# Patient Record
Sex: MALE | Race: WHITE | NOT HISPANIC OR LATINO | Employment: FULL TIME | URBAN - METROPOLITAN AREA
[De-identification: names, ages, dates, MRNs, and addresses within clinical notes are randomized per-mention and may not be internally consistent; named-entity substitution may affect disease eponyms.]

---

## 2017-05-16 ENCOUNTER — TELEPHONE (OUTPATIENT)
Dept: TRANSPLANT | Facility: CLINIC | Age: 32
End: 2017-05-16

## 2017-05-16 NOTE — TELEPHONE ENCOUNTER
Andres-  We have nothing since last August on him. We need at least updated labs (if recently done by nep, that's ok to use). 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   After making sure those are OK, it's fine to do letter.

## 2017-05-16 NOTE — TELEPHONE ENCOUNTER
"Patient contacted, concerns addressed.  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  Patient advised this writer will look into it.  Patient is due for annual visit in 7/2017.  Last visit on 8/8/16.  Patient reporting he needs the letter by tomorrow.  This writer explained there is no guarantee that it will be completed today as other patient issues may supercede this request.  He verbalized "okay".     "

## 2017-05-16 NOTE — TELEPHONE ENCOUNTER
----- Message from Yayo Mg sent at 5/16/2017 12:35 PM CDT -----  Contact: patient   Need to get a release form sent to him he's applying to a new job please call

## 2017-05-17 ENCOUNTER — TELEPHONE (OUTPATIENT)
Dept: TRANSPLANT | Facility: CLINIC | Age: 32
End: 2017-05-17

## 2017-05-17 NOTE — TELEPHONE ENCOUNTER
Patient completed labs at [facility]. No results available at this time, patient contacted and advised.  He verbalized understanding.  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

## 2017-05-17 NOTE — TELEPHONE ENCOUNTER
Received transferred call, patient requesting clearance letter for a new job.  Per Dr. Armstrong, patient requires labs.  Patient request labs be sent to PCP office.  Faxed per request.

## 2017-05-17 NOTE — TELEPHONE ENCOUNTER
----- Message from Ghanshyam Cabezas sent at 5/17/2017  9:29 AM CDT -----  Contact: pt  Need to get a release form sent to him he's applying to a new job please call   [phone] (pt)  [phone] (f)

## 2017-05-18 ENCOUNTER — DOCUMENTATION ONLY (OUTPATIENT)
Dept: TRANSPLANT | Facility: CLINIC | Age: 32
End: 2017-05-18

## 2017-05-18 ENCOUNTER — TELEPHONE (OUTPATIENT)
Dept: TRANSPLANT | Facility: CLINIC | Age: 32
End: 2017-05-18

## 2017-05-18 LAB
EXT ALBUMIN: 4.6
EXT ALKALINE PHOSPHATASE: 62
EXT ALT: 35
EXT AST: 78
EXT BILIRUBIN DIRECT: 0.19 MG/DL
EXT BILIRUBIN TOTAL: 0.7
EXT BUN: 10
EXT CALCIUM: 9.8
EXT CHLORIDE: 105
EXT CO2: 20
EXT CREATININE: 1.34 MG/DL
EXT EOSINOPHIL%: 2
EXT GFR MDRD AF AMER: 79
EXT GFR MDRD NON AF AMER: 69
EXT GLUCOSE: 80
EXT HEMATOCRIT: 42.9
EXT HEMOGLOBIN: 14.4
EXT LYMPH%: 18
EXT MAGNESIUM: 1.8
EXT MONOCYTES%: 6
EXT PHOSPHORUS: 3.1
EXT PLATELETS: 225
EXT POTASSIUM: 4.2
EXT PROT/CREAT RATIO UR: NORMAL
EXT PROTEIN TOTAL: 7.3
EXT SEGS%: 73
EXT SODIUM: 143 MMOL/L
EXT TACROLIMUS LVL: NORMAL
EXT WBC: 6.9

## 2017-05-18 NOTE — PROGRESS NOTES
Results reviewed, and action is needed: AST looks abnormal, but I do not have reference for his lab. Please verify if it is high; If so, he should f/u with PCP.

## 2017-05-18 NOTE — TELEPHONE ENCOUNTER
----- Message from Mercedes Hameed sent at 5/18/2017  8:20 AM CDT -----  Contact: pt  Pt called for lab results, please call back at [phone]

## 2017-05-20 LAB
ALBUMIN SERPL-MCNC: 4.6 G/DL (ref 3.5–5.5)
ALP SERPL-CCNC: 62 IU/L (ref 39–117)
ALT SERPL-CCNC: 35 IU/L (ref 0–44)
AST SERPL-CCNC: 78 IU/L (ref 0–40)
BASOPHILS # BLD AUTO: 0 X10E3/UL (ref 0–0.2)
BASOPHILS NFR BLD AUTO: 1 %
BILIRUB DIRECT SERPL-MCNC: 0.19 MG/DL (ref 0–0.4)
BILIRUB SERPL-MCNC: 0.7 MG/DL (ref 0–1.2)
BUN SERPL-MCNC: 10 MG/DL (ref 6–20)
BUN/CREAT SERPL: 7 (ref 9–20)
CALCIUM SERPL-MCNC: 9.8 MG/DL (ref 8.7–10.2)
CHLORIDE SERPL-SCNC: 105 MMOL/L (ref 96–106)
CO2 SERPL-SCNC: 20 MMOL/L (ref 18–29)
CREAT SERPL-MCNC: 1.34 MG/DL (ref 0.76–1.27)
CREAT UR-MCNC: 187.5 MG/DL
EOSINOPHIL # BLD AUTO: 0.2 X10E3/UL (ref 0–0.4)
EOSINOPHIL NFR BLD AUTO: 2 %
ERYTHROCYTE [DISTWIDTH] IN BLOOD BY AUTOMATED COUNT: 13.7 % (ref 12.3–15.4)
GLUCOSE SERPL-MCNC: 80 MG/DL (ref 65–99)
HCT VFR BLD AUTO: 42.9 % (ref 37.5–51)
HGB BLD-MCNC: 14.4 G/DL (ref 12.6–17.7)
IMM GRANULOCYTES # BLD: 0 X10E3/UL (ref 0–0.1)
IMM GRANULOCYTES NFR BLD: 0 %
LYMPHOCYTES # BLD AUTO: 1.2 X10E3/UL (ref 0.7–3.1)
LYMPHOCYTES NFR BLD AUTO: 18 %
MAGNESIUM SERPL-MCNC: 1.8 MG/DL (ref 1.6–2.3)
MCH RBC QN AUTO: 29.6 PG (ref 26.6–33)
MCHC RBC AUTO-ENTMCNC: 33.6 G/DL (ref 31.5–35.7)
MCV RBC AUTO: 88 FL (ref 79–97)
MONOCYTES # BLD AUTO: 0.4 X10E3/UL (ref 0.1–0.9)
MONOCYTES NFR BLD AUTO: 6 %
NEUTROPHILS # BLD AUTO: 5.1 X10E3/UL (ref 1.4–7)
NEUTROPHILS NFR BLD AUTO: 73 %
PHOSPHATE SERPL-MCNC: 3.1 MG/DL (ref 2.5–4.5)
PLATELET # BLD AUTO: 225 X10E3/UL (ref 150–379)
POTASSIUM SERPL-SCNC: 4.2 MMOL/L (ref 3.5–5.2)
PROT SERPL-MCNC: 7.3 G/DL (ref 6–8.5)
PROT UR-MCNC: 9.9 MG/DL
PROT/CREAT UR: 53 MG/G CREAT (ref 0–200)
RBC # BLD AUTO: 4.86 X10E6/UL (ref 4.14–5.8)
SODIUM SERPL-SCNC: 143 MMOL/L (ref 134–144)
TACROLIMUS BLD-MCNC: 8.7 NG/ML (ref 2–20)
WBC # BLD AUTO: 6.9 X10E3/UL (ref 3.4–10.8)

## 2017-08-30 DIAGNOSIS — Z29.89 NEED FOR PROPHYLACTIC IMMUNOTHERAPY: ICD-10-CM

## 2017-08-30 DIAGNOSIS — Z94.0 LIVING-DONOR KIDNEY TRANSPLANT RECIPIENT: ICD-10-CM

## 2017-08-30 RX ORDER — TACROLIMUS 1 MG/1
CAPSULE ORAL
Qty: 180 CAPSULE | Refills: 11 | Status: SHIPPED | OUTPATIENT
Start: 2017-08-30 | End: 2018-09-06 | Stop reason: SDUPTHER

## 2017-08-30 RX ORDER — MYCOPHENOLATE MOFETIL 250 MG/1
CAPSULE ORAL
Qty: 180 CAPSULE | Refills: 11 | Status: SHIPPED | OUTPATIENT
Start: 2017-08-30 | End: 2018-09-06 | Stop reason: SDUPTHER

## 2018-07-06 ENCOUNTER — DOCUMENTATION ONLY (OUTPATIENT)
Dept: TRANSPLANT | Facility: CLINIC | Age: 33
End: 2018-07-06

## 2018-07-06 LAB
EXT ALBUMIN: NORMAL
EXT ALKALINE PHOSPHATASE: NORMAL
EXT ALT: NORMAL
EXT AMYLASE: NORMAL
EXT ANC: NORMAL
EXT AST: NORMAL
EXT BACTERIA UA: NORMAL
EXT BANDS%: NORMAL
EXT BILIRUBIN DIRECT: NORMAL MG/DL
EXT BILIRUBIN TOTAL: NORMAL
EXT BK VIRUS DNA QN PCR: NORMAL
EXT BK VIRUS DNA QUANT, PCR, URINE: NORMAL
EXT BUN: 11
EXT C PEPTIDE: NORMAL
EXT CALCIUM: NORMAL
EXT CHLORIDE: 104
EXT CHOLESTEROL (LIPID PANEL): NORMAL
EXT CHOLESTEROL: NORMAL
EXT CMV DNA QUANT. BY PCR: NORMAL
EXT CO2: 22
EXT CREATININE: NORMAL MG/DL
EXT CYCLOSPORINE LVL: NORMAL
EXT EBV DNA BY PCR: NORMAL
EXT EBV DNA-COPIES/ML: NORMAL
EXT EOSINOPHIL%: 3
EXT FERRITIN: NORMAL
EXT GFR MDRD AF AMER: NORMAL
EXT GFR MDRD NON AF AMER: NORMAL
EXT GLUCOSE UA: NORMAL
EXT GLUCOSE: NORMAL
EXT HBV DNA QUANT PCR: NORMAL
EXT HCV QUANT: NORMAL
EXT HDL: NORMAL
EXT HEMATOCRIT: 42.8
EXT HEMOGLOBIN A1C: NORMAL
EXT HEMOGLOBIN: 14.2
EXT HEP B S AG: NORMAL
EXT HIV: NORMAL
EXT IMMUNKNOW (STIMULATED): NORMAL
EXT INR: NORMAL
EXT IRON SATURATION: NORMAL
EXT LDH, TOTAL: NORMAL
EXT LDL CHOLESTEROL: NORMAL
EXT LIPASE: NORMAL
EXT LYMPH%: 20
EXT MAGNESIUM: NORMAL
EXT MONOCYTES%: 7
EXT NITRITES UA: NORMAL
EXT PHOSPHORUS: NORMAL
EXT PLATELETS: 202
EXT POTASSIUM: 4.1
EXT PROT/CREAT RATIO UR: NORMAL
EXT PROTEIN TOTAL: 7.4
EXT PROTEIN UA: NORMAL
EXT PT: NORMAL
EXT PTH, INTACT: NORMAL
EXT RBC UA: NORMAL
EXT SEGS%: 70
EXT SERUM IRON: NORMAL
EXT SIROLIMUS LVL: NORMAL
EXT SODIUM: 142 MMOL/L
EXT STOOL CDIFF: NORMAL
EXT STOOL CMV: NORMAL
EXT STOOL CULTURE: NORMAL
EXT STOOL OCP: NORMAL
EXT TACROLIMUS LVL: NORMAL
EXT TIBC: NORMAL
EXT TRIGLYCERIDES: NORMAL
EXT UNSATURATED IRON BINDING CAP.: NORMAL
EXT URIC ACID: NORMAL
EXT URINE CULTURE: NORMAL
EXT VIT D 25 HYDROXY: NORMAL
EXT WBC UA: NORMAL
EXT WBC: 6.8

## 2018-07-09 LAB
ALBUMIN SERPL-MCNC: 4.3 G/DL (ref 3.5–5.5)
ALP SERPL-CCNC: 64 IU/L (ref 39–117)
ALT SERPL-CCNC: 29 IU/L (ref 0–44)
AST SERPL-CCNC: 22 IU/L (ref 0–40)
BASOPHILS # BLD AUTO: 0 X10E3/UL (ref 0–0.2)
BASOPHILS NFR BLD AUTO: 0 %
BILIRUB DIRECT SERPL-MCNC: 0.11 MG/DL (ref 0–0.4)
BILIRUB SERPL-MCNC: 0.4 MG/DL (ref 0–1.2)
BUN SERPL-MCNC: 11 MG/DL (ref 6–20)
BUN/CREAT SERPL: 7 (ref 9–20)
CALCIUM SERPL-MCNC: 9.6 MG/DL (ref 8.7–10.2)
CHLORIDE SERPL-SCNC: 104 MMOL/L (ref 96–106)
CO2 SERPL-SCNC: 22 MMOL/L (ref 20–29)
CREAT SERPL-MCNC: 1.56 MG/DL (ref 0.76–1.27)
CREAT UR-MCNC: 110.8 MG/DL
EGFR IF AFRICAN AMERICAN: 66 ML/MIN/1.73
EOSINOPHIL # BLD AUTO: 0.2 X10E3/UL (ref 0–0.4)
EOSINOPHIL NFR BLD AUTO: 3 %
ERYTHROCYTE [DISTWIDTH] IN BLOOD BY AUTOMATED COUNT: 13.6 % (ref 12.3–15.4)
EST. GFR  (NON AFRICAN AMERICAN): 57 ML/MIN/1.73
GLUCOSE SERPL-MCNC: 104 MG/DL (ref 65–99)
HCT VFR BLD AUTO: 42.8 % (ref 37.5–51)
HGB BLD-MCNC: 14.2 G/DL (ref 13–17.7)
IMM GRANULOCYTES # BLD: 0 X10E3/UL (ref 0–0.1)
IMM GRANULOCYTES NFR BLD: 0 %
LYMPHOCYTES # BLD AUTO: 1.3 X10E3/UL (ref 0.7–3.1)
LYMPHOCYTES NFR BLD AUTO: 20 %
MAGNESIUM SERPL-MCNC: 1.6 MG/DL (ref 1.6–2.3)
MCH RBC QN AUTO: 28.7 PG (ref 26.6–33)
MCHC RBC AUTO-ENTMCNC: 33.2 G/DL (ref 31.5–35.7)
MCV RBC AUTO: 87 FL (ref 79–97)
MONOCYTES # BLD AUTO: 0.5 X10E3/UL (ref 0.1–0.9)
MONOCYTES NFR BLD AUTO: 7 %
NEUTROPHILS # BLD AUTO: 4.7 X10E3/UL (ref 1.4–7)
NEUTROPHILS NFR BLD AUTO: 70 %
PHOSPHATE SERPL-MCNC: 2.8 MG/DL (ref 2.5–4.5)
PLATELET # BLD AUTO: 202 X10E3/UL (ref 150–379)
POTASSIUM SERPL-SCNC: 4.1 MMOL/L (ref 3.5–5.2)
PROT SERPL-MCNC: 7.4 G/DL (ref 6–8.5)
PROT UR-MCNC: 5.6 MG/DL
PROT/CREAT UR: 51 MG/G CREAT (ref 0–200)
RBC # BLD AUTO: 4.94 X10E6/UL (ref 4.14–5.8)
SODIUM SERPL-SCNC: 142 MMOL/L (ref 134–144)
TACROLIMUS BLD-MCNC: 6.4 NG/ML (ref 2–20)
WBC # BLD AUTO: 6.8 X10E3/UL (ref 3.4–10.8)

## 2018-07-11 ENCOUNTER — OFFICE VISIT (OUTPATIENT)
Dept: TRANSPLANT | Facility: CLINIC | Age: 33
End: 2018-07-11
Payer: OTHER GOVERNMENT

## 2018-07-11 VITALS
TEMPERATURE: 98 F | HEIGHT: 72 IN | RESPIRATION RATE: 16 BRPM | BODY MASS INDEX: 31.92 KG/M2 | OXYGEN SATURATION: 99 % | HEART RATE: 68 BPM | DIASTOLIC BLOOD PRESSURE: 90 MMHG | WEIGHT: 235.69 LBS | SYSTOLIC BLOOD PRESSURE: 130 MMHG

## 2018-07-11 DIAGNOSIS — Z29.89 NEED FOR PROPHYLACTIC IMMUNOTHERAPY: Chronic | ICD-10-CM

## 2018-07-11 DIAGNOSIS — D84.9 IMMUNOCOMPROMISED STATE: ICD-10-CM

## 2018-07-11 DIAGNOSIS — N18.30 CKD (CHRONIC KIDNEY DISEASE), STAGE III: Primary | Chronic | ICD-10-CM

## 2018-07-11 DIAGNOSIS — Z94.0 LIVING-DONOR KIDNEY TRANSPLANT RECIPIENT: Chronic | ICD-10-CM

## 2018-07-11 DIAGNOSIS — I10 ESSENTIAL HYPERTENSION: ICD-10-CM

## 2018-07-11 LAB
CREAT UR-MCNC: 54 MG/DL
PROT UR-MCNC: <7 MG/DL
PROT/CREAT RATIO, UR: NORMAL

## 2018-07-11 PROCEDURE — 99214 OFFICE O/P EST MOD 30 MIN: CPT | Mod: S$PBB,,, | Performed by: INTERNAL MEDICINE

## 2018-07-11 PROCEDURE — 99999 PR PBB SHADOW E&M-EST. PATIENT-LVL III: CPT | Mod: PBBFAC,,, | Performed by: INTERNAL MEDICINE

## 2018-07-11 PROCEDURE — 99213 OFFICE O/P EST LOW 20 MIN: CPT | Mod: PBBFAC | Performed by: INTERNAL MEDICINE

## 2018-07-11 PROCEDURE — 84156 ASSAY OF PROTEIN URINE: CPT

## 2018-07-11 NOTE — PROGRESS NOTES
Kidney Post-Transplant Assessment    Referring Physician:   Current Nephrologist: Patricio Abbott    ORGAN: LEFT KIDNEY  Donor Type: living  PHS Increased Risk: no  Cold Ischemia: 19.8 mins  Induction Medications: steroids (prednisone,methylprednisolone,solumedrol,medrol,decadron), campath - alemtuzumab (anti-cd52)    Subjective:     CC:  Reassessment of renal allograft function and management of chronic immunosuppression.    HPI:  Mr. Tripp is a 33 y.o. year old White male who received a living kidney transplant on 7/26/10.  He has CKD stage 3 - GFR 30-59 and his baseline creatinine is between 1.3 to 1.6. He takes mycophenolic acid, prednisone and tacrolimus for maintenance immunosuppression. He denies any recent hospitalizations or ER visits since his previous clinic visit.    No issues to report to me today    No chest pain or SOBV    No claudication    No issues to repor to me today     He feels great and very pleased with the kidney transplant outcome         Current Outpatient Prescriptions on File Prior to Visit   Medication Sig Dispense Refill    mycophenolate (CELLCEPT) 250 mg Cap TAKE 2 CAPSULES BY MOUTH 3 TIMES DAILY. 180 capsule 11    tacrolimus (PROGRAF) 1 MG Cap TAKE 3 CAPSULES BY MOUTH EVERY MORNING, THEN 3 CAPSULES BY MOUTH EVERY EVENING 180 capsule 11     No current facility-administered medications on file prior to visit.         Review of Systems    Skin: no skin rash  CNS; no headaches, blurred vision, seizure, or syncope  ENT: No JVD,  Adenopathies,  nasal congestion. No oral lesions  Cardiac: No chest pain, dyspnea, claudication, edema or palpitations  Respiratory: No SOB, cough, hemoptysis   Gastro-intestinal: No diarrhea, constipation, abdominal pain, nausea, vomit. No ascitis  Genitourinary: no hematuria, dysuria, frequency, frequency  Musculoskeletal: joint pain, arthritis or vasculitic changes  Psych: alert awake, oriented, No cranial nerves deficit.      Objective:       Physical  Exam     BP (!) 130/90 (BP Location: Right arm, Patient Position: Sitting, BP Method: Medium (Automatic))   Pulse 68   Temp 98.1 °F (36.7 °C) (Oral)   Resp 16   Ht 6' (1.829 m)   Wt 106.9 kg (235 lb 10.8 oz)   SpO2 99%   BMI 31.96 kg/m²       Head: normocephalic  Neck: No JVD, cervical axillary, or femoral adenopathies  Heart: no murmurs, Normal s1 and s2, No gallops, no rubs, No murmurs  Lungs; CTA, good respiratory effort, no crackles  Abdomen: soft, non tender, no splenomegaly or hepatomegaly, no massess, no bruits  Extremities: No edema, skin rash, joint pain  SNC: awake, alert oriented. Cranial nerves are intact, no focalized, sensitivity and strength preserved      Labs:  Lab Results   Component Value Date    WBC 6.8 07/05/2018    HGB 14.2 07/05/2018    HCT 42.8 07/05/2018     07/05/2018    K 4.1 07/05/2018     07/05/2018    CO2 22 07/05/2018    BUN 11 07/05/2018    CREATININE 1.56 (H) 07/05/2018    EGFRNONAA 57 (L) 07/05/2018    EGFRIFAFRICA 66 07/05/2018    GLUCOSE 109 07/26/2010    CALCIUM 9.6 07/05/2018    PHOS 2.8 07/05/2018    MG 1.6 07/05/2018    ALBUMIN 4.3 07/05/2018    AST 22 07/05/2018    ALT 29 07/05/2018       Lab Results   Component Value Date    EXTWBC 6.8 07/05/2018    EXTSEGS 70 07/05/2018    EXTPLATELETS 202 07/05/2018    EXTHEMOGLOBI 14.2 07/05/2018    EXTHEMATOCRI 42.8 07/05/2018    EXTCREATININ 1.34 05/17/2017    EXTSODIUM 142 07/05/2018    EXTPOTASSIUM 4.1 07/05/2018    EXTBUN 11 07/05/2018    EXTCO2 22 07/05/2018    EXTCALCIUM 9.8 05/17/2017    EXTPHOSPHORU 3.1 05/17/2017    EXTGLUCOSE 80 05/17/2017    EXTALBUMIN 4.6 05/17/2017    EXTAST 78 05/17/2017    EXTALT 35 05/17/2017    EXTBILITOTAL 0.7 05/17/2017       Lab Results   Component Value Date    EXTTACROLVL pending 05/17/2017    EXTPROTCRE pending 05/17/2017       Labs were reviewed with the patient.    Assessment:     1. CKD (chronic kidney disease), stage III    2. Living-donor kidney transplant recipient -  7/26/10    3. Essential hypertension    4. Chronic immunosuppression with Prograf and Cellcept    5. Immunocompromised state        Plan:     Will repeat pr/cr ratio    Same dose with prograf    MMF at target     Education provided    We discussed briefly interaction with pomegranate: some interaction found and reported to the patient       1. CKD stage: ckd stage 3 with stable creatinine. Will continue follow up with his nephrologist and our transplant clinic. Education provided in appropriate fluid intake, potassium intake. Continue with oral hydration    2. Immunosuppression: will continue with same dose of prograf and MMF for now. Monitor toxicities as per our protocol.  Lab Results   Component Value Date    TACROLIMUS 6.4 07/05/2018      Will closely monitor for toxicities, education provided about adherence to medicines and need to communicate any side effct to the transplant nurse or physician    3. Allograft Function:creatinine at baseline. Encourage fluid intake around 3 lt a day and avoid prolonged exposure to hot weather w/o apprpriate hydration  Lab Results   Component Value Date    CREATININE 1.56 (H) 07/05/2018       4. Hypertension management:  Well controlled at home Continue with home blood pressure monitoring, low salt and healthy life discussed with the patient    5. Metabolic Bone Disease/Secondary Hyperparathyroidism: hillary and phos within the normla parameters , continue with his nephrologist management of elevated PTH calcium and phosphorus as per our center protocol. Will monitor PTH, Vit D level, calcium      Lab Results   Component Value Date     (H) 08/23/2010    CALCIUM 9.6 07/05/2018    PHOS 2.8 07/05/2018       6. Electrolytes: reviewed with the patient, essentially within the normal range no need for acute changes today, will monitor as per our center guidelines     Lab Results   Component Value Date     07/05/2018    K 4.1 07/05/2018     07/05/2018    CO2 22  07/05/2018       7. Anemia: will continue monitoring as per our center guidelines. No indication for acute intervention today     Lab Results   Component Value Date    WBC 6.8 07/05/2018    HGB 14.2 07/05/2018    HCT 42.8 07/05/2018    MCV 87 07/05/2018     07/05/2018       8.Proteinuria: will repeat pr/cr ratio. Specimen last time was not processed correctly.  will continue with pr/cr ratio as per our center guidelines. uable to claculate ratio. No preteina measured. Will repeat in clinic today  Lab Results   Component Value Date    PROTEINURINE 15 08/08/2016    CREATRANDUR 110.8 07/05/2018    UTPCR 0.05 08/08/2016        9. BK virus infection screening: will continue with urine or blood PCR as per our guidelines to prevent BK virus viremia and allograft dysfunction. Last serum pcr in 2013 ws undetectable.   Lab Results   Component Value Date    BKVIRUSDNAUR NEGATIVE 07/17/2013    BKQUANTURINE <250 07/17/2013    BKVIRUSLOG <2.40 07/17/2013    BKVIRUSURINE Urine 07/17/2013         10. Weight education: provided during the clinic visit   There is no height or weight on file to calculate BMI.       11.Patient safety education regarding immunosuppression including prophylaxis posttransplant for CMV, PCP : Education provided about vaccination and prevention of infections    12.  Cytopenias: no significant cytopenias will monitor as per our guidelines. Medicine list reviewed including potential causes of drug-induced cytopenias     Lab Results   Component Value Date    WBC 6.8 07/05/2018    HGB 14.2 07/05/2018    HCT 42.8 07/05/2018    MCV 87 07/05/2018     07/05/2018       13. Post-transplant Prophylaxis; CMV Infection, PJP and Candida mucosistis and other indicated for this particular patient    I spoke with the patient for 30 minutes. More than half dedicated to counseling and education. All questions answered            Follow-up:   Annual follow-up with kidney transplant clinic with repeat labs,  including renal function panel with UA, urine protein/creatinine ratio, and drug trough level q3 months.  Patient also reminded to follow-up with general nephrologist.    Quoc Morris MD       Education:   Material provided to the patient.  Patient reminded to call with any health changes since these can be early signs of significant complications.  Also, I advised the patient to be sure any new medications or changes of old medications are discussed with either a pharmacist or physician knowledgeable with transplant to avoid rejection/drug toxicity related to significant drug interactions.    UNOS Patient Status  Functional Status: 100% - Normal, no complaints, no evidence of disease  Physical Capacity: No Limitations

## 2018-07-11 NOTE — LETTER
July 11, 2018        Patricio Abbott  [address] [address]  Phone: [phone]  Fax: [phone]             [facility]  [address]  [facility] [address]  Phone: [phone]   Patient: Ubaldo Tripp   MR Number: [id number]   YOB: 1985   Date of Visit: 7/11/2018       Dear Dr. Patricio Abbott    Thank you for referring Ubaldo Tripp to me for evaluation. Attached you will find relevant portions of my assessment and plan of care.    If you have questions, please do not hesitate to call me. I look forward to following Ubaldo Tripp along with you.    Sincerely,    Quoc Morris MD    Enclosure    If you would like to receive this communication electronically, please contact [email or web address] or [phone] to request [facility] access.    [facility] is a tool which provides read-only access to select patient information with whom you have a relationship. Its easy to use and provides real time access to review your patients record including encounter summaries, notes, results, and demographic information.    If you feel you have received this communication in error or would no longer like to receive these types of communications, please e-mail [email or web address]

## 2018-09-06 DIAGNOSIS — Z94.0 LIVING-DONOR KIDNEY TRANSPLANT RECIPIENT: ICD-10-CM

## 2018-09-06 DIAGNOSIS — Z29.89 NEED FOR PROPHYLACTIC IMMUNOTHERAPY: ICD-10-CM

## 2018-09-07 RX ORDER — MYCOPHENOLATE MOFETIL 250 MG/1
CAPSULE ORAL
Qty: 180 CAPSULE | Refills: 12 | Status: SHIPPED | OUTPATIENT
Start: 2018-09-07 | End: 2019-09-30 | Stop reason: SDUPTHER

## 2018-09-07 RX ORDER — TACROLIMUS 1 MG/1
CAPSULE ORAL
Qty: 180 CAPSULE | Refills: 12 | Status: SHIPPED | OUTPATIENT
Start: 2018-09-07 | End: 2019-09-30 | Stop reason: SDUPTHER

## 2019-07-05 ENCOUNTER — TELEPHONE (OUTPATIENT)
Dept: TRANSPLANT | Facility: CLINIC | Age: 34
End: 2019-07-05

## 2019-07-05 NOTE — TELEPHONE ENCOUNTER
Called the patient to Find out if he went to do his labs that was scheduled 7/1/2019 He said he  Didn't go because his Gout flared up he said he will  Go 7/9 and the orders was re faxed to his out side lab

## 2019-07-16 ENCOUNTER — TELEPHONE (OUTPATIENT)
Dept: TRANSPLANT | Facility: CLINIC | Age: 34
End: 2019-07-16

## 2019-07-16 NOTE — TELEPHONE ENCOUNTER
Unable to reach patient on his lines, left message for patient to contact us to reschedule his annual lab and urine testing and that [email or web address] states he did not go and to his testing on the rescheduled date he had given of 7/9/19.

## 2019-09-17 ENCOUNTER — TELEPHONE (OUTPATIENT)
Dept: TRANSPLANT | Facility: CLINIC | Age: 34
End: 2019-09-17

## 2019-09-17 NOTE — TELEPHONE ENCOUNTER
Unable to reach patient at listed numbers, message left that he is overdue for his annual apt and labs and to make contact with our office please. Letter also placed in the mail in order to try and reach patient.

## 2019-09-30 DIAGNOSIS — Z94.0 LIVING-DONOR KIDNEY TRANSPLANT RECIPIENT: ICD-10-CM

## 2019-09-30 DIAGNOSIS — Z29.89 NEED FOR PROPHYLACTIC IMMUNOTHERAPY: ICD-10-CM

## 2019-09-30 RX ORDER — MYCOPHENOLATE MOFETIL 250 MG/1
CAPSULE ORAL
Qty: 180 CAPSULE | Refills: 0 | Status: SHIPPED | OUTPATIENT
Start: 2019-09-30 | End: 2019-11-12 | Stop reason: SDUPTHER

## 2019-09-30 RX ORDER — TACROLIMUS 1 MG/1
CAPSULE ORAL
Qty: 180 CAPSULE | Refills: 0 | Status: SHIPPED | OUTPATIENT
Start: 2019-09-30 | End: 2019-11-12 | Stop reason: SDUPTHER

## 2019-09-30 RX ORDER — TACROLIMUS 1 MG/1
CAPSULE ORAL
Qty: 180 CAPSULE | Refills: 12 | OUTPATIENT
Start: 2019-09-30

## 2019-09-30 NOTE — TELEPHONE ENCOUNTER
Have been trying to reach patient, he placed a call for immuno refill. I called him back and left voicemail again for him to please contact our office to schedule labs (no labs since 07/2018 and have not seen patient since 07/2018).  Patient called us back and is set for labs tomorrow morning. Advised patient that we will refill his immuno's for the next 30 days while we wait on labs and to see if he can be deferred for his annual visit or not.

## 2019-09-30 NOTE — TELEPHONE ENCOUNTER
----- Message from Sofia Jones sent at 9/30/2019 10:14 AM CDT -----  Type:  RX Refill Request    Who Called: Xiomara     RX Name and Strength: mycophenolate (CELLCEPT) 250 mg Cap and tacrolimus (PROGRAF) 1 MG Cap    How is the patient currently taking it? (ex. 1XDay):     Is this a 30 day or 90 day RX: 90    Preferred Pharmacy with phone number: [facility] [phone]    Local or Mail Order: local    Ordering Provider:     Would the patient rather a call back or a response via [facility]? c/b    Best Call Back Number:     Additional Information:

## 2019-10-04 ENCOUNTER — TELEPHONE (OUTPATIENT)
Dept: TRANSPLANT | Facility: CLINIC | Age: 34
End: 2019-10-04

## 2019-10-04 LAB
ALBUMIN SERPL-MCNC: 4.4 G/DL (ref 3.5–5.5)
ALP SERPL-CCNC: 74 IU/L (ref 39–117)
ALT SERPL-CCNC: 17 IU/L (ref 0–44)
AST SERPL-CCNC: 17 IU/L (ref 0–40)
BASOPHILS # BLD AUTO: 0 X10E3/UL (ref 0–0.2)
BASOPHILS NFR BLD AUTO: 1 %
BILIRUB DIRECT SERPL-MCNC: 0.16 MG/DL (ref 0–0.4)
BILIRUB SERPL-MCNC: 0.8 MG/DL (ref 0–1.2)
BUN SERPL-MCNC: 9 MG/DL (ref 6–20)
BUN/CREAT SERPL: 6 (ref 9–20)
CALCIUM SERPL-MCNC: 9.5 MG/DL (ref 8.7–10.2)
CHLORIDE SERPL-SCNC: 105 MMOL/L (ref 96–106)
CO2 SERPL-SCNC: 20 MMOL/L (ref 20–29)
CREAT SERPL-MCNC: 1.39 MG/DL (ref 0.76–1.27)
CREAT UR-MCNC: 249.2 MG/DL
EOSINOPHIL # BLD AUTO: 0.2 X10E3/UL (ref 0–0.4)
EOSINOPHIL NFR BLD AUTO: 3 %
ERYTHROCYTE [DISTWIDTH] IN BLOOD BY AUTOMATED COUNT: 13.8 % (ref 12.3–15.4)
GLUCOSE SERPL-MCNC: 131 MG/DL (ref 65–99)
HCT VFR BLD AUTO: 44.8 % (ref 37.5–51)
HGB BLD-MCNC: 14.6 G/DL (ref 13–17.7)
IMM GRANULOCYTES # BLD AUTO: 0 X10E3/UL (ref 0–0.1)
IMM GRANULOCYTES NFR BLD AUTO: 0 %
LYMPHOCYTES # BLD AUTO: 1.1 X10E3/UL (ref 0.7–3.1)
LYMPHOCYTES NFR BLD AUTO: 18 %
MAGNESIUM SERPL-MCNC: 1.6 MG/DL (ref 1.6–2.3)
MCH RBC QN AUTO: 29.3 PG (ref 26.6–33)
MCHC RBC AUTO-ENTMCNC: 32.6 G/DL (ref 31.5–35.7)
MCV RBC AUTO: 90 FL (ref 79–97)
MONOCYTES # BLD AUTO: 0.3 X10E3/UL (ref 0.1–0.9)
MONOCYTES NFR BLD AUTO: 5 %
NEUTROPHILS # BLD AUTO: 4.7 X10E3/UL (ref 1.4–7)
NEUTROPHILS NFR BLD AUTO: 73 %
PHOSPHATE SERPL-MCNC: 2.1 MG/DL (ref 2.5–4.5)
PLATELET # BLD AUTO: 214 X10E3/UL (ref 150–450)
POTASSIUM SERPL-SCNC: 3.8 MMOL/L (ref 3.5–5.2)
PROT SERPL-MCNC: 7.1 G/DL (ref 6–8.5)
PROT UR-MCNC: 10.7 MG/DL
PROT/CREAT UR: 43 MG/G CREAT (ref 0–200)
RBC # BLD AUTO: 4.99 X10E6/UL (ref 4.14–5.8)
SODIUM SERPL-SCNC: 141 MMOL/L (ref 134–144)
TACROLIMUS BLD IA-MCNC: 8.7 NG/ML (ref 2–20)
WBC # BLD AUTO: 6.4 X10E3/UL (ref 3.4–10.8)

## 2019-10-04 NOTE — TELEPHONE ENCOUNTER
"Annual Transplant follow up assessment    Does not qualify for deferment.    Call placed to annual post transplant patient for health assessment and evaluation of need for annual transplant clinic visit.      -Are you following with a General Nephrologist? No, has never seen a local Nephrologist  If so, who, and when was your last visit? Has never seen a local Nephrologist post transplant states " I was never told"    -Have you had any hospitalizations since your last visit? Denies      -What is your current dose of Immunos?   tacro dosage is 3/3  cellcept 500mg twice daily    Who prescribed your last refill? Transplant Dr prescribe    -Do you have any new health problems? Denies    -Have you been told you have any form of Cancer? Denies    -Have you had any recurrent infections? Yearly episode of Bronchitis but only once a year    Broke ankle in June and was in a walking boot with Physical therapy. Placed on Xarelto for treatment of DVT and has follow up with that for eventual discontinuation of med after 6months  "

## 2019-10-07 ENCOUNTER — DOCUMENTATION ONLY (OUTPATIENT)
Dept: TRANSPLANT | Facility: CLINIC | Age: 34
End: 2019-10-07

## 2019-10-07 LAB
CREATININE RANDOM URINE: 249 MG/DL
EXT ALBUMIN: 4.4 (ref 3.5–5.5)
EXT ALKALINE PHOSPHATASE: 74 (ref 39–117)
EXT ALT: 17 (ref 0–44)
EXT AST: 17 (ref 0–40)
EXT BILIRUBIN DIRECT: 0.16 MG/DL (ref 0–0.4)
EXT BILIRUBIN TOTAL: 0.8 (ref 0–1.2)
EXT BUN: 9 (ref 6–20)
EXT CHLORIDE: 105 (ref 96–106)
EXT CO2: 20 (ref 20–29)
EXT CREATININE: 1.39 MG/DL (ref 0.76–1.27)
EXT EOSINOPHIL%: 3
EXT GFR MDRD NON AF AMER: 65
EXT GLUCOSE: 131 (ref 65–99)
EXT HEMATOCRIT: 44.8 (ref 37.5–51)
EXT HEMOGLOBIN: 14.6 (ref 13–17.7)
EXT LYMPH%: 18
EXT MAGNESIUM: 1.6 (ref 1.6–2.3)
EXT MONOCYTES%: 5
EXT PHOSPHORUS: 2.1 (ref 2.5–4.5)
EXT PLATELETS: 214 (ref 150–450)
EXT POTASSIUM: 3.8 (ref 3.5–5.2)
EXT PROTEIN TOTAL: 7.1 (ref 6–8.5)
EXT SEGS%: 73
EXT SODIUM: 141 MMOL/L (ref 134–144)
EXT TACROLIMUS LVL: 8.7 (ref 2–20)
EXT WBC: 6.4 (ref 3.4–10.8)
PROTEIN URINE RANDOM: 10.7 MG/DL
RATIO: 43 MG/G

## 2019-10-08 ENCOUNTER — TELEPHONE (OUTPATIENT)
Dept: TRANSPLANT | Facility: CLINIC | Age: 34
End: 2019-10-08

## 2019-10-08 NOTE — TELEPHONE ENCOUNTER
----- Message from Janeth Meadows sent at 10/8/2019  4:14 PM CDT -----  Contact: pt  Pt was returning phone call.    Pt# [phone]

## 2019-10-14 DIAGNOSIS — Z94.0 KIDNEY REPLACED BY TRANSPLANT: Primary | ICD-10-CM

## 2019-10-21 DIAGNOSIS — Z29.89 NEED FOR PROPHYLACTIC IMMUNOTHERAPY: ICD-10-CM

## 2019-10-21 DIAGNOSIS — Z94.0 LIVING-DONOR KIDNEY TRANSPLANT RECIPIENT: ICD-10-CM

## 2019-10-21 NOTE — TELEPHONE ENCOUNTER
Patient called in and stated he is out of his meds. Made patient aware that his local pharmacy has it ready and it has been ready since 9/30/19. Patient also made aware that he must be seen for any future refills and he verbalized understanding.

## 2019-10-23 ENCOUNTER — TELEPHONE (OUTPATIENT)
Dept: TRANSPLANT | Facility: CLINIC | Age: 34
End: 2019-10-23

## 2019-10-23 DIAGNOSIS — Z29.89 NEED FOR PROPHYLACTIC IMMUNOTHERAPY: ICD-10-CM

## 2019-10-23 DIAGNOSIS — Z94.0 LIVING-DONOR KIDNEY TRANSPLANT RECIPIENT: ICD-10-CM

## 2019-10-23 NOTE — TELEPHONE ENCOUNTER
----- Message from Torri Cleveland RN sent at 10/22/2019 11:26 AM CDT -----  Contact: [facility] [phone]       ----- Message -----  From: Patricia Márquez MA  Sent: 10/22/2019  11:18 AM CDT  To: [facility] Post-Kidney Transplant Clinical    Pt is requesting new 90 day Rx's for home delivery on mycophenolate (CELLCEPT) 250 mg Cap and tacrolimus (PROGRAF) 1 MG Capd.  New Rx would be needed to fill this request              [facility] rep with [facility] [phone]

## 2019-10-23 NOTE — TELEPHONE ENCOUNTER
Script recently sent to local pharmacy and patient has enough to hold him for 1mo, future scripts to [facility] but he needs to come in for visit and labs in November first.

## 2019-11-12 DIAGNOSIS — Z94.0 LIVING-DONOR KIDNEY TRANSPLANT RECIPIENT: ICD-10-CM

## 2019-11-12 DIAGNOSIS — Z29.89 NEED FOR PROPHYLACTIC IMMUNOTHERAPY: ICD-10-CM

## 2019-11-12 RX ORDER — MYCOPHENOLATE MOFETIL 250 MG/1
CAPSULE ORAL
Qty: 180 CAPSULE | Refills: 0 | Status: SHIPPED | OUTPATIENT
Start: 2019-11-12 | End: 2019-12-13 | Stop reason: SDUPTHER

## 2019-11-12 RX ORDER — TACROLIMUS 1 MG/1
CAPSULE ORAL
Qty: 180 CAPSULE | Refills: 0 | Status: SHIPPED | OUTPATIENT
Start: 2019-11-12 | End: 2019-12-13 | Stop reason: SDUPTHER

## 2019-11-15 ENCOUNTER — TELEPHONE (OUTPATIENT)
Dept: TRANSPLANT | Facility: CLINIC | Age: 34
End: 2019-11-15

## 2019-12-13 ENCOUNTER — TELEPHONE (OUTPATIENT)
Dept: TRANSPLANT | Facility: CLINIC | Age: 34
End: 2019-12-13

## 2019-12-13 DIAGNOSIS — Z94.0 LIVING-DONOR KIDNEY TRANSPLANT RECIPIENT: ICD-10-CM

## 2019-12-13 DIAGNOSIS — Z29.89 NEED FOR PROPHYLACTIC IMMUNOTHERAPY: ICD-10-CM

## 2019-12-13 RX ORDER — TACROLIMUS 1 MG/1
CAPSULE ORAL
Qty: 180 CAPSULE | Refills: 0 | Status: SHIPPED | OUTPATIENT
Start: 2019-12-13 | End: 2020-01-03 | Stop reason: SDUPTHER

## 2019-12-13 RX ORDER — MYCOPHENOLATE MOFETIL 250 MG/1
CAPSULE ORAL
Qty: 180 CAPSULE | Refills: 0 | Status: SHIPPED | OUTPATIENT
Start: 2019-12-13 | End: 2020-01-03 | Stop reason: SDUPTHER

## 2019-12-13 NOTE — TELEPHONE ENCOUNTER
Patient notified he will again be given a month supply of his immuno's to hold him to another rescheduled apt on 1/3/19

## 2019-12-13 NOTE — TELEPHONE ENCOUNTER
Reached out to patient, he did not show for his 11/11/19 apt with provider after having been notified in Oct when just one refill of meds was given, that no more refills would be given if he didn't make that apt.  Patient states today that it is difficult for him to get off work. I advised him that we are happy to provide him a letter to his work in order for him to be given a day to be allowed to make an apt. But he instead asked for any apt prior to 1/6 since he will be off for holidays. Gave patient an apt on 1/3/19 at 1130 and he said that was perfect.   I also asked him if he has established himself yet with a local kidney dr after a previous conversation that he had not been going to one, he said not yet. The reason being was that he was waiting on his PCP office to send a referral to one. I advised him to be proactive in following up with this.   I contacted [facility] office myself at [phone] and faxed over to them at [phone] what most recent clinicals we do have and left them my number to contact me if needed.

## 2019-12-17 ENCOUNTER — TELEPHONE (OUTPATIENT)
Dept: TRANSPLANT | Facility: CLINIC | Age: 34
End: 2019-12-17

## 2019-12-17 DIAGNOSIS — Z29.89 NEED FOR PROPHYLACTIC IMMUNOTHERAPY: ICD-10-CM

## 2019-12-17 DIAGNOSIS — Z94.0 LIVING-DONOR KIDNEY TRANSPLANT RECIPIENT: ICD-10-CM

## 2019-12-17 NOTE — TELEPHONE ENCOUNTER
Just determined that due to his not being a [place] resident, our pharmacy cannot mail meds to an out of state, therefore patient requests I send his prescription to [facility]. At this time, patient must be seen for any further refills of immuno's, will send refill to [facility] at that visit on 1/3/19.

## 2019-12-17 NOTE — TELEPHONE ENCOUNTER
----- Message from Tona Leon MD sent at 12/17/2019 12:06 PM CST -----  Refill his med to our pharmacy if patient is michelle to  his med here. Thank you!  ----- Message -----  From: Torri Cleveland RN  Sent: 12/17/2019   9:55 AM CST  To: Tona Leon MD, Anna Quiros, PharmD    Patient uses a small retail pharmacy, please advise. Should I route med to our pharmacy to start filling it for him or switch him to [facility]?    He currently has a 30 day supply.      ----- Message -----  From: Sofia Jones  Sent: 12/17/2019   9:52 AM CST  To: [facility] Post-Kidney Transplant Clinical    calling to inform coordinator that tacrolimus (PROGRAF) 1 MG Cap is on national back order/pt was given 180 pills by pharmacy    pls contact pt to advise    Pt contact [phone]

## 2020-01-03 ENCOUNTER — OFFICE VISIT (OUTPATIENT)
Dept: TRANSPLANT | Facility: CLINIC | Age: 35
End: 2020-01-03
Payer: OTHER GOVERNMENT

## 2020-01-03 ENCOUNTER — LAB VISIT (OUTPATIENT)
Dept: LAB | Facility: HOSPITAL | Age: 35
End: 2020-01-03
Payer: OTHER GOVERNMENT

## 2020-01-03 ENCOUNTER — TELEPHONE (OUTPATIENT)
Dept: TRANSPLANT | Facility: CLINIC | Age: 35
End: 2020-01-03

## 2020-01-03 VITALS
WEIGHT: 242.31 LBS | OXYGEN SATURATION: 99 % | RESPIRATION RATE: 16 BRPM | TEMPERATURE: 97 F | DIASTOLIC BLOOD PRESSURE: 80 MMHG | BODY MASS INDEX: 32.86 KG/M2 | HEART RATE: 65 BPM | SYSTOLIC BLOOD PRESSURE: 139 MMHG

## 2020-01-03 DIAGNOSIS — Z94.0 KIDNEY REPLACED BY TRANSPLANT: ICD-10-CM

## 2020-01-03 DIAGNOSIS — N18.30 CKD (CHRONIC KIDNEY DISEASE), STAGE III: Chronic | ICD-10-CM

## 2020-01-03 DIAGNOSIS — Z94.0 LIVING-DONOR KIDNEY TRANSPLANT RECIPIENT: Primary | Chronic | ICD-10-CM

## 2020-01-03 DIAGNOSIS — Z29.89 NEED FOR PROPHYLACTIC IMMUNOTHERAPY: Chronic | ICD-10-CM

## 2020-01-03 DIAGNOSIS — Z29.89 NEED FOR PROPHYLACTIC IMMUNOTHERAPY: ICD-10-CM

## 2020-01-03 DIAGNOSIS — Z94.0 LIVING-DONOR KIDNEY TRANSPLANT RECIPIENT: ICD-10-CM

## 2020-01-03 DIAGNOSIS — I15.0 RENOVASCULAR HYPERTENSION: ICD-10-CM

## 2020-01-03 DIAGNOSIS — D84.9 IMMUNOCOMPROMISED STATE: ICD-10-CM

## 2020-01-03 LAB
ALBUMIN SERPL BCP-MCNC: 4 G/DL (ref 3.5–5.2)
ALBUMIN SERPL BCP-MCNC: 4 G/DL (ref 3.5–5.2)
ALP SERPL-CCNC: 72 U/L (ref 55–135)
ALT SERPL W/O P-5'-P-CCNC: 26 U/L (ref 10–44)
ANION GAP SERPL CALC-SCNC: 7 MMOL/L (ref 8–16)
AST SERPL-CCNC: 20 U/L (ref 10–40)
BASOPHILS # BLD AUTO: 0.07 K/UL (ref 0–0.2)
BASOPHILS NFR BLD: 0.8 % (ref 0–1.9)
BILIRUB DIRECT SERPL-MCNC: 0.2 MG/DL (ref 0.1–0.3)
BILIRUB SERPL-MCNC: 0.6 MG/DL (ref 0.1–1)
BUN SERPL-MCNC: 6 MG/DL (ref 6–20)
CALCIUM SERPL-MCNC: 9.2 MG/DL (ref 8.7–10.5)
CHLORIDE SERPL-SCNC: 107 MMOL/L (ref 95–110)
CO2 SERPL-SCNC: 24 MMOL/L (ref 23–29)
CREAT SERPL-MCNC: 1.4 MG/DL (ref 0.5–1.4)
DIFFERENTIAL METHOD: NORMAL
EOSINOPHIL # BLD AUTO: 0.3 K/UL (ref 0–0.5)
EOSINOPHIL NFR BLD: 3.5 % (ref 0–8)
ERYTHROCYTE [DISTWIDTH] IN BLOOD BY AUTOMATED COUNT: 12.6 % (ref 11.5–14.5)
EST. GFR  (AFRICAN AMERICAN): >60 ML/MIN/1.73 M^2
EST. GFR  (NON AFRICAN AMERICAN): >60 ML/MIN/1.73 M^2
GLUCOSE SERPL-MCNC: 88 MG/DL (ref 70–110)
HCT VFR BLD AUTO: 49 % (ref 40–54)
HGB BLD-MCNC: 15.7 G/DL (ref 14–18)
IMM GRANULOCYTES # BLD AUTO: 0.04 K/UL (ref 0–0.04)
IMM GRANULOCYTES NFR BLD AUTO: 0.4 % (ref 0–0.5)
LYMPHOCYTES # BLD AUTO: 2.2 K/UL (ref 1–4.8)
LYMPHOCYTES NFR BLD: 24.3 % (ref 18–48)
MAGNESIUM SERPL-MCNC: 1.3 MG/DL (ref 1.6–2.6)
MCH RBC QN AUTO: 29.2 PG (ref 27–31)
MCHC RBC AUTO-ENTMCNC: 32 G/DL (ref 32–36)
MCV RBC AUTO: 91 FL (ref 82–98)
MONOCYTES # BLD AUTO: 0.9 K/UL (ref 0.3–1)
MONOCYTES NFR BLD: 9.8 % (ref 4–15)
NEUTROPHILS # BLD AUTO: 5.5 K/UL (ref 1.8–7.7)
NEUTROPHILS NFR BLD: 61.2 % (ref 38–73)
NRBC BLD-RTO: 0 /100 WBC
PHOSPHATE SERPL-MCNC: 3.3 MG/DL (ref 2.7–4.5)
PLATELET # BLD AUTO: 215 K/UL (ref 150–350)
PMV BLD AUTO: 9.3 FL (ref 9.2–12.9)
POTASSIUM SERPL-SCNC: 4.1 MMOL/L (ref 3.5–5.1)
PROT SERPL-MCNC: 7.8 G/DL (ref 6–8.4)
RBC # BLD AUTO: 5.38 M/UL (ref 4.6–6.2)
SODIUM SERPL-SCNC: 138 MMOL/L (ref 136–145)
TACROLIMUS BLD-MCNC: 8.4 NG/ML (ref 5–15)
WBC # BLD AUTO: 9.04 K/UL (ref 3.9–12.7)

## 2020-01-03 PROCEDURE — 80197 ASSAY OF TACROLIMUS: CPT

## 2020-01-03 PROCEDURE — 99215 PR OFFICE/OUTPT VISIT, EST, LEVL V, 40-54 MIN: ICD-10-PCS | Mod: S$PBB,,, | Performed by: NURSE PRACTITIONER

## 2020-01-03 PROCEDURE — 36415 COLL VENOUS BLD VENIPUNCTURE: CPT

## 2020-01-03 PROCEDURE — 99213 OFFICE O/P EST LOW 20 MIN: CPT | Mod: PBBFAC | Performed by: NURSE PRACTITIONER

## 2020-01-03 PROCEDURE — 84075 ASSAY ALKALINE PHOSPHATASE: CPT

## 2020-01-03 PROCEDURE — 99215 OFFICE O/P EST HI 40 MIN: CPT | Mod: S$PBB,,, | Performed by: NURSE PRACTITIONER

## 2020-01-03 PROCEDURE — 83735 ASSAY OF MAGNESIUM: CPT

## 2020-01-03 PROCEDURE — 99999 PR PBB SHADOW E&M-EST. PATIENT-LVL III: ICD-10-PCS | Mod: PBBFAC,,, | Performed by: NURSE PRACTITIONER

## 2020-01-03 PROCEDURE — 99999 PR PBB SHADOW E&M-EST. PATIENT-LVL III: CPT | Mod: PBBFAC,,, | Performed by: NURSE PRACTITIONER

## 2020-01-03 PROCEDURE — 85025 COMPLETE CBC W/AUTO DIFF WBC: CPT

## 2020-01-03 PROCEDURE — 80069 RENAL FUNCTION PANEL: CPT

## 2020-01-03 RX ORDER — MYCOPHENOLATE MOFETIL 250 MG/1
CAPSULE ORAL
Qty: 180 CAPSULE | Refills: 0 | Status: SHIPPED | OUTPATIENT
Start: 2020-01-03 | End: 2020-01-03 | Stop reason: SDUPTHER

## 2020-01-03 RX ORDER — TACROLIMUS 1 MG/1
CAPSULE ORAL
Qty: 150 CAPSULE | Refills: 11 | Status: SHIPPED | OUTPATIENT
Start: 2020-01-03 | End: 2021-01-11

## 2020-01-03 RX ORDER — MYCOPHENOLATE MOFETIL 250 MG/1
CAPSULE ORAL
Qty: 180 CAPSULE | Refills: 11 | Status: SHIPPED | OUTPATIENT
Start: 2020-01-03 | End: 2021-01-11

## 2020-01-03 NOTE — PROGRESS NOTES
Kidney Post-Transplant Assessment    Referring Physician:   Current Nephrologist: Patricio Abbott    ORGAN: LEFT KIDNEY  Donor Type: living  PHS Increased Risk: no  Cold Ischemia: 19.8 mins  Induction Medications: steroids (prednisone,methylprednisolone,solumedrol,medrol,decadron), campath - alemtuzumab (anti-cd52)    Subjective:     CC:  Reassessment of renal allograft function and management of chronic immunosuppression.    HPI:  Mr. Tripp is a 34 y.o. year old White male who received a living kidney transplant on 7/26/10.  He has CKD stage 2 - GFR 60-89 and his creatinine is ~ 1.3-1.4. He takes mycophenolate mofetil and tacrolimus for maintenance immunosuppression.  Recent hospitalizations or ER visits since his previous clinic visit. 6/2019 --surgery for broken right ankle. Ended up with a blood clot to the right calf. He was on blood thinners for 6 months. Ankle has healed and DVT has resolved. No longer on blood thinners.     Overall feels well. No health concerns today.   Denies chest pain, SOB, leg pain, abdominal pain or LUTs.   has been referred to general nephrology by his PCP.       Past Medical History:   Diagnosis Date    Chronic interstitial nephritis     CKD (chronic kidney disease), stage III 7/17/2013    Hypertension     Immunocompromised state 7/11/2018    Living-donor kidney transplant recipient     Need for prophylactic immunotherapy        Review of Systems   Constitutional: Negative for activity change, appetite change, chills, fatigue, fever and unexpected weight change.   HENT: Negative for congestion, facial swelling, postnasal drip, rhinorrhea, sinus pressure, sore throat and trouble swallowing.               Eyes: Negative for pain, redness and visual disturbance.   Respiratory: Negative for cough, chest tightness, shortness of breath and wheezing.    Cardiovascular: Negative.  Negative for chest pain, palpitations and leg swelling.   Gastrointestinal: Negative for abdominal  pain, diarrhea, nausea and vomiting.   Genitourinary: Negative for dysuria, flank pain and urgency.   Musculoskeletal: Negative for gait problem, neck pain and neck stiffness.   Skin: Negative for rash.   Allergic/Immunologic: Positive for immunocompromised state. Negative for environmental allergies and food allergies.   Neurological: Negative for dizziness, weakness, light-headedness and headaches.   Psychiatric/Behavioral: Negative for agitation and confusion. The patient is not nervous/anxious.        Objective:   Blood pressure 139/80, pulse 65, temperature 96.9 °F (36.1 °C), resp. rate 16, weight 109.9 kg (242 lb 4.6 oz), SpO2 99 %.body mass index is 32.86 kg/m².    Physical Exam   Constitutional: He is oriented to person, place, and time. He appears well-developed and well-nourished.   HENT:   Head: Normocephalic.       Mouth/Throat: Oropharynx is clear and moist. No oropharyngeal exudate.   Eyes: Pupils are equal, round, and reactive to light. Conjunctivae and EOM are normal. No scleral icterus.   Neck: Normal range of motion. Neck supple.   Cardiovascular: Normal rate, regular rhythm and normal heart sounds.   Pulmonary/Chest: Effort normal and breath sounds normal.   Abdominal: Soft. Normal appearance and bowel sounds are normal. He exhibits no distension and no mass. There is no splenomegaly or hepatomegaly. There is no tenderness. There is no rebound, no guarding, no CVA tenderness, no tenderness at McBurney's point and negative Manzanares's sign.       Musculoskeletal: Normal range of motion. He exhibits no edema.   Lymphadenopathy:     He has no cervical adenopathy.   Neurological: He is alert and oriented to person, place, and time. He exhibits normal muscle tone. Coordination normal.   Skin: Skin is warm and dry.   Psychiatric: He has a normal mood and affect. His behavior is normal.   Vitals reviewed.      Labs:  Lab Results   Component Value Date    WBC 8.86 08/08/2016    HGB 15.8 08/08/2016    HCT  48.2 08/08/2016     08/08/2016    K 4.5 08/08/2016     08/08/2016    CO2 26 08/08/2016    BUN 9 08/08/2016    CREATININE 1.3 08/08/2016    EGFRNONAA >60.0 08/08/2016    GLUCOSE 109 07/26/2010    CALCIUM 9.7 08/08/2016    PHOS 2.7 08/08/2016    MG 1.7 07/27/2010    ALBUMIN 4.0 08/08/2016    ALBUMIN 4.0 08/08/2016    AST 25 08/08/2016    ALT 33 08/08/2016       Lab Results   Component Value Date    EXTWBC 6.4 10/03/2019    EXTSEGS 73 10/03/2019    EXTPLATELETS 214 10/03/2019    EXTHEMOGLOBI 14.6 10/03/2019    EXTHEMATOCRI 44.8 10/03/2019    EXTCREATININ 1.39 (A) 10/03/2019    EXTSODIUM 141 10/03/2019    EXTPOTASSIUM 3.8 10/03/2019    EXTBUN 9 10/03/2019    EXTCO2 20 10/03/2019    EXTCALCIUM 9.8 05/17/2017    EXTPHOSPHORU 2.1 (A) 10/03/2019    EXTGLUCOSE 131 (A) 10/03/2019    EXTALBUMIN 4.4 10/03/2019    EXTAST 17 10/03/2019    EXTALT 17 10/03/2019    EXTBILITOTAL 0.8 10/03/2019       Lab Results   Component Value Date    EXTTACROLVL 8.7 10/03/2019    EXTPROTCRE pending 05/17/2017       Labs were reviewed with the patient.    Assessment:     1. Living-donor kidney transplant recipient - 7/26/10    2. Chronic immunosuppression with Prograf and Cellcept    3. Immunocompromised state    4. Renovascular hypertension    5. CKD (chronic kidney disease), stage III        Plan:     Lower prograf 3/2 repeat trough ~ 2 weeks  Encouraged to f/u with General nephrology  As scheduled  Follow-up:   1. CKD stage: 2 stable    2. Immunosuppression: No change, Prograf Trough 8.7 . Lower Prograf 3/2, MMF 500mg BID    Will continue to monitor for drug toxicities.     3. Allograft Function: Stable.  Continue good po hydration . P/CrR-results pending      Lab Results   Component Value Date    CREATININE 1.4 01/03/2020          1/3/2020  9yr 5mo   eGFR if non African American >60 mL/min/1.73 m^2 >60.0  Calculation used to obtain the estimated glomerular filtration  rate (eGFR) is the CKD-EPI equation.          4. Hypertension  management: Stable, advise low salt diet and home BP monitoring    No BP meds    5. Metabolic Bone Disease/Secondary Hyperparathyroidism:stable.    6. Electrolytes: Normal calcium. Bicarbonate is normal    high mg diet encouraged -->MGMT deferred to general nephrology    Lab Results   Component Value Date     (H) 08/23/2010    CALCIUM 9.2 01/03/2020    PHOS 3.3 01/03/2020      1/3/2020  9yr 5mo   Magnesium 1.6 - 2.6 mg/dL 1.3 (A)       7. Anemia: stable. No need for intervention    Lab Results   Component Value Date    WBC 9.04 01/03/2020    HGB 15.7 01/03/2020    HCT 49.0 01/03/2020    MCV 91 01/03/2020     01/03/2020       8.  Cytopenias: no significant cytopenias will monitor as per our guidelines. Medicine list reviewed including potential causes of drug-induced cytopenias    9.Proteinuria: continue p/c ratio as per guidelines P/ CrR- results pending           Follow-up:   Annual follow-up with kidney transplant clinic with repeat labs, including renal function panel with UA, urine protein/creatinine ratio, and drug trough level q3 months.  Patient also reminded to follow-up with general nephrologist.    Isha Landa NP       Education:   Material provided to the patient.  Patient reminded to call with any health changes since these can be early signs of significant complications.  Also, I advised the patient to be sure any new medications or changes of old medications are discussed with either a pharmacist or physician knowledgeable with transplant to avoid rejection/drug toxicity related to significant drug interactions.    UNOS Patient Status  Functional Status: 90% - Able to carry on normal activity: minor symptoms of disease  Physical Capacity: No Limitations

## 2020-01-03 NOTE — LETTER
January 3, 2020                     [address]  [address]  [facility] [address]  Phone: [phone]   Patient: Ubaldo Tripp   MR Number: [id number]   YOB: 1985   Date of Visit: 1/3/2020       Dear      Thank you for referring Ubaldo Tripp to me for evaluation. Attached you will find relevant portions of my assessment and plan of care.    If you have questions, please do not hesitate to call me. I look forward to following Ubaldo Tripp along with you.    Sincerely,    Isha Landa, NP    Enclosure    If you would like to receive this communication electronically, please contact [email or web address] or [phone] to request [employer] access.    [employer] is a tool which provides read-only access to select patient information with whom you have a relationship. Its easy to use and provides real time access to review your patients record including encounter summaries, notes, results, and demographic information.    If you feel you have received this communication in error or would no longer like to receive these types of communications, please e-mail [email or web address]

## 2020-01-03 NOTE — TELEPHONE ENCOUNTER
tacro already altered down from 3/3 to 3/2 per Isha in clinic, patient scheduled for repeat labs on 1/17.

## 2020-01-03 NOTE — TELEPHONE ENCOUNTER
----- Message from Tona Leon MD sent at 1/3/2020 12:25 PM CST -----  Please tac to 2 mg BID if it is a true trough. Check the level in 2-3 weeks. Thank you!

## 2020-02-02 LAB
BUN SERPL-MCNC: 12 MG/DL (ref 6–20)
BUN/CREAT SERPL: 9 (ref 9–20)
CALCIUM SERPL-MCNC: 9.8 MG/DL (ref 8.7–10.2)
CHLORIDE SERPL-SCNC: 106 MMOL/L (ref 96–106)
CO2 SERPL-SCNC: 21 MMOL/L (ref 20–29)
CREAT SERPL-MCNC: 1.33 MG/DL (ref 0.76–1.27)
GLUCOSE SERPL-MCNC: 58 MG/DL (ref 65–99)
POTASSIUM SERPL-SCNC: 4.5 MMOL/L (ref 3.5–5.2)
SODIUM SERPL-SCNC: 142 MMOL/L (ref 134–144)
SPECIMEN STATUS REPORT: NORMAL
TACROLIMUS BLD LC/MS/MS-MCNC: 6.9 NG/ML (ref 2–20)

## 2020-02-03 ENCOUNTER — DOCUMENTATION ONLY (OUTPATIENT)
Dept: TRANSPLANT | Facility: CLINIC | Age: 35
End: 2020-02-03

## 2020-02-03 LAB
EXT BUN: 12 (ref 6–20)
EXT CALCIUM: 9.8 (ref 8.7–10.2)
EXT CHLORIDE: 106 (ref 96–106)
EXT CO2: 21 (ref 20–29)
EXT CREATININE: 1.33 MG/DL (ref 0.76–1.27)
EXT GFR MDRD NON AF AMER: 68
EXT GLUCOSE: 58 (ref 65–99)
EXT POTASSIUM: 4.5 (ref 3.5–5.2)
EXT SODIUM: 142 MMOL/L (ref 134–144)
EXT TACROLIMUS LVL: 6.9 (ref 2–20)

## 2020-12-29 DIAGNOSIS — Z94.0 STATUS POST DECEASED-DONOR KIDNEY TRANSPLANTATION: Primary | ICD-10-CM

## 2020-12-31 ENCOUNTER — TELEPHONE (OUTPATIENT)
Dept: TRANSPLANT | Facility: CLINIC | Age: 35
End: 2020-12-31

## 2020-12-31 NOTE — TELEPHONE ENCOUNTER
----- Message from Allie Auguste sent at 12/31/2020 12:30 PM CST -----  Regarding: Vaccine  Contact: Ubaldo Fowler is calling to speak with nurse coordinator to see if he can take covid vaccine. Leave voicemail if he does not answer.      [phone]

## 2020-12-31 NOTE — TELEPHONE ENCOUNTER
Explained to patient that the Transplant Team had a discussion with Dr. Montgomery in ID and he is encouraging patients to get the COVID vaccine when it becomes available to them. Pt states he has it available and wanted to check with transplant first. I explained that the long term effects are unknown, as they are for anyone. Pt voiced understanding.

## 2021-01-19 ENCOUNTER — TELEPHONE (OUTPATIENT)
Dept: TRANSPLANT | Facility: CLINIC | Age: 36
End: 2021-01-19

## 2021-01-26 ENCOUNTER — EPISODE CHANGES (OUTPATIENT)
Dept: TRANSPLANT | Facility: CLINIC | Age: 36
End: 2021-01-26

## 2021-02-18 LAB
ALBUMIN SERPL-MCNC: 4.7 G/DL (ref 4–5)
ALBUMIN/GLOB SERPL: 1.8 {RATIO} (ref 1.2–2.2)
ALP SERPL-CCNC: 76 IU/L (ref 39–117)
ALT SERPL-CCNC: 17 IU/L (ref 0–44)
AST SERPL-CCNC: 15 IU/L (ref 0–40)
BACTERIA UR CULT: NO GROWTH
BACTERIA UR CULT: NORMAL
BASOPHILS # BLD AUTO: 0.1 X10E3/UL (ref 0–0.2)
BASOPHILS NFR BLD AUTO: 1 %
BILIRUB SERPL-MCNC: 0.5 MG/DL (ref 0–1.2)
BUN SERPL-MCNC: 11 MG/DL (ref 6–20)
BUN/CREAT SERPL: 9 (ref 9–20)
CALCIUM SERPL-MCNC: 9.5 MG/DL (ref 8.7–10.2)
CHLORIDE SERPL-SCNC: 105 MMOL/L (ref 96–106)
CO2 SERPL-SCNC: 18 MMOL/L (ref 20–29)
CREAT SERPL-MCNC: 1.29 MG/DL (ref 0.76–1.27)
CREAT UR-MCNC: 147.4 MG/DL
EOSINOPHIL # BLD AUTO: 0.2 X10E3/UL (ref 0–0.4)
EOSINOPHIL NFR BLD AUTO: 3 %
ERYTHROCYTE [DISTWIDTH] IN BLOOD BY AUTOMATED COUNT: 12.6 % (ref 11.6–15.4)
GLOBULIN SER CALC-MCNC: 2.6 G/DL (ref 1.5–4.5)
GLUCOSE SERPL-MCNC: 94 MG/DL (ref 65–99)
HCT VFR BLD AUTO: 47.3 % (ref 37.5–51)
HGB BLD-MCNC: 15.6 G/DL (ref 13–17.7)
IMM GRANULOCYTES # BLD AUTO: 0 X10E3/UL (ref 0–0.1)
IMM GRANULOCYTES NFR BLD AUTO: 0 %
LYMPHOCYTES # BLD AUTO: 1.2 X10E3/UL (ref 0.7–3.1)
LYMPHOCYTES NFR BLD AUTO: 19 %
MCH RBC QN AUTO: 29.2 PG (ref 26.6–33)
MCHC RBC AUTO-ENTMCNC: 33 G/DL (ref 31.5–35.7)
MCV RBC AUTO: 89 FL (ref 79–97)
MONOCYTES # BLD AUTO: 0.5 X10E3/UL (ref 0.1–0.9)
MONOCYTES NFR BLD AUTO: 8 %
NEUTROPHILS # BLD AUTO: 4.4 X10E3/UL (ref 1.4–7)
NEUTROPHILS NFR BLD AUTO: 69 %
PHOSPHATE SERPL-MCNC: 2.8 MG/DL (ref 2.8–4.1)
PLATELET # BLD AUTO: 176 X10E3/UL (ref 150–450)
POTASSIUM SERPL-SCNC: 4.7 MMOL/L (ref 3.5–5.2)
PROT SERPL-MCNC: 7.3 G/DL (ref 6–8.5)
PROT UR-MCNC: 17.6 MG/DL
PROT/CREAT UR: 119 MG/G CREAT (ref 0–200)
RBC # BLD AUTO: 5.34 X10E6/UL (ref 4.14–5.8)
SODIUM SERPL-SCNC: 139 MMOL/L (ref 134–144)
SPECIMEN STATUS REPORT: NORMAL
TACROLIMUS BLD LC/MS/MS-MCNC: 4.1 NG/ML (ref 2–20)
WBC # BLD AUTO: 6.4 X10E3/UL (ref 3.4–10.8)

## 2021-02-22 PROBLEM — I82.409 DEEP VENOUS THROMBOSIS OF LOWER EXTREMITY: Status: ACTIVE | Noted: 2019-09-03

## 2021-02-22 RX ORDER — SODIUM BICARBONATE 650 MG/1
650 TABLET ORAL 2 TIMES DAILY
Qty: 60 TABLET | Refills: 11 | Status: ON HOLD | OUTPATIENT
Start: 2021-02-22 | End: 2023-12-22

## 2021-02-25 ENCOUNTER — TELEPHONE (OUTPATIENT)
Dept: TRANSPLANT | Facility: CLINIC | Age: 36
End: 2021-02-25

## 2021-12-14 ENCOUNTER — TELEPHONE (OUTPATIENT)
Dept: TRANSPLANT | Facility: CLINIC | Age: 36
End: 2021-12-14
Payer: OTHER GOVERNMENT

## 2023-12-15 ENCOUNTER — TELEPHONE (OUTPATIENT)
Dept: TRANSPLANT | Facility: CLINIC | Age: 38
End: 2023-12-15
Payer: OTHER GOVERNMENT

## 2023-12-15 NOTE — TELEPHONE ENCOUNTER
Return call to Dr Robertson asking if he needs to speak to a MD. States no need to speak to a MD. States patient presently in the hospital in [facility] after C/O Headache X 4 days, Admit Hypertensive urgency and GENNY, Creatinine 3.7. Patient out of all meds X 1 month. \[facility]\"" plan D/C on Monday 12/18. Asking the transplant team to schedule patient in clinic for possible outpatient Biopsy.  Dr Robertson faxing labs and progress notes to .  ----- Message from Isha Landa NP sent at 12/15/2023  3:28 PM CST -----  Regarding: FW: Speak to provider  Contact: Snow    ----- Message -----  From: Samantha Llanes  Sent: 12/15/2023   2:30 PM CST  To: Isha Landa NP  Subject: Speak to provider                                Regarding: Speak to provider        Name Of Caller: Snow        Contact Preference: [phone] ( Dr Robertson)           Nature of call: Dr [facility]  is calling to speak to provider, in regards to pt status. They believe he is in rejection. I noyified them that he has not had a visit with you since 01/2020, states that any assistance would help.

## 2023-12-18 ENCOUNTER — HOSPITAL ENCOUNTER (INPATIENT)
Facility: HOSPITAL | Age: 38
LOS: 5 days | Discharge: HOME | DRG: 699 | End: 2023-12-23
Attending: HOSPITALIST | Admitting: HOSPITALIST
Payer: OTHER GOVERNMENT

## 2023-12-18 DIAGNOSIS — Z91.89 AT RISK FOR OPPORTUNISTIC INFECTIONS: ICD-10-CM

## 2023-12-18 DIAGNOSIS — N17.9 ACUTE RENAL FAILURE: ICD-10-CM

## 2023-12-18 DIAGNOSIS — N18.4 CKD (CHRONIC KIDNEY DISEASE) STAGE 4, GFR 15-29 ML/MIN: ICD-10-CM

## 2023-12-18 DIAGNOSIS — R07.9 CHEST PAIN: ICD-10-CM

## 2023-12-18 DIAGNOSIS — N17.9 ACUTE RENAL FAILURE, UNSPECIFIED ACUTE RENAL FAILURE TYPE: ICD-10-CM

## 2023-12-18 DIAGNOSIS — T86.11 ACUTE REJECTION OF KIDNEY TRANSPLANT: Primary | ICD-10-CM

## 2023-12-18 DIAGNOSIS — Z79.60 LONG-TERM USE OF IMMUNOSUPPRESSANT MEDICATION: ICD-10-CM

## 2023-12-18 DIAGNOSIS — I15.0 RENOVASCULAR HYPERTENSION: ICD-10-CM

## 2023-12-18 DIAGNOSIS — Z94.0 LIVING-DONOR KIDNEY TRANSPLANT RECIPIENT: Chronic | ICD-10-CM

## 2023-12-18 DIAGNOSIS — N04.9 NEPHROTIC SYNDROME: ICD-10-CM

## 2023-12-18 DIAGNOSIS — Z29.89 NEED FOR PROPHYLACTIC IMMUNOTHERAPY: ICD-10-CM

## 2023-12-18 DIAGNOSIS — Z29.89 PROPHYLACTIC IMMUNOTHERAPY: ICD-10-CM

## 2023-12-18 PROBLEM — I82.409 DEEP VENOUS THROMBOSIS OF LOWER EXTREMITY: Status: RESOLVED | Noted: 2019-09-03 | Resolved: 2023-12-18

## 2023-12-18 LAB
ALBUMIN SERPL BCP-MCNC: 2.2 G/DL (ref 3.5–5.2)
ALP SERPL-CCNC: 98 U/L (ref 55–135)
ALT SERPL W/O P-5'-P-CCNC: 23 U/L (ref 10–44)
ANION GAP SERPL CALC-SCNC: 8 MMOL/L (ref 8–16)
AST SERPL-CCNC: 24 U/L (ref 10–40)
BASOPHILS # BLD AUTO: 0.05 K/UL (ref 0–0.2)
BASOPHILS NFR BLD: 0.3 % (ref 0–1.9)
BILIRUB SERPL-MCNC: 0.6 MG/DL (ref 0.1–1)
BUN SERPL-MCNC: 91 MG/DL (ref 6–20)
CALCIUM SERPL-MCNC: 8.1 MG/DL (ref 8.7–10.5)
CHLORIDE SERPL-SCNC: 107 MMOL/L (ref 95–110)
CO2 SERPL-SCNC: 20 MMOL/L (ref 23–29)
CREAT SERPL-MCNC: 3.9 MG/DL (ref 0.5–1.4)
CREAT UR-MCNC: 184 MG/DL (ref 23–375)
DIFFERENTIAL METHOD: ABNORMAL
EOSINOPHIL # BLD AUTO: 0.1 K/UL (ref 0–0.5)
EOSINOPHIL NFR BLD: 0.6 % (ref 0–8)
ERYTHROCYTE [DISTWIDTH] IN BLOOD BY AUTOMATED COUNT: 13.1 % (ref 11.5–14.5)
EST. GFR  (NO RACE VARIABLE): 19.1 ML/MIN/1.73 M^2
GLUCOSE SERPL-MCNC: 98 MG/DL (ref 70–110)
HCT VFR BLD AUTO: 46.2 % (ref 40–54)
HGB BLD-MCNC: 15.4 G/DL (ref 14–18)
IMM GRANULOCYTES # BLD AUTO: 0.18 K/UL (ref 0–0.04)
IMM GRANULOCYTES NFR BLD AUTO: 1.2 % (ref 0–0.5)
LYMPHOCYTES # BLD AUTO: 1.8 K/UL (ref 1–4.8)
LYMPHOCYTES NFR BLD: 12.5 % (ref 18–48)
MAGNESIUM SERPL-MCNC: 2 MG/DL (ref 1.6–2.6)
MCH RBC QN AUTO: 29.1 PG (ref 27–31)
MCHC RBC AUTO-ENTMCNC: 33.3 G/DL (ref 32–36)
MCV RBC AUTO: 87 FL (ref 82–98)
MONOCYTES # BLD AUTO: 1.5 K/UL (ref 0.3–1)
MONOCYTES NFR BLD: 10.6 % (ref 4–15)
NEUTROPHILS # BLD AUTO: 10.8 K/UL (ref 1.8–7.7)
NEUTROPHILS NFR BLD: 74.8 % (ref 38–73)
NRBC BLD-RTO: 0 /100 WBC
PHOSPHATE SERPL-MCNC: 6.1 MG/DL (ref 2.7–4.5)
PLATELET # BLD AUTO: 171 K/UL (ref 150–450)
PLATELET BLD QL SMEAR: ABNORMAL
PMV BLD AUTO: 9.8 FL (ref 9.2–12.9)
POTASSIUM SERPL-SCNC: 4.3 MMOL/L (ref 3.5–5.1)
PROT SERPL-MCNC: 5.7 G/DL (ref 6–8.4)
PROT UR-MCNC: 1601 MG/DL (ref 0–15)
PROT/CREAT UR: 8.7 MG/G{CREAT} (ref 0–0.2)
RBC # BLD AUTO: 5.29 M/UL (ref 4.6–6.2)
SODIUM SERPL-SCNC: 135 MMOL/L (ref 136–145)
WBC # BLD AUTO: 14.45 K/UL (ref 3.9–12.7)

## 2023-12-18 PROCEDURE — 84100 ASSAY OF PHOSPHORUS: CPT | Performed by: INTERNAL MEDICINE

## 2023-12-18 PROCEDURE — 25000003 PHARM REV CODE 250: Performed by: INTERNAL MEDICINE

## 2023-12-18 PROCEDURE — 80197 ASSAY OF TACROLIMUS: CPT | Performed by: INTERNAL MEDICINE

## 2023-12-18 PROCEDURE — 99233 PR SUBSEQUENT HOSPITAL CARE,LEVL III: ICD-10-PCS | Mod: ,,, | Performed by: PHYSICIAN ASSISTANT

## 2023-12-18 PROCEDURE — 85025 COMPLETE CBC W/AUTO DIFF WBC: CPT | Performed by: INTERNAL MEDICINE

## 2023-12-18 PROCEDURE — 83735 ASSAY OF MAGNESIUM: CPT | Performed by: INTERNAL MEDICINE

## 2023-12-18 PROCEDURE — 99223 1ST HOSP IP/OBS HIGH 75: CPT | Mod: ,,, | Performed by: PHYSICIAN ASSISTANT

## 2023-12-18 PROCEDURE — 63600175 PHARM REV CODE 636 W HCPCS: Performed by: INTERNAL MEDICINE

## 2023-12-18 PROCEDURE — 80053 COMPREHEN METABOLIC PANEL: CPT | Performed by: INTERNAL MEDICINE

## 2023-12-18 PROCEDURE — 20600001 HC STEP DOWN PRIVATE ROOM

## 2023-12-18 PROCEDURE — 99233 SBSQ HOSP IP/OBS HIGH 50: CPT | Mod: ,,, | Performed by: PHYSICIAN ASSISTANT

## 2023-12-18 PROCEDURE — 36415 COLL VENOUS BLD VENIPUNCTURE: CPT | Performed by: INTERNAL MEDICINE

## 2023-12-18 PROCEDURE — 82570 ASSAY OF URINE CREATININE: CPT | Performed by: PHYSICIAN ASSISTANT

## 2023-12-18 PROCEDURE — 99223 PR INITIAL HOSPITAL CARE,LEVL III: ICD-10-PCS | Mod: ,,, | Performed by: PHYSICIAN ASSISTANT

## 2023-12-18 RX ORDER — CARVEDILOL 12.5 MG/1
12.5 TABLET ORAL 2 TIMES DAILY
Status: DISCONTINUED | OUTPATIENT
Start: 2023-12-18 | End: 2023-12-23 | Stop reason: HOSPADM

## 2023-12-18 RX ORDER — CARVEDILOL 12.5 MG/1
12.5 TABLET ORAL 2 TIMES DAILY
Status: DISCONTINUED | OUTPATIENT
Start: 2023-12-18 | End: 2023-12-18

## 2023-12-18 RX ORDER — HEPARIN SODIUM 5000 [USP'U]/ML
5000 INJECTION, SOLUTION INTRAVENOUS; SUBCUTANEOUS EVERY 8 HOURS
Status: DISCONTINUED | OUTPATIENT
Start: 2023-12-18 | End: 2023-12-22

## 2023-12-18 RX ORDER — TACROLIMUS 1 MG/1
2 CAPSULE ORAL EVERY EVENING
Status: DISCONTINUED | OUTPATIENT
Start: 2023-12-18 | End: 2023-12-21

## 2023-12-18 RX ORDER — NIFEDIPINE 30 MG/1
90 TABLET, EXTENDED RELEASE ORAL DAILY
Status: DISCONTINUED | OUTPATIENT
Start: 2023-12-18 | End: 2023-12-18

## 2023-12-18 RX ORDER — CLONIDINE HYDROCHLORIDE 0.1 MG/1
0.1 TABLET ORAL EVERY 6 HOURS PRN
Status: DISCONTINUED | OUTPATIENT
Start: 2023-12-18 | End: 2023-12-23 | Stop reason: HOSPADM

## 2023-12-18 RX ORDER — MYCOPHENOLATE MOFETIL 250 MG/1
1000 CAPSULE ORAL 3 TIMES DAILY
Status: DISCONTINUED | OUTPATIENT
Start: 2023-12-18 | End: 2023-12-18

## 2023-12-18 RX ORDER — MYCOPHENOLATE MOFETIL 250 MG/1
1000 CAPSULE ORAL 2 TIMES DAILY
Status: DISCONTINUED | OUTPATIENT
Start: 2023-12-18 | End: 2023-12-23 | Stop reason: HOSPADM

## 2023-12-18 RX ORDER — GLUCAGON 1 MG
1 KIT INJECTION
Status: DISCONTINUED | OUTPATIENT
Start: 2023-12-18 | End: 2023-12-23 | Stop reason: HOSPADM

## 2023-12-18 RX ORDER — NIFEDIPINE 30 MG/1
30 TABLET, EXTENDED RELEASE ORAL 2 TIMES DAILY
Status: DISCONTINUED | OUTPATIENT
Start: 2023-12-18 | End: 2023-12-19

## 2023-12-18 RX ORDER — SODIUM CHLORIDE 0.9 % (FLUSH) 0.9 %
10 SYRINGE (ML) INJECTION EVERY 12 HOURS PRN
Status: DISCONTINUED | OUTPATIENT
Start: 2023-12-18 | End: 2023-12-23 | Stop reason: HOSPADM

## 2023-12-18 RX ORDER — IBUPROFEN 200 MG
24 TABLET ORAL
Status: DISCONTINUED | OUTPATIENT
Start: 2023-12-18 | End: 2023-12-23 | Stop reason: HOSPADM

## 2023-12-18 RX ORDER — ACETAMINOPHEN 325 MG/1
650 TABLET ORAL EVERY 4 HOURS PRN
Status: DISCONTINUED | OUTPATIENT
Start: 2023-12-18 | End: 2023-12-23 | Stop reason: HOSPADM

## 2023-12-18 RX ORDER — IPRATROPIUM BROMIDE AND ALBUTEROL SULFATE 2.5; .5 MG/3ML; MG/3ML
3 SOLUTION RESPIRATORY (INHALATION) EVERY 6 HOURS PRN
Status: DISCONTINUED | OUTPATIENT
Start: 2023-12-18 | End: 2023-12-23 | Stop reason: HOSPADM

## 2023-12-18 RX ORDER — SIMETHICONE 80 MG
1 TABLET,CHEWABLE ORAL 4 TIMES DAILY PRN
Status: DISCONTINUED | OUTPATIENT
Start: 2023-12-18 | End: 2023-12-23 | Stop reason: HOSPADM

## 2023-12-18 RX ORDER — NIFEDIPINE 30 MG/1
30 TABLET, EXTENDED RELEASE ORAL 2 TIMES DAILY
Status: DISCONTINUED | OUTPATIENT
Start: 2023-12-19 | End: 2023-12-18

## 2023-12-18 RX ORDER — ONDANSETRON 2 MG/ML
4 INJECTION INTRAMUSCULAR; INTRAVENOUS EVERY 8 HOURS PRN
Status: DISCONTINUED | OUTPATIENT
Start: 2023-12-18 | End: 2023-12-23 | Stop reason: HOSPADM

## 2023-12-18 RX ORDER — HYDRALAZINE HYDROCHLORIDE 25 MG/1
25 TABLET, FILM COATED ORAL EVERY 6 HOURS PRN
Status: DISCONTINUED | OUTPATIENT
Start: 2023-12-18 | End: 2023-12-23 | Stop reason: HOSPADM

## 2023-12-18 RX ORDER — IBUPROFEN 200 MG
16 TABLET ORAL
Status: DISCONTINUED | OUTPATIENT
Start: 2023-12-18 | End: 2023-12-23 | Stop reason: HOSPADM

## 2023-12-18 RX ORDER — NALOXONE HCL 0.4 MG/ML
0.02 VIAL (ML) INJECTION
Status: DISCONTINUED | OUTPATIENT
Start: 2023-12-18 | End: 2023-12-23 | Stop reason: HOSPADM

## 2023-12-18 RX ORDER — PREDNISONE 5 MG/1
5 TABLET ORAL DAILY
Status: DISCONTINUED | OUTPATIENT
Start: 2023-12-19 | End: 2023-12-22

## 2023-12-18 RX ORDER — TACROLIMUS 1 MG/1
3 CAPSULE ORAL EVERY MORNING
Status: DISCONTINUED | OUTPATIENT
Start: 2023-12-19 | End: 2023-12-20

## 2023-12-18 RX ORDER — TALC
6 POWDER (GRAM) TOPICAL NIGHTLY PRN
Status: DISCONTINUED | OUTPATIENT
Start: 2023-12-18 | End: 2023-12-23 | Stop reason: HOSPADM

## 2023-12-18 RX ORDER — AMOXICILLIN 250 MG
1 CAPSULE ORAL 2 TIMES DAILY
Status: DISCONTINUED | OUTPATIENT
Start: 2023-12-18 | End: 2023-12-23 | Stop reason: HOSPADM

## 2023-12-18 RX ORDER — MAG HYDROX/ALUMINUM HYD/SIMETH 200-200-20
30 SUSPENSION, ORAL (FINAL DOSE FORM) ORAL 4 TIMES DAILY PRN
Status: DISCONTINUED | OUTPATIENT
Start: 2023-12-18 | End: 2023-12-23 | Stop reason: HOSPADM

## 2023-12-18 RX ORDER — MYCOPHENOLATE MOFETIL 250 MG/1
500 CAPSULE ORAL 3 TIMES DAILY
Status: DISCONTINUED | OUTPATIENT
Start: 2023-12-18 | End: 2023-12-18

## 2023-12-18 RX ADMIN — TACROLIMUS 2 MG: 1 CAPSULE ORAL at 05:12

## 2023-12-18 RX ADMIN — CARVEDILOL 12.5 MG: 12.5 TABLET, FILM COATED ORAL at 05:12

## 2023-12-18 RX ADMIN — NIFEDIPINE 30 MG: 30 TABLET, FILM COATED, EXTENDED RELEASE ORAL at 05:12

## 2023-12-18 RX ADMIN — HEPARIN SODIUM 5000 UNITS: 5000 INJECTION INTRAVENOUS; SUBCUTANEOUS at 09:12

## 2023-12-18 RX ADMIN — SENNOSIDES AND DOCUSATE SODIUM 1 TABLET: 8.6; 5 TABLET ORAL at 09:12

## 2023-12-18 RX ADMIN — MYCOPHENOLATE MOFETIL 1000 MG: 250 CAPSULE ORAL at 09:12

## 2023-12-18 NOTE — HPI
Mr. Qasim Tripp is a 38 y.o. year old white male who received a living kidney transplant on 7/26/10 (etiology of ESRD unknown per patient). Last follow up with transplant team was 1/3/20 at which time patient had CKD stage 2 - GFR 60-89 with baseline creatinine ~ 1.3-1.4. Patient lives in [place] and reports work () got busy so he stopped going to labs/appointments. He has been on mycophenolate mofetil and tacrolimus for maintenance immunosuppression, but he admits to running out of medications ~5 weeks ago. He states he did not have time to schedule a visit with a nephrologist, so he was unable to obtain his meds due to need for a new script/refill from a nephrologist. Patient presented to an ED in [place] with HA and HTN (SBP <200) on Monday, 12/11. Patient reports a history of HTN pre transplant, but reports his BP has been well controlled without need for anti-hypertensive's post op until now. Patient was prescribed Lisinopril on 12/11 and discharged home. He presented again to the ED 12/13 with HA and HTN, GENNY, and he was admitted. OSH record transfer is pending, but patient reports he was treated empirically for rejection with IV steroids. On admit to [facility], he reports feeling better overall and denies fever, chills, SOB, CP, edema, decreased UOP. He does admit to dark urine x a few weeks and decreased appetite starting yesterday. Labs notable for Cr 3.9, Phos 6.1. CBC is pending. Admitted to  with KTM consult. Recommend obtaining DSA, PTH, UPC, kidney transplant US, and consulting IR for kidney transplant biopsy tomorrow, 12/19. Continue Prograf 3 mg AM 2 mg PM, Cellcept 1000 mg BID, and Prednisone 5 mg daily for now. D/w Dr. Morris.

## 2023-12-18 NOTE — CONSULTS
Biopsy Consult Note  Interventional Radiology    Consult Requested By: Preston Montgomery MD  Reason for Consult: kidney biospy    SUBJECTIVE:     Chief Complaint:  s/p Ktx with GENNY, concern for rejection    History of Present Illness:  Ubaldo Tripp is a 38 y.o. male with a PMHx of CKD s/p LRKTx in 2010, HTN who was admitted to OSH for HTN urgency and GENNY. Pt was then transferred to [facility] on 12/18/23 for higher level of care and KTM consult. Interventional Radiology has been consulted for US guided random transplant kidney biopsy. Per chart, pt has not taken his anti rejection meds in over 1 month. He underwent treatment with steroids and resumed his anti rejection meds at the OSH without improvement in his Cr. He had kidney US performed at OSH which reportedly revealed transplant hydronephrosis; repeat kidney US currently pending. Cr on presentation to OSH was 3.7; repeat labs from today currently pending. Unclear what baseline Cr is. He does not take any blood thinners.    Scheduled Meds:   heparin (porcine)  5,000 Units Subcutaneous Q8H    mycophenolate  500 mg Oral TID    senna-docusate 8.6-50 mg  1 tablet Oral BID    tacrolimus  2 mg Oral Daily PM    [START ON 12/19/2023] tacrolimus  3 mg Oral Daily AM     Continuous Infusions:  PRN Meds:acetaminophen, albuterol-ipratropium, aluminum-magnesium hydroxide-simethicone, cloNIDine, dextrose 10%, dextrose 10%, glucagon (human recombinant), glucose, glucose, hydrALAZINE, melatonin, naloxone, ondansetron, simethicone, sodium chloride 0.9%    Review of patient's allergies indicates:   Allergen Reactions    Penicillins      Other reaction(s): Hives  Other reaction(s): Edema       Past Medical History:   Diagnosis Date    Chronic interstitial nephritis     CKD (chronic kidney disease), stage III 7/17/2013    Hypertension     Immunocompromised state 7/11/2018    Living-donor kidney transplant recipient     Need for prophylactic immunotherapy      Past Surgical History:  "  Procedure Laterality Date    KIDNEY TRANSPLANT      PERITONEAL CATHETER REMOVAL       Family History   Problem Relation Age of Onset    Stroke Mother     Heart disease Father     Kidney disease Neg Hx     Hypertension Neg Hx     Diabetes Neg Hx      Social History     Tobacco Use    Smoking status: Former     Current packs/day: 0.00     Average packs/day: 0.5 packs/day for 15.0 years (7.5 ttl pk-yrs)     Types: Cigarettes     Start date: 1995     Quit date: 2010     Years since quittin.4    Smokeless tobacco: Never   Substance Use Topics    Alcohol use: No    Drug use: No       OBJECTIVE:     Vital Signs (Most Recent)  Temp: 98.4 °F (36.9 °C) (23 1421)  Pulse: 73 (23 1421)  Resp: 18 (23 142)  BP: (!) 170/103 (23 142)  SpO2: 98 % (23 142)    Physical Exam:  Physical Exam  Vitals and nursing note reviewed.   Constitutional:       General: He is not in acute distress.     Appearance: He is not ill-appearing.   HENT:      Head: Normocephalic and atraumatic.      Right Ear: External ear normal.      Left Ear: External ear normal.   Eyes:      Extraocular Movements: Extraocular movements intact.      Conjunctiva/sclera: Conjunctivae normal.      Pupils: Pupils are equal, round, and reactive to light.   Pulmonary:      Effort: Pulmonary effort is normal. No respiratory distress.   Abdominal:      General: Abdomen is flat. There is no distension.   Skin:     General: Skin is warm and dry.      Coloration: Skin is not jaundiced.   Neurological:      General: No focal deficit present.      Mental Status: He is alert and oriented to person, place, and time.   Psychiatric:         Mood and Affect: Mood normal.         Behavior: Behavior normal.         Thought Content: Thought content normal.         Judgment: Judgment normal.         Laboratory  I have reviewed all pertinent lab results within the past 24 hours.  CBC: No results for input(s): "WBC", "RBC", "HGB", "HCT", "PLT", " ""MCV", "MCH", "MCHC" in the last 168 hours.  BMP: No results for input(s): "GLU", "NA", "K", "CL", "CO2", "BUN", "CREATININE", "CALCIUM", "MG" in the last 168 hours.  CMP: No results for input(s): "GLU", "CALCIUM", "ALBUMIN", "PROT", "NA", "K", "CO2", "CL", "BUN", "CREATININE", "ALKPHOS", "ALT", "AST", "BILITOT" in the last 168 hours.  LFTs: No results for input(s): "ALT", "AST", "ALKPHOS", "BILITOT", "PROT", "ALBUMIN" in the last 168 hours.  Coagulation: No results for input(s): "LABPROT", "INR", "APTT" in the last 168 hours.  Microbiology Results (last 7 days)       ** No results found for the last 168 hours. **            ASA/Mallampati  ASA: 3  Mallampati: 2    Imaging:  No recent imaging studies available.    ASSESSMENT/PLAN:     Assessment:  38 y.o. male with a PMHx of s/p LRKTx in 2010, HTN who has been referred to IR for US guided random transplant kidney biopsy. The procedure was discussed in great detail with the patient including thorough explanations of the potential risks and benefits of US guided random transplant kidney biopsy. Risks include sepsis, severe infection, hemorrhage, and damage to surrounding structures. Labs pending, transplant kidney US pending, and KTM consult pending. The patient may be a candidate for US guided random transplant kidney biopsy under moderate sedation pending labs, US results, and KTM input. Plan discussed with ordering physician.The pt verbalized understanding of the plan and would like to proceed.    Plan:  May proceed with US guided random transplant kidney biopsy under moderate sedation on 12/19/23 pending labs, US results, and KTM input.   Please keep pt NPO starting at midnight on 12/19/23.   Anticoagulation history reviewed.   Coagulation labs reviewed.  Thank you for the consult. Please contact with questions via [employer] secure chat or [employer]    Ale Arnett PA-C  Interventional Radiology  Spectra: [id number]    "

## 2023-12-18 NOTE — SUBJECTIVE & OBJECTIVE
Subjective:     History of Present Illness:   Mr. Qasim Tripp is a 38 y.o. year old white male who received a living kidney transplant on 7/26/10 (etiology of ESRD unknown per patient). Last follow up with transplant team was 1/3/20 at which time patient had CKD stage 2 - GFR 60-89 with baseline creatinine ~ 1.3-1.4. Patient lives in [place] and reports work () got busy so he stopped going to labs/appointments. He has been on mycophenolate mofetil and tacrolimus for maintenance immunosuppression, but he admits to running out of medications ~5 weeks ago. He states he did not have time to schedule a visit with a nephrologist, so he was unable to obtain his meds due to need for a new script/refill from a nephrologist. Patient presented to an ED in [place] with HA and HTN (SBP <200) on Monday, 12/11. Patient reports a history of HTN pre transplant, but reports his BP has been well controlled without need for anti-hypertensive's post op until now. Patient was prescribed Lisinopril on 12/11 and discharged home. He presented again to the ED 12/13 with HA and HTN, GENNY, and he was admitted. OSH record transfer is pending, but patient reports he was treated empirically for rejection with IV steroids. [place] [facility] he reports feeling better overall and denies fever, chills, SOB, CP, edema, decreased UOP. He does admit to dark urine x a few weeks and decreased appetite starting yesterday. Labs notable for Cr 3.9, Phos 6.1. CBC is pending. Admitted to  with KTM consult. Recommend obtaining DSA, PTH, UPC, kidney transplant US, and consulting IR for kidney transplant biopsy tomorrow, 12/19. Continue Prograf 3 mg AM 2 mg PM, Cellcept 1000 mg BID, and Prednisone 5 mg daily for now. D/w Dr. Morris.    Mr. Tripp is a 38 y.o. year old male who is status post Kidney Transplant - 7/26/2010  (#1).    He received induction therapy with Campath and his maintenance immunosuppression consists of:  "  Immunosuppressants (From admission, onward)      Start     Stop Route Frequency Ordered    12/19/23 0800  tacrolimus capsule 3 mg         -- Oral Every morning 12/18/23 1449    12/18/23 2100  mycophenolate capsule 1,000 mg         -- Oral 2 times daily 12/18/23 1612    12/18/23 1800  tacrolimus capsule 2 mg         -- Oral Every evening 12/18/23 1449                Past Medical and Surgical History: Mr. Tripp has a past medical history of Chronic interstitial nephritis, CKD (chronic kidney disease), stage III (7/17/2013), Hypertension, Immunocompromised state (7/11/2018), Living-donor kidney transplant recipient, and Need for prophylactic immunotherapy.  He has a past surgical history that includes Kidney transplant and Peritoneal catheter removal.    Past Social and Family History: Mr. Tripp reports that he quit smoking about 13 years ago. He started smoking about 28 years ago. He has a 7.5 pack-year smoking history. He has never used smokeless tobacco. He reports that he does not drink alcohol and does not use drugs.His family history includes Heart disease in his father; Stroke in his mother.    Intake/Output - Last 3 Shifts         12/16 0700  12/17 0659 12/17 0700  12/18 0659 12/18 0700  12/19 0659    Urine (mL/kg/hr)   600    Total Output   600    Net   -600                    Review of Systems   Constitutional:  Positive for appetite change. Negative for chills and fever.   HENT:  Negative for trouble swallowing.    Eyes:  Negative for photophobia and redness.   Respiratory:  Negative for cough, shortness of breath, wheezing and stridor.    Cardiovascular:  Negative for chest pain, palpitations and leg swelling.   Gastrointestinal:  Negative for abdominal distention, abdominal pain, diarrhea, nausea and vomiting.   Genitourinary:  Negative for decreased urine volume and difficulty urinating.        +dark "tea colored" urine   Musculoskeletal:  Negative for neck pain and neck stiffness.   Skin:  " "Negative for wound.   Allergic/Immunologic: Positive for immunocompromised state.   Neurological:  Positive for headaches. Negative for dizziness and syncope.   Psychiatric/Behavioral:  Negative for agitation, behavioral problems, confusion and decreased concentration.      Objective:     Vital Signs (Most Recent):  Temp: 97.8 °F (36.6 °C) (12/18/23 1624)  Pulse: 85 (12/18/23 1624)  Resp: 18 (12/18/23 1624)  BP: (!) 178/106 (12/18/23 1624)  SpO2: 97 % (12/18/23 1624) Vital Signs (24h Range):  Temp:  [97.8 °F (36.6 °C)-98.4 °F (36.9 °C)] 97.8 °F (36.6 °C)  Pulse:  [73-85] 85  Resp:  [18] 18  SpO2:  [97 %-98 %] 97 %  BP: (166-178)/(103-125) 178/106     Weight: 123.2 kg (271 lb 9.7 oz)  Height: 6' (182.9 cm)  Body mass index is 36.84 kg/m².     Physical Exam  Vitals and nursing note reviewed.   Constitutional:       General: He is not in acute distress.  HENT:      Head: Normocephalic and atraumatic.   Eyes:      General: No scleral icterus.        Right eye: No discharge.         Left eye: No discharge.   Cardiovascular:      Rate and Rhythm: Normal rate and regular rhythm.      Heart sounds: No murmur heard.  Abdominal:      General: Bowel sounds are normal. There is no distension.      Tenderness: There is no abdominal tenderness. There is no guarding.      Comments: Ktx scar RLQ   Musculoskeletal:      Comments: Trace generalized edema  Surgical scar lateral R ankle   Skin:     General: Skin is warm and dry.      Capillary Refill: Capillary refill takes less than 2 seconds.      Coloration: Skin is not jaundiced.   Neurological:      General: No focal deficit present.      Mental Status: He is alert and oriented to person, place, and time.   Psychiatric:         Mood and Affect: Mood normal.         Behavior: Behavior normal.         Thought Content: Thought content normal.          Significant Labs:  CBC: No results for input(s): "WBC", "RBC", "HGB", "HCT", "PLT", "MCV", "MCH", "MCHC" in the last 168 hours.  CMP: "   Recent Labs   Lab 12/18/23  1514   GLU 98   CALCIUM 8.1*   ALBUMIN 2.2*   PROT 5.7*   *   K 4.3   CO2 20*      BUN 91*   CREATININE 3.9*   ALKPHOS 98   ALT 23   AST 24       Diagnostics:  Kidney US ordered

## 2023-12-18 NOTE — ASSESSMENT & PLAN NOTE
- Continue Prograf (3 mg 0800, 2 mg 1800). Monitor trough daily and adjust dose as needed to achieve therapeutic level.  - Continue Cellcept 1000 mg BID.  - Treated with IV steroids at OSH prior to transfer. For now, start Prednisone 5 mg daily.

## 2023-12-18 NOTE — NURSING
Nurses Note -- 4 Eyes      12/18/2023   2:25 PM      Skin assessed during: Admit      [x] No Altered Skin Integrity Present    []Prevention Measures Documented      [] Yes- Altered Skin Integrity Present or Discovered   [] LDA Added if Not in Epic (Describe Wound)   [] New Altered Skin Integrity was Present on Admit and Documented in LDA   [] Wound Image Taken    Wound Care Consulted? No    Attending Nurse:  MONIQUE Rodrigues     Second RN/Staff Member:   MARCEL Bender

## 2023-12-18 NOTE — ASSESSMENT & PLAN NOTE
"- Last follow up with transplant team was 1/3/20 at which time patient had CKD stage 2 - GFR 60-89 with baseline creatinine ~ 1.3-1.4.   - Cr on admit 3.9; see "acute renal failure."  "

## 2023-12-18 NOTE — CONSULTS
[facility] - Telemetry Stepdown  Kidney Transplant  Consult Note    Inpatient consult to Kidney/Pancreas Transplant Medicine  Consult performed by: Slime Goyal PA-C  Consult ordered by: Preston Montgomery MD            Subjective:     History of Present Illness:   Mr. Qasim Tripp is a 38 y.o. year old white male who received a living kidney transplant on 7/26/10 (etiology of ESRD unknown per patient). Last follow up with transplant team was 1/3/20 at which time patient had CKD stage 2 - GFR 60-89 with baseline creatinine ~ 1.3-1.4. Patient lives in [place] and reports work () got busy so he stopped going to labs/appointments. He has been on mycophenolate mofetil and tacrolimus for maintenance immunosuppression, but he admits to running out of medications ~5 weeks ago. He states he did not have time to schedule a visit with a nephrologist, so he was unable to obtain his meds due to need for a new script/refill from a nephrologist. Patient presented to an ED in [place] with HA and HTN (SBP <200) on Monday, 12/11. Patient reports a history of HTN pre transplant, but reports his BP has been well controlled without need for anti-hypertensive's post op until now. Patient was prescribed Lisinopril on 12/11 and discharged home. He presented again to the ED 12/13 with HA and HTN, GENNY, and he was admitted. OSH record transfer is pending, but patient reports he was treated empirically for rejection with IV steroids. On admit to [facility] [place], he reports feeling better overall and denies fever, chills, SOB, CP, edema, decreased UOP. He does admit to dark urine x a few weeks and decreased appetite starting yesterday. Labs notable for Cr 3.9, Phos 6.1. CBC is pending. Admitted to  with KTM consult. Recommend obtaining DSA, PTH, UPC, kidney transplant US, and consulting IR for kidney transplant biopsy tomorrow, 12/19. Continue Prograf 3 mg AM 2 mg PM, Cellcept 1000 mg BID, and Prednisone 5 mg daily for  now. D/w Dr. Morris.    Mr. Tripp is a 38 y.o. year old male who is status post Kidney Transplant - 7/26/2010  (#1).    He received induction therapy with Campath and his maintenance immunosuppression consists of:   Immunosuppressants (From admission, onward)      Start     Stop Route Frequency Ordered    12/19/23 0800  tacrolimus capsule 3 mg         -- Oral Every morning 12/18/23 1449    12/18/23 2100  mycophenolate capsule 1,000 mg         -- Oral 2 times daily 12/18/23 1612    12/18/23 1800  tacrolimus capsule 2 mg         -- Oral Every evening 12/18/23 1449                Past Medical and Surgical History: Mr. Tripp has a past medical history of Chronic interstitial nephritis, CKD (chronic kidney disease), stage III (7/17/2013), Hypertension, Immunocompromised state (7/11/2018), Living-donor kidney transplant recipient, and Need for prophylactic immunotherapy.  He has a past surgical history that includes Kidney transplant and Peritoneal catheter removal.    Past Social and Family History: Mr. Tripp reports that he quit smoking about 13 years ago. He started smoking about 28 years ago. He has a 7.5 pack-year smoking history. He has never used smokeless tobacco. He reports that he does not drink alcohol and does not use drugs.His family history includes Heart disease in his father; Stroke in his mother.    Intake/Output - Last 3 Shifts         12/16 0700  12/17 0659 12/17 0700  12/18 0659 12/18 0700  12/19 0659    Urine (mL/kg/hr)   600    Total Output   600    Net   -600                    Review of Systems   Constitutional:  Positive for appetite change. Negative for chills and fever.   HENT:  Negative for trouble swallowing.    Eyes:  Negative for photophobia and redness.   Respiratory:  Negative for cough, shortness of breath, wheezing and stridor.    Cardiovascular:  Negative for chest pain, palpitations and leg swelling.   Gastrointestinal:  Negative for abdominal distention, abdominal pain,  "diarrhea, nausea and vomiting.   Genitourinary:  Negative for decreased urine volume and difficulty urinating.        +dark "tea colored" urine   Musculoskeletal:  Negative for neck pain and neck stiffness.   Skin:  Negative for wound.   Allergic/Immunologic: Positive for immunocompromised state.   Neurological:  Positive for headaches. Negative for dizziness and syncope.   Psychiatric/Behavioral:  Negative for agitation, behavioral problems, confusion and decreased concentration.      Objective:     Vital Signs (Most Recent):  Temp: 97.8 °F (36.6 °C) (12/18/23 1624)  Pulse: 85 (12/18/23 1624)  Resp: 18 (12/18/23 1624)  BP: (!) 178/106 (12/18/23 1624)  SpO2: 97 % (12/18/23 1624) Vital Signs (24h Range):  Temp:  [97.8 °F (36.6 °C)-98.4 °F (36.9 °C)] 97.8 °F (36.6 °C)  Pulse:  [73-85] 85  Resp:  [18] 18  SpO2:  [97 %-98 %] 97 %  BP: (166-178)/(103-125) 178/106     Weight: 123.2 kg (271 lb 9.7 oz)  Height: 6' (182.9 cm)  Body mass index is 36.84 kg/m².     Physical Exam  Vitals and nursing note reviewed.   Constitutional:       General: He is not in acute distress.  HENT:      Head: Normocephalic and atraumatic.   Eyes:      General: No scleral icterus.        Right eye: No discharge.         Left eye: No discharge.   Cardiovascular:      Rate and Rhythm: Normal rate and regular rhythm.      Heart sounds: No murmur heard.  Abdominal:      General: Bowel sounds are normal. There is no distension.      Tenderness: There is no abdominal tenderness. There is no guarding.      Comments: Ktx scar RLQ   Musculoskeletal:      Comments: Trace generalized edema  Surgical scar lateral R ankle   Skin:     General: Skin is warm and dry.      Capillary Refill: Capillary refill takes less than 2 seconds.      Coloration: Skin is not jaundiced.   Neurological:      General: No focal deficit present.      Mental Status: He is alert and oriented to person, place, and time.   Psychiatric:         Mood and Affect: Mood normal.         " "Behavior: Behavior normal.         Thought Content: Thought content normal.          Significant Labs:  CBC: No results for input(s): "WBC", "RBC", "HGB", "HCT", "PLT", "MCV", "MCH", "MCHC" in the last 168 hours.  CMP:   Recent Labs   Lab 12/18/23  1514   GLU 98   CALCIUM 8.1*   ALBUMIN 2.2*   PROT 5.7*   *   K 4.3   CO2 20*      BUN 91*   CREATININE 3.9*   ALKPHOS 98   ALT 23   AST 24       Diagnostics:  Kidney US ordered  Assessment/Plan:     Cardiac/Vascular  Renovascular hypertension  - Admitted to OSH 12/13 with HTN/HA, reports BP has been well controlled post transplant (until now) but that he did take anti-hypertensives pre transplant  - SBP 170s on admit.  - Management per primary team; Of note, IR requires SBP to be <160 for kidney biopsy.    Renal/  * Acute renal failure  - Baseline creat in 2020 was 1.3-1.4  - Cr 3.9 on admit 12/18  - Avoid nephrotoxic agents (NSAIDs, IV contrast dye, ACEI/ARB anti-HTN medications, Aminoglycoside-containing antibiotics)  - Recommend obtaining DSA, PTH, UPC (ordered)  - Recommend kidney transplant US (ordered)  - IR consult for kidney bx tomorrow, 12/19  - Obtain OSH records  - See "prophylactic immunotherapy."   - Renally dose all appropriate medications, including antibiotics   - Strict I/O      CKD (chronic kidney disease) stage 4, GFR 15-29 ml/min  - Last follow up with transplant team was 1/3/20 at which time patient had CKD stage 2 - GFR 60-89 with baseline creatinine ~ 1.3-1.4.   - Cr on admit 3.9; see "acute renal failure."    Living-donor kidney transplant recipient - 7/26/10  - s/p living related Ktx 7/26/10 for unknown etiology   - See "acute renal failure."      Immunology/Multi System  Prophylactic immunotherapy  - Continue Prograf (3 mg 0800, 2 mg 1800). Monitor trough daily and adjust dose as needed to achieve therapeutic level.  - Continue Cellcept 1000 mg BID.  - Treated with IV steroids at OSH prior to transfer. For now, start Prednisone 5 " "mg daily.        Palliative Care  Long-term use of immunosuppressant medication  - See "prophylactic immunotherapy."          Slime Goyal PA-C  Kidney Transplant  [facility] - Telemetry Stepdown  "

## 2023-12-18 NOTE — ASSESSMENT & PLAN NOTE
- Admitted to OSH 12/13 with HTN/HA, reports BP has been well controlled post transplant (until now) but that he did take anti-hypertensives pre transplant  - SBP 170s on admit.  - Management per primary team; Of note, IR requires SBP to be <160 for kidney biopsy.

## 2023-12-18 NOTE — PROVIDER TRANSFER
"   Outside Transfer Acceptance Note / Regional Referral Center    Referring facility: [facility]   Referring provider: JOANNE SOSA  Accepting facility: [facility]  Accepting provider: SEAN CH  Admitting provider: Hospital medicine P  Reason for transfer: Higher Level of Care  Transfer diagnosis: Acute Renal Failure  Transfer specialty requested: Transplant Kidney  Transfer specialty notified: yes  Transfer level: NUMBER 1-5: 2  Bed type requested: med tele   Isolation status: No active isolations   Admission class or status: IP- Inpatient      Narrative     Pt is a 38-year-old male with past medical history of hypertension, CKD s/p renal transplant in 2010 who presented to the ED with complaints of a headache.  On arrival to the ED, patient was found to be very hypertensive with BP of 222/115 initially.  Required an ICU admission with Cardene drip.  His blood pressure eventually improved.  Patient was also found to have renal function dysfunction with creatinine elevated to 3.68, baseline WNL.  Other labs showed Na 133, K nl, Cr 3.68, BUN 83, UA +1 leukocytes and 3+ blood.  CT abdomen/pelvis was performed which showed persistent mild dilation of the allograft and cable system.  Hydronephrosis and distended bladder.  Mayen catheter was placed.  Patient was also started on ceftriaxone.  Referring provider seeking transfer for facility with kidney transplant medicine given his GENNY.  Blood pressure has now improved with last vital signs of /114, HR 73, temp 36.9, RR 10 on RA.  Dr. Morris from kidney transplant medicine is agreeable to consult with Hospital Medicine admitting.    Objective     Vitals:  /114, HR 73, temp 36.9, RR 10 on RA    Recent Labs: All pertinent labs within the past 24 hours have been reviewed.  Labs: Na 133, K nl, Cr 3.68, BUN 83, UA +1 leukocytes and 3+ blood     CBC: No results for input(s): "WBC", "HGB", "HCT", "PLT" in the last 48 " "hours.  CMP: No results for input(s): "NA", "K", "CL", "CO2", "GLU", "BUN", "CREATININE", "CALCIUM", "PROT", "ALBUMIN", "BILITOT", "ALKPHOS", "AST", "ALT", "ANIONGAP", "EGFRNONAA" in the last 48 hours.    Invalid input(s): "ESTGFAFRICA"  Cardiac Markers: No results for input(s): "CKMB", "MYOGLOBIN", "BNP", "TROPISTAT" in the last 48 hours.    Recent imaging:   CT abdomen - persistent mild dilation of the allograph and caval system   Hydronephrosis and distended bladde    IV access:  PIV    Allergies:   Review of patient's allergies indicates:   Allergen Reactions    Penicillins      Other reaction(s): Hives  Other reaction(s): Edema        NPO: No    Anticoagulation:   Anticoagulants       None             Instructions      [facility]-  Admit to Hospital Medicine  Upon patient arrival to floor, please send SecureChat to [facility] or call extension [phone] (if no answer, do NOT leave a callback number after the beep, rather please send a SecureChat to [facility]), for Hospital Medicine admit team assignment and for additional admit orders for the patient.  Do not page the attending physician associated with the patient on arrival (this physician may not be on duty at the time of arrival).  Rather, always send a SecureChat to [facility] or call [phone] to reach the triage physician for orders and team assignment.    "

## 2023-12-18 NOTE — NURSING TRANSFER
Nursing Transfer Note      12/18/2023   2:23 PM      Reason patient is being transferred: Higher Level of Care    Transfer From: [place]     Transfer via stretcher    Transfer with cell phone and clothes    Transported by [facility]    Transfer Vital Signs:  Blood Pressure:170/103  Heart Rate:73  O2:98% on room air  Temperature:98.4  Respirations:18    Additional Lines: Mayen Catheter    4eyes on Skin: yes    Patient belongings transferred with patient: Yes    Notified: MD assigned to that pt.     Upon arrival to floor: patient oriented to room, call bell in reach, and bed in lowest position

## 2023-12-18 NOTE — PLAN OF CARE
Problem: Adult Inpatient Plan of Care  Goal: Absence of Hospital-Acquired Illness or Injury  Outcome: Ongoing, Progressing  Intervention: Identify and Manage Fall Risk  Flowsheets (Taken 12/18/2023 5343)  Safety Promotion/Fall Prevention:   assistive device/personal item within reach   side rails raised x 2   lighting adjusted   medications reviewed  Plan of care was reviewed with the pt. Juancho HARRISON. Pt presented with HTN and medications were administered per MD. MD is aware. Will continue to monitor. Mayen care was done. Intake and output was documented. No major changes throughout the day. Safety precautions were in placed. Pt is aware about being NPO at [facility] for kidney biopsy tomorrow.

## 2023-12-18 NOTE — ASSESSMENT & PLAN NOTE
"- Baseline creat in 2020 was 1.3-1.4  - Cr 3.9 on admit 12/18  - Avoid nephrotoxic agents (NSAIDs, IV contrast dye, ACEI/ARB anti-HTN medications, Aminoglycoside-containing antibiotics)  - Recommend obtaining DSA, PTH, UPC (ordered)  - Recommend kidney transplant US (ordered)  - IR consult for kidney bx tomorrow, 12/19  - Obtain OSH records  - See "prophylactic immunotherapy."   - Renally dose all appropriate medications, including antibiotics   - Strict I/O    "

## 2023-12-19 PROBLEM — N04.9 NEPHROTIC SYNDROME: Status: ACTIVE | Noted: 2023-12-19

## 2023-12-19 LAB
ALBUMIN SERPL BCP-MCNC: 1.8 G/DL (ref 3.5–5.2)
ALP SERPL-CCNC: 80 U/L (ref 55–135)
ALT SERPL W/O P-5'-P-CCNC: 20 U/L (ref 10–44)
ANION GAP SERPL CALC-SCNC: 8 MMOL/L (ref 8–16)
AST SERPL-CCNC: 18 U/L (ref 10–40)
BACTERIA #/AREA URNS AUTO: ABNORMAL /HPF
BASOPHILS # BLD AUTO: 0.02 K/UL (ref 0–0.2)
BASOPHILS NFR BLD: 0.2 % (ref 0–1.9)
BILIRUB SERPL-MCNC: 0.5 MG/DL (ref 0.1–1)
BILIRUB UR QL STRIP: NEGATIVE
BUN SERPL-MCNC: 95 MG/DL (ref 6–20)
CALCIUM SERPL-MCNC: 7.8 MG/DL (ref 8.7–10.5)
CHLORIDE SERPL-SCNC: 107 MMOL/L (ref 95–110)
CLARITY UR REFRACT.AUTO: ABNORMAL
CO2 SERPL-SCNC: 22 MMOL/L (ref 23–29)
COLOR UR AUTO: YELLOW
CREAT SERPL-MCNC: 3.8 MG/DL (ref 0.5–1.4)
CREAT UR-MCNC: 127 MG/DL (ref 23–375)
DIFFERENTIAL METHOD: ABNORMAL
EOSINOPHIL # BLD AUTO: 0.2 K/UL (ref 0–0.5)
EOSINOPHIL NFR BLD: 1.8 % (ref 0–8)
ERYTHROCYTE [DISTWIDTH] IN BLOOD BY AUTOMATED COUNT: 12.7 % (ref 11.5–14.5)
EST. GFR  (NO RACE VARIABLE): 19.7 ML/MIN/1.73 M^2
GLUCOSE SERPL-MCNC: 89 MG/DL (ref 70–110)
GLUCOSE UR QL STRIP: NEGATIVE
HCT VFR BLD AUTO: 35.9 % (ref 40–54)
HGB BLD-MCNC: 12.2 G/DL (ref 14–18)
HGB UR QL STRIP: ABNORMAL
HYALINE CASTS UR QL AUTO: 5 /LPF
IMM GRANULOCYTES # BLD AUTO: 0.11 K/UL (ref 0–0.04)
IMM GRANULOCYTES NFR BLD AUTO: 1.2 % (ref 0–0.5)
INR PPP: 1 (ref 0.8–1.2)
KETONES UR QL STRIP: NEGATIVE
LEUKOCYTE ESTERASE UR QL STRIP: ABNORMAL
LYMPHOCYTES # BLD AUTO: 1.7 K/UL (ref 1–4.8)
LYMPHOCYTES NFR BLD: 18.5 % (ref 18–48)
MAGNESIUM SERPL-MCNC: 2.1 MG/DL (ref 1.6–2.6)
MCH RBC QN AUTO: 29.5 PG (ref 27–31)
MCHC RBC AUTO-ENTMCNC: 34 G/DL (ref 32–36)
MCV RBC AUTO: 87 FL (ref 82–98)
MICROSCOPIC COMMENT: ABNORMAL
MONOCYTES # BLD AUTO: 1.2 K/UL (ref 0.3–1)
MONOCYTES NFR BLD: 12.7 % (ref 4–15)
NEUTROPHILS # BLD AUTO: 5.9 K/UL (ref 1.8–7.7)
NEUTROPHILS NFR BLD: 65.6 % (ref 38–73)
NITRITE UR QL STRIP: NEGATIVE
NRBC BLD-RTO: 0 /100 WBC
PH UR STRIP: 7 [PH] (ref 5–8)
PHOSPHATE SERPL-MCNC: 5.7 MG/DL (ref 2.7–4.5)
PLATELET # BLD AUTO: 179 K/UL (ref 150–450)
PMV BLD AUTO: 9.3 FL (ref 9.2–12.9)
POTASSIUM SERPL-SCNC: 4 MMOL/L (ref 3.5–5.1)
PROT SERPL-MCNC: 4.7 G/DL (ref 6–8.4)
PROT UR QL STRIP: ABNORMAL
PROT UR-MCNC: 1022 MG/DL (ref 0–15)
PROT/CREAT UR: 8.05 MG/G{CREAT} (ref 0–0.2)
PROTHROMBIN TIME: 11 SEC (ref 9–12.5)
PTH-INTACT SERPL-MCNC: 457.3 PG/ML (ref 9–77)
RBC # BLD AUTO: 4.14 M/UL (ref 4.6–6.2)
RBC #/AREA URNS AUTO: >100 /HPF (ref 0–4)
SODIUM SERPL-SCNC: 137 MMOL/L (ref 136–145)
SP GR UR STRIP: 1.02 (ref 1–1.03)
TACROLIMUS BLD-MCNC: 5.1 NG/ML (ref 5–15)
TACROLIMUS BLD-MCNC: 5.6 NG/ML (ref 5–15)
URN SPEC COLLECT METH UR: ABNORMAL
WBC # BLD AUTO: 9.03 K/UL (ref 3.9–12.7)
WBC #/AREA URNS AUTO: >100 /HPF (ref 0–5)

## 2023-12-19 PROCEDURE — 84100 ASSAY OF PHOSPHORUS: CPT | Performed by: INTERNAL MEDICINE

## 2023-12-19 PROCEDURE — 25000003 PHARM REV CODE 250: Performed by: INTERNAL MEDICINE

## 2023-12-19 PROCEDURE — 87086 URINE CULTURE/COLONY COUNT: CPT | Performed by: INTERNAL MEDICINE

## 2023-12-19 PROCEDURE — 20600001 HC STEP DOWN PRIVATE ROOM

## 2023-12-19 PROCEDURE — 86977 RBC SERUM PRETX INCUBJ/INHIB: CPT | Performed by: PHYSICIAN ASSISTANT

## 2023-12-19 PROCEDURE — 86833 HLA CLASS II HIGH DEFIN QUAL: CPT | Performed by: PHYSICIAN ASSISTANT

## 2023-12-19 PROCEDURE — 80053 COMPREHEN METABOLIC PANEL: CPT | Performed by: INTERNAL MEDICINE

## 2023-12-19 PROCEDURE — 86832 HLA CLASS I HIGH DEFIN QUAL: CPT | Performed by: PHYSICIAN ASSISTANT

## 2023-12-19 PROCEDURE — 99233 PR SUBSEQUENT HOSPITAL CARE,LEVL III: ICD-10-PCS | Mod: ,,, | Performed by: PHYSICIAN ASSISTANT

## 2023-12-19 PROCEDURE — 99233 SBSQ HOSP IP/OBS HIGH 50: CPT | Mod: ,,, | Performed by: PHYSICIAN ASSISTANT

## 2023-12-19 PROCEDURE — 83735 ASSAY OF MAGNESIUM: CPT | Performed by: INTERNAL MEDICINE

## 2023-12-19 PROCEDURE — 36415 COLL VENOUS BLD VENIPUNCTURE: CPT | Performed by: PHYSICIAN ASSISTANT

## 2023-12-19 PROCEDURE — 63600175 PHARM REV CODE 636 W HCPCS: Performed by: RADIOLOGY

## 2023-12-19 PROCEDURE — 85610 PROTHROMBIN TIME: CPT | Performed by: PHYSICIAN ASSISTANT

## 2023-12-19 PROCEDURE — 63600175 PHARM REV CODE 636 W HCPCS: Performed by: INTERNAL MEDICINE

## 2023-12-19 PROCEDURE — 80197 ASSAY OF TACROLIMUS: CPT | Performed by: INTERNAL MEDICINE

## 2023-12-19 PROCEDURE — 84156 ASSAY OF PROTEIN URINE: CPT | Performed by: INTERNAL MEDICINE

## 2023-12-19 PROCEDURE — 85025 COMPLETE CBC W/AUTO DIFF WBC: CPT | Performed by: INTERNAL MEDICINE

## 2023-12-19 PROCEDURE — 83970 ASSAY OF PARATHORMONE: CPT | Performed by: PHYSICIAN ASSISTANT

## 2023-12-19 PROCEDURE — 81001 URINALYSIS AUTO W/SCOPE: CPT | Performed by: INTERNAL MEDICINE

## 2023-12-19 RX ORDER — HYDRALAZINE HYDROCHLORIDE 20 MG/ML
INJECTION INTRAMUSCULAR; INTRAVENOUS
Status: COMPLETED | OUTPATIENT
Start: 2023-12-19 | End: 2023-12-19

## 2023-12-19 RX ORDER — NIFEDIPINE 30 MG/1
60 TABLET, EXTENDED RELEASE ORAL 2 TIMES DAILY
Status: DISCONTINUED | OUTPATIENT
Start: 2023-12-19 | End: 2023-12-23 | Stop reason: HOSPADM

## 2023-12-19 RX ORDER — SEVELAMER CARBONATE 800 MG/1
800 TABLET, FILM COATED ORAL
Status: DISCONTINUED | OUTPATIENT
Start: 2023-12-19 | End: 2023-12-22

## 2023-12-19 RX ORDER — MIDAZOLAM HYDROCHLORIDE 1 MG/ML
INJECTION INTRAMUSCULAR; INTRAVENOUS
Status: COMPLETED | OUTPATIENT
Start: 2023-12-19 | End: 2023-12-19

## 2023-12-19 RX ADMIN — MYCOPHENOLATE MOFETIL 1000 MG: 250 CAPSULE ORAL at 10:12

## 2023-12-19 RX ADMIN — HYDRALAZINE HYDROCHLORIDE 10 MG: 20 INJECTION INTRAMUSCULAR; INTRAVENOUS at 01:12

## 2023-12-19 RX ADMIN — SENNOSIDES AND DOCUSATE SODIUM 1 TABLET: 8.6; 5 TABLET ORAL at 10:12

## 2023-12-19 RX ADMIN — TACROLIMUS 2 MG: 1 CAPSULE ORAL at 05:12

## 2023-12-19 RX ADMIN — HEPARIN SODIUM 5000 UNITS: 5000 INJECTION INTRAVENOUS; SUBCUTANEOUS at 03:12

## 2023-12-19 RX ADMIN — MIDAZOLAM HYDROCHLORIDE 2 MG: 2 INJECTION, SOLUTION INTRAMUSCULAR; INTRAVENOUS at 01:12

## 2023-12-19 RX ADMIN — CARVEDILOL 12.5 MG: 12.5 TABLET, FILM COATED ORAL at 10:12

## 2023-12-19 RX ADMIN — SENNOSIDES AND DOCUSATE SODIUM 1 TABLET: 8.6; 5 TABLET ORAL at 03:12

## 2023-12-19 RX ADMIN — MYCOPHENOLATE MOFETIL 1000 MG: 250 CAPSULE ORAL at 03:12

## 2023-12-19 RX ADMIN — NIFEDIPINE 60 MG: 30 TABLET, FILM COATED, EXTENDED RELEASE ORAL at 10:12

## 2023-12-19 RX ADMIN — SEVELAMER CARBONATE 800 MG: 800 TABLET, FILM COATED ORAL at 05:12

## 2023-12-19 RX ADMIN — PREDNISONE 5 MG: 5 TABLET ORAL at 03:12

## 2023-12-19 RX ADMIN — NIFEDIPINE 30 MG: 30 TABLET, FILM COATED, EXTENDED RELEASE ORAL at 03:12

## 2023-12-19 RX ADMIN — HEPARIN SODIUM 5000 UNITS: 5000 INJECTION INTRAVENOUS; SUBCUTANEOUS at 10:12

## 2023-12-19 RX ADMIN — CARVEDILOL 12.5 MG: 12.5 TABLET, FILM COATED ORAL at 03:12

## 2023-12-19 NOTE — ASSESSMENT & PLAN NOTE
- Admitted to ICU in [place] for HTN emergency requiring cardene gtt. Hasn't required anti-hypertensives until this admission.   - Start coreg and nifedipine   - PRN clonidine and hydralazine

## 2023-12-19 NOTE — ASSESSMENT & PLAN NOTE
- Continue Prograf 3mg AM and 2mg PM and monitor levels daily  - Continue Cellcept 1000 mg BID.  - Treated with IV steroids prior to transfer. Cont prednisone 5 mg daily.

## 2023-12-19 NOTE — PLAN OF CARE
Problem: Adult Inpatient Plan of Care  Goal: Plan of Care Review  Outcome: Ongoing, Progressing  Goal: Patient-Specific Goal (Individualized)  Outcome: Ongoing, Progressing  Goal: Absence of Hospital-Acquired Illness or Injury  Outcome: Ongoing, Progressing  Goal: Optimal Comfort and Wellbeing  Outcome: Ongoing, Progressing  Goal: Readiness for Transition of Care  Outcome: Ongoing, Progressing     No distress noted at this time. Call light within reach. Will continue to monitor.

## 2023-12-19 NOTE — SUBJECTIVE & OBJECTIVE
Interval History: NAEON. Cr 3.8 today. Kidney US concerning for graft rejection. Unfortunately kidney biopsy cancelled as patient refused HTN meds this AM and BP uncontrolled.     Review of Systems   Constitutional:  Negative for activity change, appetite change, chills, diaphoresis, fatigue, fever and unexpected weight change.   Respiratory:  Negative for cough, chest tightness, shortness of breath, wheezing and stridor.    Cardiovascular:  Negative for chest pain, palpitations and leg swelling.   Gastrointestinal:  Negative for abdominal pain, blood in stool, constipation, diarrhea, nausea and vomiting.   Endocrine: Negative for cold intolerance and heat intolerance.   Genitourinary:  Negative for difficulty urinating, dysuria, flank pain and frequency.   Musculoskeletal:  Negative for arthralgias, joint swelling and myalgias.   Skin: Negative.    Neurological:  Negative for dizziness, weakness, light-headedness and headaches.     Objective:     Vital Signs (Most Recent):  Temp: 97.2 °F (36.2 °C) (12/19/23 1546)  Pulse: 87 (12/19/23 1546)  Resp: 18 (12/19/23 1546)  BP: (!) 175/106 (12/19/23 1553)  SpO2: 98 % (12/19/23 1546) Vital Signs (24h Range):  Temp:  [97.2 °F (36.2 °C)-98.2 °F (36.8 °C)] 97.2 °F (36.2 °C)  Pulse:  [63-94] 87  Resp:  [12-18] 18  SpO2:  [93 %-100 %] 98 %  BP: (140-219)/() 175/106     Weight: 123.2 kg (271 lb 9.7 oz)  Body mass index is 36.84 kg/m².    Intake/Output Summary (Last 24 hours) at 12/19/2023 1623  Last data filed at 12/19/2023 1217  Gross per 24 hour   Intake 240 ml   Output 1060 ml   Net -820 ml         Physical Exam  Constitutional:       General: He is not in acute distress.     Appearance: He is not ill-appearing or toxic-appearing.   HENT:      Head: Normocephalic and atraumatic.   Eyes:      Conjunctiva/sclera: Conjunctivae normal.      Pupils: Pupils are equal, round, and reactive to light.   Cardiovascular:      Rate and Rhythm: Normal rate and regular rhythm.       Heart sounds: Normal heart sounds.   Pulmonary:      Effort: Pulmonary effort is normal. No respiratory distress.      Breath sounds: Normal breath sounds. No wheezing or rales.   Abdominal:      General: Bowel sounds are normal. There is no distension.      Palpations: Abdomen is soft.      Tenderness: There is no abdominal tenderness. There is no guarding.   Musculoskeletal:         General: Normal range of motion.      Cervical back: Normal range of motion and neck supple.      Right lower leg: No edema.      Left lower leg: No edema.   Skin:     General: Skin is warm and dry.   Neurological:      General: No focal deficit present.      Mental Status: He is alert and oriented to person, place, and time. Mental status is at baseline.   Psychiatric:         Mood and Affect: Mood normal.         Behavior: Behavior normal.             Significant Labs: All pertinent labs within the past 24 hours have been reviewed.    Significant Imaging: I have reviewed all pertinent imaging results/findings within the past 24 hours.

## 2023-12-19 NOTE — ASSESSMENT & PLAN NOTE
"- Baseline creat in 2020 was 1.3-1.4  - Cr 3.9 on admit 12/18; s/p empiric treatment with IV steroids at OSH (working on obtaining OSH records for exact dose)  - Avoid nephrotoxic agents (NSAIDs, IV contrast dye, ACEI/ARB anti-HTN medications, Aminoglycoside-containing antibiotics)  - UPC elevated, trend daily  - DSA 12/19 pending  - PTH 12/19 pending  -  Kidney transplant US 12/18 shows mild hydronephrosis (PVR ordered), mild elevated of the peak systolic velocity of main renal artery (however, improved from 2010), and question of minimal tardus parvus waveform at the inferior lobar artery.   - Plan for transplant kidney bx today 12/19 with IR. Treatment is pending bx results.   - See "prophylactic immunotherapy."   - Renally dose all appropriate medications, including antibiotics   - Strict I/O, daily weights  "

## 2023-12-19 NOTE — ASSESSMENT & PLAN NOTE
- Continue Prograf 3mg AM and 2mg PM and monitor levels daily  - Continue Cellcept 1000 mg BID.  - Treated with IV steroids prior to transfer. Start prednisone 5 mg daily.

## 2023-12-19 NOTE — ASSESSMENT & PLAN NOTE
CKD s/p living-donor kidney transplant 7/2010  - Non-compliance with immunotherapy over the past 4-5 weeks  - Baseline creat in 2020 was 1.3-1.4  - Cr 3.9 on admit 12/18  - s/p empiric treatment with IV steroids in [place]  - Cont prograf/cellcept  - Start prednisone 5mg daily  - Kidney transplant US pending   - KTM consulted  - IR consulted for kidney biopsy  - BP control  - Avoid nephrotoxic agents   - Strict I/O

## 2023-12-19 NOTE — NURSING
Pt arrived to 173 for transplant kidney biopsy. Pt oriented to unit and staff. Plan of care reviewed with patient, patient verbalizes understanding. Comfort measures utilized. Pt will remain on stretcher for procedure. Fall risk reviewed with patient, fall risk interventions maintained with direct observation/attendance and side rails.  Blankets applied. Pt prepped and draped utilizing standard sterile technique. Patient placed on continuous monitoring, as required by sedation policy. Timeouts completed utilizing standard universal time-out, per department and facility policy. RN to remain at bedside, continuous monitoring maintained. Pt resting comfortably. Denies pain/discomfort. Will continue to monitor. See flow sheets for monitoring, medication administration, and updates.

## 2023-12-19 NOTE — SUBJECTIVE & OBJECTIVE
Subjective:   History of Present Illness:  Mr. Qasim Tripp is a 38 y.o. year old white male who received a living kidney transplant on 7/26/10 (etiology of ESRD unknown per patient). Last follow up with transplant team was 1/3/20 at which time patient had CKD stage 2 - GFR 60-89 with baseline creatinine ~ 1.3-1.4. Patient lives in [place] and reports work () got busy so he stopped going to labs/appointments. He has been on mycophenolate mofetil and tacrolimus for maintenance immunosuppression, but he admits to running out of medications ~5 weeks ago. He states he did not have time to schedule a visit with a nephrologist, so he was unable to obtain his meds due to need for a new script/refill from a nephrologist. Patient presented to an ED in [place] with HA and HTN (SBP <200) on Monday, 12/11. Patient reports a history of HTN pre transplant, but reports his BP has been well controlled without need for anti-hypertensive's post op until now. Patient was prescribed Lisinopril on 12/11 and discharged home. He presented again to the ED 12/13 with HA and HTN, GENNY, and he was admitted. OSH record transfer is pending, but patient reports he was treated empirically for rejection with IV steroids. On admit to [facility], he reports feeling better overall and denies fever, chills, SOB, CP, edema, decreased UOP. He does admit to dark urine x a few weeks and decreased appetite starting yesterday. Labs notable for Cr 3.9, Phos 6.1. CBC is pending. Admitted to  with KTM consult. Recommend obtaining DSA, PTH, UPC, kidney transplant US, and consulting IR for kidney transplant biopsy tomorrow, 12/19. Continue Prograf 3 mg AM 2 mg PM, Cellcept 1000 mg BID, and Prednisone 5 mg daily for now. D/w Dr. Morris.    Mr. Tripp is a 38 y.o. year old male who is status post Kidney Transplant - 7/26/2010  (#1).    His maintenance immunosuppression consists of:   Immunosuppressants (From admission, onward)       Start     Stop Route Frequency Ordered    12/19/23 0800  tacrolimus capsule 3 mg         -- Oral Every morning 12/18/23 1449    12/18/23 2100  mycophenolate capsule 1,000 mg         -- Oral 2 times daily 12/18/23 1612    12/18/23 1800  tacrolimus capsule 2 mg         -- Oral Every evening 12/18/23 1449            Hospital Course:  Interval History: NAEON. BP improved this AM on Nifedipine and Coreg. Cr 3.8 from 3.9 and patient has proteinuria. Kidney transplant US shows mild hydronephrosis (PVR ordered), mild elevated of the peak systolic velocity of main renal artery (however, improved from 2010), and question of minimal tardus parvus waveform at the inferior lobar artery. Plan for transplant kidney bx today 12/19 with IR. Treatment is pending bx results. DSA 12/19 pending. For now, continue Prograf, Cellcept, and Prednisone. Prograf level 5.1. Continue Prograf 3 mg AM 2 mg PM. Monitor strict I/O, daily weights.      Past Medical, Surgical, Family, and Social History:   Unchanged from H&P.    Scheduled Meds:   carvediloL  12.5 mg Oral BID    heparin (porcine)  5,000 Units Subcutaneous Q8H    mycophenolate  1,000 mg Oral BID    NIFEdipine  30 mg Oral BID    predniSONE  5 mg Oral Daily    senna-docusate 8.6-50 mg  1 tablet Oral BID    sevelamer carbonate  800 mg Oral TID WM    tacrolimus  2 mg Oral Daily PM    tacrolimus  3 mg Oral Daily AM     Continuous Infusions:  PRN Meds:acetaminophen, albuterol-ipratropium, aluminum-magnesium hydroxide-simethicone, cloNIDine, dextrose 10%, dextrose 10%, glucagon (human recombinant), glucose, glucose, hydrALAZINE, melatonin, naloxone, ondansetron, simethicone, sodium chloride 0.9%    Intake/Output - Last 3 Shifts         12/17 0700 12/18 0659 12/18 0700 12/19 0659 12/19 0700 12/20 0659    P.O.  240     Total Intake(mL/kg)  240 (1.9)     Urine (mL/kg/hr)  660 400 (0.8)    Stool  0     Total Output  660 400    Net  -420 -400           Stool Occurrence  0 x              Review  "of Systems   Constitutional:  Negative for appetite change, chills and fever.   HENT:  Negative for trouble swallowing.    Eyes:  Negative for photophobia and redness.   Respiratory:  Negative for cough, shortness of breath, wheezing and stridor.    Cardiovascular:  Negative for chest pain, palpitations and leg swelling.   Gastrointestinal:  Negative for abdominal distention, abdominal pain, diarrhea, nausea and vomiting.   Genitourinary:  Negative for decreased urine volume and difficulty urinating.        +dark "tea colored" urine   Musculoskeletal:  Negative for neck pain and neck stiffness.   Skin:  Negative for wound.   Allergic/Immunologic: Positive for immunocompromised state.   Neurological:  Negative for dizziness, syncope and headaches.   Psychiatric/Behavioral:  Negative for agitation, behavioral problems, confusion and decreased concentration.       Objective:     Vital Signs (Most Recent):  Temp: 98.1 °F (36.7 °C) (12/19/23 0730)  Pulse: 67 (12/19/23 0730)  Resp: 18 (12/19/23 0730)  BP: (!) 140/87 (12/19/23 0730)  SpO2: 95 % (12/19/23 0730) Vital Signs (24h Range):  Temp:  [97.8 °F (36.6 °C)-98.4 °F (36.9 °C)] 98.1 °F (36.7 °C)  Pulse:  [63-85] 67  Resp:  [17-18] 18  SpO2:  [94 %-98 %] 95 %  BP: (140-178)/() 140/87     Weight: 123.2 kg (271 lb 9.7 oz)  Height: 6' (182.9 cm)  Body mass index is 36.84 kg/m².     Physical Exam  Vitals and nursing note reviewed.   Constitutional:       General: He is not in acute distress.  HENT:      Head: Normocephalic and atraumatic.   Eyes:      General: No scleral icterus.        Right eye: No discharge.         Left eye: No discharge.   Cardiovascular:      Rate and Rhythm: Normal rate and regular rhythm.      Heart sounds: No murmur heard.  Abdominal:      General: Bowel sounds are normal. There is no distension.      Tenderness: There is no abdominal tenderness. There is no guarding.      Comments: Ktx scar RLQ   Musculoskeletal:      Comments: Trace " generalized edema  Surgical scar lateral R ankle   Skin:     General: Skin is warm and dry.      Capillary Refill: Capillary refill takes less than 2 seconds.      Coloration: Skin is not jaundiced.   Neurological:      General: No focal deficit present.      Mental Status: He is alert and oriented to person, place, and time.   Psychiatric:         Mood and Affect: Mood normal.         Behavior: Behavior normal.         Thought Content: Thought content normal.          Laboratory:  CBC:   Recent Labs   Lab 12/18/23  1514 12/19/23  0346   WBC 14.45* 9.03   RBC 5.29 4.14*   HGB 15.4 12.2*   HCT 46.2 35.9*    179   MCV 87 87   MCH 29.1 29.5   MCHC 33.3 34.0     CMP:   Recent Labs   Lab 12/18/23  1514 12/19/23  0346   GLU 98 89   CALCIUM 8.1* 7.8*   ALBUMIN 2.2* 1.8*   PROT 5.7* 4.7*   * 137   K 4.3 4.0   CO2 20* 22*    107   BUN 91* 95*   CREATININE 3.9* 3.8*   ALKPHOS 98 80   ALT 23 20   AST 24 18       Diagnostic Results:  US - Kidney: Results for orders placed during the hospital encounter of 12/18/23    US Transplant Kidney With Doppler    Narrative  EXAMINATION:  US TRANSPLANT KIDNEY WITH DOPPLER    CLINICAL HISTORY:  concern for rejection;    TECHNIQUE:  Grayscale, duplex, and color Doppler evaluation of transplant kidney performed.    COMPARISON:  Ultrasound kidney 10/04/2010    FINDINGS:  History of kidney transplant in 2010.      There is a transplant kidney identified in the right lower quadrant.  Renal allograft measures 15.5 cm in craniocaudal length, previously 11.9 cm.  No perinephric fluid or collection identified.  No mass.  Mild hydronephrosis.    Doppler evaluation demonstrates a peak systolic velocity in the main renal artery of 277 cm/sec (previously 425 cm/sec).  The main renal artery has a normal waveform without evidence of parvus tardus.  The main renal artery to iliac artery velocity ratio is not elevated measuring 1.4.  The main renal vein is patent.    The intrarenal  resistive indices are as follows:    Segmental upper pole: 0.75, previously 0.56 with mild slow upstroke of the waveform    Segmental midpole: 0.70, previously 0.64 with slow upstroke of the waveform    Segmental lower pole: 0.71, previously 0.63 with questioned early/minimal parvus tardus waveform.    Interlobar: 0.69, previously 0.59 with normal waveforms    Small free fluid in the pelvis.    Impression  Doppler evaluation of transplant kidney demonstrate mild hydronephrosis.    Mild elevation of the peak systolic velocity in the main renal artery but improved from study in 2010 otherwise normal renal artery to iliac artery ratio.  There is questioned early/minimal tardus parvus waveform at the inferior lobar artery with mild slow upstroke at the upper and midpole arteries, as well as interval increased but technically within normal limits resistive indices from 2010 study.  These findings are concerning for developing allograft rejection versus sequela of ATN or medication such as calcineurin inhibitor toxicity, less likely renal artery stenosis.  Further evaluation/follow-up as warranted.    This report was flagged in Epic as abnormal.    Electronically signed by resident: Cesar Sullivan  Date:    12/18/2023  Time:    19:33    Electronically signed by: Tres Tsang MD  Date:    12/18/2023  Time:    21:10

## 2023-12-19 NOTE — ASSESSMENT & PLAN NOTE
- Continue Prograf (3 mg 0800, 2 mg 1800). Monitor trough daily and adjust dose as needed to achieve therapeutic level.  - 12/19: no dose changes  - Continue Cellcept 1000 mg BID.  - Treated with IV steroids at OSH prior to transfer. For now, continue Prednisone 5 mg daily.

## 2023-12-19 NOTE — HOSPITAL COURSE
Interval History: NAEON. 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 EM pending. Discussed with team. Plan to treat with steroids. Give additional Solumedrol 5mg/kg q daily x 2 doses to complete treatment. Restart opportunistic infectious prophylaxis. Due to hypoalbuminemia, start eliquis for anticoagulation. DSA 12/19 with +DSAs. Reviewed by team. Consider IVIG outpatient. Transfer to [facility] for further management. Discuss with primary team. Monitor.

## 2023-12-19 NOTE — PROGRESS NOTES
[facility]  Progress Note    Patient Name: Ubaldo Tripp  MRN: [id number]  Patient Class: IP- Inpatient   Admission Date: 12/18/2023  Length of Stay: 1 days  Attending Physician: Preston Montgomery MD  Primary Care Provider: Jaime Jauregui (Inactive)        Subjective:     Principal Problem:Acute renal failure        HPI:  38M with PMH of CKD s/p living kidney transplant 7/26/10 on cellcept/tacrolimus (etiology of ESRD unknown per patient) who has been transferred from hospital in [place], [place] for evaluation of possible graft rejection. Patient currently follows with nephrology in [place] and last follow up with transplant team was 1/3/20 at which time patient had CKD stage 2 with baseline creatinine ~ 1.3-1.4. Patient states he stopped taking cellcept and tacrolimus 4-5 weeks ago as he ran out of medications and was too busy with work to schedule follow up appt. He presented to ED in [place] with c/o headache was diagnosed with hypertensive emergency and placed in ICU on cardene drip. Patient also noted to have GENNY with Cr 3.68. Other labs showed Na 133, K nl, Cr 3.68, BUN 83, UA +1 leukocytes and 3+ blood.  CT abdomen/pelvis was performed which showed persistent mild dilation of the allograft and cable system.  Hydronephrosis and distended bladder.  Mayen catheter was placed.  Patient was also started on ceftriaxone for unknown reason. Patient then transferred here for evaluation by kidney transplant medicine for possible graft rejection. Patient reports he was treated empirically for rejection with IV steroids. He is asymptomatic at this time and denies fever, chills, SOB, CP, edema, decreased UOP. He will be admitted to hospital medicine service for further management.    Overview/Hospital Course:  No notes on file    Interval History: NAEON. Cr 3.8 today. Kidney US concerning for graft rejection. Unfortunately kidney biopsy cancelled as patient refused HTN meds this AM  and BP uncontrolled.     Review of Systems   Constitutional:  Negative for activity change, appetite change, chills, diaphoresis, fatigue, fever and unexpected weight change.   Respiratory:  Negative for cough, chest tightness, shortness of breath, wheezing and stridor.    Cardiovascular:  Negative for chest pain, palpitations and leg swelling.   Gastrointestinal:  Negative for abdominal pain, blood in stool, constipation, diarrhea, nausea and vomiting.   Endocrine: Negative for cold intolerance and heat intolerance.   Genitourinary:  Negative for difficulty urinating, dysuria, flank pain and frequency.   Musculoskeletal:  Negative for arthralgias, joint swelling and myalgias.   Skin: Negative.    Neurological:  Negative for dizziness, weakness, light-headedness and headaches.     Objective:     Vital Signs (Most Recent):  Temp: 97.2 °F (36.2 °C) (12/19/23 1546)  Pulse: 87 (12/19/23 1546)  Resp: 18 (12/19/23 1546)  BP: (!) 175/106 (12/19/23 1553)  SpO2: 98 % (12/19/23 1546) Vital Signs (24h Range):  Temp:  [97.2 °F (36.2 °C)-98.2 °F (36.8 °C)] 97.2 °F (36.2 °C)  Pulse:  [63-94] 87  Resp:  [12-18] 18  SpO2:  [93 %-100 %] 98 %  BP: (140-219)/() 175/106     Weight: 123.2 kg (271 lb 9.7 oz)  Body mass index is 36.84 kg/m².    Intake/Output Summary (Last 24 hours) at 12/19/2023 1623  Last data filed at 12/19/2023 1217  Gross per 24 hour   Intake 240 ml   Output 1060 ml   Net -820 ml         Physical Exam  Constitutional:       General: He is not in acute distress.     Appearance: He is not ill-appearing or toxic-appearing.   HENT:      Head: Normocephalic and atraumatic.   Eyes:      Conjunctiva/sclera: Conjunctivae normal.      Pupils: Pupils are equal, round, and reactive to light.   Cardiovascular:      Rate and Rhythm: Normal rate and regular rhythm.      Heart sounds: Normal heart sounds.   Pulmonary:      Effort: Pulmonary effort is normal. No respiratory distress.      Breath sounds: Normal breath sounds. No  wheezing or rales.   Abdominal:      General: Bowel sounds are normal. There is no distension.      Palpations: Abdomen is soft.      Tenderness: There is no abdominal tenderness. There is no guarding.   Musculoskeletal:         General: Normal range of motion.      Cervical back: Normal range of motion and neck supple.      Right lower leg: No edema.      Left lower leg: No edema.   Skin:     General: Skin is warm and dry.   Neurological:      General: No focal deficit present.      Mental Status: He is alert and oriented to person, place, and time. Mental status is at baseline.   Psychiatric:         Mood and Affect: Mood normal.         Behavior: Behavior normal.             Significant Labs: All pertinent labs within the past 24 hours have been reviewed.    Significant Imaging: I have reviewed all pertinent imaging results/findings within the past 24 hours.    Assessment/Plan:      * Acute renal failure  CKD s/p living-donor kidney transplant 7/2010  - Non-compliance with immunotherapy over the past 4-5 weeks  - Baseline creat in 2020 was 1.3-1.4  - Cr 3.9 on admit 12/18  - s/p empiric treatment with IV steroids in [place]  - Cont prograf/cellcept  - Cont prednisone 5mg daily  - Kidney transplant US: Mild elevation of the peak systolic velocity in the main renal artery but improved from study in 2010 otherwise normal renal artery to iliac artery ratio.  There is questioned early/minimal tardus parvus waveform at the inferior lobar artery with mild slow upstroke at the upper and midpole arteries, as well as interval increased but technically within normal limits resistive indices from 2010 study.  These findings are concerning for developing allograft rejection  - KTM consulted  - IR consulted for kidney biopsy  - BP control  - Avoid nephrotoxic agents   - Strict I/O  - Kidney biopsy once HTN better controlled, hopefully tomorrow.    Nephrotic syndrome        Long-term use of immunosuppressant  "medication        CKD (chronic kidney disease) stage 4, GFR 15-29 ml/min  - CR 3.9 on admit. Baseline Cr in 2020 1.3-1.4      Renovascular hypertension  - Admitted to ICU in [place] for HTN emergency requiring cardene gtt. Hasn't required anti-hypertensives until this admission.   - Cont coreg 12.5mg BID and increase nifedipine to 60mg BID  - PRN clonidine and hydralazine      Prophylactic immunotherapy  - Continue Prograf 3mg AM and 2mg PM and monitor levels daily  - Continue Cellcept 1000 mg BID.  - Treated with IV steroids prior to transfer. Cont prednisone 5 mg daily.      Living-donor kidney transplant recipient - 7/26/10  - s/p living related Ktx 7/26/10 for unknown etiology   - See "acute renal failure."        VTE Risk Mitigation (From admission, onward)           Ordered     heparin (porcine) injection 5,000 Units  Every 8 hours         12/18/23 1449     IP VTE HIGH RISK PATIENT  Once         12/18/23 1449     Place sequential compression device  Until discontinued         12/18/23 1449                    Discharge Planning   ZAY:      Code Status: Full Code   Is the patient medically ready for discharge?:     Reason for patient still in hospital (select all that apply): Treatment and Consult recommendations                     Preston Montgomery MD  Department of Hospital Medicine   [facility] - Tele[facility] Stepdown    "

## 2023-12-19 NOTE — ASSESSMENT & PLAN NOTE
CKD s/p living-donor kidney transplant 7/2010  - Non-compliance with immunotherapy over the past 4-5 weeks  - Baseline creat in 2020 was 1.3-1.4  - Cr 3.9 on admit 12/18  - s/p empiric treatment with IV steroids in [place]  - Cont prograf/cellcept  - Cont prednisone 5mg daily  - Kidney transplant US: Mild elevation of the peak systolic velocity in the main renal artery but improved from study in 2010 otherwise normal renal artery to iliac artery ratio.  There is questioned early/minimal tardus parvus waveform at the inferior lobar artery with mild slow upstroke at the upper and midpole arteries, as well as interval increased but technically within normal limits resistive indices from 2010 study.  These findings are concerning for developing allograft rejection  - KTM consulted  - IR consulted for kidney biopsy  - BP control  - Avoid nephrotoxic agents   - Strict I/O  - Kidney biopsy once HTN better controlled, hopefully tomorrow.

## 2023-12-19 NOTE — PROGRESS NOTES
[facility] - Telemetry Stepdown  Kidney Transplant  Progress Note      Reason for Follow-up: Reassessment of Kidney Transplant - 7/26/2010  (#1) recipient and management of immunosuppression.     ORGAN: LEFT KIDNEY    Donor Type: Living        Subjective:   History of Present Illness:  Mr. Qasim Tripp is a 38 y.o. year old white male who received a living kidney transplant on 7/26/10 (etiology of ESRD unknown per patient). Last follow up with transplant team was 1/3/20 at which time patient had CKD stage 2 - GFR 60-89 with baseline creatinine ~ 1.3-1.4. Patient lives in [place] and reports work () got busy so he stopped going to labs/appointments. He has been on mycophenolate mofetil and tacrolimus for maintenance immunosuppression, but he admits to running out of medications ~5 weeks ago. He states he did not have time to schedule a visit with a nephrologist, so he was unable to obtain his meds due to need for a new script/refill from a nephrologist. Patient presented to an ED in [place] with HA and HTN (SBP <200) on Monday, 12/11. Patient reports a history of HTN pre transplant, but reports his BP has been well controlled without need for anti-hypertensive's post op until now. Patient was prescribed Lisinopril on 12/11 and discharged home. He presented again to the ED 12/13 with HA and HTN, GENNY, and he was admitted. OSH record transfer is pending, but patient reports he was treated empirically for rejection with IV steroids. On admit to [facility], he reports feeling better overall and denies fever, chills, SOB, CP, edema, decreased UOP. He does admit to dark urine x a few weeks and decreased appetite starting yesterday. Labs notable for Cr 3.9, Phos 6.1. CBC is pending. Admitted to  with KTM consult. Recommend obtaining DSA, PTH, UPC, kidney transplant US, and consulting IR for kidney transplant biopsy tomorrow, 12/19. Continue Prograf 3 mg AM 2 mg PM, Cellcept 1000 mg BID, and Prednisone 5  mg daily for now. D/w Dr. Morris.    Mr. Tripp is a 38 y.o. year old male who is status post Kidney Transplant - 7/26/2010  (#1).    His maintenance immunosuppression consists of:   Immunosuppressants (From admission, onward)      Start     Stop Route Frequency Ordered    12/19/23 0800  tacrolimus capsule 3 mg         -- Oral Every morning 12/18/23 1449    12/18/23 2100  mycophenolate capsule 1,000 mg         -- Oral 2 times daily 12/18/23 1612    12/18/23 1800  tacrolimus capsule 2 mg         -- Oral Every evening 12/18/23 1449            Hospital Course:  Interval History: NAEON. BP improved this AM on Nifedipine and Coreg. Cr 3.8 from 3.9 and patient has proteinuria. Kidney transplant US shows mild hydronephrosis (PVR ordered), mild elevated of the peak systolic velocity of main renal artery (however, improved from 2010), and question of minimal tardus parvus waveform at the inferior lobar artery. Plan for transplant kidney bx today 12/19 with IR. Treatment is pending bx results. DSA 12/19 pending. For now, continue Prograf, Cellcept, and Prednisone. Prograf level 5.1. Continue Prograf 3 mg AM 2 mg PM. Monitor strict I/O, daily weights.      Past Medical, Surgical, Family, and Social History:   Unchanged from H&P.    Scheduled Meds:   carvediloL  12.5 mg Oral BID    heparin (porcine)  5,000 Units Subcutaneous Q8H    mycophenolate  1,000 mg Oral BID    NIFEdipine  30 mg Oral BID    predniSONE  5 mg Oral Daily    senna-docusate 8.6-50 mg  1 tablet Oral BID    sevelamer carbonate  800 mg Oral TID WM    tacrolimus  2 mg Oral Daily PM    tacrolimus  3 mg Oral Daily AM     Continuous Infusions:  PRN Meds:acetaminophen, albuterol-ipratropium, aluminum-magnesium hydroxide-simethicone, cloNIDine, dextrose 10%, dextrose 10%, glucagon (human recombinant), glucose, glucose, hydrALAZINE, melatonin, naloxone, ondansetron, simethicone, sodium chloride 0.9%    Intake/Output - Last 3 Shifts         12/17 0700  12/18 0659  "12/18 0700 12/19 0659 12/19 0700 12/20 0659    P.O.  240     Total Intake(mL/kg)  240 (1.9)     Urine (mL/kg/hr)  660 400 (0.8)    Stool  0     Total Output  660 400    Net  -420 -400           Stool Occurrence  0 x              Review of Systems   Constitutional:  Negative for appetite change, chills and fever.   HENT:  Negative for trouble swallowing.    Eyes:  Negative for photophobia and redness.   Respiratory:  Negative for cough, shortness of breath, wheezing and stridor.    Cardiovascular:  Negative for chest pain, palpitations and leg swelling.   Gastrointestinal:  Negative for abdominal distention, abdominal pain, diarrhea, nausea and vomiting.   Genitourinary:  Negative for decreased urine volume and difficulty urinating.        +dark "tea colored" urine   Musculoskeletal:  Negative for neck pain and neck stiffness.   Skin:  Negative for wound.   Allergic/Immunologic: Positive for immunocompromised state.   Neurological:  Negative for dizziness, syncope and headaches.   Psychiatric/Behavioral:  Negative for agitation, behavioral problems, confusion and decreased concentration.       Objective:     Vital Signs (Most Recent):  Temp: 98.1 °F (36.7 °C) (12/19/23 0730)  Pulse: 67 (12/19/23 0730)  Resp: 18 (12/19/23 0730)  BP: (!) 140/87 (12/19/23 0730)  SpO2: 95 % (12/19/23 0730) Vital Signs (24h Range):  Temp:  [97.8 °F (36.6 °C)-98.4 °F (36.9 °C)] 98.1 °F (36.7 °C)  Pulse:  [63-85] 67  Resp:  [17-18] 18  SpO2:  [94 %-98 %] 95 %  BP: (140-178)/() 140/87     Weight: 123.2 kg (271 lb 9.7 oz)  Height: 6' (182.9 cm)  Body mass index is 36.84 kg/m².     Physical Exam  Vitals and nursing note reviewed.   Constitutional:       General: He is not in acute distress.  HENT:      Head: Normocephalic and atraumatic.   Eyes:      General: No scleral icterus.        Right eye: No discharge.         Left eye: No discharge.   Cardiovascular:      Rate and Rhythm: Normal rate and regular rhythm.      Heart sounds: No " murmur heard.  Abdominal:      General: Bowel sounds are normal. There is no distension.      Tenderness: There is no abdominal tenderness. There is no guarding.      Comments: Ktx scar RLQ   Musculoskeletal:      Comments: Trace generalized edema  Surgical scar lateral R ankle   Skin:     General: Skin is warm and dry.      Capillary Refill: Capillary refill takes less than 2 seconds.      Coloration: Skin is not jaundiced.   Neurological:      General: No focal deficit present.      Mental Status: He is alert and oriented to person, place, and time.   Psychiatric:         Mood and Affect: Mood normal.         Behavior: Behavior normal.         Thought Content: Thought content normal.          Laboratory:  CBC:   Recent Labs   Lab 12/18/23  1514 12/19/23  0346   WBC 14.45* 9.03   RBC 5.29 4.14*   HGB 15.4 12.2*   HCT 46.2 35.9*    179   MCV 87 87   MCH 29.1 29.5   MCHC 33.3 34.0     CMP:   Recent Labs   Lab 12/18/23  1514 12/19/23  0346   GLU 98 89   CALCIUM 8.1* 7.8*   ALBUMIN 2.2* 1.8*   PROT 5.7* 4.7*   * 137   K 4.3 4.0   CO2 20* 22*    107   BUN 91* 95*   CREATININE 3.9* 3.8*   ALKPHOS 98 80   ALT 23 20   AST 24 18       Diagnostic Results:  US - Kidney: Results for orders placed during the hospital encounter of 12/18/23    US Transplant Kidney With Doppler    Narrative  EXAMINATION:  US TRANSPLANT KIDNEY WITH DOPPLER    CLINICAL HISTORY:  concern for rejection;    TECHNIQUE:  Grayscale, duplex, and color Doppler evaluation of transplant kidney performed.    COMPARISON:  Ultrasound kidney 10/04/2010    FINDINGS:  History of kidney transplant in 2010.      There is a transplant kidney identified in the right lower quadrant.  Renal allograft measures 15.5 cm in craniocaudal length, previously 11.9 cm.  No perinephric fluid or collection identified.  No mass.  Mild hydronephrosis.    Doppler evaluation demonstrates a peak systolic velocity in the main renal artery of 277 cm/sec (previously 425  cm/sec).  The main renal artery has a normal waveform without evidence of parvus tardus.  The main renal artery to iliac artery velocity ratio is not elevated measuring 1.4.  The main renal vein is patent.    The intrarenal resistive indices are as follows:    Segmental upper pole: 0.75, previously 0.56 with mild slow upstroke of the waveform    Segmental midpole: 0.70, previously 0.64 with slow upstroke of the waveform    Segmental lower pole: 0.71, previously 0.63 with questioned early/minimal parvus tardus waveform.    Interlobar: 0.69, previously 0.59 with normal waveforms    Small free fluid in the pelvis.    Impression  Doppler evaluation of transplant kidney demonstrate mild hydronephrosis.    Mild elevation of the peak systolic velocity in the main renal artery but improved from study in 2010 otherwise normal renal artery to iliac artery ratio.  There is questioned early/minimal tardus parvus waveform at the inferior lobar artery with mild slow upstroke at the upper and midpole arteries, as well as interval increased but technically within normal limits resistive indices from 2010 study.  These findings are concerning for developing allograft rejection versus sequela of ATN or medication such as calcineurin inhibitor toxicity, less likely renal artery stenosis.  Further evaluation/follow-up as warranted.    This report was flagged in Epic as abnormal.    Electronically signed by resident: Cesar Sullivan  Date:    12/18/2023  Time:    19:33    Electronically signed by: Tres Tsang MD  Date:    12/18/2023  Time:    21:10  Assessment/Plan:     * Acute renal failure  - Baseline creat in 2020 was 1.3-1.4  - Cr 3.9 on admit 12/18; s/p empiric treatment with IV steroids at OSH (working on obtaining OSH records for exact dose)  - Avoid nephrotoxic agents (NSAIDs, IV contrast dye, ACEI/ARB anti-HTN medications, Aminoglycoside-containing antibiotics)  - UPC elevated, trend daily  - DSA 12/19 pending  - PTH 12/19  "pending  -  Kidney transplant US 12/18 shows mild hydronephrosis (PVR ordered), mild elevated of the peak systolic velocity of main renal artery (however, improved from 2010), and question of minimal tardus parvus waveform at the inferior lobar artery.   - Plan for transplant kidney bx today 12/19 with IR. Treatment is pending bx results.   - See "prophylactic immunotherapy."   - Renally dose all appropriate medications, including antibiotics   - Strict I/O, daily weights    Long-term use of immunosuppressant medication  - See "prophylactic immunotherapy."      CKD (chronic kidney disease) stage 4, GFR 15-29 ml/min  - Last follow up with transplant team was 1/3/20 at which time patient had CKD stage 2 - GFR 60-89 with baseline creatinine ~ 1.3-1.4.   - Cr on admit 3.9; see "acute renal failure."    Renovascular hypertension  - Admitted to OSH 12/13 with HTN/HA, reports BP has been well controlled post transplant (until now) but that he did take anti-hypertensives pre transplant  - SBP 170s on admit.  - Coreg and Nifedipine started on admit 12/19 with improvement in BP noted, continue at this time with hold parameters  - Management per primary team; Of note, IR requires SBP to be <160 for kidney biopsy.    Prophylactic immunotherapy  - Continue Prograf (3 mg 0800, 2 mg 1800). Monitor trough daily and adjust dose as needed to achieve therapeutic level.  - 12/19: no dose changes  - Continue Cellcept 1000 mg BID.  - Treated with IV steroids at OSH prior to transfer. For now, continue Prednisone 5 mg daily.        Living-donor kidney transplant recipient - 7/26/10  - s/p living related Ktx 7/26/10 for unknown etiology   - See "acute renal failure."          Slime Goyal PA-C  Kidney Transplant  [facility] - Telemetry Stepdown    "

## 2023-12-19 NOTE — NURSING
Patient AAOx4, able to make needs known to staff. NPO after midnight. Heparin SQ held due to biopsy procedure. Void in urinal without difficulty. No apparent distress noted. Will continue to follow plan of care.

## 2023-12-19 NOTE — ASSESSMENT & PLAN NOTE
- Admitted to ICU in [place] for HTN emergency requiring cardene gtt. Hasn't required anti-hypertensives until this admission.   - Cont coreg 12.5mg BID and increase nifedipine to 60mg BID  - PRN clonidine and hydralazine

## 2023-12-19 NOTE — SEDATION DOCUMENTATION
"B/P high, states "nervous". Was not given B/P meds this am because"I can't take pills without food."  Dr Guo notified and orders received.  "

## 2023-12-19 NOTE — HPI
38M with PMH of CKD s/p living kidney transplant 7/26/10 on cellcept/tacrolimus (etiology of ESRD unknown per patient) who has been transferred from hospital in [place], [place] for evaluation of possible graft rejection. Patient currently follows with nephrology in [place] and last follow up with transplant team was 1/3/20 at which time patient had CKD stage 2 with baseline creatinine ~ 1.3-1.4. Patient states he stopped taking cellcept and tacrolimus 4-5 weeks ago as he ran out of medications and was too busy with work to schedule follow up appt. He presented to ED in [place] with c/o headache was diagnosed with hypertensive emergency and placed in ICU on cardene drip. Patient also noted to have GENNY with Cr 3.68. Other labs showed Na 133, K nl, Cr 3.68, BUN 83, UA +1 leukocytes and 3+ blood.  CT abdomen/pelvis was performed which showed persistent mild dilation of the allograft and cable system.  Hydronephrosis and distended bladder.  Mayen catheter was placed.  Patient was also started on ceftriaxone for unknown reason. Patient then transferred here for evaluation by kidney transplant medicine for possible graft rejection. Patient reports he was treated empirically for rejection with IV steroids. He is asymptomatic at this time and denies fever, chills, SOB, CP, edema, decreased UOP. He will be admitted to hospital medicine service for further management.

## 2023-12-19 NOTE — H&P
[facility] - Telemetry [facility] Medicine  History & Physical    Patient Name: Ubaldo Tripp  MRN: [id number]  Patient Class: IP- Inpatient  Admission Date: 12/18/2023  Attending Physician: Preston Montgomery MD   Primary Care Provider: Jaime Jauregui (Inactive)         Patient information was obtained from patient and ER records.     Subjective:     Principal Problem:Acute renal failure    Chief Complaint: No chief complaint on file.       HPI: 38M with PMH of CKD s/p living kidney transplant 7/26/10 on cellcept/tacrolimus (etiology of ESRD unknown per patient) who has been transferred from hospital in [place], [place] for evaluation of possible graft rejection. Patient currently follows with nephrology in [place] and last follow up with transplant team was 1/3/20 at which time patient had CKD stage 2 with baseline creatinine ~ 1.3-1.4. Patient states he stopped taking cellcept and tacrolimus 4-5 weeks ago as he ran out of medications and was too busy with work to schedule follow up appt. He presented to ED in [place] with c/o headache was diagnosed with hypertensive emergency and placed in ICU on cardene drip. Patient also noted to have GENNY with Cr 3.68. Other labs showed Na 133, K nl, Cr 3.68, BUN 83, UA +1 leukocytes and 3+ blood.  CT abdomen/pelvis was performed which showed persistent mild dilation of the allograft and cable system.  Hydronephrosis and distended bladder.  Mayen catheter was placed.  Patient was also started on ceftriaxone for unknown reason. Patient then transferred here for evaluation by kidney transplant medicine for possible graft rejection. Patient reports he was treated empirically for rejection with IV steroids. He is asymptomatic at this time and denies fever, chills, SOB, CP, edema, decreased UOP. He will be admitted to hospital medicine service for further management.    Past Medical History:   Diagnosis Date    Chronic interstitial nephritis     CKD (chronic kidney disease),  stage III 2013    Hypertension     Immunocompromised state 2018    Living-donor kidney transplant recipient     Need for prophylactic immunotherapy        Past Surgical History:   Procedure Laterality Date    KIDNEY TRANSPLANT      PERITONEAL CATHETER REMOVAL         Review of patient's allergies indicates:   Allergen Reactions    Penicillins      Other reaction(s): Hives  Other reaction(s): Edema       No current facility-administered medications on file prior to encounter.     Current Outpatient Medications on File Prior to Encounter   Medication Sig    mycophenolate (CELLCEPT) 250 mg Cap TAKE 2 CAPSULES THREE TIMES A DAY.    tacrolimus (PROGRAF) 1 MG Cap TAKE 3 CAPSULES EVERY MORNING THEN 2 CAPSULES EVERY EVENING    sodium bicarbonate 650 MG tablet Take 1 tablet (650 mg total) by mouth 2 (two) times daily.     Family History       Problem Relation (Age of Onset)    Heart disease Father    Stroke Mother          Tobacco Use    Smoking status: Former     Current packs/day: 0.00     Average packs/day: 0.5 packs/day for 15.0 years (7.5 ttl pk-yrs)     Types: Cigarettes     Start date: 1995     Quit date: 2010     Years since quittin.4    Smokeless tobacco: Never   Substance and Sexual Activity    Alcohol use: No    Drug use: No    Sexual activity: Not on file     Comment:      Review of Systems   Constitutional:  Negative for activity change, appetite change, chills, diaphoresis, fatigue, fever and unexpected weight change.   Respiratory:  Negative for cough, chest tightness, shortness of breath, wheezing and stridor.    Cardiovascular:  Negative for chest pain, palpitations and leg swelling.   Gastrointestinal:  Negative for abdominal pain, blood in stool, constipation, diarrhea, nausea and vomiting.   Endocrine: Negative for cold intolerance and heat intolerance.   Genitourinary:  Negative for difficulty urinating, dysuria, flank pain and frequency.   Musculoskeletal:  Negative for  arthralgias, joint swelling and myalgias.   Skin: Negative.    Neurological:  Negative for dizziness, weakness, light-headedness and headaches.     Objective:     Vital Signs (Most Recent):  Temp: 98 °F (36.7 °C) (12/19/23 1144)  Pulse: 94 (12/19/23 1410)  Resp: 14 (12/19/23 1410)  BP: (!) 158/101 (12/19/23 1410)  SpO2: 98 % (12/19/23 1410) Vital Signs (24h Range):  Temp:  [97.8 °F (36.6 °C)-98.4 °F (36.9 °C)] 98 °F (36.7 °C)  Pulse:  [63-94] 94  Resp:  [12-18] 14  SpO2:  [93 %-100 %] 98 %  BP: (140-219)/() 158/101     Weight: 123.2 kg (271 lb 9.7 oz)  Body mass index is 36.84 kg/m².     Physical Exam  Constitutional:       General: He is not in acute distress.     Appearance: He is not ill-appearing or toxic-appearing.   HENT:      Head: Normocephalic and atraumatic.   Eyes:      Conjunctiva/sclera: Conjunctivae normal.      Pupils: Pupils are equal, round, and reactive to light.   Cardiovascular:      Rate and Rhythm: Normal rate and regular rhythm.      Heart sounds: Normal heart sounds.   Pulmonary:      Effort: Pulmonary effort is normal. No respiratory distress.      Breath sounds: Normal breath sounds. No wheezing or rales.   Abdominal:      General: Bowel sounds are normal. There is no distension.      Palpations: Abdomen is soft.      Tenderness: There is no abdominal tenderness. There is no guarding.   Musculoskeletal:         General: Normal range of motion.      Cervical back: Normal range of motion and neck supple.      Right lower leg: No edema.      Left lower leg: No edema.   Skin:     General: Skin is warm and dry.   Neurological:      General: No focal deficit present.      Mental Status: He is alert and oriented to person, place, and time. Mental status is at baseline.   Psychiatric:         Mood and Affect: Mood normal.         Behavior: Behavior normal.              CRANIAL NERVES     CN III, IV, VI   Pupils are equal, round, and reactive to light.       Significant Labs: All pertinent  "labs within the past 24 hours have been reviewed.    Significant Imaging: I have reviewed all pertinent imaging results/findings within the past 24 hours.  Assessment/Plan:     * Acute renal failure  CKD s/p living-donor kidney transplant 7/2010  - Non-compliance with immunotherapy over the past 4-5 weeks  - Baseline creat in 2020 was 1.3-1.4  - Cr 3.9 on admit 12/18  - s/p empiric treatment with IV steroids in [place]  - Cont prograf/cellcept  - Start prednisone 5mg daily  - Kidney transplant US pending   - KTM consulted  - IR consulted for kidney biopsy  - BP control  - Avoid nephrotoxic agents   - Strict I/O    Nephrotic syndrome        Long-term use of immunosuppressant medication        CKD (chronic kidney disease) stage 4, GFR 15-29 ml/min  - CR 3.9 on admit. Baseline Cr in 2020 1.3-1.4      Renovascular hypertension  - Admitted to ICU in [place] for HTN emergency requiring cardene gtt. Hasn't required anti-hypertensives until this admission.   - Start coreg and nifedipine   - PRN clonidine and hydralazine      Prophylactic immunotherapy  - Continue Prograf 3mg AM and 2mg PM and monitor levels daily  - Continue Cellcept 1000 mg BID.  - Treated with IV steroids prior to transfer. Start prednisone 5 mg daily.      Living-donor kidney transplant recipient - 7/26/10  - s/p living related Ktx 7/26/10 for unknown etiology   - See "acute renal failure."        VTE Risk Mitigation (From admission, onward)           Ordered     heparin (porcine) injection 5,000 Units  Every 8 hours         12/18/23 1449     IP VTE HIGH RISK PATIENT  Once         12/18/23 1449     Place sequential compression device  Until discontinued         12/18/23 1449                               Pt is in MPU 6 for one hour recovery.       Preston Montgomery MD  Department of Hospital Medicine  [facility] - Telemetry Stepdown          "

## 2023-12-19 NOTE — ASSESSMENT & PLAN NOTE
- Admitted to OSH 12/13 with HTN/HA, reports BP has been well controlled post transplant (until now) but that he did take anti-hypertensives pre transplant  - SBP 170s on admit.  - Coreg and Nifedipine started on admit 12/19 with improvement in BP noted, continue at this time with hold parameters  - Management per primary team; Of note, IR requires SBP to be <160 for kidney biopsy.

## 2023-12-19 NOTE — SUBJECTIVE & OBJECTIVE
Past Medical History:   Diagnosis Date    Chronic interstitial nephritis     CKD (chronic kidney disease), stage III 2013    Hypertension     Immunocompromised state 2018    Living-donor kidney transplant recipient     Need for prophylactic immunotherapy        Past Surgical History:   Procedure Laterality Date    KIDNEY TRANSPLANT      PERITONEAL CATHETER REMOVAL         Review of patient's allergies indicates:   Allergen Reactions    Penicillins      Other reaction(s): Hives  Other reaction(s): Edema       No current facility-administered medications on file prior to encounter.     Current Outpatient Medications on File Prior to Encounter   Medication Sig    mycophenolate (CELLCEPT) 250 mg Cap TAKE 2 CAPSULES THREE TIMES A DAY.    tacrolimus (PROGRAF) 1 MG Cap TAKE 3 CAPSULES EVERY MORNING THEN 2 CAPSULES EVERY EVENING    sodium bicarbonate 650 MG tablet Take 1 tablet (650 mg total) by mouth 2 (two) times daily.     Family History       Problem Relation (Age of Onset)    Heart disease Father    Stroke Mother          Tobacco Use    Smoking status: Former     Current packs/day: 0.00     Average packs/day: 0.5 packs/day for 15.0 years (7.5 ttl pk-yrs)     Types: Cigarettes     Start date: 1995     Quit date: 2010     Years since quittin.4    Smokeless tobacco: Never   Substance and Sexual Activity    Alcohol use: No    Drug use: No    Sexual activity: Not on file     Comment:      Review of Systems   Constitutional:  Negative for activity change, appetite change, chills, diaphoresis, fatigue, fever and unexpected weight change.   Respiratory:  Negative for cough, chest tightness, shortness of breath, wheezing and stridor.    Cardiovascular:  Negative for chest pain, palpitations and leg swelling.   Gastrointestinal:  Negative for abdominal pain, blood in stool, constipation, diarrhea, nausea and vomiting.   Endocrine: Negative for cold intolerance and heat intolerance.   Genitourinary:   Negative for difficulty urinating, dysuria, flank pain and frequency.   Musculoskeletal:  Negative for arthralgias, joint swelling and myalgias.   Skin: Negative.    Neurological:  Negative for dizziness, weakness, light-headedness and headaches.     Objective:     Vital Signs (Most Recent):  Temp: 98 °F (36.7 °C) (12/19/23 1144)  Pulse: 94 (12/19/23 1410)  Resp: 14 (12/19/23 1410)  BP: (!) 158/101 (12/19/23 1410)  SpO2: 98 % (12/19/23 1410) Vital Signs (24h Range):  Temp:  [97.8 °F (36.6 °C)-98.4 °F (36.9 °C)] 98 °F (36.7 °C)  Pulse:  [63-94] 94  Resp:  [12-18] 14  SpO2:  [93 %-100 %] 98 %  BP: (140-219)/() 158/101     Weight: 123.2 kg (271 lb 9.7 oz)  Body mass index is 36.84 kg/m².     Physical Exam  Constitutional:       General: He is not in acute distress.     Appearance: He is not ill-appearing or toxic-appearing.   HENT:      Head: Normocephalic and atraumatic.   Eyes:      Conjunctiva/sclera: Conjunctivae normal.      Pupils: Pupils are equal, round, and reactive to light.   Cardiovascular:      Rate and Rhythm: Normal rate and regular rhythm.      Heart sounds: Normal heart sounds.   Pulmonary:      Effort: Pulmonary effort is normal. No respiratory distress.      Breath sounds: Normal breath sounds. No wheezing or rales.   Abdominal:      General: Bowel sounds are normal. There is no distension.      Palpations: Abdomen is soft.      Tenderness: There is no abdominal tenderness. There is no guarding.   Musculoskeletal:         General: Normal range of motion.      Cervical back: Normal range of motion and neck supple.      Right lower leg: No edema.      Left lower leg: No edema.   Skin:     General: Skin is warm and dry.   Neurological:      General: No focal deficit present.      Mental Status: He is alert and oriented to person, place, and time. Mental status is at baseline.   Psychiatric:         Mood and Affect: Mood normal.         Behavior: Behavior normal.              CRANIAL NERVES     CN  III, IV, VI   Pupils are equal, round, and reactive to light.       Significant Labs: All pertinent labs within the past 24 hours have been reviewed.    Significant Imaging: I have reviewed all pertinent imaging results/findings within the past 24 hours.

## 2023-12-20 LAB
ALBUMIN SERPL BCP-MCNC: 2 G/DL (ref 3.5–5.2)
ALP SERPL-CCNC: 84 U/L (ref 55–135)
ALT SERPL W/O P-5'-P-CCNC: 22 U/L (ref 10–44)
ANION GAP SERPL CALC-SCNC: 7 MMOL/L (ref 8–16)
AST SERPL-CCNC: 17 U/L (ref 10–40)
BASOPHILS # BLD AUTO: 0.02 K/UL (ref 0–0.2)
BASOPHILS NFR BLD: 0.2 % (ref 0–1.9)
BILIRUB SERPL-MCNC: 0.5 MG/DL (ref 0.1–1)
BUN SERPL-MCNC: 92 MG/DL (ref 6–20)
CALCIUM SERPL-MCNC: 7.9 MG/DL (ref 8.7–10.5)
CHLORIDE SERPL-SCNC: 108 MMOL/L (ref 95–110)
CO2 SERPL-SCNC: 22 MMOL/L (ref 23–29)
CREAT SERPL-MCNC: 3.2 MG/DL (ref 0.5–1.4)
DIFFERENTIAL METHOD: ABNORMAL
EOSINOPHIL # BLD AUTO: 0.2 K/UL (ref 0–0.5)
EOSINOPHIL NFR BLD: 1.9 % (ref 0–8)
ERYTHROCYTE [DISTWIDTH] IN BLOOD BY AUTOMATED COUNT: 12.4 % (ref 11.5–14.5)
EST. GFR  (NO RACE VARIABLE): 24.2 ML/MIN/1.73 M^2
GLUCOSE SERPL-MCNC: 108 MG/DL (ref 70–110)
HCT VFR BLD AUTO: 38.1 % (ref 40–54)
HGB BLD-MCNC: 12.9 G/DL (ref 14–18)
IMM GRANULOCYTES # BLD AUTO: 0.12 K/UL (ref 0–0.04)
IMM GRANULOCYTES NFR BLD AUTO: 1.4 % (ref 0–0.5)
LYMPHOCYTES # BLD AUTO: 1.4 K/UL (ref 1–4.8)
LYMPHOCYTES NFR BLD: 16.4 % (ref 18–48)
MAGNESIUM SERPL-MCNC: 2.2 MG/DL (ref 1.6–2.6)
MCH RBC QN AUTO: 29.5 PG (ref 27–31)
MCHC RBC AUTO-ENTMCNC: 33.9 G/DL (ref 32–36)
MCV RBC AUTO: 87 FL (ref 82–98)
MONOCYTES # BLD AUTO: 0.9 K/UL (ref 0.3–1)
MONOCYTES NFR BLD: 10.6 % (ref 4–15)
NEUTROPHILS # BLD AUTO: 5.9 K/UL (ref 1.8–7.7)
NEUTROPHILS NFR BLD: 69.5 % (ref 38–73)
NRBC BLD-RTO: 0 /100 WBC
PHOSPHATE SERPL-MCNC: 4.8 MG/DL (ref 2.7–4.5)
PLATELET # BLD AUTO: 166 K/UL (ref 150–450)
PMV BLD AUTO: 9 FL (ref 9.2–12.9)
POTASSIUM SERPL-SCNC: 4 MMOL/L (ref 3.5–5.1)
PROT SERPL-MCNC: 4.9 G/DL (ref 6–8.4)
RBC # BLD AUTO: 4.37 M/UL (ref 4.6–6.2)
SODIUM SERPL-SCNC: 137 MMOL/L (ref 136–145)
TACROLIMUS BLD-MCNC: 4.5 NG/ML (ref 5–15)
WBC # BLD AUTO: 8.43 K/UL (ref 3.9–12.7)

## 2023-12-20 PROCEDURE — 63600175 PHARM REV CODE 636 W HCPCS: Performed by: RADIOLOGY

## 2023-12-20 PROCEDURE — 85025 COMPLETE CBC W/AUTO DIFF WBC: CPT | Performed by: INTERNAL MEDICINE

## 2023-12-20 PROCEDURE — 25000003 PHARM REV CODE 250: Performed by: INTERNAL MEDICINE

## 2023-12-20 PROCEDURE — 63600175 PHARM REV CODE 636 W HCPCS: Performed by: INTERNAL MEDICINE

## 2023-12-20 PROCEDURE — 25000003 PHARM REV CODE 250: Performed by: RADIOLOGY

## 2023-12-20 PROCEDURE — 80053 COMPREHEN METABOLIC PANEL: CPT | Performed by: INTERNAL MEDICINE

## 2023-12-20 PROCEDURE — 63600175 PHARM REV CODE 636 W HCPCS: Mod: JG | Performed by: SURGERY

## 2023-12-20 PROCEDURE — 20600001 HC STEP DOWN PRIVATE ROOM

## 2023-12-20 PROCEDURE — 99233 SBSQ HOSP IP/OBS HIGH 50: CPT | Mod: ,,, | Performed by: PHYSICIAN ASSISTANT

## 2023-12-20 PROCEDURE — 36415 COLL VENOUS BLD VENIPUNCTURE: CPT | Performed by: INTERNAL MEDICINE

## 2023-12-20 PROCEDURE — 80197 ASSAY OF TACROLIMUS: CPT | Performed by: INTERNAL MEDICINE

## 2023-12-20 PROCEDURE — 83735 ASSAY OF MAGNESIUM: CPT | Performed by: INTERNAL MEDICINE

## 2023-12-20 PROCEDURE — 99233 PR SUBSEQUENT HOSPITAL CARE,LEVL III: ICD-10-PCS | Mod: ,,, | Performed by: PHYSICIAN ASSISTANT

## 2023-12-20 PROCEDURE — 25000003 PHARM REV CODE 250: Performed by: SURGERY

## 2023-12-20 PROCEDURE — 84100 ASSAY OF PHOSPHORUS: CPT | Performed by: INTERNAL MEDICINE

## 2023-12-20 RX ORDER — LIDOCAINE HYDROCHLORIDE 10 MG/ML
INJECTION INFILTRATION; PERINEURAL
Status: COMPLETED | OUTPATIENT
Start: 2023-12-20 | End: 2023-12-20

## 2023-12-20 RX ORDER — MIDAZOLAM HYDROCHLORIDE 1 MG/ML
INJECTION INTRAMUSCULAR; INTRAVENOUS
Status: COMPLETED | OUTPATIENT
Start: 2023-12-20 | End: 2023-12-20

## 2023-12-20 RX ORDER — FENTANYL CITRATE 50 UG/ML
INJECTION, SOLUTION INTRAMUSCULAR; INTRAVENOUS
Status: COMPLETED | OUTPATIENT
Start: 2023-12-20 | End: 2023-12-20

## 2023-12-20 RX ORDER — TACROLIMUS 1 MG/1
4 CAPSULE ORAL EVERY MORNING
Status: DISCONTINUED | OUTPATIENT
Start: 2023-12-21 | End: 2023-12-21

## 2023-12-20 RX ORDER — ONDANSETRON 2 MG/ML
INJECTION INTRAMUSCULAR; INTRAVENOUS
Status: COMPLETED | OUTPATIENT
Start: 2023-12-20 | End: 2023-12-20

## 2023-12-20 RX ADMIN — HEPARIN SODIUM 5000 UNITS: 5000 INJECTION INTRAVENOUS; SUBCUTANEOUS at 01:12

## 2023-12-20 RX ADMIN — CARVEDILOL 12.5 MG: 12.5 TABLET, FILM COATED ORAL at 08:12

## 2023-12-20 RX ADMIN — FENTANYL CITRATE 50 MCG: 50 INJECTION, SOLUTION INTRAMUSCULAR; INTRAVENOUS at 09:12

## 2023-12-20 RX ADMIN — NIFEDIPINE 60 MG: 30 TABLET, FILM COATED, EXTENDED RELEASE ORAL at 08:12

## 2023-12-20 RX ADMIN — ONDANSETRON 4 MG: 2 INJECTION INTRAMUSCULAR; INTRAVENOUS at 10:12

## 2023-12-20 RX ADMIN — MIDAZOLAM HYDROCHLORIDE 1.5 MG: 2 INJECTION, SOLUTION INTRAMUSCULAR; INTRAVENOUS at 09:12

## 2023-12-20 RX ADMIN — LIDOCAINE HYDROCHLORIDE 5 ML: 10 INJECTION, SOLUTION INFILTRATION; PERINEURAL at 10:12

## 2023-12-20 RX ADMIN — SEVELAMER CARBONATE 800 MG: 800 TABLET, FILM COATED ORAL at 05:12

## 2023-12-20 RX ADMIN — TACROLIMUS 2 MG: 1 CAPSULE ORAL at 05:12

## 2023-12-20 RX ADMIN — SENNOSIDES AND DOCUSATE SODIUM 1 TABLET: 8.6; 5 TABLET ORAL at 08:12

## 2023-12-20 RX ADMIN — MIDAZOLAM HYDROCHLORIDE 1 MG: 2 INJECTION, SOLUTION INTRAMUSCULAR; INTRAVENOUS at 10:12

## 2023-12-20 RX ADMIN — CLONIDINE HYDROCHLORIDE 0.1 MG: 0.1 TABLET ORAL at 03:12

## 2023-12-20 RX ADMIN — PREDNISONE 5 MG: 5 TABLET ORAL at 08:12

## 2023-12-20 RX ADMIN — Medication 1 G: at 10:12

## 2023-12-20 RX ADMIN — DESMOPRESSIN ACETATE 18.48 MCG: 4 SOLUTION INTRAVENOUS at 09:12

## 2023-12-20 RX ADMIN — SEVELAMER CARBONATE 800 MG: 800 TABLET, FILM COATED ORAL at 12:12

## 2023-12-20 RX ADMIN — MYCOPHENOLATE MOFETIL 1000 MG: 250 CAPSULE ORAL at 08:12

## 2023-12-20 RX ADMIN — HEPARIN SODIUM 5000 UNITS: 5000 INJECTION INTRAVENOUS; SUBCUTANEOUS at 09:12

## 2023-12-20 RX ADMIN — TACROLIMUS 3 MG: 1 CAPSULE ORAL at 08:12

## 2023-12-20 RX ADMIN — FENTANYL CITRATE 50 MCG: 50 INJECTION, SOLUTION INTRAMUSCULAR; INTRAVENOUS at 10:12

## 2023-12-20 NOTE — PLAN OF CARE
Problem: Fluid and Electrolyte Imbalance (Acute Kidney Injury/Impairment)  Goal: Fluid and Electrolyte Balance  Outcome: Ongoing, Progressing     Problem: Oral Intake Inadequate (Acute Kidney Injury/Impairment)  Goal: Optimal Nutrition Intake  Outcome: Ongoing, Progressing     Problem: Renal Function Impairment (Acute Kidney Injury/Impairment)  Goal: Effective Renal Function  Outcome: Ongoing, Progressing     Pt AAOx4, able to make needs known to staff. NPO after midnight to prepare to kidney biopsy this am, 0600 Heparin SQ will be held. Indep. ADLs. Ambulates without difficulty. No apparent distress noted. Will continue to follow plan of care.

## 2023-12-20 NOTE — PROGRESS NOTES
[facility] - Tele[facility] Stepdown  Kidney Transplant  Progress Note      Reason for Follow-up: Reassessment of Kidney Transplant - 7/26/2010  (#1) recipient and management of immunosuppression.     ORGAN: LEFT KIDNEY    Donor Type: Living      Subjective:   History of Present Illness:  Mr. Qasim Tripp is a 38 y.o. year old white male who received a living kidney transplant on 7/26/10 (etiology of ESRD unknown per patient). Last follow up with transplant team was 1/3/20 at which time patient had CKD stage 2 - GFR 60-89 with baseline creatinine ~ 1.3-1.4. Patient lives in [place] and reports work () got busy so he stopped going to labs/appointments. He has been on mycophenolate mofetil and tacrolimus for maintenance immunosuppression, but he admits to running out of medications ~5 weeks ago. He states he did not have time to schedule a visit with a nephrologist, so he was unable to obtain his meds due to need for a new script/refill from a nephrologist. Patient presented to an ED in [place] with HA and HTN (SBP <200) on Monday, 12/11. Patient reports a history of HTN pre transplant, but reports his BP has been well controlled without need for anti-hypertensive's post op until now. Patient was prescribed Lisinopril on 12/11 and discharged home. He presented again to the ED 12/13 with HA and HTN, GENNY, and he was admitted. OSH record transfer is pending, but patient reports he was treated empirically for rejection with IV steroids. On [facility], he reports feeling better overall and denies fever, chills, SOB, CP, edema, decreased UOP. He does admit to dark urine x a few weeks and decreased appetite starting yesterday. Labs notable for Cr 3.9, Phos 6.1. CBC is pending. Admitted to  with KTM consult. Recommend obtaining DSA, PTH, UPC, kidney transplant US, and consulting IR for kidney transplant biopsy tomorrow, 12/19. Continue Prograf 3 mg AM 2 mg PM, Cellcept 1000 mg BID, and Prednisone 5 mg  daily for now. D/w Dr. Morris.    Mr. Tripp is a 38 y.o. year old male who is status post Kidney Transplant - 7/26/2010  (#1).    His maintenance immunosuppression consists of:   Immunosuppressants (From admission, onward)      Start     Stop Route Frequency Ordered    12/21/23 0800  tacrolimus capsule 4 mg         -- Oral Every morning 12/20/23 1012    12/18/23 2100  mycophenolate capsule 1,000 mg         -- Oral 2 times daily 12/18/23 1612    12/18/23 1800  tacrolimus capsule 2 mg         -- Oral Every evening 12/18/23 1449          Interval History: NAEON. Pt unable to get kidney bx yesterday d/t hypertension. Nifedipine increased by primary team and biopsy rescheduled for today with IR. Treatment pending bx results. DSA obtained 12/19 pending. Cr 3.2 from 3.8. Prograf level 4.5 this am, increase dose to 4 mg am/2 mg pm. Cont cellcept and pred. Monitor strict I/O's and daily weights.       Past Medical, Surgical, Family, and Social History:   Unchanged from H&P.    Scheduled Meds:   carvediloL  12.5 mg Oral BID    heparin (porcine)  5,000 Units Subcutaneous Q8H    mycophenolate  1,000 mg Oral BID    NIFEdipine  60 mg Oral BID    predniSONE  5 mg Oral Daily    senna-docusate 8.6-50 mg  1 tablet Oral BID    sevelamer carbonate  800 mg Oral TID WM    tacrolimus  2 mg Oral Daily PM    [START ON 12/21/2023] tacrolimus  4 mg Oral Daily AM     Continuous Infusions:  PRN Meds:acetaminophen, albuterol-ipratropium, aluminum-magnesium hydroxide-simethicone, cloNIDine, dextrose 10%, dextrose 10%, glucagon (human recombinant), glucose, glucose, hydrALAZINE, melatonin, naloxone, ondansetron, simethicone, sodium chloride 0.9%    Intake/Output - Last 3 Shifts         12/18 0700 12/19 0659 12/19 0700 12/20 0659 12/20 0700 12/21 0659    P.O. 240 150     Total Intake(mL/kg) 240 (1.9) 150 (1.2)     Urine (mL/kg/hr) 660 2200 (0.7)     Stool 0      Total Output 660 2200     Net -420 -2050            Stool Occurrence 0 x         "       Review of Systems   Constitutional:  Negative for appetite change, chills and fever.   HENT:  Negative for trouble swallowing.    Eyes:  Negative for photophobia and redness.   Respiratory:  Negative for cough, shortness of breath, wheezing and stridor.    Cardiovascular:  Negative for chest pain, palpitations and leg swelling.   Gastrointestinal:  Negative for abdominal distention, abdominal pain, diarrhea, nausea and vomiting.   Genitourinary:  Negative for decreased urine volume and difficulty urinating.        +dark "tea colored" urine   Musculoskeletal:  Negative for neck pain and neck stiffness.   Skin:  Negative for wound.   Allergic/Immunologic: Positive for immunocompromised state.   Neurological:  Negative for dizziness, syncope and headaches.   Psychiatric/Behavioral:  Negative for agitation, behavioral problems, confusion and decreased concentration.       Objective:     Vital Signs (Most Recent):  Temp: 97.8 °F (36.6 °C) (12/20/23 1136)  Pulse: 70 (12/20/23 1136)  Resp: 18 (12/20/23 1136)  BP: (!) 154/99 (12/20/23 1136)  SpO2: 97 % (12/20/23 1136) Vital Signs (24h Range):  Temp:  [96.1 °F (35.6 °C)-98.5 °F (36.9 °C)] 97.8 °F (36.6 °C)  Pulse:  [70-94] 70  Resp:  [9-20] 18  SpO2:  [91 %-100 %] 97 %  BP: (121-219)/() 154/99     Weight: 123.2 kg (271 lb 9.7 oz)  Height: 6' (182.9 cm)  Body mass index is 36.84 kg/m².     Physical Exam  Vitals and nursing note reviewed.   Constitutional:       General: He is not in acute distress.  HENT:      Head: Normocephalic and atraumatic.   Eyes:      General: No scleral icterus.        Right eye: No discharge.         Left eye: No discharge.   Cardiovascular:      Rate and Rhythm: Normal rate and regular rhythm.      Heart sounds: No murmur heard.  Abdominal:      General: Bowel sounds are normal. There is no distension.      Tenderness: There is no abdominal tenderness. There is no guarding.      Comments: Ktx scar RLQ   Musculoskeletal:      Comments: " Trace generalized edema  Surgical scar lateral R ankle   Skin:     General: Skin is warm and dry.      Capillary Refill: Capillary refill takes less than 2 seconds.      Coloration: Skin is not jaundiced.   Neurological:      General: No focal deficit present.      Mental Status: He is alert and oriented to person, place, and time.   Psychiatric:         Mood and Affect: Mood normal.         Behavior: Behavior normal.         Thought Content: Thought content normal.          Laboratory:  CBC:   Recent Labs   Lab 12/18/23  1514 12/19/23  0346 12/20/23  0449   WBC 14.45* 9.03 8.43   RBC 5.29 4.14* 4.37*   HGB 15.4 12.2* 12.9*   HCT 46.2 35.9* 38.1*    179 166   MCV 87 87 87   MCH 29.1 29.5 29.5   MCHC 33.3 34.0 33.9     CMP:   Recent Labs   Lab 12/18/23  1514 12/19/23  0346 12/20/23  0449   GLU 98 89 108   CALCIUM 8.1* 7.8* 7.9*   ALBUMIN 2.2* 1.8* 2.0*   PROT 5.7* 4.7* 4.9*   * 137 137   K 4.3 4.0 4.0   CO2 20* 22* 22*    107 108   BUN 91* 95* 92*   CREATININE 3.9* 3.8* 3.2*   ALKPHOS 98 80 84   ALT 23 20 22   AST 24 18 17     Labs within the past 24 hours have been reviewed.    Diagnostic Results:  US - Kidney: Results for orders placed during the hospital encounter of 12/18/23    US Transplant Kidney With Doppler    Narrative  EXAMINATION:  US TRANSPLANT KIDNEY WITH DOPPLER    CLINICAL HISTORY:  concern for rejection;    TECHNIQUE:  Grayscale, duplex, and color Doppler evaluation of transplant kidney performed.    COMPARISON:  Ultrasound kidney 10/04/2010    FINDINGS:  History of kidney transplant in 2010.      There is a transplant kidney identified in the right lower quadrant.  Renal allograft measures 15.5 cm in craniocaudal length, previously 11.9 cm.  No perinephric fluid or collection identified.  No mass.  Mild hydronephrosis.    Doppler evaluation demonstrates a peak systolic velocity in the main renal artery of 277 cm/sec (previously 425 cm/sec).  The main renal artery has a normal  waveform without evidence of parvus tardus.  The main renal artery to iliac artery velocity ratio is not elevated measuring 1.4.  The main renal vein is patent.    The intrarenal resistive indices are as follows:    Segmental upper pole: 0.75, previously 0.56 with mild slow upstroke of the waveform    Segmental midpole: 0.70, previously 0.64 with slow upstroke of the waveform    Segmental lower pole: 0.71, previously 0.63 with questioned early/minimal parvus tardus waveform.    Interlobar: 0.69, previously 0.59 with normal waveforms    Small free fluid in the pelvis.    Impression  Doppler evaluation of transplant kidney demonstrate mild hydronephrosis.    Mild elevation of the peak systolic velocity in the main renal artery but improved from study in 2010 otherwise normal renal artery to iliac artery ratio.  There is questioned early/minimal tardus parvus waveform at the inferior lobar artery with mild slow upstroke at the upper and midpole arteries, as well as interval increased but technically within normal limits resistive indices from 2010 study.  These findings are concerning for developing allograft rejection versus sequela of ATN or medication such as calcineurin inhibitor toxicity, less likely renal artery stenosis.  Further evaluation/follow-up as warranted.    This report was flagged in Epic as abnormal.    Electronically signed by resident: Cesar Sullivan  Date:    12/18/2023  Time:    19:33    Electronically signed by: Tres Tsang MD  Date:    12/18/2023  Time:    21:10  Assessment/Plan:     * Acute renal failure  - Baseline creat in 2020 was 1.3-1.4  - Cr 3.9 on admit 12/18; s/p empiric treatment with IV steroids at OSH (working on obtaining OSH records for exact dose)  - Avoid nephrotoxic agents (NSAIDs, IV contrast dye, ACEI/ARB anti-HTN medications, Aminoglycoside-containing antibiotics)  - UPC elevated, trend daily  - DSA 12/19 pending  - PTH 12/19 pending  - Kidney transplant US 12/18 shows mild  "hydronephrosis (PVR ordered), mild elevated of the peak systolic velocity of main renal artery (however, improved from 2010), and question of minimal tardus parvus waveform at the inferior lobar artery.   - Transplant kidney bx 12/20 with IR. Treatment is pending bx results.   - Renally dose all appropriate medications, including antibiotics   - Strict I/O, daily weights      Long-term use of immunosuppressant medication  - Continue Prograf (4 mg 0800, 2 mg 1800), cellcept, and prednisone.  - Monitor trough daily and adjust dose as needed to achieve therapeutic level.      CKD (chronic kidney disease) stage 4, GFR 15-29 ml/min  - Last follow up with transplant team was 1/3/20 at which time patient had CKD stage 2 - GFR 60-89 with baseline creatinine ~ 1.3-1.4.   - Cr on admit 3.9 now 3.2; see "acute renal failure."      Renovascular hypertension  - Management per primary team       Prophylactic immunotherapy  - see long term use of immunosuppression       Living-donor kidney transplant recipient - 7/26/10  - s/p living related Ktx 7/26/10 for unknown etiology   - See "acute renal failure."      Rita Narayanan PA-C  Kidney Transplant  [facility] - Telemetry Stepdown    " room air

## 2023-12-20 NOTE — ASSESSMENT & PLAN NOTE
"- Last follow up with transplant team was 1/3/20 at which time patient had CKD stage 2 - GFR 60-89 with baseline creatinine ~ 1.3-1.4.   - Cr on admit 3.9 now 3.2; see "acute renal failure."  "

## 2023-12-20 NOTE — PLAN OF CARE
CM unable to obtain Discharge Planning Assessment due to patient out of room in kidney biopsy procedure. CM will return once patient is back in his room.      Maria Teresa Medina RN     [phone]

## 2023-12-20 NOTE — ASSESSMENT & PLAN NOTE
- Admitted to ICU in [place] for HTN emergency requiring cardene gtt. Hasn't required anti-hypertensives until this admission.   - Cont coreg 12.5mg BID and nifedipine 60mg BID  - PRN clonidine and hydralazine

## 2023-12-20 NOTE — SUBJECTIVE & OBJECTIVE
Subjective:   History of Present Illness:  Mr. Qasim Tripp is a 38 y.o. year old white male who received a living kidney transplant on 7/26/10 (etiology of ESRD unknown per patient). Last follow up with transplant team was 1/3/20 at which time patient had CKD stage 2 - GFR 60-89 with baseline creatinine ~ 1.3-1.4. Patient lives in [place] and reports work () got busy so he stopped going to labs/appointments. He has been on mycophenolate mofetil and tacrolimus for maintenance immunosuppression, but he admits to running out of medications ~5 weeks ago. He states he did not have time to schedule a visit with a nephrologist, so he was unable to obtain his meds due to need for a new script/refill from a nephrologist. Patient presented to an ED in [place] with HA and HTN (SBP <200) on Monday, 12/11. Patient reports a history of HTN pre transplant, but reports his BP has been well controlled without need for anti-hypertensive's post op until now. Patient was prescribed Lisinopril on 12/11 and discharged home. He presented again to the ED 12/13 with HA and HTN, GENNY, and he was admitted. OSH record transfer is pending, but patient reports he was treated empirically for rejection with IV steroids. On admit to [facility], he reports feeling better overall and denies fever, chills, SOB, CP, edema, decreased UOP. He does admit to dark urine x a few weeks and decreased appetite starting yesterday. Labs notable for Cr 3.9, Phos 6.1. CBC is pending. Admitted to  with KTM consult. Recommend obtaining DSA, PTH, UPC, kidney transplant US, and consulting IR for kidney transplant biopsy tomorrow, 12/19. Continue Prograf 3 mg AM 2 mg PM, Cellcept 1000 mg BID, and Prednisone 5 mg daily for now. D/w Dr. Morris.    Mr. Tripp is a 38 y.o. year old male who is status post Kidney Transplant - 7/26/2010  (#1).    His maintenance immunosuppression consists of:   Immunosuppressants (From admission, onward)       Start     Stop Route Frequency Ordered    12/21/23 0800  tacrolimus capsule 4 mg         -- Oral Every morning 12/20/23 1012    12/18/23 2100  mycophenolate capsule 1,000 mg         -- Oral 2 times daily 12/18/23 1612    12/18/23 1800  tacrolimus capsule 2 mg         -- Oral Every evening 12/18/23 1449          Interval History: NAEON. Pt unable to get kidney bx yesterday d/t hypertension. Nifedipine increased by primary team and biopsy rescheduled for today with IR. Treatment pending bx results. DSA obtained 12/19 pending. Cr 3.2 from 3.8. Prograf level 4.5 this am, increase dose to 4 mg am/2 mg pm. Cont cellcept and pred. Monitor strict I/O's and daily weights.       Past Medical, Surgical, Family, and Social History:   Unchanged from H&P.    Scheduled Meds:   carvediloL  12.5 mg Oral BID    heparin (porcine)  5,000 Units Subcutaneous Q8H    mycophenolate  1,000 mg Oral BID    NIFEdipine  60 mg Oral BID    predniSONE  5 mg Oral Daily    senna-docusate 8.6-50 mg  1 tablet Oral BID    sevelamer carbonate  800 mg Oral TID WM    tacrolimus  2 mg Oral Daily PM    [START ON 12/21/2023] tacrolimus  4 mg Oral Daily AM     Continuous Infusions:  PRN Meds:acetaminophen, albuterol-ipratropium, aluminum-magnesium hydroxide-simethicone, cloNIDine, dextrose 10%, dextrose 10%, glucagon (human recombinant), glucose, glucose, hydrALAZINE, melatonin, naloxone, ondansetron, simethicone, sodium chloride 0.9%    Intake/Output - Last 3 Shifts         12/18 0700 12/19 0659 12/19 0700 12/20 0659 12/20 0700  12/21 0659    P.O. 240 150     Total Intake(mL/kg) 240 (1.9) 150 (1.2)     Urine (mL/kg/hr) 660 2200 (0.7)     Stool 0      Total Output 660 2200     Net -420 -2050            Stool Occurrence 0 x               Review of Systems   Constitutional:  Negative for appetite change, chills and fever.   HENT:  Negative for trouble swallowing.    Eyes:  Negative for photophobia and redness.   Respiratory:  Negative for cough, shortness of  "breath, wheezing and stridor.    Cardiovascular:  Negative for chest pain, palpitations and leg swelling.   Gastrointestinal:  Negative for abdominal distention, abdominal pain, diarrhea, nausea and vomiting.   Genitourinary:  Negative for decreased urine volume and difficulty urinating.        +dark "tea colored" urine   Musculoskeletal:  Negative for neck pain and neck stiffness.   Skin:  Negative for wound.   Allergic/Immunologic: Positive for immunocompromised state.   Neurological:  Negative for dizziness, syncope and headaches.   Psychiatric/Behavioral:  Negative for agitation, behavioral problems, confusion and decreased concentration.       Objective:     Vital Signs (Most Recent):  Temp: 97.8 °F (36.6 °C) (12/20/23 1136)  Pulse: 70 (12/20/23 1136)  Resp: 18 (12/20/23 1136)  BP: (!) 154/99 (12/20/23 1136)  SpO2: 97 % (12/20/23 1136) Vital Signs (24h Range):  Temp:  [96.1 °F (35.6 °C)-98.5 °F (36.9 °C)] 97.8 °F (36.6 °C)  Pulse:  [70-94] 70  Resp:  [9-20] 18  SpO2:  [91 %-100 %] 97 %  BP: (121-219)/() 154/99     Weight: 123.2 kg (271 lb 9.7 oz)  Height: 6' (182.9 cm)  Body mass index is 36.84 kg/m².     Physical Exam  Vitals and nursing note reviewed.   Constitutional:       General: He is not in acute distress.  HENT:      Head: Normocephalic and atraumatic.   Eyes:      General: No scleral icterus.        Right eye: No discharge.         Left eye: No discharge.   Cardiovascular:      Rate and Rhythm: Normal rate and regular rhythm.      Heart sounds: No murmur heard.  Abdominal:      General: Bowel sounds are normal. There is no distension.      Tenderness: There is no abdominal tenderness. There is no guarding.      Comments: Ktx scar RLQ   Musculoskeletal:      Comments: Trace generalized edema  Surgical scar lateral R ankle   Skin:     General: Skin is warm and dry.      Capillary Refill: Capillary refill takes less than 2 seconds.      Coloration: Skin is not jaundiced.   Neurological:      " General: No focal deficit present.      Mental Status: He is alert and oriented to person, place, and time.   Psychiatric:         Mood and Affect: Mood normal.         Behavior: Behavior normal.         Thought Content: Thought content normal.          Laboratory:  CBC:   Recent Labs   Lab 12/18/23  1514 12/19/23  0346 12/20/23  0449   WBC 14.45* 9.03 8.43   RBC 5.29 4.14* 4.37*   HGB 15.4 12.2* 12.9*   HCT 46.2 35.9* 38.1*    179 166   MCV 87 87 87   MCH 29.1 29.5 29.5   MCHC 33.3 34.0 33.9     CMP:   Recent Labs   Lab 12/18/23  1514 12/19/23  0346 12/20/23  0449   GLU 98 89 108   CALCIUM 8.1* 7.8* 7.9*   ALBUMIN 2.2* 1.8* 2.0*   PROT 5.7* 4.7* 4.9*   * 137 137   K 4.3 4.0 4.0   CO2 20* 22* 22*    107 108   BUN 91* 95* 92*   CREATININE 3.9* 3.8* 3.2*   ALKPHOS 98 80 84   ALT 23 20 22   AST 24 18 17     Labs within the past 24 hours have been reviewed.    Diagnostic Results:  US - Kidney: Results for orders placed during the hospital encounter of 12/18/23    US Transplant Kidney With Doppler    Narrative  EXAMINATION:  US TRANSPLANT KIDNEY WITH DOPPLER    CLINICAL HISTORY:  concern for rejection;    TECHNIQUE:  Grayscale, duplex, and color Doppler evaluation of transplant kidney performed.    COMPARISON:  Ultrasound kidney 10/04/2010    FINDINGS:  History of kidney transplant in 2010.      There is a transplant kidney identified in the right lower quadrant.  Renal allograft measures 15.5 cm in craniocaudal length, previously 11.9 cm.  No perinephric fluid or collection identified.  No mass.  Mild hydronephrosis.    Doppler evaluation demonstrates a peak systolic velocity in the main renal artery of 277 cm/sec (previously 425 cm/sec).  The main renal artery has a normal waveform without evidence of parvus tardus.  The main renal artery to iliac artery velocity ratio is not elevated measuring 1.4.  The main renal vein is patent.    The intrarenal resistive indices are as follows:    Segmental upper  pole: 0.75, previously 0.56 with mild slow upstroke of the waveform    Segmental midpole: 0.70, previously 0.64 with slow upstroke of the waveform    Segmental lower pole: 0.71, previously 0.63 with questioned early/minimal parvus tardus waveform.    Interlobar: 0.69, previously 0.59 with normal waveforms    Small free fluid in the pelvis.    Impression  Doppler evaluation of transplant kidney demonstrate mild hydronephrosis.    Mild elevation of the peak systolic velocity in the main renal artery but improved from study in 2010 otherwise normal renal artery to iliac artery ratio.  There is questioned early/minimal tardus parvus waveform at the inferior lobar artery with mild slow upstroke at the upper and midpole arteries, as well as interval increased but technically within normal limits resistive indices from 2010 study.  These findings are concerning for developing allograft rejection versus sequela of ATN or medication such as calcineurin inhibitor toxicity, less likely renal artery stenosis.  Further evaluation/follow-up as warranted.    This report was flagged in Epic as abnormal.    Electronically signed by resident: Cesar Sullivan  Date:    12/18/2023  Time:    19:33    Electronically signed by: Tres Tsang MD  Date:    12/18/2023  Time:    21:10

## 2023-12-20 NOTE — ASSESSMENT & PLAN NOTE
- Increase Prograf to 4mg AM and 2mg PM and monitor levels daily  - Continue Cellcept 1000 mg BID.  - Treated with IV steroids prior to transfer. Cont prednisone 5 mg daily.

## 2023-12-20 NOTE — PLAN OF CARE
[facility] - Telemetry Stepdown  Initial Discharge Assessment       Primary Care Provider: Jaime Jauregui (Inactive)    Admission Diagnosis: Acute renal failure [N17.9]    Admission Date: 12/18/2023  Expected Discharge Date: 12/24/2023    Transition of Care Barriers: None    Payor:  / Plan:  [employer] / Product Type: Government /     Extended Emergency Contact Information  Primary Emergency Contact: Nusrat Tripp  Address: [address] [place] [address]           [place] [address] [facility]  Home Phone: [phone]  Mobile Phone: [phone]  Relation: Spouse    Discharge Plan A: Home with family  Discharge Plan B: Home with family      [id number] [place] [place] - [address]  [address]  [place] [address]  Phone: [phone] Fax: [phone]    [id number] - [place] - [address]  [address]  [place] [address]  Phone: [phone] Fax: [phone]    [facility] - [place], [place] - [address]  [address] [facility]  [place] [address]  Phone: [phone] Fax: [phone]    [facility] [place], [place] - [address] [place] [address]  Phone: [phone] Fax: [phone]    [facility] - [place] [address]  Phone: [phone] Fax: [phone]      Transferred from:     Past Medical History:   Diagnosis Date    Chronic interstitial nephritis     CKD (chronic kidney disease), stage III 7/17/2013    Hypertension     Immunocompromised state 7/11/2018    Living-donor kidney transplant recipient     Need for prophylactic immunotherapy          CM met with patient in room for Discharge Planning Assessment.  Patient was able to answer questions.  Per patient, he/spouse live  in a mobile home with 3 step(s) to enter.   Per patient, he was independent with ADLS and  used no DME for ambulation. Per patient, he is not on dialysis and does not take Coumadin.  Patient will have help from Nusrat Tripp (spouse) [phone] and Jaime Cardenas (father-in-law) upon discharge. Patient is from [facility] and had his kidney transplant here at [facility] in 2013.   Discharge Planning discussed with patient in room.  All questions addressed.  CM will follow for needs.      Discharge Plan A and Plan B have been determined by review of patient's clinical status, future medical and therapeutic needs, and coverage/benefits for post-acute care in coordination with multidisciplinary team members.        Initial Assessment (most recent)       Adult Discharge Assessment - 12/20/23 1547          Discharge Assessment    Assessment Type Discharge Planning Assessment     Confirmed/corrected address, phone number and insurance Yes     Confirmed Demographics Correct on Facesheet     Source of Information patient     When was your last doctors appointment? 01/03/20     Communicated ZAY with patient/caregiver Date not available/Unable to determine     Reason For Admission Acute renal failure     People in Home child(jesus), dependent;spouse     Facility Arrived From: [facility]     Do you expect to return to your current living situation? Yes     Do you have help at home or someone to help you manage your care at home? Yes     Who are your caregiver(s) and their phone number(s)? Nusrat Tripp (spouse) [phone]     Prior to hospitilization cognitive status: Alert/Oriented     Current cognitive status: Alert/Oriented     Walking or Climbing Stairs Difficulty no     Dressing/Bathing Difficulty no     Equipment Currently Used at Home none     Readmission within 30 days? No     Patient currently being followed by outpatient case management? No     Do you currently have service(s) that help you manage your care at home? No     Do you take prescription medications? Yes      Do you have prescription coverage? Yes     Coverage [facility]     Do you have any problems affording any of your prescribed medications? No     Is the patient taking medications as prescribed? yes     Who is going to help you get home at discharge? Nusrat Tripp (spouse) [phone], Jaime Cardenas (father in law)     How do you get to doctors appointments? car, drives self     Are you on dialysis? No     Do you take coumadin? No     Discharge Plan A Home with family     Discharge Plan B Home with family     DME Needed Upon Discharge  other (see comments)   TBD    Discharge Plan discussed with: Patient     Transition of Care Barriers None        Physical Activity    On average, how many days per week do you engage in moderate to strenuous exercise (like a brisk walk)? 0 days     On average, how many minutes do you engage in exercise at this level? 0 min        Financial Resource Strain    How hard is it for you to pay for the very basics like food, housing, medical care, and heating? Not very hard        Housing Stability    In the last 12 months, was there a time when you were not able to pay the mortgage or rent on time? No     In the last 12 months, how many places have you lived? 1     In the last 12 months, was there a time when you did not have a steady place to sleep or slept in a shelter (including now)? No        Transportation Needs    In the past 12 months, has lack of transportation kept you from medical appointments or from getting medications? No     In the past 12 months, has lack of transportation kept you from meetings, work, or from getting things needed for daily living? No        Food Insecurity    Within the past 12 months, you worried that your food would run out before you got the money to buy more. Never true     Within the past 12 months, the food you bought just didn't last and you didn't have money to get more. Never true        Stress    Do you feel stress -  tense, restless, nervous, or anxious, or unable to sleep at night because your mind is troubled all the time - these days? To some extent        Social Connections    In a typical week, how many times do you talk on the phone with family, friends, or neighbors? More than three times a week     How often do you get together with friends or relatives? More than three times a week     How often do you attend Religion or Mosque services? Never     Do you belong to any clubs or organizations such as Religion groups, unions, fraternal or athletic groups, or school groups? No     How often do you attend meetings of the clubs or organizations you belong to? Never     Are you , , , , never , or living with a partner?         Alcohol Use    Q1: How often do you have a drink containing alcohol? Monthly or less     Q2: How many drinks containing alcohol do you have on a typical day when you are drinking? 1 or 2     Q3: How often do you have six or more drinks on one occasion? Never        OTHER    Name(s) of People in Home Nusrat Qasim (spouse) [phone], dependent children                            PCP:  Jaime Jauregui (Inactive)  [phone]        Pharmacy:    [id number] [place], [place] - [address]  [address] [facility]  [place] [address]  Phone: [phone] Fax: [phone]    [id number] - [place], [place] - [address]  [address] [place] [address]  [place] [address]  Phone: [phone] Fax: [phone]    [facility] - [place], [place] - [address]  [address] [facility]  [place] [address]  Phone: [phone] Fax: [phone]    [facility] - [place], [place] - [address]  [address] [facility] [facility] [address] [facility] [address] [facility] [address]  Phone: [phone] Fax: [phone]    [facility] - [place], [place] - [address]  [address]  Phone: [phone] Fax: [phone]        Emergency Contacts:  Extended Emergency Contact Information  Primary Emergency Contact: Nusrat Tripp  Address: [facility] [place] [address] [facility]  Home Phone: [phone]  [place] Phone: [phone]  Relation: Spouse      Insurance:    Payor:  / Plan:  [facility] / Product Type: Government /     Maria Teresa Medina RN     [phone]      12/20/2023  3:57 PM

## 2023-12-20 NOTE — NURSING
Patient received s/p transplant kidney biopsy in MPU bay 5. Procedure site dressing to RLQ is clean and dry. No evidence of bleeding or hematoma formation. Patient to recover for 1 hour and return to inpatient room. Fall precautions reviewed. Bed in lowest, locked position. Call light within reach.

## 2023-12-20 NOTE — ASSESSMENT & PLAN NOTE
- Continue Prograf (4 mg 0800, 2 mg 1800), cellcept, and prednisone.  - Monitor trough daily and adjust dose as needed to achieve therapeutic level.

## 2023-12-20 NOTE — PLAN OF CARE
Transplant kidney biopsy complete. Pt tolerated well. VSS. No signs or symptoms of distress noted.  Site CDI.  Pt will be transferred to recovery bed and report to be given at bedside.

## 2023-12-20 NOTE — NURSING
Procedure recovery complete. VSS. Procedure site dressing to RLQ is clean, dry, and intact. Patient tolerated PO fluids and snack with no N/V. Report called to Cydney MALLORY.

## 2023-12-20 NOTE — H&P
See IR consult dated 12/18/23. Transplant kidney biopsy postponed on 12/19/23 due to persistently high BP. Plan to proceed with US guided transplant kidney biopsy under moderate sedation today, 12/20/23. SBP will need to remain < 160 mmHg in order to proceed.     ASA: 3  Mallampati: 2    Plan:  Will proceed with US guided transplant kidney biopsy under moderate sedation today, 12/20/23  SBP will need to remain < 160 mmHg in order to proceed  Please keep pt NPO    Ale Arnett PA-C  Interventional Radiology   Spectra: [phone]

## 2023-12-20 NOTE — ASSESSMENT & PLAN NOTE
CKD s/p living-donor kidney transplant 7/2010  - Non-compliance with immunotherapy over the past 4-5 weeks  - Baseline creat in 2020 was 1.3-1.4  - Cr 3.9 on admit 12/18  - s/p empiric treatment with IV steroids in [place]  - Cont prograf/cellcept  - Cont prednisone 5mg daily  - Kidney transplant US: Mild elevation of the peak systolic velocity in the main renal artery but improved from study in 2010 otherwise normal renal artery to iliac artery ratio.  There is questioned early/minimal tardus parvus waveform at the inferior lobar artery with mild slow upstroke at the upper and midpole arteries, as well as interval increased but technically within normal limits resistive indices from 2010 study.  These findings are concerning for developing allograft rejection  - KTM consulted  - IR consulted for kidney biopsy  - BP control  - Avoid nephrotoxic agents   - Strict I/O  - s/p kidney biopsy today, follow up results

## 2023-12-20 NOTE — ASSESSMENT & PLAN NOTE
- Baseline creat in 2020 was 1.3-1.4  - Cr 3.9 on admit 12/18; s/p empiric treatment with IV steroids at OSH (working on obtaining OSH records for exact dose)  - Avoid nephrotoxic agents (NSAIDs, IV contrast dye, ACEI/ARB anti-HTN medications, Aminoglycoside-containing antibiotics)  - UPC elevated, trend daily  - DSA 12/19 pending  - PTH 12/19 pending  - Kidney transplant US 12/18 shows mild hydronephrosis (PVR ordered), mild elevated of the peak systolic velocity of main renal artery (however, improved from 2010), and question of minimal tardus parvus waveform at the inferior lobar artery.   - Transplant kidney bx 12/20 with IR. Treatment is pending bx results.   - Renally dose all appropriate medications, including antibiotics   - Strict I/O, daily weights

## 2023-12-20 NOTE — SUBJECTIVE & OBJECTIVE
Interval History: NAEON. S/p kidney biopsy today. Cr trending down.     Review of Systems   Constitutional:  Negative for activity change, appetite change, chills, diaphoresis, fatigue, fever and unexpected weight change.   Respiratory:  Negative for cough, chest tightness, shortness of breath, wheezing and stridor.    Cardiovascular:  Negative for chest pain, palpitations and leg swelling.   Gastrointestinal:  Negative for abdominal pain, blood in stool, constipation, diarrhea, nausea and vomiting.   Endocrine: Negative for cold intolerance and heat intolerance.   Genitourinary:  Negative for difficulty urinating, dysuria, flank pain and frequency.   Musculoskeletal:  Negative for arthralgias, joint swelling and myalgias.   Skin: Negative.    Neurological:  Negative for dizziness, weakness, light-headedness and headaches.     Objective:     Vital Signs (Most Recent):  Temp: 97.8 °F (36.6 °C) (12/20/23 1136)  Pulse: 70 (12/20/23 1136)  Resp: 18 (12/20/23 1136)  BP: (!) 154/99 (12/20/23 1136)  SpO2: 97 % (12/20/23 1136) Vital Signs (24h Range):  Temp:  [96.1 °F (35.6 °C)-98.5 °F (36.9 °C)] 97.8 °F (36.6 °C)  Pulse:  [70-94] 70  Resp:  [9-20] 18  SpO2:  [91 %-100 %] 97 %  BP: (121-175)/() 154/99     Weight: 123.2 kg (271 lb 9.7 oz)  Body mass index is 36.84 kg/m².    Intake/Output Summary (Last 24 hours) at 12/20/2023 1354  Last data filed at 12/19/2023 1718  Gross per 24 hour   Intake 150 ml   Output 1200 ml   Net -1050 ml           Physical Exam  Constitutional:       General: He is not in acute distress.     Appearance: He is not ill-appearing or toxic-appearing.   HENT:      Head: Normocephalic and atraumatic.   Eyes:      Conjunctiva/sclera: Conjunctivae normal.      Pupils: Pupils are equal, round, and reactive to light.   Cardiovascular:      Rate and Rhythm: Normal rate and regular rhythm.      Heart sounds: Normal heart sounds.   Pulmonary:      Effort: Pulmonary effort is normal. No respiratory  distress.      Breath sounds: Normal breath sounds. No wheezing or rales.   Abdominal:      General: Bowel sounds are normal. There is no distension.      Palpations: Abdomen is soft.      Tenderness: There is no abdominal tenderness. There is no guarding.   Musculoskeletal:         General: Normal range of motion.      Cervical back: Normal range of motion and neck supple.      Right lower leg: No edema.      Left lower leg: No edema.   Skin:     General: Skin is warm and dry.   Neurological:      General: No focal deficit present.      Mental Status: He is alert and oriented to person, place, and time. Mental status is at baseline.   Psychiatric:         Mood and Affect: Mood normal.         Behavior: Behavior normal.             Significant Labs: All pertinent labs within the past 24 hours have been reviewed.    Significant Imaging: I have reviewed all pertinent imaging results/findings within the past 24 hours.

## 2023-12-20 NOTE — PROGRESS NOTES
[facility] - Telemetry [facility] Medicine  Progress Note    Patient Name: Ubaldo Tripp  MRN: [id number]  Patient Class: IP- Inpatient   Admission Date: 12/18/2023  Length of Stay: 2 days  Attending Physician: Preston Montgomery MD  Primary Care Provider: Jaime Jauregui (Inactive)        Subjective:     Principal Problem:Acute renal failure        HPI:  38M with PMH of CKD s/p living kidney transplant 7/26/10 on cellcept/tacrolimus (etiology of ESRD unknown per patient) who has been transferred from hospital in [place], [place] for evaluation of possible graft rejection. Patient currently follows with nephrology in [place] and last follow up with transplant team was 1/3/20 at which time patient had CKD stage 2 with baseline creatinine ~ 1.3-1.4. Patient states he stopped taking cellcept and tacrolimus 4-5 weeks ago as he ran out of medications and was too busy with work to schedule follow up appt. He presented to ED in [place] with c/o headache was diagnosed with hypertensive emergency and placed in ICU on cardene drip. Patient also noted to have GENNY with Cr 3.68. Other labs showed Na 133, K nl, Cr 3.68, BUN 83, UA +1 leukocytes and 3+ blood.  CT abdomen/pelvis was performed which showed persistent mild dilation of the allograft and cable system.  Hydronephrosis and distended bladder.  Mayen catheter was placed.  Patient was also started on ceftriaxone for unknown reason. Patient then transferred here for evaluation by kidney transplant medicine for possible graft rejection. Patient reports he was treated empirically for rejection with IV steroids. He is asymptomatic at this time and denies fever, chills, SOB, CP, edema, decreased UOP. He will be admitted to hospital medicine service for further management.    Overview/Hospital Course:  No notes on file    Interval History: NAEON. S/p kidney biopsy today. Cr trending down.     Review of Systems   Constitutional:  Negative for activity change, appetite change,  chills, diaphoresis, fatigue, fever and unexpected weight change.   Respiratory:  Negative for cough, chest tightness, shortness of breath, wheezing and stridor.    Cardiovascular:  Negative for chest pain, palpitations and leg swelling.   Gastrointestinal:  Negative for abdominal pain, blood in stool, constipation, diarrhea, nausea and vomiting.   Endocrine: Negative for cold intolerance and heat intolerance.   Genitourinary:  Negative for difficulty urinating, dysuria, flank pain and frequency.   Musculoskeletal:  Negative for arthralgias, joint swelling and myalgias.   Skin: Negative.    Neurological:  Negative for dizziness, weakness, light-headedness and headaches.     Objective:     Vital Signs (Most Recent):  Temp: 97.8 °F (36.6 °C) (12/20/23 1136)  Pulse: 70 (12/20/23 1136)  Resp: 18 (12/20/23 1136)  BP: (!) 154/99 (12/20/23 1136)  SpO2: 97 % (12/20/23 1136) Vital Signs (24h Range):  Temp:  [96.1 °F (35.6 °C)-98.5 °F (36.9 °C)] 97.8 °F (36.6 °C)  Pulse:  [70-94] 70  Resp:  [9-20] 18  SpO2:  [91 %-100 %] 97 %  BP: (121-175)/() 154/99     Weight: 123.2 kg (271 lb 9.7 oz)  Body mass index is 36.84 kg/m².    Intake/Output Summary (Last 24 hours) at 12/20/2023 1354  Last data filed at 12/19/2023 1718  Gross per 24 hour   Intake 150 ml   Output 1200 ml   Net -1050 ml           Physical Exam  Constitutional:       General: He is not in acute distress.     Appearance: He is not ill-appearing or toxic-appearing.   HENT:      Head: Normocephalic and atraumatic.   Eyes:      Conjunctiva/sclera: Conjunctivae normal.      Pupils: Pupils are equal, round, and reactive to light.   Cardiovascular:      Rate and Rhythm: Normal rate and regular rhythm.      Heart sounds: Normal heart sounds.   Pulmonary:      Effort: Pulmonary effort is normal. No respiratory distress.      Breath sounds: Normal breath sounds. No wheezing or rales.   Abdominal:      General: Bowel sounds are normal. There is no distension.       Palpations: Abdomen is soft.      Tenderness: There is no abdominal tenderness. There is no guarding.   Musculoskeletal:         General: Normal range of motion.      Cervical back: Normal range of motion and neck supple.      Right lower leg: No edema.      Left lower leg: No edema.   Skin:     General: Skin is warm and dry.   Neurological:      General: No focal deficit present.      Mental Status: He is alert and oriented to person, place, and time. Mental status is at baseline.   Psychiatric:         Mood and Affect: Mood normal.         Behavior: Behavior normal.             Significant Labs: All pertinent labs within the past 24 hours have been reviewed.    Significant Imaging: I have reviewed all pertinent imaging results/findings within the past 24 hours.    Assessment/Plan:      * Acute renal failure  CKD s/p living-donor kidney transplant 7/2010  - Non-compliance with immunotherapy over the past 4-5 weeks  - Baseline creat in 2020 was 1.3-1.4  - Cr 3.9 on admit 12/18  - s/p empiric treatment with IV steroids in [place]  - Cont prograf/cellcept  - Cont prednisone 5mg daily  - Kidney transplant US: Mild elevation of the peak systolic velocity in the main renal artery but improved from study in 2010 otherwise normal renal artery to iliac artery ratio.  There is questioned early/minimal tardus parvus waveform at the inferior lobar artery with mild slow upstroke at the upper and midpole arteries, as well as interval increased but technically within normal limits resistive indices from 2010 study.  These findings are concerning for developing allograft rejection  - KTM consulted  - IR consulted for kidney biopsy  - BP control  - Avoid nephrotoxic agents   - Strict I/O  - s/p kidney biopsy today, follow up results    Nephrotic syndrome        Long-term use of immunosuppressant medication        CKD (chronic kidney disease) stage 4, GFR 15-29 ml/min  - CR 3.9 on admit. Baseline Cr in 2020 1.3-1.4      Renovascular  "hypertension  - Admitted to ICU in [place] for HTN emergency requiring cardene gtt. Hasn't required anti-hypertensives until this admission.   - Cont coreg 12.5mg BID and nifedipine 60mg BID  - PRN clonidine and hydralazine      Prophylactic immunotherapy  - Increase Prograf to 4mg AM and 2mg PM and monitor levels daily  - Continue Cellcept 1000 mg BID.  - Treated with IV steroids prior to transfer. Cont prednisone 5 mg daily.      Living-donor kidney transplant recipient - 7/26/10  - s/p living related Ktx 7/26/10 for unknown etiology   - See "acute renal failure."        VTE Risk Mitigation (From admission, onward)           Ordered     heparin (porcine) injection 5,000 Units  Every 8 hours         12/18/23 1449     IP VTE HIGH RISK PATIENT  Once         12/18/23 1449     Place sequential compression device  Until discontinued         12/18/23 1449                    Discharge Planning   ZAY:      Code Status: Full Code   Is the patient medically ready for discharge?: No    Reason for patient still in hospital (select all that apply): Treatment and Consult recommendations                     Preston Montgomery MD  Department of Hospital Medicine   [facility] - Telemetry Stepdown    "

## 2023-12-20 NOTE — PLAN OF CARE
Pt arrived to  for transplant kidney biopsy. Pt oriented to unit and staff. Plan of care reviewed with patient, patient verbalizes understanding. Comfort measures utilized. Blankets applied. Pt prepped and draped utilizing standard sterile technique. Patient placed on continuous monitoring, as required by sedation policy. Timeouts completed utilizing standard universal time-out, per department and facility policy. RN to remain at bedside, continuous monitoring maintained. Pt resting comfortably. Denies pain/discomfort. Will continue to monitor. See flow sheets for monitoring, medication administration, and updates.

## 2023-12-20 NOTE — PROCEDURES
Radiology Post-Procedure Note    Pre Op Diagnosis: Renal transplant  Post Op Diagnosis: Same    Procedure: US-guided renal transplant biopsy    Procedure performed by: Jaime Hull MD    Written Informed Consent Obtained: Yes  Specimen Removed: YES 4, 18g x 20 mm core biopsies placed on telfa  Estimated Blood Loss: Minimal    Findings:   Mild transplant hydronephrosis and loss of corticomedullary differentiation.     Patient tolerated procedure well.    Jaime Hull MD  Interventional Radiology  Department of Radiology

## 2023-12-21 LAB
ANION GAP SERPL CALC-SCNC: 6 MMOL/L (ref 8–16)
BACTERIA UR CULT: NO GROWTH
BASOPHILS # BLD AUTO: 0.02 K/UL (ref 0–0.2)
BASOPHILS NFR BLD: 0.2 % (ref 0–1.9)
BUN SERPL-MCNC: 93 MG/DL (ref 6–20)
CALCIUM SERPL-MCNC: 7.6 MG/DL (ref 8.7–10.5)
CHLORIDE SERPL-SCNC: 108 MMOL/L (ref 95–110)
CO2 SERPL-SCNC: 22 MMOL/L (ref 23–29)
CREAT SERPL-MCNC: 2.8 MG/DL (ref 0.5–1.4)
DIFFERENTIAL METHOD: ABNORMAL
EOSINOPHIL # BLD AUTO: 0.2 K/UL (ref 0–0.5)
EOSINOPHIL NFR BLD: 2.3 % (ref 0–8)
ERYTHROCYTE [DISTWIDTH] IN BLOOD BY AUTOMATED COUNT: 12.6 % (ref 11.5–14.5)
EST. GFR  (NO RACE VARIABLE): 28.4 ML/MIN/1.73 M^2
GLUCOSE SERPL-MCNC: 103 MG/DL (ref 70–110)
HCT VFR BLD AUTO: 34.6 % (ref 40–54)
HGB BLD-MCNC: 11.6 G/DL (ref 14–18)
IMM GRANULOCYTES # BLD AUTO: 0.1 K/UL (ref 0–0.04)
IMM GRANULOCYTES NFR BLD AUTO: 1.2 % (ref 0–0.5)
LYMPHOCYTES # BLD AUTO: 1.3 K/UL (ref 1–4.8)
LYMPHOCYTES NFR BLD: 15 % (ref 18–48)
MAGNESIUM SERPL-MCNC: 2.2 MG/DL (ref 1.6–2.6)
MCH RBC QN AUTO: 29.1 PG (ref 27–31)
MCHC RBC AUTO-ENTMCNC: 33.5 G/DL (ref 32–36)
MCV RBC AUTO: 87 FL (ref 82–98)
MONOCYTES # BLD AUTO: 0.9 K/UL (ref 0.3–1)
MONOCYTES NFR BLD: 10.9 % (ref 4–15)
NEUTROPHILS # BLD AUTO: 5.9 K/UL (ref 1.8–7.7)
NEUTROPHILS NFR BLD: 70.4 % (ref 38–73)
NRBC BLD-RTO: 0 /100 WBC
PHOSPHATE SERPL-MCNC: 4.2 MG/DL (ref 2.7–4.5)
PLATELET # BLD AUTO: 168 K/UL (ref 150–450)
PMV BLD AUTO: 8.7 FL (ref 9.2–12.9)
POTASSIUM SERPL-SCNC: 4 MMOL/L (ref 3.5–5.1)
RBC # BLD AUTO: 3.99 M/UL (ref 4.6–6.2)
SODIUM SERPL-SCNC: 136 MMOL/L (ref 136–145)
TACROLIMUS BLD-MCNC: 6.1 NG/ML (ref 5–15)
WBC # BLD AUTO: 8.42 K/UL (ref 3.9–12.7)

## 2023-12-21 PROCEDURE — 63600175 PHARM REV CODE 636 W HCPCS: Performed by: PHYSICIAN ASSISTANT

## 2023-12-21 PROCEDURE — 99233 SBSQ HOSP IP/OBS HIGH 50: CPT | Mod: ,,, | Performed by: PHYSICIAN ASSISTANT

## 2023-12-21 PROCEDURE — 84100 ASSAY OF PHOSPHORUS: CPT | Performed by: INTERNAL MEDICINE

## 2023-12-21 PROCEDURE — 99233 PR SUBSEQUENT HOSPITAL CARE,LEVL III: ICD-10-PCS | Mod: ,,, | Performed by: PHYSICIAN ASSISTANT

## 2023-12-21 PROCEDURE — 63600175 PHARM REV CODE 636 W HCPCS: Performed by: INTERNAL MEDICINE

## 2023-12-21 PROCEDURE — 80197 ASSAY OF TACROLIMUS: CPT | Performed by: INTERNAL MEDICINE

## 2023-12-21 PROCEDURE — 83735 ASSAY OF MAGNESIUM: CPT | Performed by: INTERNAL MEDICINE

## 2023-12-21 PROCEDURE — 80048 BASIC METABOLIC PNL TOTAL CA: CPT | Performed by: INTERNAL MEDICINE

## 2023-12-21 PROCEDURE — 85025 COMPLETE CBC W/AUTO DIFF WBC: CPT | Performed by: INTERNAL MEDICINE

## 2023-12-21 PROCEDURE — 36415 COLL VENOUS BLD VENIPUNCTURE: CPT | Performed by: INTERNAL MEDICINE

## 2023-12-21 PROCEDURE — 25000003 PHARM REV CODE 250: Performed by: INTERNAL MEDICINE

## 2023-12-21 PROCEDURE — 20600001 HC STEP DOWN PRIVATE ROOM

## 2023-12-21 RX ORDER — PROCHLORPERAZINE EDISYLATE 5 MG/ML
5 INJECTION INTRAMUSCULAR; INTRAVENOUS EVERY 6 HOURS PRN
Status: DISCONTINUED | OUTPATIENT
Start: 2023-12-21 | End: 2023-12-23 | Stop reason: HOSPADM

## 2023-12-21 RX ORDER — TACROLIMUS 1 MG/1
4 CAPSULE ORAL 2 TIMES DAILY
Status: DISCONTINUED | OUTPATIENT
Start: 2023-12-21 | End: 2023-12-23 | Stop reason: HOSPADM

## 2023-12-21 RX ORDER — POLYETHYLENE GLYCOL 3350 17 G/17G
17 POWDER, FOR SOLUTION ORAL DAILY
Status: DISCONTINUED | OUTPATIENT
Start: 2023-12-21 | End: 2023-12-23 | Stop reason: HOSPADM

## 2023-12-21 RX ORDER — TACROLIMUS 1 MG/1
4 CAPSULE ORAL EVERY EVENING
Status: DISCONTINUED | OUTPATIENT
Start: 2023-12-21 | End: 2023-12-21

## 2023-12-21 RX ADMIN — CARVEDILOL 12.5 MG: 12.5 TABLET, FILM COATED ORAL at 09:12

## 2023-12-21 RX ADMIN — TACROLIMUS 4 MG: 1 CAPSULE ORAL at 09:12

## 2023-12-21 RX ADMIN — NIFEDIPINE 60 MG: 30 TABLET, FILM COATED, EXTENDED RELEASE ORAL at 09:12

## 2023-12-21 RX ADMIN — HEPARIN SODIUM 5000 UNITS: 5000 INJECTION INTRAVENOUS; SUBCUTANEOUS at 02:12

## 2023-12-21 RX ADMIN — SEVELAMER CARBONATE 800 MG: 800 TABLET, FILM COATED ORAL at 09:12

## 2023-12-21 RX ADMIN — PREDNISONE 5 MG: 5 TABLET ORAL at 09:12

## 2023-12-21 RX ADMIN — SENNOSIDES AND DOCUSATE SODIUM 1 TABLET: 8.6; 5 TABLET ORAL at 09:12

## 2023-12-21 RX ADMIN — HEPARIN SODIUM 5000 UNITS: 5000 INJECTION INTRAVENOUS; SUBCUTANEOUS at 05:12

## 2023-12-21 RX ADMIN — ALUMINUM HYDROXIDE, MAGNESIUM HYDROXIDE, AND SIMETHICONE 30 ML: 200; 200; 20 SUSPENSION ORAL at 07:12

## 2023-12-21 RX ADMIN — HEPARIN SODIUM 5000 UNITS: 5000 INJECTION INTRAVENOUS; SUBCUTANEOUS at 09:12

## 2023-12-21 RX ADMIN — CLONIDINE HYDROCHLORIDE 0.1 MG: 0.1 TABLET ORAL at 02:12

## 2023-12-21 RX ADMIN — ONDANSETRON 4 MG: 2 INJECTION INTRAMUSCULAR; INTRAVENOUS at 11:12

## 2023-12-21 RX ADMIN — MYCOPHENOLATE MOFETIL 1000 MG: 250 CAPSULE ORAL at 09:12

## 2023-12-21 RX ADMIN — SEVELAMER CARBONATE 800 MG: 800 TABLET, FILM COATED ORAL at 05:12

## 2023-12-21 RX ADMIN — SEVELAMER CARBONATE 800 MG: 800 TABLET, FILM COATED ORAL at 12:12

## 2023-12-21 RX ADMIN — TACROLIMUS 4 MG: 1 CAPSULE ORAL at 05:12

## 2023-12-21 NOTE — SUBJECTIVE & OBJECTIVE
Subjective:   History of Present Illness:  Mr. Qasim Tripp is a 38 y.o. year old white male who received a living kidney transplant on 7/26/10 (etiology of ESRD unknown per patient). Last follow up with transplant team was 1/3/20 at which time patient had CKD stage 2 - GFR 60-89 with baseline creatinine ~ 1.3-1.4. Patient lives in [place], [place] and reports work () got busy so he stopped going to labs/appointments. He has been on mycophenolate mofetil and tacrolimus for maintenance immunosuppression, but he admits to running out of medications ~5 weeks ago. He states he did not have time to schedule a visit with a nephrologist, so he was unable to obtain his meds due to need for a new script/refill from a nephrologist. Patient presented to an ED in [place] with HA and HTN (SBP <200) on Monday, 12/11. Patient reports a history of HTN pre transplant, but reports his BP has been well controlled without need for anti-hypertensive's post op until now. Patient was prescribed Lisinopril on 12/11 and discharged home. He presented again to the ED 12/13 with HA and HTN, GENNY, and he was admitted. OSH record transfer is pending, but patient reports he was treated empirically for rejection with IV steroids. On admit to [facility], he reports feeling better overall and denies fever, chills, SOB, CP, edema, decreased UOP. He does admit to dark urine x a few weeks and decreased appetite starting yesterday. Labs notable for Cr 3.9, Phos 6.1. CBC is pending. Admitted to  with KTM consult. Recommend obtaining DSA, PTH, UPC, kidney transplant US, and consulting IR for kidney transplant biopsy tomorrow, 12/19. Continue Prograf 3 mg AM 2 mg PM, Cellcept 1000 mg BID, and Prednisone 5 mg daily for now. D/w Dr. Morris.    Mr. Tripp is a 38 y.o. year old male who is status post Kidney Transplant - 7/26/2010  (#1).    His maintenance immunosuppression consists of:   Immunosuppressants (From admission, onward)       Start     Stop Route Frequency Ordered    12/21/23 1800  tacrolimus capsule 4 mg         -- Oral 2 times daily 12/21/23 1038    12/18/23 2100  mycophenolate capsule 1,000 mg         -- Oral 2 times daily 12/18/23 1612            Hospital Course:  Interval History: NAEON. Kidney bx 12/20 results pending. Cr trending down; 2.8 today. DSA 12/19 pending. Tacrolimus level needs to be drawn at 0600 (as timed) for true 12 hour trough (it was drawn at 0230AM today). D/w bedside nurse. Dose changed to 4 mg BID today. F/u tomorrow's level. Monitor.        Past Medical, Surgical, Family, and Social History:   Unchanged from H&P.    Scheduled Meds:   carvediloL  12.5 mg Oral BID    heparin (porcine)  5,000 Units Subcutaneous Q8H    mycophenolate  1,000 mg Oral BID    NIFEdipine  60 mg Oral BID    predniSONE  5 mg Oral Daily    senna-docusate 8.6-50 mg  1 tablet Oral BID    sevelamer carbonate  800 mg Oral TID WM    tacrolimus  4 mg Oral BID     Continuous Infusions:  PRN Meds:acetaminophen, albuterol-ipratropium, aluminum-magnesium hydroxide-simethicone, cloNIDine, dextrose 10%, dextrose 10%, glucagon (human recombinant), glucose, glucose, hydrALAZINE, melatonin, naloxone, ondansetron, simethicone, sodium chloride 0.9%    Intake/Output - Last 3 Shifts         12/19 0700  12/20 0659 12/20 0700  12/21 0659 12/21 0700  12/22 0659    P.O. 150 250     Total Intake(mL/kg) 150 (1.2) 250 (2)     Urine (mL/kg/hr) 2200 (0.7)      Stool       Total Output 2200      Net -2050 +250            Stool Occurrence  0 x              Review of Systems   Constitutional:  Negative for appetite change, chills and fever.   HENT:  Negative for trouble swallowing.    Eyes:  Negative for photophobia and redness.   Respiratory:  Negative for cough, shortness of breath, wheezing and stridor.    Cardiovascular:  Negative for chest pain, palpitations and leg swelling.   Gastrointestinal:  Negative for abdominal distention, abdominal pain, diarrhea, nausea  "and vomiting.   Genitourinary:  Negative for decreased urine volume and difficulty urinating.        +dark "tea colored" urine   Musculoskeletal:  Negative for neck pain and neck stiffness.   Skin:  Negative for wound.   Allergic/Immunologic: Positive for immunocompromised state.   Neurological:  Negative for dizziness, syncope and headaches.   Psychiatric/Behavioral:  Negative for agitation, behavioral problems, confusion and decreased concentration.       Objective:     Vital Signs (Most Recent):  Temp: 98.6 °F (37 °C) (12/21/23 0800)  Pulse: 80 (12/21/23 0910)  Resp: 18 (12/21/23 0800)  BP: (!) 142/92 (12/21/23 0910)  SpO2: 98 % (12/21/23 0800) Vital Signs (24h Range):  Temp:  [97.8 °F (36.6 °C)-98.6 °F (37 °C)] 98.6 °F (37 °C)  Pulse:  [70-88] 80  Resp:  [15-18] 18  SpO2:  [96 %-98 %] 98 %  BP: (132-166)/() 142/92     Weight: 123.2 kg (271 lb 9.7 oz)  Height: 6' (182.9 cm)  Body mass index is 36.84 kg/m².     Physical Exam  Vitals and nursing note reviewed.   Constitutional:       General: He is not in acute distress.  HENT:      Head: Normocephalic and atraumatic.   Eyes:      General: No scleral icterus.        Right eye: No discharge.         Left eye: No discharge.   Cardiovascular:      Rate and Rhythm: Normal rate and regular rhythm.      Heart sounds: No murmur heard.  Abdominal:      General: Bowel sounds are normal. There is no distension.      Tenderness: There is no abdominal tenderness. There is no guarding.      Comments: Ktx scar RLQ   Musculoskeletal:      Comments: Surgical scar lateral R ankle   Skin:     General: Skin is warm and dry.      Capillary Refill: Capillary refill takes less than 2 seconds.      Coloration: Skin is not jaundiced.   Neurological:      General: No focal deficit present.      Mental Status: He is alert and oriented to person, place, and time.   Psychiatric:         Mood and Affect: Mood normal.         Behavior: Behavior normal.         Thought Content: Thought " content normal.          Laboratory:  BMP:   Recent Labs   Lab 12/19/23  0346 12/20/23  0449 12/21/23  0235   GLU 89 108 103    137 136   K 4.0 4.0 4.0    108 108   CO2 22* 22* 22*   BUN 95* 92* 93*   CREATININE 3.8* 3.2* 2.8*   CALCIUM 7.8* 7.9* 7.6*     CMP:   Recent Labs   Lab 12/18/23  1514 12/19/23  0346 12/20/23  0449 12/21/23  0235   GLU 98 89 108 103   CALCIUM 8.1* 7.8* 7.9* 7.6*   ALBUMIN 2.2* 1.8* 2.0*  --    PROT 5.7* 4.7* 4.9*  --    * 137 137 136   K 4.3 4.0 4.0 4.0   CO2 20* 22* 22* 22*    107 108 108   BUN 91* 95* 92* 93*   CREATININE 3.9* 3.8* 3.2* 2.8*   ALKPHOS 98 80 84  --    ALT 23 20 22  --    AST 24 18 17  --

## 2023-12-21 NOTE — PLAN OF CARE
Problem: Adult Inpatient Plan of Care  Goal: Absence of Hospital-Acquired Illness or Injury  Outcome: Ongoing, Progressing  Goal: Optimal Comfort and Wellbeing  Outcome: Ongoing, Progressing  Goal: Readiness for Transition of Care  Outcome: Ongoing, Progressing     Problem: Renal Function Impairment (Acute Kidney Injury/Impairment)  Goal: Effective Renal Function  Outcome: Ongoing, Progressing     Resting in bed quietly, Watching videos on his cell phone, breathing even and un-labored, bed in low position, call light in reach, wheels locked, rails up x 3, no c/o pain and no acute distress noted.

## 2023-12-21 NOTE — ASSESSMENT & PLAN NOTE
- Continue Prograf (4 mg 0800, 4 mg 1800), Cellcept, and Prednisone.  - Monitor trough daily and adjust dose as needed to achieve therapeutic level.

## 2023-12-21 NOTE — PROGRESS NOTES
[facility] - Telemetry Stepdown  Kidney Transplant  Progress Note      Reason for Follow-up: Reassessment of Kidney Transplant - 7/26/2010  (#1) recipient and management of immunosuppression.     ORGAN: LEFT KIDNEY    Donor Type: Living      Subjective:   History of Present Illness:  Mr. Qasim Tripp is a 38 y.o. year old white male who received a living kidney transplant on 7/26/10 (etiology of ESRD unknown per patient). Last follow up with transplant team was 1/3/20 at which time patient had CKD stage 2 - GFR 60-89 with baseline creatinine ~ 1.3-1.4. Patient lives in [place] and reports work () got busy so he stopped going to labs/appointments. He has been on mycophenolate mofetil and tacrolimus for maintenance immunosuppression, but he admits to running out of medications ~5 weeks ago. He states he did not have time to schedule a visit with a nephrologist, so he was unable to obtain his meds due to need for a new script/refill from a nephrologist. Patient presented to an ED in [place] with HA and HTN (SBP <200) on Monday, 12/11. Patient reports a history of HTN pre transplant, but reports his BP has been well controlled without need for anti-hypertensive's post op until now. Patient was prescribed Lisinopril on 12/11 and discharged home. He presented again to the ED 12/13 with HA and HTN, GENNY, and he was admitted. OSH record transfer is pending, but patient reports he was treated empirically for rejection with IV steroids. On admit to [facility], he reports feeling better overall and denies fever, chills, SOB, CP, edema, decreased UOP. He does admit to dark urine x a few weeks and decreased appetite starting yesterday. Labs notable for Cr 3.9, Phos 6.1. CBC is pending. Admitted to  with KTM consult. Recommend obtaining DSA, PTH, UPC, kidney transplant US, and consulting IR for kidney transplant biopsy tomorrow, 12/19. Continue Prograf 3 mg AM 2 mg PM, Cellcept 1000 mg BID, and Prednisone 5 mg  daily for now. D/w Dr. Morris.    Mr. Tripp is a 38 y.o. year old male who is status post Kidney Transplant - 7/26/2010  (#1).    His maintenance immunosuppression consists of:   Immunosuppressants (From admission, onward)      Start     Stop Route Frequency Ordered    12/21/23 1800  tacrolimus capsule 4 mg         -- Oral 2 times daily 12/21/23 1038    12/18/23 2100  mycophenolate capsule 1,000 mg         -- Oral 2 times daily 12/18/23 1612            Hospital Course:  Interval History: NAEON. Kidney bx 12/20 results pending. Cr trending down; 2.8 today. DSA 12/19 pending. Tacrolimus level needs to be drawn at 0600 (as timed) for true 12 hour trough (it was drawn at 0230AM today). D/w bedside nurse. Dose changed to 4 mg BID today. F/u tomorrow's level. Monitor.        Past Medical, Surgical, Family, and Social History:   Unchanged from H&P.    Scheduled Meds:   carvediloL  12.5 mg Oral BID    heparin (porcine)  5,000 Units Subcutaneous Q8H    mycophenolate  1,000 mg Oral BID    NIFEdipine  60 mg Oral BID    predniSONE  5 mg Oral Daily    senna-docusate 8.6-50 mg  1 tablet Oral BID    sevelamer carbonate  800 mg Oral TID WM    tacrolimus  4 mg Oral BID     Continuous Infusions:  PRN Meds:acetaminophen, albuterol-ipratropium, aluminum-magnesium hydroxide-simethicone, cloNIDine, dextrose 10%, dextrose 10%, glucagon (human recombinant), glucose, glucose, hydrALAZINE, melatonin, naloxone, ondansetron, simethicone, sodium chloride 0.9%    Intake/Output - Last 3 Shifts         12/19 0700  12/20 0659 12/20 0700  12/21 0659 12/21 0700  12/22 0659    P.O. 150 250     Total Intake(mL/kg) 150 (1.2) 250 (2)     Urine (mL/kg/hr) 2200 (0.7)      Stool       Total Output 2200      Net -2050 +250            Stool Occurrence  0 x              Review of Systems   Constitutional:  Negative for appetite change, chills and fever.   HENT:  Negative for trouble swallowing.    Eyes:  Negative for photophobia and redness.  "  Respiratory:  Negative for cough, shortness of breath, wheezing and stridor.    Cardiovascular:  Negative for chest pain, palpitations and leg swelling.   Gastrointestinal:  Negative for abdominal distention, abdominal pain, diarrhea, nausea and vomiting.   Genitourinary:  Negative for decreased urine volume and difficulty urinating.        +dark "tea colored" urine   Musculoskeletal:  Negative for neck pain and neck stiffness.   Skin:  Negative for wound.   Allergic/Immunologic: Positive for immunocompromised state.   Neurological:  Negative for dizziness, syncope and headaches.   Psychiatric/Behavioral:  Negative for agitation, behavioral problems, confusion and decreased concentration.       Objective:     Vital Signs (Most Recent):  Temp: 98.6 °F (37 °C) (12/21/23 0800)  Pulse: 80 (12/21/23 0910)  Resp: 18 (12/21/23 0800)  BP: (!) 142/92 (12/21/23 0910)  SpO2: 98 % (12/21/23 0800) Vital Signs (24h Range):  Temp:  [97.8 °F (36.6 °C)-98.6 °F (37 °C)] 98.6 °F (37 °C)  Pulse:  [70-88] 80  Resp:  [15-18] 18  SpO2:  [96 %-98 %] 98 %  BP: (132-166)/() 142/92     Weight: 123.2 kg (271 lb 9.7 oz)  Height: 6' (182.9 cm)  Body mass index is 36.84 kg/m².     Physical Exam  Vitals and nursing note reviewed.   Constitutional:       General: He is not in acute distress.  HENT:      Head: Normocephalic and atraumatic.   Eyes:      General: No scleral icterus.        Right eye: No discharge.         Left eye: No discharge.   Cardiovascular:      Rate and Rhythm: Normal rate and regular rhythm.      Heart sounds: No murmur heard.  Abdominal:      General: Bowel sounds are normal. There is no distension.      Tenderness: There is no abdominal tenderness. There is no guarding.      Comments: Ktx scar RLQ   Musculoskeletal:      Comments: Surgical scar lateral R ankle   Skin:     General: Skin is warm and dry.      Capillary Refill: Capillary refill takes less than 2 seconds.      Coloration: Skin is not jaundiced. "   Neurological:      General: No focal deficit present.      Mental Status: He is alert and oriented to person, place, and time.   Psychiatric:         Mood and Affect: Mood normal.         Behavior: Behavior normal.         Thought Content: Thought content normal.          Laboratory:  BMP:   Recent Labs   Lab 12/19/23  0346 12/20/23  0449 12/21/23  0235   GLU 89 108 103    137 136   K 4.0 4.0 4.0    108 108   CO2 22* 22* 22*   BUN 95* 92* 93*   CREATININE 3.8* 3.2* 2.8*   CALCIUM 7.8* 7.9* 7.6*     CMP:   Recent Labs   Lab 12/18/23  1514 12/19/23  0346 12/20/23  0449 12/21/23  0235   GLU 98 89 108 103   CALCIUM 8.1* 7.8* 7.9* 7.6*   ALBUMIN 2.2* 1.8* 2.0*  --    PROT 5.7* 4.7* 4.9*  --    * 137 137 136   K 4.3 4.0 4.0 4.0   CO2 20* 22* 22* 22*    107 108 108   BUN 91* 95* 92* 93*   CREATININE 3.9* 3.8* 3.2* 2.8*   ALKPHOS 98 80 84  --    ALT 23 20 22  --    AST 24 18 17  --        Assessment/Plan:     * Acute renal failure  - Baseline creat in 2020 was 1.3-1.4  - Cr 3.9 on admit 12/18; s/p empiric treatment with IV steroids at OSH (working on obtaining OSH records for exact dose)  - Avoid nephrotoxic agents (NSAIDs, IV contrast dye, ACEI/ARB anti-HTN medications, Aminoglycoside-containing antibiotics)  - UPC elevated, trend daily  - DSA 12/19 pending  - Kidney transplant US 12/18 shows mild hydronephrosis, mild elevated of the peak systolic velocity of main renal artery (however, improved from 2010), and question of minimal tardus parvus waveform at the inferior lobar artery.   - Transplant kidney bx 12/20 with IR. Treatment is pending bx results.   - Renally dose all appropriate medications, including antibiotics   - Strict I/O, daily weights    Nephrotic syndrome  - Kidney bx 12/20 pending; trend UPC      Long-term use of immunosuppressant medication  - Continue Prograf (4 mg 0800, 4 mg 1800), Cellcept, and Prednisone.  - Monitor trough daily and adjust dose as needed to achieve  "therapeutic level.        CKD (chronic kidney disease) stage 4, GFR 15-29 ml/min  - Last follow up with transplant team was 1/3/20 at which time patient had CKD stage 2 - GFR 60-89 with baseline creatinine ~ 1.3-1.4.   - Cr on admit 3.9, trending down; see "acute renal failure."    Renovascular hypertension  - Management per primary team     Prophylactic immunotherapy  - See long term use of immunosuppression         Living-donor kidney transplant recipient - 7/26/10  - s/p living related Ktx 7/26/10 for unknown etiology   - See "acute renal failure."          Slime Goyal PA-C  Kidney Transplant  [facility] - Telemetry Stepdown    "

## 2023-12-21 NOTE — ASSESSMENT & PLAN NOTE
- Baseline creat in 2020 was 1.3-1.4  - Cr 3.9 on admit 12/18; s/p empiric treatment with IV steroids at OSH (working on obtaining OSH records for exact dose)  - Avoid nephrotoxic agents (NSAIDs, IV contrast dye, ACEI/ARB anti-HTN medications, Aminoglycoside-containing antibiotics)  - UPC elevated, trend daily  - DSA 12/19 pending  - Kidney transplant US 12/18 shows mild hydronephrosis, mild elevated of the peak systolic velocity of main renal artery (however, improved from 2010), and question of minimal tardus parvus waveform at the inferior lobar artery.   - Transplant kidney bx 12/20 with IR. Treatment is pending bx results.   - Renally dose all appropriate medications, including antibiotics   - Strict I/O, daily weights

## 2023-12-22 PROBLEM — D84.821 IMMUNOCOMPROMISED STATE DUE TO DRUG THERAPY: Status: ACTIVE | Noted: 2023-12-18

## 2023-12-22 PROBLEM — T86.11 ACUTE REJECTION OF KIDNEY TRANSPLANT: Status: ACTIVE | Noted: 2023-12-22

## 2023-12-22 PROBLEM — Z91.89 AT RISK FOR OPPORTUNISTIC INFECTIONS: Status: ACTIVE | Noted: 2023-12-22

## 2023-12-22 PROBLEM — Z79.899 IMMUNOCOMPROMISED STATE DUE TO DRUG THERAPY: Status: ACTIVE | Noted: 2023-12-18

## 2023-12-22 PROBLEM — N17.9 ACUTE RENAL FAILURE: Status: RESOLVED | Noted: 2023-12-18 | Resolved: 2023-12-22

## 2023-12-22 LAB
ALBUMIN SERPL BCP-MCNC: 1.8 G/DL (ref 3.5–5.2)
ANA SER QL IF: NORMAL
ANION GAP SERPL CALC-SCNC: 8 MMOL/L (ref 8–16)
BACTERIA #/AREA URNS AUTO: ABNORMAL /HPF
BASOPHILS # BLD AUTO: 0.03 K/UL (ref 0–0.2)
BASOPHILS NFR BLD: 0.4 % (ref 0–1.9)
BILIRUB UR QL STRIP: NEGATIVE
BUN SERPL-MCNC: 83 MG/DL (ref 6–20)
C3 SERPL-MCNC: 14 MG/DL (ref 50–180)
C4 SERPL-MCNC: 25 MG/DL (ref 11–44)
CALCIUM SERPL-MCNC: 7.6 MG/DL (ref 8.7–10.5)
CHLORIDE SERPL-SCNC: 107 MMOL/L (ref 95–110)
CLARITY UR REFRACT.AUTO: ABNORMAL
CO2 SERPL-SCNC: 21 MMOL/L (ref 23–29)
COLOR UR AUTO: YELLOW
CREAT SERPL-MCNC: 2.8 MG/DL (ref 0.5–1.4)
CREAT UR-MCNC: 121 MG/DL (ref 23–375)
DIFFERENTIAL METHOD: ABNORMAL
DSDNA AB SER-ACNC: NORMAL [IU]/ML
EOSINOPHIL # BLD AUTO: 0.2 K/UL (ref 0–0.5)
EOSINOPHIL NFR BLD: 2.1 % (ref 0–8)
EOSINOPHIL URNS QL WRIGHT STN: ABNORMAL
ERYTHROCYTE [DISTWIDTH] IN BLOOD BY AUTOMATED COUNT: 12.6 % (ref 11.5–14.5)
EST. GFR  (NO RACE VARIABLE): 28.4 ML/MIN/1.73 M^2
GLUCOSE SERPL-MCNC: 90 MG/DL (ref 70–110)
GLUCOSE UR QL STRIP: NEGATIVE
HAV IGM SERPL QL IA: NORMAL
HBV CORE IGM SERPL QL IA: NORMAL
HBV SURFACE AG SERPL QL IA: NORMAL
HCT VFR BLD AUTO: 35.4 % (ref 40–54)
HCV AB SERPL QL IA: NORMAL
HGB BLD-MCNC: 11.7 G/DL (ref 14–18)
HGB UR QL STRIP: ABNORMAL
HYALINE CASTS UR QL AUTO: 2 /LPF
IMM GRANULOCYTES # BLD AUTO: 0.1 K/UL (ref 0–0.04)
IMM GRANULOCYTES NFR BLD AUTO: 1.2 % (ref 0–0.5)
KETONES UR QL STRIP: NEGATIVE
LEUKOCYTE ESTERASE UR QL STRIP: ABNORMAL
LYMPHOCYTES # BLD AUTO: 1.3 K/UL (ref 1–4.8)
LYMPHOCYTES NFR BLD: 16 % (ref 18–48)
MAGNESIUM SERPL-MCNC: 2.2 MG/DL (ref 1.6–2.6)
MCH RBC QN AUTO: 29 PG (ref 27–31)
MCHC RBC AUTO-ENTMCNC: 33.1 G/DL (ref 32–36)
MCV RBC AUTO: 88 FL (ref 82–98)
MICROSCOPIC COMMENT: ABNORMAL
MONOCYTES # BLD AUTO: 1 K/UL (ref 0.3–1)
MONOCYTES NFR BLD: 12.3 % (ref 4–15)
NEUTROPHILS # BLD AUTO: 5.7 K/UL (ref 1.8–7.7)
NEUTROPHILS NFR BLD: 68 % (ref 38–73)
NITRITE UR QL STRIP: NEGATIVE
NRBC BLD-RTO: 0 /100 WBC
PH UR STRIP: 5 [PH] (ref 5–8)
PHOSPHATE SERPL-MCNC: 3.9 MG/DL (ref 2.7–4.5)
PLATELET # BLD AUTO: 191 K/UL (ref 150–450)
PMV BLD AUTO: 9.1 FL (ref 9.2–12.9)
POCT GLUCOSE: 222 MG/DL (ref 70–110)
POTASSIUM SERPL-SCNC: 4.3 MMOL/L (ref 3.5–5.1)
PROT UR QL STRIP: ABNORMAL
PROT UR-MCNC: 1213 MG/DL (ref 0–15)
PROT/CREAT UR: 10.02 MG/G{CREAT} (ref 0–0.2)
RBC # BLD AUTO: 4.04 M/UL (ref 4.6–6.2)
RBC #/AREA URNS AUTO: >100 /HPF (ref 0–4)
SODIUM SERPL-SCNC: 136 MMOL/L (ref 136–145)
SP GR UR STRIP: 1.02 (ref 1–1.03)
TACROLIMUS BLD-MCNC: 7.9 NG/ML (ref 5–15)
URN SPEC COLLECT METH UR: ABNORMAL
WBC # BLD AUTO: 8.4 K/UL (ref 3.9–12.7)
WBC #/AREA URNS AUTO: >100 /HPF (ref 0–5)

## 2023-12-22 PROCEDURE — 86036 ANCA SCREEN EACH ANTIBODY: CPT | Mod: 59 | Performed by: INTERNAL MEDICINE

## 2023-12-22 PROCEDURE — 86160 COMPLEMENT ANTIGEN: CPT | Performed by: INTERNAL MEDICINE

## 2023-12-22 PROCEDURE — 36415 COLL VENOUS BLD VENIPUNCTURE: CPT | Performed by: INTERNAL MEDICINE

## 2023-12-22 PROCEDURE — 99233 PR SUBSEQUENT HOSPITAL CARE,LEVL III: ICD-10-PCS | Mod: ,,, | Performed by: PHYSICIAN ASSISTANT

## 2023-12-22 PROCEDURE — 84165 PROTEIN E-PHORESIS SERUM: CPT | Performed by: INTERNAL MEDICINE

## 2023-12-22 PROCEDURE — 20600001 HC STEP DOWN PRIVATE ROOM

## 2023-12-22 PROCEDURE — 86334 PATHOLOGIST INTERPRETATION IFE: ICD-10-PCS | Mod: 26,,, | Performed by: PATHOLOGY

## 2023-12-22 PROCEDURE — 80074 ACUTE HEPATITIS PANEL: CPT | Performed by: INTERNAL MEDICINE

## 2023-12-22 PROCEDURE — 86162 COMPLEMENT TOTAL (CH50): CPT | Performed by: INTERNAL MEDICINE

## 2023-12-22 PROCEDURE — 63600175 PHARM REV CODE 636 W HCPCS: Performed by: PHYSICIAN ASSISTANT

## 2023-12-22 PROCEDURE — 25000003 PHARM REV CODE 250: Performed by: INTERNAL MEDICINE

## 2023-12-22 PROCEDURE — 85025 COMPLETE CBC W/AUTO DIFF WBC: CPT | Performed by: INTERNAL MEDICINE

## 2023-12-22 PROCEDURE — 86160 COMPLEMENT ANTIGEN: CPT | Mod: 59 | Performed by: INTERNAL MEDICINE

## 2023-12-22 PROCEDURE — 63600175 PHARM REV CODE 636 W HCPCS: Performed by: INTERNAL MEDICINE

## 2023-12-22 PROCEDURE — 84165 PATHOLOGIST INTERPRETATION SPE: ICD-10-PCS | Mod: 26,,, | Performed by: PATHOLOGY

## 2023-12-22 PROCEDURE — 83516 IMMUNOASSAY NONANTIBODY: CPT | Performed by: INTERNAL MEDICINE

## 2023-12-22 PROCEDURE — 81001 URINALYSIS AUTO W/SCOPE: CPT | Performed by: INTERNAL MEDICINE

## 2023-12-22 PROCEDURE — 99233 SBSQ HOSP IP/OBS HIGH 50: CPT | Mod: ,,, | Performed by: PHYSICIAN ASSISTANT

## 2023-12-22 PROCEDURE — 80069 RENAL FUNCTION PANEL: CPT | Performed by: STUDENT IN AN ORGANIZED HEALTH CARE EDUCATION/TRAINING PROGRAM

## 2023-12-22 PROCEDURE — 87086 URINE CULTURE/COLONY COUNT: CPT | Performed by: INTERNAL MEDICINE

## 2023-12-22 PROCEDURE — 86334 IMMUNOFIX E-PHORESIS SERUM: CPT | Mod: 26,,, | Performed by: PATHOLOGY

## 2023-12-22 PROCEDURE — 83735 ASSAY OF MAGNESIUM: CPT | Performed by: INTERNAL MEDICINE

## 2023-12-22 PROCEDURE — 87205 SMEAR GRAM STAIN: CPT | Performed by: INTERNAL MEDICINE

## 2023-12-22 PROCEDURE — 80197 ASSAY OF TACROLIMUS: CPT | Performed by: INTERNAL MEDICINE

## 2023-12-22 PROCEDURE — 86038 ANTINUCLEAR ANTIBODIES: CPT | Performed by: INTERNAL MEDICINE

## 2023-12-22 PROCEDURE — 86147 CARDIOLIPIN ANTIBODY EA IG: CPT | Performed by: INTERNAL MEDICINE

## 2023-12-22 PROCEDURE — 25000003 PHARM REV CODE 250: Performed by: STUDENT IN AN ORGANIZED HEALTH CARE EDUCATION/TRAINING PROGRAM

## 2023-12-22 PROCEDURE — 86225 DNA ANTIBODY NATIVE: CPT | Performed by: INTERNAL MEDICINE

## 2023-12-22 PROCEDURE — 84165 PROTEIN E-PHORESIS SERUM: CPT | Mod: 26,,, | Performed by: PATHOLOGY

## 2023-12-22 PROCEDURE — 86334 IMMUNOFIX E-PHORESIS SERUM: CPT | Performed by: INTERNAL MEDICINE

## 2023-12-22 PROCEDURE — 25000003 PHARM REV CODE 250: Performed by: PHYSICIAN ASSISTANT

## 2023-12-22 PROCEDURE — 84156 ASSAY OF PROTEIN URINE: CPT | Performed by: PHYSICIAN ASSISTANT

## 2023-12-22 RX ORDER — HEPARIN 100 UNIT/ML
500 SYRINGE INTRAVENOUS
Status: CANCELLED | OUTPATIENT
Start: 2023-12-26

## 2023-12-22 RX ORDER — EPINEPHRINE 0.3 MG/.3ML
0.3 INJECTION SUBCUTANEOUS ONCE AS NEEDED
Status: CANCELLED | OUTPATIENT
Start: 2023-12-26

## 2023-12-22 RX ORDER — DIPHENHYDRAMINE HYDROCHLORIDE 50 MG/ML
25 INJECTION INTRAMUSCULAR; INTRAVENOUS
Status: CANCELLED | OUTPATIENT
Start: 2023-12-26

## 2023-12-22 RX ORDER — VALGANCICLOVIR 450 MG/1
TABLET, FILM COATED ORAL
Qty: 12 TABLET | Refills: 5 | Status: SHIPPED | OUTPATIENT
Start: 2023-12-25 | End: 2023-12-22

## 2023-12-22 RX ORDER — NYSTATIN 100000 [USP'U]/ML
500000 SUSPENSION ORAL
Status: DISCONTINUED | OUTPATIENT
Start: 2023-12-22 | End: 2023-12-22

## 2023-12-22 RX ORDER — MYCOPHENOLATE MOFETIL 250 MG/1
1000 CAPSULE ORAL 2 TIMES DAILY
Qty: 240 CAPSULE | Refills: 11 | Status: ON HOLD | OUTPATIENT
Start: 2023-12-22 | End: 2024-01-23

## 2023-12-22 RX ORDER — NIFEDIPINE 60 MG/1
60 TABLET, EXTENDED RELEASE ORAL 2 TIMES DAILY
Qty: 60 TABLET | Refills: 4 | Status: ON HOLD | OUTPATIENT
Start: 2023-12-22 | End: 2024-01-23

## 2023-12-22 RX ORDER — SULFAMETHOXAZOLE AND TRIMETHOPRIM 400; 80 MG/1; MG/1
1 TABLET ORAL DAILY
Status: DISCONTINUED | OUTPATIENT
Start: 2023-12-22 | End: 2023-12-23 | Stop reason: HOSPADM

## 2023-12-22 RX ORDER — PREDNISONE 20 MG/1
TABLET ORAL
Qty: 120 TABLET | Refills: 3 | Status: ON HOLD | OUTPATIENT
Start: 2023-12-24 | End: 2024-01-23

## 2023-12-22 RX ORDER — SODIUM BICARBONATE 650 MG/1
650 TABLET ORAL 2 TIMES DAILY
Qty: 60 TABLET | Refills: 5 | Status: SHIPPED | OUTPATIENT
Start: 2023-12-22 | End: 2023-12-23

## 2023-12-22 RX ORDER — VALGANCICLOVIR 450 MG/1
450 TABLET, FILM COATED ORAL
Status: DISCONTINUED | OUTPATIENT
Start: 2023-12-22 | End: 2023-12-23

## 2023-12-22 RX ORDER — SODIUM CHLORIDE 0.9 % (FLUSH) 0.9 %
10 SYRINGE (ML) INJECTION
Status: CANCELLED | OUTPATIENT
Start: 2023-12-26

## 2023-12-22 RX ORDER — IBUPROFEN 200 MG
24 TABLET ORAL
Status: DISCONTINUED | OUTPATIENT
Start: 2023-12-22 | End: 2023-12-23 | Stop reason: HOSPADM

## 2023-12-22 RX ORDER — CARVEDILOL 12.5 MG/1
12.5 TABLET ORAL 2 TIMES DAILY
Qty: 60 TABLET | Refills: 5 | Status: ON HOLD | OUTPATIENT
Start: 2023-12-22 | End: 2024-01-23

## 2023-12-22 RX ORDER — DIPHENHYDRAMINE HCL 25 MG
25 CAPSULE ORAL
Status: CANCELLED | OUTPATIENT
Start: 2023-12-26

## 2023-12-22 RX ORDER — ACETAMINOPHEN 500 MG
500 TABLET ORAL
Status: CANCELLED | OUTPATIENT
Start: 2023-12-26

## 2023-12-22 RX ORDER — INSULIN ASPART 100 [IU]/ML
0-5 INJECTION, SOLUTION INTRAVENOUS; SUBCUTANEOUS
Status: DISCONTINUED | OUTPATIENT
Start: 2023-12-22 | End: 2023-12-23 | Stop reason: HOSPADM

## 2023-12-22 RX ORDER — SULFAMETHOXAZOLE AND TRIMETHOPRIM 400; 80 MG/1; MG/1
1 TABLET ORAL DAILY
Qty: 30 TABLET | Refills: 2 | Status: SHIPPED | OUTPATIENT
Start: 2023-12-23 | End: 2024-01-08 | Stop reason: SINTOL

## 2023-12-22 RX ORDER — TACROLIMUS 1 MG/1
4 CAPSULE ORAL EVERY 12 HOURS
Qty: 240 CAPSULE | Refills: 11 | Status: ON HOLD | OUTPATIENT
Start: 2023-12-22 | End: 2024-01-23

## 2023-12-22 RX ORDER — DIPHENHYDRAMINE HYDROCHLORIDE 50 MG/ML
50 INJECTION INTRAMUSCULAR; INTRAVENOUS ONCE AS NEEDED
Status: CANCELLED | OUTPATIENT
Start: 2023-12-26

## 2023-12-22 RX ORDER — GLUCAGON 1 MG
1 KIT INJECTION
Status: DISCONTINUED | OUTPATIENT
Start: 2023-12-22 | End: 2023-12-23 | Stop reason: HOSPADM

## 2023-12-22 RX ORDER — LOSARTAN POTASSIUM 50 MG/1
100 TABLET ORAL DAILY
Status: DISCONTINUED | OUTPATIENT
Start: 2023-12-22 | End: 2023-12-23 | Stop reason: HOSPADM

## 2023-12-22 RX ORDER — IBUPROFEN 200 MG
16 TABLET ORAL
Status: DISCONTINUED | OUTPATIENT
Start: 2023-12-22 | End: 2023-12-23 | Stop reason: HOSPADM

## 2023-12-22 RX ORDER — VALGANCICLOVIR 450 MG/1
450 TABLET, FILM COATED ORAL DAILY
Qty: 30 TABLET | Refills: 5 | Status: ON HOLD | OUTPATIENT
Start: 2023-12-22 | End: 2024-01-23

## 2023-12-22 RX ORDER — OLMESARTAN MEDOXOMIL 40 MG/1
40 TABLET ORAL DAILY
Qty: 90 TABLET | Refills: 3 | Status: ON HOLD | OUTPATIENT
Start: 2023-12-22 | End: 2024-01-23

## 2023-12-22 RX ADMIN — MYCOPHENOLATE MOFETIL 1000 MG: 250 CAPSULE ORAL at 08:12

## 2023-12-22 RX ADMIN — MYCOPHENOLATE MOFETIL 1000 MG: 250 CAPSULE ORAL at 10:12

## 2023-12-22 RX ADMIN — NIFEDIPINE 60 MG: 30 TABLET, FILM COATED, EXTENDED RELEASE ORAL at 10:12

## 2023-12-22 RX ADMIN — METHYLPREDNISOLONE SODIUM SUCCINATE 500 MG: 500 INJECTION INTRAMUSCULAR; INTRAVENOUS at 11:12

## 2023-12-22 RX ADMIN — SEVELAMER CARBONATE 800 MG: 800 TABLET, FILM COATED ORAL at 08:12

## 2023-12-22 RX ADMIN — INSULIN ASPART 1 UNITS: 100 INJECTION, SOLUTION INTRAVENOUS; SUBCUTANEOUS at 09:12

## 2023-12-22 RX ADMIN — CARVEDILOL 12.5 MG: 12.5 TABLET, FILM COATED ORAL at 08:12

## 2023-12-22 RX ADMIN — SULFAMETHOXAZOLE AND TRIMETHOPRIM 1 TABLET: 400; 80 TABLET ORAL at 10:12

## 2023-12-22 RX ADMIN — VALGANCICLOVIR 450 MG: 450 TABLET, FILM COATED ORAL at 10:12

## 2023-12-22 RX ADMIN — POLYETHYLENE GLYCOL 3350 17 G: 17 POWDER, FOR SOLUTION ORAL at 10:12

## 2023-12-22 RX ADMIN — NYSTATIN 500000 UNITS: 100000 SUSPENSION ORAL at 11:12

## 2023-12-22 RX ADMIN — TACROLIMUS 4 MG: 1 CAPSULE ORAL at 05:12

## 2023-12-22 RX ADMIN — APIXABAN 5 MG: 5 TABLET, FILM COATED ORAL at 11:12

## 2023-12-22 RX ADMIN — LOSARTAN POTASSIUM 100 MG: 50 TABLET, FILM COATED ORAL at 11:12

## 2023-12-22 RX ADMIN — NIFEDIPINE 60 MG: 30 TABLET, FILM COATED, EXTENDED RELEASE ORAL at 08:12

## 2023-12-22 RX ADMIN — SENNOSIDES AND DOCUSATE SODIUM 1 TABLET: 8.6; 5 TABLET ORAL at 10:12

## 2023-12-22 RX ADMIN — SEVELAMER CARBONATE 800 MG: 800 TABLET, FILM COATED ORAL at 11:12

## 2023-12-22 RX ADMIN — TACROLIMUS 4 MG: 1 CAPSULE ORAL at 10:12

## 2023-12-22 RX ADMIN — CARVEDILOL 12.5 MG: 12.5 TABLET, FILM COATED ORAL at 10:12

## 2023-12-22 RX ADMIN — APIXABAN 5 MG: 5 TABLET, FILM COATED ORAL at 08:12

## 2023-12-22 RX ADMIN — SENNOSIDES AND DOCUSATE SODIUM 1 TABLET: 8.6; 5 TABLET ORAL at 08:12

## 2023-12-22 RX ADMIN — HEPARIN SODIUM 5000 UNITS: 5000 INJECTION INTRAVENOUS; SUBCUTANEOUS at 05:12

## 2023-12-22 NOTE — ASSESSMENT & PLAN NOTE
CKD s/p living-donor kidney transplant in 2010  Presented w/ severe GENNY in setting of non-compliance with immunotherapy over the past 4-5 weeks and poor PO intake. Baseline Cr 1.3-1.4 per 2020 labs, although pt reports recent baseline mid-2s. On 12/18 admission Cr 3.9. Received empiric treatment with IV steroids in [place], [place] prior to transfer. Renal US w/ mild elevation of the peak systolic velocity in the main renal artery but improved from study in 2010 otherwise normal renal artery to iliac artery ratio; questioned early/minimal tardus parvus waveform at the inferior lobar artery with mild slow upstroke at the upper and midpole arteries, as well as interval increased but technically within normal limits resistive indices from 2010 study. Findings are concerning for developing allograft rejection  - KTM consulted; appreciate recs  - S/p kidney biopsy 12/20; pathology pending  - BP control as below  - Monitor Cr & UOP  - Strict I/Os   - Minimize nephrotoxic agents as able & renally-dose meds

## 2023-12-22 NOTE — DISCHARGE SUMMARY
[facility] - Telemetry Stepdown  Kidney Transplant  Discharge Summary    Patient Name: Ubaldo Tripp  MRN: [id number]  Admission Date: 12/18/2023  Hospital Length of Stay: 4 days  Discharge Date and Time:  12/23/2023 12:04 PM  Attending Physician: Madeleine Brennan MD   Discharging Provider: Nadege Julian PA-C  Primary Care Provider: Jaime Jauregui (Inactive)    HPI:   Mr. Qasim Tripp is a 38 y.o. year old white male who received a living kidney transplant on 7/26/10 (etiology of ESRD unknown per patient). Last follow up with transplant team was 1/3/20 at which time patient had CKD stage 2 - GFR 60-89 with baseline creatinine ~ 1.3-1.4. Patient lives in [place], [place] and reports work () got busy so he stopped going to labs/appointments. He has been on mycophenolate mofetil and tacrolimus for maintenance immunosuppression, but he admits to running out of medications ~5 weeks ago. He states he did not have time to schedule a visit with a nephrologist, so he was unable to obtain his meds due to need for a new script/refill from a nephrologist. Patient presented to an ED in [place] with HA and HTN (SBP <200) on Monday, 12/11. Patient reports a history of HTN pre transplant, but reports his BP has been well controlled without need for anti-hypertensive's post op until now. Patient was prescribed Lisinopril on 12/11 and discharged home. He presented again to the ED 12/13 with HA and HTN, GENNY, and he was admitted. OSH record transfer is pending, but patient reports he was treated empirically for rejection with IV steroids. On admit to [facility], he reports feeling better overall and denies fever, chills, SOB, CP, edema, decreased UOP. He does admit to dark urine x a few weeks and decreased appetite starting yesterday. Labs notable for Cr 3.9, Phos 6.1. CBC is pending. Admitted to  with KTM consult. Recommend obtaining DSA, PTH, UPC, kidney transplant US, and consulting IR for kidney  transplant biopsy tomorrow, 12/19. Continue Prograf 3 mg AM 2 mg PM, Cellcept 1000 mg BID, and Prednisone 5 mg daily for now. D/w Dr. Morris.    Hospital Course:    Pt presented to OSH with HA/HTN. Labs remarkable for GENNY and patient reported he had not been taking anti-rejection meds for 5 weeks. Transferred [place] hospital medicine [place] [facility] [place]. KTM consulted. He underwent biopsy on 12/20. 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 EM pending. Discussed with team. Transferred to KTS service for management. Treated with steroids (given insufficient steroids at OSH). Given an additional Solumedrol 500mg q daily x 2 doses to complete treatment (on 12/22-23). Restarted opportunistic infectious prophylaxis with nystatin, valcyte, and bacrtim. Will discharge on prednisone taper for GN.  DSA 12/19 with +DSAs. Reviewed by team. Consider IVIG outpatient pending response to steroids. Suspect GENNY due to GN.  Due to nephrotic syndrome and hypoalbuminemia, started eliquis for anticoagulation. Pt otherwise feeling significantly better. He is stable for discharge. Cr has come down to 3 on day of discharge. He will dc on pred taper, cellcept, and prograf. He will follow up with labs next Tuesday, December 26th and in clinic with Dr Morris on 1/12/23. Pt expressed understanding of discharge instructions and importance of follow up.     Goals of Care Treatment Preferences:  Code Status: Full Code      Final Active Diagnoses:    Diagnosis Date Noted POA    PRINCIPAL PROBLEM:  Acute renal failure [N17.9] 12/18/2023 Yes    Nephrotic syndrome [N04.9] 12/19/2023 Yes    Long-term use of immunosuppressant medication [Z79.60] 12/18/2023 Not Applicable    CKD (chronic kidney disease) stage 4, GFR 15-29 ml/min [N18.4] 07/17/2013 Yes    Prophylactic immunotherapy [Z29.89]  Not Applicable    Renovascular hypertension [I15.0]  Yes    Living-donor kidney transplant recipient - 7/26/10 [Z94.0] 07/26/2010 Not  Applicable     Chronic      Problems Resolved During this Admission:       Treatments: see hospital course    Consults (From admission, onward)          Status Ordering Provider     Inpatient consult to Interventional Radiology  Once        Provider:  (Not yet assigned)    Completed KATRINA PATRICK     Inpatient consult to Kidney/Pancreas Transplant Medicine  Once        Provider:  (Not yet assigned)    Completed KATRINA PATRICK            Pending Diagnostic Studies:       Procedure Component Value Units Date/Time    MARIAN [[id number]] Collected: 12/22/23 0542    Order Status: Sent Lab Status: In process Updated: 12/22/23 0607    Specimen: Blood     Anti-DNA antibody, double-stranded [[id number]] Collected: 12/22/23 0542    Order Status: Sent Lab Status: In process Updated: 12/22/23 0607    Specimen: Blood     Anti-neutrophilic cytoplasmic antibody [[id number]] Collected: 12/22/23 0542    Order Status: Sent Lab Status: In process Updated: 12/22/23 0607    Specimen: Blood     Cardiolipin antibody [[id number]] Collected: 12/22/23 0542    Order Status: Sent Lab Status: In process Updated: 12/22/23 0607    Specimen: Blood     Complement, total [[id number]] Collected: 12/22/23 0542    Order Status: Sent Lab Status: In process Updated: 12/22/23 0607    Specimen: Blood     Glomerular basement membrane antibodies [[id number]] Collected: 12/22/23 0542    Order Status: Sent Lab Status: In process Updated: 12/22/23 0607    Specimen: Blood     Immunofixation electrophoresis [[id number]] Collected: 12/22/23 0542    Order Status: Sent Lab Status: In process Updated: 12/22/23 0607    Specimen: Blood     Specimen to Pathology, Radiology Kidney, [place] biopsy [[id number]] Collected: 12/20/23 1004    Order Status: Sent Lab Status: In process Updated: 12/20/23 1026    Regan's Stain, Urine Random [[id number]] Collected: 12/22/23 0823    Order Status: Sent Lab Status: In process Updated: 12/22/23 0903    Specimen: Urine, Clean  "Catch           Significant Diagnostic Studies: Labs: CMP   Recent Labs   Lab 12/22/23  0407 12/23/23  0550    132*   K 4.3 4.9    105   CO2 21* 18*   GLU 90 138*   BUN 83* 89*   CREATININE 2.8* 3.0*   CALCIUM 7.6* 8.1*   ALBUMIN 1.8* 2.0*   ANIONGAP 8 9     CBC   Recent Labs   Lab 12/22/23  0407 12/23/23  0550   WBC 8.40 10.63   HGB 11.7* 13.9*   HCT 35.4* 40.7    190     INR   Lab Results   Component Value Date    INR 1.0 12/19/2023    INR 1.0 07/21/2010    INR 1.0 09/30/2008     and All labs within the past 24 hours have been reviewed  Microbiology: Blood Culture No results found for: "LABBLOO" and Urine Culture    Lab Results   Component Value Date    LABURIN No growth 12/19/2023       Discharged Condition: fair    Disposition: home    Follow Up: see hospital course    Patient Instructions:   No discharge procedures on file.    Medications:  Reconciled Home Medications:      Medication List        START taking these medications      apixaban 5 mg Tab  Commonly known as: ELIQUIS  Take 1 tablet (5 mg total) by mouth 2 (two) times daily.     carvediloL 12.5 MG tablet  Commonly known as: COREG  Take 1 tablet (12.5 mg total) by mouth 2 (two) times daily.     NIFEdipine 60 MG (OSM) 24 hr tablet  Commonly known as: PROCARDIA-XL  Take 1 tablet (60 mg total) by mouth 2 (two) times a day.     olmesartan 40 MG tablet  Commonly known as: BENICAR  Take 1 tablet (40 mg total) by mouth once daily.     predniSONE 20 MG tablet  Commonly known as: DELTASONE  60 mg PO daily x 3 weeks, then 50 mg PO daily x 3 weeks then follow up in clinic for subsequent taper  Start taking on: December 24, 2023     sulfamethoxazole-trimethoprim 400-80mg 400-80 mg per tablet  Commonly known as: BACTRIM,SEPTRA  Take 1 tablet by mouth once daily. Stop: 3/22/24     valGANciclovir 450 mg Tab  Commonly known as: VALCYTE  Take 1 tablet (450 mg total) by mouth once daily. Stop: 6/22/24            CHANGE how you take these medications  "     mycophenolate 250 mg Cap  Commonly known as: CELLCEPT  Take 4 capsules (1,000 mg total) by mouth 2 (two) times daily.  What changed:   how much to take  when to take this     sodium bicarbonate 650 MG tablet  Take 2 tablets (1,300 mg total) by mouth 2 (two) times daily.  What changed: how much to take     tacrolimus 1 MG Cap  Commonly known as: PROGRAF  Take 4 capsules (4 mg total) by mouth every 12 (twelve) hours.  What changed: See the new instructions.            Time spent caring for patient (Greater than 1/2 spent in direct face-to-face contact): > 30 minutes    Nati Nix  Kidney Transplant  [facility] - Telemetry Stepdown

## 2023-12-22 NOTE — PLAN OF CARE
Problem: Adult Inpatient Plan of Care  Goal: Absence of Hospital-Acquired Illness or Injury  Outcome: Ongoing, Progressing  Goal: Optimal Comfort and Wellbeing  Outcome: Ongoing, Progressing  Goal: Readiness for Transition of Care  Outcome: Ongoing, Progressing     Problem: Fluid and Electrolyte Imbalance (Acute Kidney Injury/Impairment)  Goal: Fluid and Electrolyte Balance  Outcome: Ongoing, Progressing     Resting in bed quietly with eyes closed, breathing even and un-labored, bed in low position, call light in reach, wheels locked, rails up x 3, no c/o pain and no acute distress noted.

## 2023-12-22 NOTE — ASSESSMENT & PLAN NOTE
-Cause of GENNY; see GENNY for details  -Biopsy shows TCMR and some suspicion for ABMR. Will treat TCMR with steroids and work on outpt IVIG

## 2023-12-22 NOTE — ASSESSMENT & PLAN NOTE
- Kidney bx 12/20 pending; trend UP  - start eliquis in the setting of hypoalbuminemia d/t risk of thrombosis from hypercoagulable state

## 2023-12-22 NOTE — ASSESSMENT & PLAN NOTE
Treated with IV steroids prior to transfer.   - Continue Cellcept 1000 mg BID  - Continue prednisone 5 mg daily  - Tacrolimus 4mg BID   - Monitor tacro lvl daily  - KTM consulted as above

## 2023-12-22 NOTE — ASSESSMENT & PLAN NOTE
- Continue Prograf, Cellcept, and Prednisone.  -Given newly diagnosed GN and nephrotic syndrome, will need high dose steroids (60 mg/d tapering q 3 weeks by 10 mg) with f/u in clinic  - Monitor tacrolimus trough daily and adjust dose as needed to achieve therapeutic level.

## 2023-12-22 NOTE — SUBJECTIVE & OBJECTIVE
Subjective:   History of Present Illness:  Mr. Qasim Tripp is a 38 y.o. year old white male who received a living kidney transplant on 7/26/10 (etiology of ESRD unknown per patient). Last follow up with transplant team was 1/3/20 at which time patient had CKD stage 2 - GFR 60-89 with baseline creatinine ~ 1.3-1.4. Patient lives in [place] and reports work () got busy so he stopped going to labs/appointments. He has been on mycophenolate mofetil and tacrolimus for maintenance immunosuppression, but he admits to running out of medications ~5 weeks ago. He states he did not have time to schedule a visit with a nephrologist, so he was unable to obtain his meds due to need for a new script/refill from a nephrologist. Patient presented to an ED in [place] with HA and HTN (SBP <200) on Monday, 12/11. Patient reports a history of HTN pre transplant, but reports his BP has been well controlled without need for anti-hypertensive's post op until now. Patient was prescribed Lisinopril on 12/11 and discharged home. He presented again to the ED 12/13 with HA and HTN, GENNY, and he was admitted. OSH record transfer is pending, but patient reports he was treated empirically for rejection with IV steroids. On admit to [facility] [place], he reports feeling better overall and denies fever, chills, SOB, CP, edema, decreased UOP. He does admit to dark urine x a few weeks and decreased appetite starting yesterday. Labs notable for Cr 3.9, Phos 6.1. CBC is pending. Admitted to  with KTM consult. Recommend obtaining DSA, PTH, UPC, kidney transplant US, and consulting IR for kidney transplant biopsy tomorrow, 12/19. Continue Prograf 3 mg AM 2 mg PM, Cellcept 1000 mg BID, and Prednisone 5 mg daily for now. D/w Dr. Morris.    Interval History: NAEON. 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 EM pending. Discussed with team. Plan to treat with steroids. Give additional Solumedrol 5mg/kg q daily x 2  doses to complete treatment. Restart opportunistic infectious prophylaxis. Due to hypoalbuminemia and proteinuria, start eliquis for anticoagulation. DSA 12/19 with +DSAs. Reviewed by team. Consider IVIG outpatient. Transfer to [facility] for further management. Discuss with primary team. Monitor.      Past Medical, Surgical, Family, and Social History:   Unchanged from H&P.    Scheduled Meds:   carvediloL  12.5 mg Oral BID    heparin (porcine)  5,000 Units Subcutaneous Q8H    mycophenolate  1,000 mg Oral BID    NIFEdipine  60 mg Oral BID    polyethylene glycol  17 g Oral Daily    predniSONE  5 mg Oral Daily    senna-docusate 8.6-50 mg  1 tablet Oral BID    sevelamer carbonate  800 mg Oral TID WM    tacrolimus  4 mg Oral BID     Continuous Infusions:  PRN Meds:acetaminophen, albuterol-ipratropium, aluminum-magnesium hydroxide-simethicone, cloNIDine, dextrose 10%, dextrose 10%, glucagon (human recombinant), glucose, glucose, hydrALAZINE, melatonin, naloxone, ondansetron, prochlorperazine, simethicone, sodium chloride 0.9%    Intake/Output - Last 3 Shifts         12/20 0700  12/21 0659 12/21 0700 12/22 0659 12/22 0700  12/23 0659    P.O. 250      Total Intake(mL/kg) 250 (2)      Urine (mL/kg/hr)       Total Output       Net +250             Stool Occurrence 0 x 1 x              Review of Systems   Constitutional:  Negative for appetite change, chills and fever.   HENT:  Negative for trouble swallowing.    Eyes:  Negative for photophobia and redness.   Respiratory:  Negative for cough, shortness of breath, wheezing and stridor.    Cardiovascular:  Negative for chest pain, palpitations and leg swelling.   Gastrointestinal:  Negative for abdominal distention, abdominal pain, diarrhea, nausea and vomiting.   Genitourinary:  Negative for decreased urine volume and difficulty urinating.   Musculoskeletal:  Negative for neck pain and neck stiffness.   Skin:  Negative for wound.   Allergic/Immunologic: Positive for  immunocompromised state.   Neurological:  Negative for dizziness, syncope and headaches.   Psychiatric/Behavioral:  Negative for agitation, behavioral problems, confusion and decreased concentration.       Objective:     Vital Signs (Most Recent):  Temp: 98.4 °F (36.9 °C) (12/22/23 0430)  Pulse: 74 (12/22/23 0430)  Resp: 16 (12/22/23 0430)  BP: 126/77 (12/22/23 0430)  SpO2: 95 % (12/22/23 0430) Vital Signs (24h Range):  Temp:  [98.4 °F (36.9 °C)-98.9 °F (37.2 °C)] 98.4 °F (36.9 °C)  Pulse:  [69-80] 74  Resp:  [16-18] 16  SpO2:  [94 %-96 %] 95 %  BP: (126-161)/() 126/77     Weight: 123.2 kg (271 lb 9.7 oz)  Height: 6' (182.9 cm)  Body mass index is 36.84 kg/m².     Physical Exam  Vitals and nursing note reviewed.   Constitutional:       General: He is not in acute distress.  HENT:      Head: Normocephalic and atraumatic.   Eyes:      General: No scleral icterus.        Right eye: No discharge.         Left eye: No discharge.   Cardiovascular:      Rate and Rhythm: Normal rate and regular rhythm.      Heart sounds: No murmur heard.  Abdominal:      General: Bowel sounds are normal. There is no distension.      Tenderness: There is no abdominal tenderness. There is no guarding.      Comments: Ktx scar RLQ   Musculoskeletal:      Comments: Surgical scar lateral R ankle   Skin:     General: Skin is warm and dry.      Capillary Refill: Capillary refill takes less than 2 seconds.      Coloration: Skin is not jaundiced.   Neurological:      General: No focal deficit present.      Mental Status: He is alert and oriented to person, place, and time.   Psychiatric:         Mood and Affect: Mood normal.         Behavior: Behavior normal.         Thought Content: Thought content normal.          Laboratory:  CBC:   Recent Labs   Lab 12/20/23  0449 12/21/23  0235 12/22/23  0407   WBC 8.43 8.42 8.40   RBC 4.37* 3.99* 4.04*   HGB 12.9* 11.6* 11.7*   HCT 38.1* 34.6* 35.4*    168 191   MCV 87 87 88   MCH 29.5 29.1 29.0    MCHC 33.9 33.5 33.1     CMP:   Recent Labs   Lab 12/18/23  1514 12/19/23  0346 12/20/23  0449 12/21/23  0235 12/22/23  0407   GLU 98 89 108 103 90   CALCIUM 8.1* 7.8* 7.9* 7.6* 7.6*   ALBUMIN 2.2* 1.8* 2.0*  --  1.8*   PROT 5.7* 4.7* 4.9*  --   --    * 137 137 136 136   K 4.3 4.0 4.0 4.0 4.3   CO2 20* 22* 22* 22* 21*    107 108 108 107   BUN 91* 95* 92* 93* 83*   CREATININE 3.9* 3.8* 3.2* 2.8* 2.8*   ALKPHOS 98 80 84  --   --    ALT 23 20 22  --   --    AST 24 18 17  --   --      Labs within the past 24 hours have been reviewed.    Diagnostic Results: Reviewed.

## 2023-12-22 NOTE — ASSESSMENT & PLAN NOTE
-Given use of high dose steroids, needs to restart OI prophylaxis  -At present no s/s opportunistic infections

## 2023-12-22 NOTE — ASSESSMENT & PLAN NOTE
On admission, Cr 3.9. Baseline Cr 1.3-1.4 per 2020 labs, although pt reports recent baseline mid-2s.

## 2023-12-22 NOTE — ASSESSMENT & PLAN NOTE
- Continue Prograf, Cellcept, and Prednisone.  - Monitor trough daily and adjust dose as needed to achieve therapeutic level.

## 2023-12-22 NOTE — ASSESSMENT & PLAN NOTE
- Baseline creat in 2020 was 1.3-1.4  - Cr 3.9 on admit 12/18; s/p empiric treatment with IV steroids at OSH (working on obtaining OSH records for exact dose)  - Avoid nephrotoxic agents (NSAIDs, IV contrast dye, ACEI/ARB anti-HTN medications, Aminoglycoside-containing antibiotics)  - UPC elevated, trend daily  - DSA 12/19 with DSAs, reviewed  - Kidney transplant US 12/18 shows mild hydronephrosis, mild elevated of the peak systolic velocity of main renal artery (however, improved from 2010), and question of minimal tardus parvus waveform at the inferior lobar artery.   - Transplant kidney bx 12/20 with IR. Pathology showed diffuse proliferative GN with strong straining for C3 and IgG and mild TCMR. Awaiting final results with EM. (-)C4D.   - Discussed with team. Give solumedrol 5mg/kg (max 500mg) q daily x 2. Restart opportunistic infection prophylaxis.  - Transfer to [facility]  - Due to path findings with hypoalbuminemia and proteinuria, start anticoagulation with eliquis  - Renally dose all appropriate medications, including antibiotics   - Strict I/O, daily weights

## 2023-12-22 NOTE — PROGRESS NOTES
[facility] - Telemetry Stepdown  Kidney Transplant  Progress Note      Reason for Follow-up: Reassessment of Kidney Transplant - 7/26/2010  (#1) recipient and management of immunosuppression.    ORGAN: LEFT KIDNEY    Donor Type: Living    Donor CMV Status:   Donor HBcAB:  Donor HCV Status:  Donor HBV FADY:   Donor HCV FADY:       Subjective:   History of Present Illness:  Mr. Qasim Tripp is a 38 y.o. year old white male who received a living kidney transplant on 7/26/10 (etiology of ESRD unknown per patient). Last follow up with transplant team was 1/3/20 at which time patient had CKD stage 2 - GFR 60-89 with baseline creatinine ~ 1.3-1.4. Patient lives in [place] and reports work () got busy so he stopped going to labs/appointments. He has been on mycophenolate mofetil and tacrolimus for maintenance immunosuppression, but he admits to running out of medications ~5 weeks ago. He states he did not have time to schedule a visit with a nephrologist, so he was unable to obtain his meds due to need for a new script/refill from a nephrologist. Patient presented to an ED in [place] with HA and HTN (SBP <200) on Monday, 12/11. Patient reports a history of HTN pre transplant, but reports his BP has been well controlled without need for anti-hypertensive's post op until now. Patient was prescribed Lisinopril on 12/11 and discharged home. He presented again to the ED 12/13 with HA and HTN, GENNY, and he was admitted. OSH record transfer is pending, but patient reports he was treated empirically for rejection with IV steroids. On admit to [facility] [place], he reports feeling better overall and denies fever, chills, SOB, CP, edema, decreased UOP. He does admit to dark urine x a few weeks and decreased appetite starting yesterday. Labs notable for Cr 3.9, Phos 6.1. CBC is pending. Admitted to  with KTM consult. Recommend obtaining DSA, PTH, UPC, kidney transplant US, and consulting IR for kidney transplant biopsy  tomorrow, 12/19. Continue Prograf 3 mg AM 2 mg PM, Cellcept 1000 mg BID, and Prednisone 5 mg daily for now. D/w Dr. Morris.    Interval History: NAEON. 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 EM pending. Discussed with team. Plan to treat with steroids. Give additional Solumedrol 5mg/kg q daily x 2 doses to complete treatment. Restart opportunistic infectious prophylaxis. Due to hypoalbuminemia and proteinuria, start eliquis for anticoagulation. DSA 12/19 with +DSAs. Reviewed by team. Consider IVIG outpatient. Transfer to [facility] for further management. Discuss with primary team. Monitor.      Past Medical, Surgical, Family, and Social History:   Unchanged from H&P.    Scheduled Meds:   carvediloL  12.5 mg Oral BID    heparin (porcine)  5,000 Units Subcutaneous Q8H    mycophenolate  1,000 mg Oral BID    NIFEdipine  60 mg Oral BID    polyethylene glycol  17 g Oral Daily    predniSONE  5 mg Oral Daily    senna-docusate 8.6-50 mg  1 tablet Oral BID    sevelamer carbonate  800 mg Oral TID WM    tacrolimus  4 mg Oral BID     Continuous Infusions:  PRN Meds:acetaminophen, albuterol-ipratropium, aluminum-magnesium hydroxide-simethicone, cloNIDine, dextrose 10%, dextrose 10%, glucagon (human recombinant), glucose, glucose, hydrALAZINE, melatonin, naloxone, ondansetron, prochlorperazine, simethicone, sodium chloride 0.9%    Intake/Output - Last 3 Shifts         12/20 0700 12/21 0659 12/21 0700 12/22 0659 12/22 0700  12/23 0659    P.O. 250      Total Intake(mL/kg) 250 (2)      Urine (mL/kg/hr)       Total Output       Net +250             Stool Occurrence 0 x 1 x              Review of Systems   Constitutional:  Negative for appetite change, chills and fever.   HENT:  Negative for trouble swallowing.    Eyes:  Negative for photophobia and redness.   Respiratory:  Negative for cough, shortness of breath, wheezing and stridor.    Cardiovascular:  Negative for chest pain, palpitations and leg  swelling.   Gastrointestinal:  Negative for abdominal distention, abdominal pain, diarrhea, nausea and vomiting.   Genitourinary:  Negative for decreased urine volume and difficulty urinating.   Musculoskeletal:  Negative for neck pain and neck stiffness.   Skin:  Negative for wound.   Allergic/Immunologic: Positive for immunocompromised state.   Neurological:  Negative for dizziness, syncope and headaches.   Psychiatric/Behavioral:  Negative for agitation, behavioral problems, confusion and decreased concentration.       Objective:     Vital Signs (Most Recent):  Temp: 98.4 °F (36.9 °C) (12/22/23 0430)  Pulse: 74 (12/22/23 0430)  Resp: 16 (12/22/23 0430)  BP: 126/77 (12/22/23 0430)  SpO2: 95 % (12/22/23 0430) Vital Signs (24h Range):  Temp:  [98.4 °F (36.9 °C)-98.9 °F (37.2 °C)] 98.4 °F (36.9 °C)  Pulse:  [69-80] 74  Resp:  [16-18] 16  SpO2:  [94 %-96 %] 95 %  BP: (126-161)/() 126/77     Weight: 123.2 kg (271 lb 9.7 oz)  Height: 6' (182.9 cm)  Body mass index is 36.84 kg/m².     Physical Exam  Vitals and nursing note reviewed.   Constitutional:       General: He is not in acute distress.  HENT:      Head: Normocephalic and atraumatic.   Eyes:      General: No scleral icterus.        Right eye: No discharge.         Left eye: No discharge.   Cardiovascular:      Rate and Rhythm: Normal rate and regular rhythm.      Heart sounds: No murmur heard.  Abdominal:      General: Bowel sounds are normal. There is no distension.      Tenderness: There is no abdominal tenderness. There is no guarding.      Comments: Ktx scar RLQ   Musculoskeletal:      Comments: Surgical scar lateral R ankle   Skin:     General: Skin is warm and dry.      Capillary Refill: Capillary refill takes less than 2 seconds.      Coloration: Skin is not jaundiced.   Neurological:      General: No focal deficit present.      Mental Status: He is alert and oriented to person, place, and time.   Psychiatric:         Mood and Affect: Mood normal.          Behavior: Behavior normal.         Thought Content: Thought content normal.          Laboratory:  CBC:   Recent Labs   Lab 12/20/23  0449 12/21/23  0235 12/22/23  0407   WBC 8.43 8.42 8.40   RBC 4.37* 3.99* 4.04*   HGB 12.9* 11.6* 11.7*   HCT 38.1* 34.6* 35.4*    168 191   MCV 87 87 88   MCH 29.5 29.1 29.0   MCHC 33.9 33.5 33.1     CMP:   Recent Labs   Lab 12/18/23  1514 12/19/23  0346 12/20/23  0449 12/21/23  0235 12/22/23  0407   GLU 98 89 108 103 90   CALCIUM 8.1* 7.8* 7.9* 7.6* 7.6*   ALBUMIN 2.2* 1.8* 2.0*  --  1.8*   PROT 5.7* 4.7* 4.9*  --   --    * 137 137 136 136   K 4.3 4.0 4.0 4.0 4.3   CO2 20* 22* 22* 22* 21*    107 108 108 107   BUN 91* 95* 92* 93* 83*   CREATININE 3.9* 3.8* 3.2* 2.8* 2.8*   ALKPHOS 98 80 84  --   --    ALT 23 20 22  --   --    AST 24 18 17  --   --      Labs within the past 24 hours have been reviewed.    Diagnostic Results: Reviewed.  Assessment/Plan:     * Acute renal failure  - Baseline creat in 2020 was 1.3-1.4  - Cr 3.9 on admit 12/18; s/p empiric treatment with IV steroids at OSH (working on obtaining OSH records for exact dose)  - Avoid nephrotoxic agents (NSAIDs, IV contrast dye, ACEI/ARB anti-HTN medications, Aminoglycoside-containing antibiotics)  - UPC elevated, trend daily  - DSA 12/19 with DSAs, reviewed  - Kidney transplant US 12/18 shows mild hydronephrosis, mild elevated of the peak systolic velocity of main renal artery (however, improved from 2010), and question of minimal tardus parvus waveform at the inferior lobar artery.   - Transplant kidney bx 12/20 with IR. Pathology showed diffuse proliferative GN with strong straining for C3 and IgG and mild TCMR. Awaiting final results with EM. (-)C4D.   - Discussed with team. Give solumedrol 5mg/kg (max 500mg) q daily x 2. Restart opportunistic infection prophylaxis.  - Transfer to [facility]  - Due to path findings with hypoalbuminemia and proteinuria, start anticoagulation with eliquis  - Renally dose all  "appropriate medications, including antibiotics   - Strict I/O, daily weights    Nephrotic syndrome  - Kidney bx 12/20 pending; trend UPC  - start eliquis in the setting of hypoalbuminemia     Long-term use of immunosuppressant medication  - Continue Prograf, Cellcept, and Prednisone.  - Monitor trough daily and adjust dose as needed to achieve therapeutic level.        CKD (chronic kidney disease) stage 4, GFR 15-29 ml/min  - Last follow up with transplant team was 1/3/20 at which time patient had CKD stage 2 - GFR 60-89 with baseline creatinine ~ 1.3-1.4.   - Cr on admit 3.9, trending down; see "acute renal failure."    Renovascular hypertension  - Management per primary team     Prophylactic immunotherapy  - See long term use of immunosuppression         Living-donor kidney transplant recipient - 7/26/10  - s/p living related Ktx 7/26/10 for unknown etiology   - See "acute renal failure."            Nadege Julian PA-C  Kidney Transplant  [facility] - Telemetry Stepdown  "

## 2023-12-22 NOTE — SUBJECTIVE & OBJECTIVE
Interval History: NAEON. S/p kidney biopsy yesterday; results pending. Cr trending down (3.2 --> 2.8 today). Reports some abdominal distension & recent constipation. States that urine is still dark (sweet tea color) but improved from prior.     Review of Systems   Constitutional:  Positive for fatigue. Negative for chills, diaphoresis and fever.   HENT:  Negative for congestion and sore throat.    Respiratory:  Negative for cough, chest tightness and shortness of breath.    Cardiovascular:  Negative for chest pain and palpitations.   Gastrointestinal:  Positive for constipation. Negative for abdominal pain, blood in stool, diarrhea, nausea and vomiting.   Genitourinary:  Negative for difficulty urinating, dysuria, flank pain and frequency.   Musculoskeletal:  Negative for arthralgias, joint swelling, myalgias and neck stiffness.   Skin:  Negative for rash and wound.   Neurological:  Negative for dizziness, light-headedness and headaches.     Objective:     Vital Signs (Most Recent):  Temp: 98.4 °F (36.9 °C) (12/21/23 1932)  Pulse: 69 (12/21/23 1932)  Resp: 16 (12/21/23 1932)  BP: (!) 154/104 (12/21/23 1932)  SpO2: 95 % (12/21/23 1932) Vital Signs (24h Range):  Temp:  [98.4 °F (36.9 °C)-98.9 °F (37.2 °C)] 98.4 °F (36.9 °C)  Pulse:  [69-88] 69  Resp:  [16-18] 16  SpO2:  [94 %-98 %] 95 %  BP: (135-161)/() 154/104     Weight: 123.2 kg (271 lb 9.7 oz)  Body mass index is 36.84 kg/m².  No intake or output data in the 24 hours ending 12/21/23 0461        Physical Exam  Constitutional:       General: He is not in acute distress.     Appearance: He is not ill-appearing, toxic-appearing or diaphoretic.   HENT:      Head: Normocephalic and atraumatic.   Eyes:      Conjunctiva/sclera: Conjunctivae normal.   Cardiovascular:      Rate and Rhythm: Normal rate and regular rhythm.      Heart sounds: Normal heart sounds.   Pulmonary:      Effort: Pulmonary effort is normal. No respiratory distress.      Breath sounds: Normal  breath sounds. No wheezing.   Abdominal:      General: Bowel sounds are normal. There is distension (mild).      Palpations: Abdomen is soft.      Tenderness: There is no abdominal tenderness. There is no guarding.   Skin:     General: Skin is warm and dry.   Neurological:      General: No focal deficit present.      Mental Status: He is alert and oriented to person, place, and time. Mental status is at baseline.   Psychiatric:         Mood and Affect: Mood normal.         Behavior: Behavior normal.           Significant Labs: All pertinent labs within the past 24 hours have been reviewed.    Significant Imaging: I have reviewed all pertinent imaging results/findings within the past 24 hours.

## 2023-12-22 NOTE — ASSESSMENT & PLAN NOTE
"- Last follow up with transplant team was 1/3/20 at which time patient had CKD stage 2 - GFR 60-89 with baseline creatinine ~ 1.3-1.4.   - Cr on admit 3.9, trending down; see "acute renal failure."  "

## 2023-12-22 NOTE — ASSESSMENT & PLAN NOTE
- Admitted to ICU in [place] for HTN emergency requiring cardene gtt. Hasn't required anti-hypertensives until this admission.   - Continue coreg 12.5mg BID & nifedipine 60mg BID  - PRN clonidine and hydralazine

## 2023-12-22 NOTE — PROGRESS NOTES
[facility] - Telemetry [facility]  Progress Note    Patient Name: Ubaldo Tripp  MRN: [id number]  Patient Class: IP- Inpatient   Admission Date: 12/18/2023  Length of Stay: 3 days  Attending Physician: Madeleine Brennan MD  Primary Care Provider: Jaime Jauregui (Inactive)        Subjective:     Principal Problem:Acute renal failure        HPI:  38M with PMH of CKD s/p living kidney transplant 7/26/10 on cellcept/tacrolimus (etiology of ESRD unknown per patient) who has been transferred from hospital in [place], [place] for evaluation of possible graft rejection. Patient currently follows with nephrology in [place] and last follow up with transplant team was 1/3/20 at which time patient had CKD stage 2 with baseline creatinine ~ 1.3-1.4. Patient states he stopped taking cellcept and tacrolimus 4-5 weeks ago as he ran out of medications and was too busy with work to schedule follow up appt. He presented to ED in [place] with c/o headache was diagnosed with hypertensive emergency and placed in ICU on cardene drip. Patient also noted to have GENNY with Cr 3.68. Other labs showed Na 133, K nl, Cr 3.68, BUN 83, UA +1 leukocytes and 3+ blood.  CT abdomen/pelvis was performed which showed persistent mild dilation of the allograft and cable system.  Hydronephrosis and distended bladder.  Mayen catheter was placed.  Patient was also started on ceftriaxone for unknown reason. Patient then transferred here for evaluation by kidney transplant medicine for possible graft rejection. Patient reports he was treated empirically for rejection with IV steroids. He is asymptomatic at this time and denies fever, chills, SOB, CP, edema, decreased UOP. He will be admitted to hospital medicine service for further management.    Overview/Hospital Course:  No notes on file    Interval History: NAEON. S/p kidney biopsy yesterday; results pending. Cr trending down (3.2 --> 2.8 today). Reports some abdominal distension & recent  constipation. States that urine is still dark (sweet tea color) but improved from prior.     Review of Systems   Constitutional:  Positive for fatigue. Negative for chills, diaphoresis and fever.   HENT:  Negative for congestion and sore throat.    Respiratory:  Negative for cough, chest tightness and shortness of breath.    Cardiovascular:  Negative for chest pain and palpitations.   Gastrointestinal:  Positive for constipation. Negative for abdominal pain, blood in stool, diarrhea, nausea and vomiting.   Genitourinary:  Negative for difficulty urinating, dysuria, flank pain and frequency.   Musculoskeletal:  Negative for arthralgias, joint swelling, myalgias and neck stiffness.   Skin:  Negative for rash and wound.   Neurological:  Negative for dizziness, light-headedness and headaches.     Objective:     Vital Signs (Most Recent):  Temp: 98.4 °F (36.9 °C) (12/21/23 1932)  Pulse: 69 (12/21/23 1932)  Resp: 16 (12/21/23 1932)  BP: (!) 154/104 (12/21/23 1932)  SpO2: 95 % (12/21/23 1932) Vital Signs (24h Range):  Temp:  [98.4 °F (36.9 °C)-98.9 °F (37.2 °C)] 98.4 °F (36.9 °C)  Pulse:  [69-88] 69  Resp:  [16-18] 16  SpO2:  [94 %-98 %] 95 %  BP: (135-161)/() 154/104     Weight: 123.2 kg (271 lb 9.7 oz)  Body mass index is 36.84 kg/m².  No intake or output data in the 24 hours ending 12/21/23 4471        Physical Exam  Constitutional:       General: He is not in acute distress.     Appearance: He is not ill-appearing, toxic-appearing or diaphoretic.   HENT:      Head: Normocephalic and atraumatic.   Eyes:      Conjunctiva/sclera: Conjunctivae normal.   Cardiovascular:      Rate and Rhythm: Normal rate and regular rhythm.      Heart sounds: Normal heart sounds.   Pulmonary:      Effort: Pulmonary effort is normal. No respiratory distress.      Breath sounds: Normal breath sounds. No wheezing.   Abdominal:      General: Bowel sounds are normal. There is distension (mild).      Palpations: Abdomen is soft.       Tenderness: There is no abdominal tenderness. There is no guarding.   Skin:     General: Skin is warm and dry.   Neurological:      General: No focal deficit present.      Mental Status: He is alert and oriented to person, place, and time. Mental status is at baseline.   Psychiatric:         Mood and Affect: Mood normal.         Behavior: Behavior normal.           Significant Labs: All pertinent labs within the past 24 hours have been reviewed.    Significant Imaging: I have reviewed all pertinent imaging results/findings within the past 24 hours.    Assessment/Plan:      * Acute renal failure  CKD s/p living-donor kidney transplant in 2010  Presented w/ severe GENNY in setting of non-compliance with immunotherapy over the past 4-5 weeks and poor PO intake. Baseline Cr 1.3-1.4 per 2020 labs, although pt reports recent baseline mid-2s. On 12/18 admission Cr 3.9. Received empiric treatment with IV steroids in [place], [place] prior to transfer. Renal US w/ mild elevation of the peak systolic velocity in the main renal artery but improved from study in 2010 otherwise normal renal artery to iliac artery ratio; questioned early/minimal tardus parvus waveform at the inferior lobar artery with mild slow upstroke at the upper and midpole arteries, as well as interval increased but technically within normal limits resistive indices from 2010 study. Findings are concerning for developing allograft rejection  - KTM consulted; appreciate recs  - S/p kidney biopsy 12/20; pathology pending  - BP control as below  - Monitor Cr & UOP  - Strict I/Os   - Minimize nephrotoxic agents as able & renally-dose meds    Nephrotic syndrome        Long-term use of immunosuppressant medication        CKD (chronic kidney disease) stage 4, GFR 15-29 ml/min  On admission, Cr 3.9. Baseline Cr 1.3-1.4 per 2020 labs, although pt reports recent baseline mid-2s.     Renovascular hypertension  - Admitted to ICU in [place] for HTN emergency requiring cardene  gtt. Hasn't required anti-hypertensives until this admission.   - Continue coreg 12.5mg BID & nifedipine 60mg BID  - PRN clonidine and hydralazine    Prophylactic immunotherapy  Treated with IV steroids prior to transfer.   - Continue Cellcept 1000 mg BID  - Continue prednisone 5 mg daily  - Tacrolimus 4mg BID   - Monitor tacro lvl daily  - KTM consulted as above    Living-donor kidney transplant recipient - 7/26/10  S/p living related Ktx 7/26/10 for unknown etiology   - KTM consulted; appreciate recs  - Mgmt of GENNY as above        VTE Risk Mitigation (From admission, onward)           Ordered     heparin (porcine) injection 5,000 Units  Every 8 hours         12/18/23 1449     IP VTE HIGH RISK PATIENT  Once         12/18/23 1449     Place sequential compression device  Until discontinued         12/18/23 1449                    Discharge Planning   ZAY: 12/24/2023     Code Status: Full Code   Is the patient medically ready for discharge?: No    Reason for patient still in hospital (select all that apply): Patient trending condition  Discharge Plan A: Home with family            Madeleine Brennan MD  Department of Hospital Medicine   [facility] - Telemetry Stepdown

## 2023-12-22 NOTE — PROGRESS NOTES
"Transplant Social Work Admit/Potential Weekend Discharge Note     Met with patient to assess needs. Patient is a 38 y.o.  male, admitted for:    Acute renal failure [N17.9]    Patient admitted from outside hospital on 12/18/2023 .  At this time, patient presents as alert and oriented x 4, pleasant, good eye contact, well groomed, recall good, concentration/judgement good, average intelligence, calm, communicative, cooperative, and asking and answering questions appropriately. At this time, patients caregiver presents as  not present at this time .    Household/Family Systems     Patient resides with patient's wife and child(jesus), age(s) 12, 11, 10, and 8 yrs , at [address] [facility]  [place] [address].  Support system includes his wife Nusrat Tirpp ([phone]), and his friends/co-workers at the fire department.   Patients children are being cared for by his wife while he is in the hospital.      Patients primary caregiver is self.  Confirmed patients contact information is [phone] (home);   Telephone Information:   [place] [phone]   .    During admission, patient's caregiver plans to stay at home.  Confirmed patient and patients caregivers do have access to reliable transportation.    Cognitive Status/Learning     Patient reports reading ability as college and states patient does not have difficulty with N/A.  Patient reports patient learns best by hands on learning.   Needed: No.   Highest education level: Attended College/Technical School    Vocation/Disability   .  Working for Income: yes  If yes, working activity level: Working Full Time  Patient is employed as a .    Adherence     Patient reports a low level of adherence to patients health care regimen.  Adherence counseling and education provided. Patient verbalizes understanding. Patient reports stopping his immunosuppressant medications for multiple weeks. Patient reports he was busy with " work and life" " and did not have time to come to [place] to meet with transplant team for a refill. Patient reports he is now committed to following up with transplant team as needed.     Substance Use    Patient reports the following substance usage.    Tobacco: none, patient denies any use.  Alcohol: none, patient denies any use.  Illicit Drugs/Non-prescribed Medications: none, patient denies any use.  Patient states clear understanding of the potential impact of substance use.  Substance abstinence/cessation counseling, education and resources provided and reviewed.     Services Utilizing/ADLS    Infusion Service: Prior to admission, patient utilizing? no  Home Health: Prior to admission, patient utilizing? no  DME: Prior to admission, no  Pulmonary/Cardiac Rehab: Prior to admission, no  Dialysis:  Prior to admission, no  Transplant Specialty Pharmacy:  Prior to admission, yes; [facility].    Prior to admission, patient reports patient was independent with ADLS and was driving.  Patient reports patient is able to care for self at this time..  Patient indicates a willingness to care for self once medically cleared to do so.    Insurance/Medications    Insured by   Payer/Plan Subscr  Sex Relation Sub. Ins. ID Effective Group Num   1.  - TRI* COMPA TERRAZAS 1985 Male Self [id number]                                     [id number], [facility] [id number]      Primary Insurance (for UNOS reporting): Public Insurance - Other Government  Secondary Insurance (for UNOS reporting): None    Patient reports patient is able to obtain and afford medications at this time and at time of discharge.    Living Will/Healthcare Power of     Patient states patient does not have a LW and/or HCPA.   provided education regarding LW and HCPA and the completion of forms.    Coping/Mental Health    Patient is coping adequately with the aid of  family members and friends.  Patient denies mental health  difficulties. Patient reports having a very supportive group of friends through his work as a . Patient denied needing or wanting resources at this time. Patient agrees to contact transplant team with needs, questions, or concerns as they arise.     Discharge Planning    At time of discharge, patient plans to return to patient's home under the care of self and Nusrat Tripp.  Patients wife will transport patient.  Per rounds today, expected discharge date has not been medically determined at this time. Patient and patients caregiver  verbalize understanding and are involved in treatment planning and discharge process.    Additional Concerns     providing ongoing psychosocial support, education, resources and d/c planning as needed.  SW remains available.  remains available. Patient denies additional needs and/or concerns at this time. Patient verbalizes understanding and agreement with information reviewed, social work availability, and how to access available resources as needed.

## 2023-12-22 NOTE — ASSESSMENT & PLAN NOTE
S/p living related Ktx 7/26/10 for unknown etiology   - KTM consulted; appreciate recs  - Mgmt of GENNY as above

## 2023-12-23 VITALS
DIASTOLIC BLOOD PRESSURE: 95 MMHG | WEIGHT: 271.63 LBS | RESPIRATION RATE: 18 BRPM | OXYGEN SATURATION: 97 % | HEIGHT: 72 IN | HEART RATE: 80 BPM | BODY MASS INDEX: 36.79 KG/M2 | TEMPERATURE: 98 F | SYSTOLIC BLOOD PRESSURE: 147 MMHG

## 2023-12-23 DIAGNOSIS — Z29.89 NEED FOR PROPHYLACTIC IMMUNOTHERAPY: ICD-10-CM

## 2023-12-23 DIAGNOSIS — Z94.0 LIVING-DONOR KIDNEY TRANSPLANT RECIPIENT: Primary | ICD-10-CM

## 2023-12-23 PROBLEM — Z79.60 LONG-TERM USE OF IMMUNOSUPPRESSANT MEDICATION: Status: ACTIVE | Noted: 2023-12-23

## 2023-12-23 LAB
ALBUMIN SERPL BCP-MCNC: 2 G/DL (ref 3.5–5.2)
ANION GAP SERPL CALC-SCNC: 9 MMOL/L (ref 8–16)
BACTERIA UR CULT: NO GROWTH
BASOPHILS # BLD AUTO: 0.02 K/UL (ref 0–0.2)
BASOPHILS NFR BLD: 0.2 % (ref 0–1.9)
BUN SERPL-MCNC: 89 MG/DL (ref 6–20)
CALCIUM SERPL-MCNC: 8.1 MG/DL (ref 8.7–10.5)
CHLORIDE SERPL-SCNC: 105 MMOL/L (ref 95–110)
CO2 SERPL-SCNC: 18 MMOL/L (ref 23–29)
CREAT SERPL-MCNC: 3 MG/DL (ref 0.5–1.4)
CREAT UR-MCNC: 152 MG/DL (ref 23–375)
DIFFERENTIAL METHOD: ABNORMAL
EOSINOPHIL # BLD AUTO: 0 K/UL (ref 0–0.5)
EOSINOPHIL NFR BLD: 0 % (ref 0–8)
ERYTHROCYTE [DISTWIDTH] IN BLOOD BY AUTOMATED COUNT: 12.4 % (ref 11.5–14.5)
EST. GFR  (NO RACE VARIABLE): 26.1 ML/MIN/1.73 M^2
GLUCOSE SERPL-MCNC: 138 MG/DL (ref 70–110)
HCT VFR BLD AUTO: 40.7 % (ref 40–54)
HGB BLD-MCNC: 13.9 G/DL (ref 14–18)
IMM GRANULOCYTES # BLD AUTO: 0.09 K/UL (ref 0–0.04)
IMM GRANULOCYTES NFR BLD AUTO: 0.8 % (ref 0–0.5)
LYMPHOCYTES # BLD AUTO: 0.5 K/UL (ref 1–4.8)
LYMPHOCYTES NFR BLD: 5 % (ref 18–48)
MAGNESIUM SERPL-MCNC: 2.2 MG/DL (ref 1.6–2.6)
MCH RBC QN AUTO: 30.1 PG (ref 27–31)
MCHC RBC AUTO-ENTMCNC: 34.2 G/DL (ref 32–36)
MCV RBC AUTO: 88 FL (ref 82–98)
MONOCYTES # BLD AUTO: 0.6 K/UL (ref 0.3–1)
MONOCYTES NFR BLD: 5.6 % (ref 4–15)
NEUTROPHILS # BLD AUTO: 9.4 K/UL (ref 1.8–7.7)
NEUTROPHILS NFR BLD: 88.4 % (ref 38–73)
NRBC BLD-RTO: 0 /100 WBC
PHOSPHATE SERPL-MCNC: 3.9 MG/DL (ref 2.7–4.5)
PLATELET # BLD AUTO: 190 K/UL (ref 150–450)
PMV BLD AUTO: 9.9 FL (ref 9.2–12.9)
POCT GLUCOSE: 114 MG/DL (ref 70–110)
POCT GLUCOSE: 139 MG/DL (ref 70–110)
POTASSIUM SERPL-SCNC: 4.9 MMOL/L (ref 3.5–5.1)
PROT UR-MCNC: 1927 MG/DL (ref 0–15)
PROT/CREAT UR: 12.68 MG/G{CREAT} (ref 0–0.2)
RBC # BLD AUTO: 4.62 M/UL (ref 4.6–6.2)
SODIUM SERPL-SCNC: 132 MMOL/L (ref 136–145)
WBC # BLD AUTO: 10.63 K/UL (ref 3.9–12.7)

## 2023-12-23 PROCEDURE — 25000003 PHARM REV CODE 250: Performed by: INTERNAL MEDICINE

## 2023-12-23 PROCEDURE — 99239 HOSP IP/OBS DSCHRG MGMT >30: CPT | Mod: ,,, | Performed by: NURSE PRACTITIONER

## 2023-12-23 PROCEDURE — 83735 ASSAY OF MAGNESIUM: CPT | Performed by: INTERNAL MEDICINE

## 2023-12-23 PROCEDURE — 36415 COLL VENOUS BLD VENIPUNCTURE: CPT | Performed by: INTERNAL MEDICINE

## 2023-12-23 PROCEDURE — 80069 RENAL FUNCTION PANEL: CPT | Performed by: STUDENT IN AN ORGANIZED HEALTH CARE EDUCATION/TRAINING PROGRAM

## 2023-12-23 PROCEDURE — 99239 PR HOSPITAL DISCHARGE DAY,>30 MIN: ICD-10-PCS | Mod: ,,, | Performed by: NURSE PRACTITIONER

## 2023-12-23 PROCEDURE — 85025 COMPLETE CBC W/AUTO DIFF WBC: CPT | Performed by: INTERNAL MEDICINE

## 2023-12-23 PROCEDURE — 84156 ASSAY OF PROTEIN URINE: CPT | Performed by: PHYSICIAN ASSISTANT

## 2023-12-23 PROCEDURE — 25000003 PHARM REV CODE 250: Performed by: PHYSICIAN ASSISTANT

## 2023-12-23 PROCEDURE — 63600175 PHARM REV CODE 636 W HCPCS: Performed by: PHYSICIAN ASSISTANT

## 2023-12-23 PROCEDURE — 63600175 PHARM REV CODE 636 W HCPCS: Performed by: INTERNAL MEDICINE

## 2023-12-23 RX ORDER — SODIUM BICARBONATE 650 MG/1
1300 TABLET ORAL 2 TIMES DAILY
Qty: 120 TABLET | Refills: 5 | Status: SHIPPED | OUTPATIENT
Start: 2023-12-23 | End: 2024-01-08 | Stop reason: DRUGHIGH

## 2023-12-23 RX ORDER — VALGANCICLOVIR 450 MG/1
450 TABLET, FILM COATED ORAL DAILY
Status: DISCONTINUED | OUTPATIENT
Start: 2023-12-24 | End: 2023-12-23 | Stop reason: HOSPADM

## 2023-12-23 RX ADMIN — APIXABAN 5 MG: 5 TABLET, FILM COATED ORAL at 09:12

## 2023-12-23 RX ADMIN — NIFEDIPINE 60 MG: 30 TABLET, FILM COATED, EXTENDED RELEASE ORAL at 09:12

## 2023-12-23 RX ADMIN — TACROLIMUS 4 MG: 1 CAPSULE ORAL at 09:12

## 2023-12-23 RX ADMIN — METHYLPREDNISOLONE SODIUM SUCCINATE 500 MG: 500 INJECTION INTRAMUSCULAR; INTRAVENOUS at 01:12

## 2023-12-23 RX ADMIN — CARVEDILOL 12.5 MG: 12.5 TABLET, FILM COATED ORAL at 09:12

## 2023-12-23 RX ADMIN — MYCOPHENOLATE MOFETIL 1000 MG: 250 CAPSULE ORAL at 09:12

## 2023-12-23 RX ADMIN — SULFAMETHOXAZOLE AND TRIMETHOPRIM 1 TABLET: 400; 80 TABLET ORAL at 09:12

## 2023-12-23 RX ADMIN — LOSARTAN POTASSIUM 100 MG: 50 TABLET, FILM COATED ORAL at 09:12

## 2023-12-23 NOTE — PROGRESS NOTES
Patient pending hospital discharge. Patient to follow up Tuesday, 12/26 with labs, CBC, RFP, tacrolimus level. Scheduled per TORB from LACEY Nati / provider.

## 2023-12-23 NOTE — PROGRESS NOTES
Discharge Medication Note:    Hospital Course: RE-ADMIT s/p KTS in 7/2010. Patient admitted from OSH and found to GENNY as well as non-adherence to anti-rejection medicatiosn for previous 5 weeks. 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 Restarted on OI prophylaxis. Discharge on prednisone taper of 60 mg QD x 3 weeks then 50 mg QD x3 weeks with subsequent outpatient follow-up for nephrotic syndrome.     To note, medications filled through [facility] in [place], [place].      Met with Ubaldo Tripp at discharge to review discharge medications and to update the blue medication card.         Medication List        START taking these medications      apixaban 5 mg Tab  Commonly known as: ELIQUIS  Take 1 tablet (5 mg total) by mouth 2 (two) times daily.     carvediloL 12.5 MG tablet  Commonly known as: COREG  Take 1 tablet (12.5 mg total) by mouth 2 (two) times daily.     NIFEdipine 60 MG (OSM) 24 hr tablet  Commonly known as: PROCARDIA-XL  Take 1 tablet (60 mg total) by mouth 2 (two) times a day.     olmesartan 40 MG tablet  Commonly known as: BENICAR  Take 1 tablet (40 mg total) by mouth once daily.     predniSONE 20 MG tablet  Commonly known as: DELTASONE  60 mg PO daily x 3 weeks, then 50 mg PO daily x 3 weeks then follow up in clinic for subsequent taper  Start taking on: December 24, 2023     sulfamethoxazole-trimethoprim 400-80mg 400-80 mg per tablet  Commonly known as: BACTRIM,SEPTRA  Take 1 tablet by mouth once daily. Stop: 3/22/24     valGANciclovir 450 mg Tab  Commonly known as: VALCYTE  Take 1 tablet (450 mg total) by mouth once daily. Stop: 6/22/24            CHANGE how you take these medications      mycophenolate 250 mg Cap  Commonly known as: CELLCEPT  Take 4 capsules (1,000 mg total) by mouth 2 (two) times daily.  What changed:   how much to take  when to take this     sodium bicarbonate 650 MG tablet  Take 2 tablets (1,300 mg total)  by mouth 2 (two) times daily.  What changed: how much to take     tacrolimus 1 MG Cap  Commonly known as: PROGRAF  Take 4 capsules (4 mg total) by mouth every 12 (twelve) hours.  What changed: See the new instructions.               Where to Get Your Medications        These medications were sent to [id number] - [place], [place] - [address] [address]  [address], [place] [address]      Phone: [phone]   apixaban 5 mg Tab  carvediloL 12.5 MG tablet  mycophenolate 250 mg Cap  NIFEdipine 60 MG (OSM) 24 hr tablet  olmesartan 40 MG tablet  predniSONE 20 MG tablet  sodium bicarbonate 650 MG tablet  sulfamethoxazole-trimethoprim 400-80mg 400-80 mg per tablet  tacrolimus 1 MG Cap  valGANciclovir 450 mg Tab        The following medications have been placed on HOLD and should be restarted in the outpatient setting (when appropriate): N/A    Ubaldo Tripp verbalized understanding and had the opportunity to ask questions.

## 2023-12-23 NOTE — PLAN OF CARE
Problem: Adult Inpatient Plan of Care  Goal: Plan of Care Review  Outcome: Met  Goal: Patient-Specific Goal (Individualized)  Outcome: Met  Goal: Absence of Hospital-Acquired Illness or Injury  Outcome: Met  Goal: Optimal Comfort and Wellbeing  Outcome: Met  Goal: Readiness for Transition of Care  Outcome: Met     Problem: Fluid and Electrolyte Imbalance (Acute Kidney Injury/Impairment)  Goal: Fluid and Electrolyte Balance  Outcome: Met     Problem: Oral Intake Inadequate (Acute Kidney Injury/Impairment)  Goal: Optimal Nutrition Intake  Outcome: Met     Problem: Renal Function Impairment (Acute Kidney Injury/Impairment)  Goal: Effective Renal Function  Outcome: Met     Problem: Adult Inpatient Plan of Care  Goal: Plan of Care Review  Outcome: Met  Goal: Patient-Specific Goal (Individualized)  Outcome: Met  Goal: Absence of Hospital-Acquired Illness or Injury  Outcome: Met  Goal: Optimal Comfort and Wellbeing  Outcome: Met  Goal: Readiness for Transition of Care  Outcome: Met     Problem: Fluid and Electrolyte Imbalance (Acute Kidney Injury/Impairment)  Goal: Fluid and Electrolyte Balance  Outcome: Met     Problem: Oral Intake Inadequate (Acute Kidney Injury/Impairment)  Goal: Optimal Nutrition Intake  Outcome: Met     Problem: Renal Function Impairment (Acute Kidney Injury/Impairment)  Goal: Effective Renal Function  Outcome: Met

## 2023-12-26 LAB
ALBUMIN SERPL ELPH-MCNC: 2.14 G/DL (ref 3.35–5.55)
ALPHA1 GLOB SERPL ELPH-MCNC: 0.17 G/DL (ref 0.17–0.41)
ALPHA2 GLOB SERPL ELPH-MCNC: 0.49 G/DL (ref 0.43–0.99)
ANCA AB TITR SER IF: NORMAL TITER
B-GLOBULIN SERPL ELPH-MCNC: 0.42 G/DL (ref 0.5–1.1)
BM IGG SER-ACNC: <0.2 U
CARDIOLIPIN IGG SER IA-ACNC: <9.4 GPL (ref 0–14.99)
CARDIOLIPIN IGM SER IA-ACNC: <9.4 MPL (ref 0–12.49)
CH50 SERPL-ACNC: 16 U/ML (ref 42–95)
GAMMA GLOB SERPL ELPH-MCNC: 0.78 G/DL (ref 0.67–1.58)
INTERPRETATION SERPL IFE-IMP: NORMAL
P-ANCA TITR SER IF: NORMAL TITER
PATHOLOGIST INTERPRETATION IFE: NORMAL
PATHOLOGIST INTERPRETATION SPE: NORMAL
PROT SERPL-MCNC: 4 G/DL (ref 6–8.4)

## 2023-12-28 ENCOUNTER — TELEPHONE (OUTPATIENT)
Dept: TRANSPLANT | Facility: CLINIC | Age: 38
End: 2023-12-28
Payer: OTHER GOVERNMENT

## 2023-12-29 ENCOUNTER — DOCUMENTATION ONLY (OUTPATIENT)
Dept: TRANSPLANT | Facility: CLINIC | Age: 38
End: 2023-12-29
Payer: OTHER GOVERNMENT

## 2024-01-03 ENCOUNTER — DOCUMENTATION ONLY (OUTPATIENT)
Dept: TRANSPLANT | Facility: CLINIC | Age: 39
End: 2024-01-03
Payer: OTHER GOVERNMENT

## 2024-01-04 LAB
FINAL PATHOLOGIC DIAGNOSIS: NORMAL
GROSS: NORMAL
Lab: NORMAL

## 2024-01-05 ENCOUNTER — TELEPHONE (OUTPATIENT)
Dept: TRANSPLANT | Facility: CLINIC | Age: 39
End: 2024-01-05
Payer: OTHER GOVERNMENT

## 2024-01-05 ENCOUNTER — DOCUMENTATION ONLY (OUTPATIENT)
Dept: TRANSPLANT | Facility: CLINIC | Age: 39
End: 2024-01-05
Payer: OTHER GOVERNMENT

## 2024-01-05 RX ORDER — FUROSEMIDE 20 MG/1
60 TABLET ORAL 2 TIMES DAILY
COMMUNITY
End: 2024-01-08 | Stop reason: DRUGHIGH

## 2024-01-05 NOTE — TELEPHONE ENCOUNTER
Incoming call from patient, notified that we are waiting on labs results from 1/3.  Patient states he feels fine denies any acute issues.  States he was admitted to [facility] in [place] on 12/28 - 12/30/2023 for fluid overload and was started on Lasix 60mg QD.

## 2024-01-06 ENCOUNTER — NURSE TRIAGE (OUTPATIENT)
Dept: ADMINISTRATIVE | Facility: CLINIC | Age: 39
End: 2024-01-06
Payer: OTHER GOVERNMENT

## 2024-01-06 NOTE — TELEPHONE ENCOUNTER
Reason for Disposition   Call about patient who is currently hospitalized    Additional Information   Negative: ED call to PCP (i.e., primary care provider; doctor, NP, or PA)   Negative: Doctor (or NP/PA) call to PCP    Protocols used: PCP Call - No Triage-A-  Kidney Transplant - 7/26/2010 (#1)   BPA 16  CC: pt called stating that last pm got call from  told to come to [facility] in [facility] for elevated K. Pt states he was admitted last pm. neph group is trying to get records. Fax # [phone]. Spoke with dr Sevilla to notify pt in hospital and neph group requesting records. Secure chat sent with chart and fax number. Pt notified if hospital staff needs to speak with neph group have them call [phone].

## 2024-01-08 ENCOUNTER — DOCUMENTATION ONLY (OUTPATIENT)
Dept: TRANSPLANT | Facility: CLINIC | Age: 39
End: 2024-01-08

## 2024-01-08 ENCOUNTER — TELEPHONE (OUTPATIENT)
Dept: TRANSPLANT | Facility: CLINIC | Age: 39
End: 2024-01-08
Payer: OTHER GOVERNMENT

## 2024-01-08 DIAGNOSIS — Z94.0 LIVING-DONOR KIDNEY TRANSPLANT RECIPIENT: ICD-10-CM

## 2024-01-08 LAB
ALBUMIN SERPL-MCNC: 2.2 G/DL (ref 4.1–5.1)
BASOPHILS # BLD AUTO: 0 X10E3/UL (ref 0–0.2)
BASOPHILS NFR BLD AUTO: 0 %
BUN SERPL-MCNC: 71 MG/DL (ref 6–24)
BUN/CREAT SERPL: 24 (ref 9–20)
CALCIUM SERPL-MCNC: 8 MG/DL (ref 8.7–10.2)
CHLORIDE SERPL-SCNC: 108 MMOL/L (ref 96–106)
CO2 SERPL-SCNC: 18 MMOL/L (ref 20–29)
CREAT SERPL-MCNC: 2.92 MG/DL (ref 0.76–1.27)
EOSINOPHIL # BLD AUTO: 0 X10E3/UL (ref 0–0.4)
EOSINOPHIL NFR BLD AUTO: 0 %
ERYTHROCYTE [DISTWIDTH] IN BLOOD BY AUTOMATED COUNT: 13 % (ref 11.6–15.4)
EST. GFR  (NO RACE VARIABLE): 27 ML/MIN/1.73
EXT ALBUMIN: 2.2 (ref 4.1–5.1)
EXT ALBUMIN: NORMAL
EXT ALKALINE PHOSPHATASE: NORMAL
EXT ALLOSURE: NORMAL
EXT ALT: NORMAL
EXT AMYLASE: NORMAL
EXT ANC: NORMAL
EXT AST: NORMAL
EXT BACTERIA UA: NORMAL
EXT BANDS%: NORMAL
EXT BILIRUBIN DIRECT: NORMAL
EXT BILIRUBIN TOTAL: NORMAL
EXT BK VIRUS DNA QN PCR: NORMAL
EXT BUN: 71 (ref 6–24)
EXT BUN: NORMAL
EXT C PEPTIDE: NORMAL
EXT CALCIUM: NORMAL
EXT CHLORIDE: 108 (ref 3.5–5.2)
EXT CHLORIDE: NORMAL
EXT CHOLESTEROL: NORMAL
EXT CMV DNA QUANT. BY PCR: NORMAL
EXT CO2: 18 (ref 20–29)
EXT CO2: NORMAL
EXT CREATININE UA: NORMAL
EXT CREATININE: 2.92 MG/DL (ref 0.76–1.27)
EXT CREATININE: NORMAL
EXT CYCLOSPORINE LVL: NORMAL
EXT EBV DNA BY PCR: NORMAL
EXT EBV IGG: NORMAL
EXT EGFR NO RACE VARIABLE: 27
EXT EGFR NO RACE VARIABLE: NORMAL
EXT EOSINOPHIL%: NORMAL
EXT FERRITIN: NORMAL
EXT GFR MDRD AF AMER: NORMAL
EXT GFR MDRD NON AF AMER: NORMAL
EXT GLUCOSE UA: NORMAL
EXT GLUCOSE: 147 (ref 70–99)
EXT GLUCOSE: NORMAL
EXT HBV DNA QUANT PCR: NORMAL
EXT HCV QUANT: NORMAL
EXT HDL: NORMAL
EXT HEMATOCRIT: 38.7 (ref 37.5–51)
EXT HEMATOCRIT: NORMAL
EXT HEMOGLOBIN A1C: NORMAL
EXT HEMOGLOBIN: 13.1 (ref 13–17.7)
EXT HEMOGLOBIN: NORMAL
EXT HIV RNA QUANT PCR: NORMAL
EXT IMMUNKNOW (STIMULATED): NORMAL
EXT IRON SATURATION: NORMAL
EXT LDH, TOTAL: NORMAL
EXT LDL CHOLESTEROL: NORMAL
EXT LEFLUNOMIDE METABOLITE: NORMAL
EXT LIPASE: NORMAL
EXT LYMPH%: 6
EXT LYMPH%: NORMAL
EXT MAGNESIUM: 2.2 (ref 1.6–2.3)
EXT MAGNESIUM: NORMAL
EXT MONOCYTES%: 4
EXT MONOCYTES%: NORMAL
EXT NITRITES UA: NORMAL
EXT PHOSPHORUS: 5.1 (ref 2.8–4.1)
EXT PHOSPHORUS: NORMAL
EXT PLATELETS: 301 (ref 150–450)
EXT PLATELETS: NORMAL
EXT POTASSIUM: 5.7 (ref 3.5–5.2)
EXT POTASSIUM: NORMAL
EXT PROT/CREAT RATIO UR: NORMAL
EXT PROTEIN TOTAL: NORMAL
EXT PROTEIN UA: NORMAL
EXT PTH, INTACT: NORMAL
EXT RBC UA: NORMAL
EXT SEGS%: 89
EXT SEGS%: NORMAL
EXT SERUM IRON: NORMAL
EXT SIROLIMUS LVL: NORMAL
EXT SODIUM: 139 MMOL/L (ref 134–144)
EXT SODIUM: NORMAL
EXT TACROLIMUS LVL: 10.8
EXT TACROLIMUS LVL: NORMAL
EXT TIBC: NORMAL
EXT TRIGLYCERIDES: NORMAL
EXT URIC ACID: NORMAL
EXT URINE CULTURE: NORMAL
EXT URINE PROTEIN: NORMAL
EXT VIT D 25 HYDROXY: NORMAL
EXT WBC UA: NORMAL
EXT WBC: 17.7 (ref 3.4–10.8)
EXT WBC: NORMAL
GLUCOSE SERPL-MCNC: 147 MG/DL (ref 70–99)
HCT VFR BLD AUTO: 38.7 % (ref 37.5–51)
HGB BLD-MCNC: 13.1 G/DL (ref 13–17.7)
IMM GRANULOCYTES # BLD AUTO: 0.2 X10E3/UL (ref 0–0.1)
IMM GRANULOCYTES NFR BLD AUTO: 1 %
LYMPHOCYTES # BLD AUTO: 1.1 X10E3/UL (ref 0.7–3.1)
LYMPHOCYTES NFR BLD AUTO: 6 %
MAGNESIUM SERPL-MCNC: 2.2 MG/DL (ref 1.6–2.3)
MCH RBC QN AUTO: 29.8 PG (ref 26.6–33)
MCHC RBC AUTO-ENTMCNC: 33.9 G/DL (ref 31.5–35.7)
MCV RBC AUTO: 88 FL (ref 79–97)
MONOCYTES # BLD AUTO: 0.8 X10E3/UL (ref 0.1–0.9)
MONOCYTES NFR BLD AUTO: 4 %
NEUTROPHILS # BLD AUTO: 15.7 X10E3/UL (ref 1.4–7)
NEUTROPHILS NFR BLD AUTO: 89 %
PARVOVIRUS B19 DNA BY PCR: NORMAL
PHOSPHATE SERPL-MCNC: 5.1 MG/DL (ref 2.8–4.1)
PLATELET # BLD AUTO: 301 X10E3/UL (ref 150–450)
POTASSIUM SERPL-SCNC: 5.7 MMOL/L (ref 3.5–5.2)
QUANTIFERON GOLD TB: NORMAL
RBC # BLD AUTO: 4.39 X10E6/UL (ref 4.14–5.8)
SODIUM SERPL-SCNC: 139 MMOL/L (ref 134–144)
TACROLIMUS BLD LC/MS/MS-MCNC: 10.8 NG/ML (ref 2–20)
WBC # BLD AUTO: 17.7 X10E3/UL (ref 3.4–10.8)

## 2024-01-08 RX ORDER — SEVELAMER CARBONATE 800 MG/1
800 TABLET, FILM COATED ORAL
Qty: 90 TABLET | Refills: 11 | Status: ON HOLD | OUTPATIENT
Start: 2024-01-08 | End: 2024-01-23

## 2024-01-08 RX ORDER — FUROSEMIDE 20 MG/1
60 TABLET ORAL 2 TIMES DAILY
Qty: 180 TABLET | Refills: 11 | Status: ON HOLD | OUTPATIENT
Start: 2024-01-08 | End: 2024-01-23

## 2024-01-08 RX ORDER — SODIUM BICARBONATE 650 MG/1
1300 TABLET ORAL 3 TIMES DAILY
Qty: 120 TABLET | Refills: 5 | Status: ON HOLD | OUTPATIENT
Start: 2024-01-08 | End: 2024-01-23

## 2024-01-08 NOTE — TELEPHONE ENCOUNTER
Return call to patient, states he has reservations to be transferred to [facility] due to the weather and 3 hours from his home.  States he will talk with his wife and [facility] MD.  Patient agreed to keep coordinator posted.   Meds reviewed with patient.  States MMF was decreased to 500mg BID and Bactrim D/C.  Voice a understating that a G6PD will be ordered with next outpatient TXP labs.  ----- Message from Salma Davis sent at 1/8/2024  3:06 PM CST -----  Regarding: speak to nurse  Contact: PT  [phone]  The patient called requesting to speak to Nurse Farhan. States he is currently admitted in a hospital in [place] and states he is being told they are needing to transfer patient to us. Please reach out at your earliest opportunity.  No further information provided      Patient can be contacted @# [phone]

## 2024-01-08 NOTE — TELEPHONE ENCOUNTER
Plan reviewed with patient and will keep coordinator posted.  Voice a understanding to notify coordinator when he is D/C from the hospital.  ----- Message from Benita Morris DO sent at 1/6/2024  5:53 PM CST -----  Hey.    He presented to [place] over the weekend per our request for hyperkalemia.Recent rejection with new baseline graft function of 2.8-3.0. K within normal limits. Bactrim held but will need G6PD checked and possible starting on dapsone.    Presented there with WBC of 20. Blood cultures positive and started on broad spectrum ABX at this time. I am willing to accept the transfer but the patient would prefer to be there as family is nearby and second we are still on diversion. I have talked to him twice already and am certain, will talk to him again tomorrow so more to come     Benita

## 2024-01-08 NOTE — TELEPHONE ENCOUNTER
----- Message from Scar Garcia Jr., MD sent at 1/5/2024  6:02 PM CST -----  Called patient and asked him to go to ER. He is going. These labs are 2 days old and trended in wrong direction. Please repeat labs tues and see if can be done at place with more rapid turn around. Start sevelamer 800 tid. Increase home furosemide to 60 bid. Increase bicarb to 3x a day. He will need new prescriptions. Tac pending

## 2024-01-10 NOTE — PROVIDER TRANSFER
(Physician in Lead of Transfers)  Outside Transfer Acceptance Note / Regional Referral Center    Upon patient arrival to floor, please send SecureChat to [facility] or call extension [phone] (if no answer, do NOT leave a callback number after the beep, rather please send a SecureChat to [facility]), for Hospital Medicine admit team assignment and for additional admit orders for the patient.  Do not page the attending physician associated with the patient on arrival (this physician may not be on duty at the time of arrival).  Rather, always send a SecureChat to [facility] or call [phone] to reach the triage physician for orders and team assignment.    Referring facility: [facility]  Referring provider: JOANNE SOSA  Accepting facility: [facility]  Accepting provider: GT HOLDEN  Reason for transfer: Higher Level of Care   Transfer diagnosis: Davi PNA, bacteremia, GENNY, Kidney transplant 7/26 2010 at   Transfer specialty requested: Transplant Kidney  Transfer specialty notified: Yes  Transfer level: NUMBER 1-5: 2  Bed type requested: stepdown  Isolation status: Airborne and Droplet and Contact///COVID isolation  Admission class or status: IP- Inpatient      Narrative     38-year-old male with a history of kidney transplant in 2010 and hypertension previously admitted to [facility] December 18-23 with GENNY, headache, and hypertension. 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  He was found to have nephrotic syndrome as well.  He received pulse dose steroids and was started on Eliquis for anticoagulation.  He was discharged on a prednisone taper, CellCept, and Prograf.  On December 23, his creatinine was 3.0.    He was subsequently admitted to [facility] in [place] on  January 5 with abnormal labs.  Outpatient labs on January 3 included white blood cells 17.7, hemoglobin 13.1, potassium 5.7, bicarbonate 18, BUN 71, creatinine 2.92, phosphorus 5.1, and tacrolimus 10.8.  Diagnoses include abnormal laboratory findings, abdominal pain, chronic kidney disease with hyperkalemia, blood culture with Staph haemolyticus, and pneumonia.  During his stay, creatinine has ranged 2.68-2.73.  Potassium normalized.  There were imaging studies concerning for possible cirrhosis. Current medications include CellCept, tacrolimus, valganciclovir, prednisone, Eliquis, and meropenem.  With his current immunosuppression and high-dose steroids, along with the recent bacteremia and current pneumonia, requesting transfer to Hospital Medicine at [facility] for KTM and transplant ID evaluation.    January 9:  White blood cells 16.6, hemoglobin 12.3, hematocrit 35.1, platelets 250, sodium 131, potassium 4.5, chloride 106, CO2 20, BUN 96, creatinine 2.73, glucose 116, calcium 7.9    January 8: Gallbladder ultrasound showed sludge in the gallbladder.  Limited study with nonvisualization of the native kidney and pancreas.  Moderate ascites.  Limited evaluation of the liver.  -transplant renal ultrasound had stable appearance of the transplant kidney without evidence of acute abnormality.    January 7: INR 1.18  -CT thorax showed consolidated infiltrates and/or atelectasis involving the bilateral lung bases with small bilateral pleural effusions.  Patchy heterogeneous infiltrate involving the right middle lobe.  Mild ascites    January 6: Abdominal ultrasound had cirrhotic appearing liver.  Small amount of ascites.  Sludge in the gallbladder.  Native right kidney not seen.    January 5:  Total bilirubin 0.5, AST 25, ALT 71, albumin 2  VS:  Temperature 98.6°, pulse 88, blood pressure 143/76, O2 sats 94%      ADDENDUM (1/10):  Referring provider thought the COVID status was negative on this patient.  When we  requested a copy of the COVID results, they noted that COVID had not been performed during this hospital stay.  COVID test was requested, and he is COVID positive on January 10.  They are faxing a hard copy of the COVID result.  He will need to transfer in COVID isolation status.    Instructions    Admit to Hospital Medicine  Consult Kidney Transplant Medicine      HOLLY Holloway MD  Hospital Medicine Staff  Cell: [phone]

## 2024-01-12 ENCOUNTER — DOCUMENTATION ONLY (OUTPATIENT)
Dept: TRANSPLANT | Facility: CLINIC | Age: 39
End: 2024-01-12

## 2024-01-14 ENCOUNTER — HOSPITAL ENCOUNTER (INPATIENT)
Facility: HOSPITAL | Age: 39
LOS: 9 days | Discharge: HOME | DRG: 208 | End: 2024-01-23
Attending: INTERNAL MEDICINE | Admitting: INTERNAL MEDICINE
Payer: OTHER GOVERNMENT

## 2024-01-14 DIAGNOSIS — Z94.0 LIVING-DONOR KIDNEY TRANSPLANT RECIPIENT: ICD-10-CM

## 2024-01-14 DIAGNOSIS — N04.9 NEPHROTIC SYNDROME: ICD-10-CM

## 2024-01-14 DIAGNOSIS — Z29.89 NEED FOR PROPHYLACTIC IMMUNOTHERAPY: ICD-10-CM

## 2024-01-14 DIAGNOSIS — U07.1 COVID: ICD-10-CM

## 2024-01-14 DIAGNOSIS — J96.01 ACUTE HYPOXIC RESPIRATORY FAILURE: Primary | ICD-10-CM

## 2024-01-14 DIAGNOSIS — J18.9 PNEUMONIA: ICD-10-CM

## 2024-01-14 DIAGNOSIS — A41.9 SEPSIS: ICD-10-CM

## 2024-01-14 DIAGNOSIS — R07.9 CHEST PAIN: ICD-10-CM

## 2024-01-14 PROBLEM — R74.01 TRANSAMINITIS: Status: ACTIVE | Noted: 2024-01-14

## 2024-01-14 PROBLEM — N18.9 ACUTE ON CHRONIC RENAL FAILURE: Status: ACTIVE | Noted: 2023-12-18

## 2024-01-14 PROBLEM — R78.81 BACTEREMIA: Status: ACTIVE | Noted: 2024-01-14

## 2024-01-14 LAB
ALBUMIN SERPL BCP-MCNC: 1.2 G/DL (ref 3.5–5.2)
ALLENS TEST: ABNORMAL
ALP SERPL-CCNC: 86 U/L (ref 55–135)
ALT SERPL W/O P-5'-P-CCNC: 28 U/L (ref 10–44)
ANION GAP SERPL CALC-SCNC: 14 MMOL/L (ref 8–16)
AST SERPL-CCNC: 20 U/L (ref 10–40)
BACTERIA #/AREA URNS AUTO: ABNORMAL /HPF
BASOPHILS # BLD AUTO: 0.03 K/UL (ref 0–0.2)
BASOPHILS NFR BLD: 0.2 % (ref 0–1.9)
BILIRUB SERPL-MCNC: 0.2 MG/DL (ref 0.1–1)
BILIRUB UR QL STRIP: NEGATIVE
BUN SERPL-MCNC: 127 MG/DL (ref 6–20)
CALCIUM SERPL-MCNC: 7 MG/DL (ref 8.7–10.5)
CHLORIDE SERPL-SCNC: 108 MMOL/L (ref 95–110)
CLARITY UR REFRACT.AUTO: ABNORMAL
CO2 SERPL-SCNC: 9 MMOL/L (ref 23–29)
COLOR UR AUTO: YELLOW
CREAT SERPL-MCNC: 2.8 MG/DL (ref 0.5–1.4)
DELSYS: ABNORMAL
DIFFERENTIAL METHOD BLD: ABNORMAL
EOSINOPHIL # BLD AUTO: 0 K/UL (ref 0–0.5)
EOSINOPHIL NFR BLD: 0 % (ref 0–8)
ERYTHROCYTE [DISTWIDTH] IN BLOOD BY AUTOMATED COUNT: 12.7 % (ref 11.5–14.5)
ERYTHROCYTE [SEDIMENTATION RATE] IN BLOOD BY WESTERGREN METHOD: 22 MM/H
EST. GFR  (NO RACE VARIABLE): 28.4 ML/MIN/1.73 M^2
FIO2: 80
GLUCOSE SERPL-MCNC: 169 MG/DL (ref 70–110)
GLUCOSE UR QL STRIP: NEGATIVE
HCO3 UR-SCNC: 18.4 MMOL/L (ref 24–28)
HCO3 UR-SCNC: 19.9 MMOL/L (ref 24–28)
HCO3 UR-SCNC: 20.9 MMOL/L (ref 24–28)
HCT VFR BLD AUTO: 32.9 % (ref 40–54)
HCT VFR BLD CALC: 32 %PCV (ref 36–54)
HCT VFR BLD CALC: 33 %PCV (ref 36–54)
HGB BLD-MCNC: 10.8 G/DL (ref 14–18)
HGB UR QL STRIP: ABNORMAL
HYALINE CASTS UR QL AUTO: 26 /LPF
IMM GRANULOCYTES # BLD AUTO: 0.27 K/UL (ref 0–0.04)
IMM GRANULOCYTES NFR BLD AUTO: 2 % (ref 0–0.5)
INR PPP: 1 (ref 0.8–1.2)
KETONES UR QL STRIP: NEGATIVE
LACTATE SERPL-SCNC: 1.7 MMOL/L (ref 0.5–2.2)
LEUKOCYTE ESTERASE UR QL STRIP: ABNORMAL
LYMPHOCYTES # BLD AUTO: 0.4 K/UL (ref 1–4.8)
LYMPHOCYTES NFR BLD: 2.6 % (ref 18–48)
MAGNESIUM SERPL-MCNC: 1.9 MG/DL (ref 1.6–2.6)
MCH RBC QN AUTO: 29.8 PG (ref 27–31)
MCHC RBC AUTO-ENTMCNC: 32.8 G/DL (ref 32–36)
MCV RBC AUTO: 91 FL (ref 82–98)
MICROSCOPIC COMMENT: ABNORMAL
MIN VOL: 12
MODE: ABNORMAL
MONOCYTES # BLD AUTO: 0.6 K/UL (ref 0.3–1)
MONOCYTES NFR BLD: 4.6 % (ref 4–15)
NEUTROPHILS # BLD AUTO: 12.5 K/UL (ref 1.8–7.7)
NEUTROPHILS NFR BLD: 90.6 % (ref 38–73)
NITRITE UR QL STRIP: NEGATIVE
NRBC BLD-RTO: 0 /100 WBC
PCO2 BLDA: 34.9 MMHG (ref 35–45)
PCO2 BLDA: 42.3 MMHG (ref 35–45)
PCO2 BLDA: 45.6 MMHG (ref 35–45)
PEEP: 10
PH SMN: 7.27 [PH] (ref 7.35–7.45)
PH SMN: 7.28 [PH] (ref 7.35–7.45)
PH SMN: 7.33 [PH] (ref 7.35–7.45)
PH UR STRIP: 6 [PH] (ref 5–8)
PHOSPHATE SERPL-MCNC: 7.5 MG/DL (ref 2.7–4.5)
PIP: 24
PLATELET # BLD AUTO: 207 K/UL (ref 150–450)
PMV BLD AUTO: 9.3 FL (ref 9.2–12.9)
PO2 BLDA: 41 MMHG (ref 40–60)
PO2 BLDA: 50 MMHG (ref 40–60)
PO2 BLDA: 64 MMHG (ref 80–100)
POC BE: -6 MMOL/L
POC BE: -7 MMOL/L
POC BE: -8 MMOL/L
POC IONIZED CALCIUM: 1.2 MMOL/L (ref 1.06–1.42)
POC IONIZED CALCIUM: 1.2 MMOL/L (ref 1.06–1.42)
POC SATURATED O2: 68 % (ref 95–100)
POC SATURATED O2: 80 % (ref 95–100)
POC SATURATED O2: 91 % (ref 95–100)
POC TCO2: 19 MMOL/L (ref 23–27)
POC TCO2: 21 MMOL/L (ref 24–29)
POC TCO2: 22 MMOL/L (ref 24–29)
POTASSIUM BLD-SCNC: 5.3 MMOL/L (ref 3.5–5.1)
POTASSIUM BLD-SCNC: 6.3 MMOL/L (ref 3.5–5.1)
POTASSIUM SERPL-SCNC: 5.2 MMOL/L (ref 3.5–5.1)
PROT SERPL-MCNC: 3.8 G/DL (ref 6–8.4)
PROT UR QL STRIP: ABNORMAL
PROTHROMBIN TIME: 10.3 SEC (ref 9–12.5)
RBC # BLD AUTO: 3.63 M/UL (ref 4.6–6.2)
RBC #/AREA URNS AUTO: >100 /HPF (ref 0–4)
SAMPLE: ABNORMAL
SITE: ABNORMAL
SODIUM BLD-SCNC: 129 MMOL/L (ref 136–145)
SODIUM BLD-SCNC: 130 MMOL/L (ref 136–145)
SODIUM SERPL-SCNC: 131 MMOL/L (ref 136–145)
SP GR UR STRIP: 1.02 (ref 1–1.03)
SP02: 91
SQUAMOUS #/AREA URNS AUTO: 3 /HPF
URN SPEC COLLECT METH UR: ABNORMAL
VT: 500
WBC # BLD AUTO: 13.77 K/UL (ref 3.9–12.7)
WBC #/AREA URNS AUTO: >100 /HPF (ref 0–5)

## 2024-01-14 PROCEDURE — 83605 ASSAY OF LACTIC ACID: CPT

## 2024-01-14 PROCEDURE — 94002 VENT MGMT INPAT INIT DAY: CPT

## 2024-01-14 PROCEDURE — C9113 INJ PANTOPRAZOLE SODIUM, VIA: HCPCS

## 2024-01-14 PROCEDURE — 5A1945Z RESPIRATORY VENTILATION, 24-96 CONSECUTIVE HOURS: ICD-10-PCS | Performed by: INTERNAL MEDICINE

## 2024-01-14 PROCEDURE — 99223 1ST HOSP IP/OBS HIGH 75: CPT | Mod: ,,, | Performed by: INTERNAL MEDICINE

## 2024-01-14 PROCEDURE — 87205 SMEAR GRAM STAIN: CPT

## 2024-01-14 PROCEDURE — 25000003 PHARM REV CODE 250: Performed by: INTERNAL MEDICINE

## 2024-01-14 PROCEDURE — 25000003 PHARM REV CODE 250: Performed by: STUDENT IN AN ORGANIZED HEALTH CARE EDUCATION/TRAINING PROGRAM

## 2024-01-14 PROCEDURE — 25000003 PHARM REV CODE 250

## 2024-01-14 PROCEDURE — 63600175 PHARM REV CODE 636 W HCPCS: Performed by: STUDENT IN AN ORGANIZED HEALTH CARE EDUCATION/TRAINING PROGRAM

## 2024-01-14 PROCEDURE — 63600175 PHARM REV CODE 636 W HCPCS

## 2024-01-14 PROCEDURE — 94761 N-INVAS EAR/PLS OXIMETRY MLT: CPT | Mod: XB

## 2024-01-14 PROCEDURE — 51702 INSERT TEMP BLADDER CATH: CPT

## 2024-01-14 PROCEDURE — 27000207 HC ISOLATION

## 2024-01-14 PROCEDURE — 63600175 PHARM REV CODE 636 W HCPCS: Performed by: INTERNAL MEDICINE

## 2024-01-14 PROCEDURE — 84295 ASSAY OF SERUM SODIUM: CPT

## 2024-01-14 PROCEDURE — 85025 COMPLETE CBC W/AUTO DIFF WBC: CPT

## 2024-01-14 PROCEDURE — 87070 CULTURE OTHR SPECIMN AEROBIC: CPT

## 2024-01-14 PROCEDURE — 27100171 HC OXYGEN HIGH FLOW UP TO 24 HOURS

## 2024-01-14 PROCEDURE — 84132 ASSAY OF SERUM POTASSIUM: CPT

## 2024-01-14 PROCEDURE — 83735 ASSAY OF MAGNESIUM: CPT

## 2024-01-14 PROCEDURE — 99900026 HC AIRWAY MAINTENANCE (STAT)

## 2024-01-14 PROCEDURE — 84100 ASSAY OF PHOSPHORUS: CPT

## 2024-01-14 PROCEDURE — 80053 COMPREHEN METABOLIC PANEL: CPT

## 2024-01-14 PROCEDURE — 85014 HEMATOCRIT: CPT

## 2024-01-14 PROCEDURE — 20000000 HC ICU ROOM

## 2024-01-14 PROCEDURE — 36600 WITHDRAWAL OF ARTERIAL BLOOD: CPT

## 2024-01-14 PROCEDURE — 87040 BLOOD CULTURE FOR BACTERIA: CPT

## 2024-01-14 PROCEDURE — 87086 URINE CULTURE/COLONY COUNT: CPT

## 2024-01-14 PROCEDURE — 82800 BLOOD PH: CPT

## 2024-01-14 PROCEDURE — XW033E5 INTRODUCTION OF REMDESIVIR ANTI-INFECTIVE INTO PERIPHERAL VEIN, PERCUTANEOUS APPROACH, NEW TECHNOLOGY GROUP 5: ICD-10-PCS | Performed by: INTERNAL MEDICINE

## 2024-01-14 PROCEDURE — 81001 URINALYSIS AUTO W/SCOPE: CPT

## 2024-01-14 PROCEDURE — 25000242 PHARM REV CODE 250 ALT 637 W/ HCPCS

## 2024-01-14 PROCEDURE — 99900035 HC TECH TIME PER 15 MIN (STAT)

## 2024-01-14 PROCEDURE — 82330 ASSAY OF CALCIUM: CPT

## 2024-01-14 PROCEDURE — 82803 BLOOD GASES ANY COMBINATION: CPT

## 2024-01-14 PROCEDURE — 80197 ASSAY OF TACROLIMUS: CPT

## 2024-01-14 PROCEDURE — 99291 CRITICAL CARE FIRST HOUR: CPT | Mod: ,,, | Performed by: INTERNAL MEDICINE

## 2024-01-14 PROCEDURE — 85610 PROTHROMBIN TIME: CPT

## 2024-01-14 RX ORDER — PROPOFOL 10 MG/ML
0-50 INJECTION, EMULSION INTRAVENOUS CONTINUOUS
Status: DISCONTINUED | OUTPATIENT
Start: 2024-01-14 | End: 2024-01-15

## 2024-01-14 RX ORDER — ONDANSETRON HYDROCHLORIDE 2 MG/ML
4 INJECTION, SOLUTION INTRAVENOUS EVERY 6 HOURS PRN
Status: DISCONTINUED | OUTPATIENT
Start: 2024-01-14 | End: 2024-01-23 | Stop reason: HOSPADM

## 2024-01-14 RX ORDER — IBUPROFEN 200 MG
16 TABLET ORAL
Status: DISCONTINUED | OUTPATIENT
Start: 2024-01-14 | End: 2024-01-16

## 2024-01-14 RX ORDER — SODIUM CHLORIDE 0.9 % (FLUSH) 0.9 %
10 SYRINGE (ML) INJECTION EVERY 12 HOURS PRN
Status: DISCONTINUED | OUTPATIENT
Start: 2024-01-14 | End: 2024-01-23 | Stop reason: HOSPADM

## 2024-01-14 RX ORDER — FUROSEMIDE 10 MG/ML
100 INJECTION INTRAMUSCULAR; INTRAVENOUS ONCE
Status: COMPLETED | OUTPATIENT
Start: 2024-01-14 | End: 2024-01-14

## 2024-01-14 RX ORDER — CHLORHEXIDINE GLUCONATE ORAL RINSE 1.2 MG/ML
15 SOLUTION DENTAL 2 TIMES DAILY
Status: DISCONTINUED | OUTPATIENT
Start: 2024-01-14 | End: 2024-01-14

## 2024-01-14 RX ORDER — MUPIROCIN 20 MG/G
OINTMENT TOPICAL 2 TIMES DAILY
Status: DISPENSED | OUTPATIENT
Start: 2024-01-14 | End: 2024-01-19

## 2024-01-14 RX ORDER — IBUPROFEN 200 MG
24 TABLET ORAL
Status: DISCONTINUED | OUTPATIENT
Start: 2024-01-14 | End: 2024-01-16

## 2024-01-14 RX ORDER — PANTOPRAZOLE SODIUM 40 MG/10ML
40 INJECTION, POWDER, LYOPHILIZED, FOR SOLUTION INTRAVENOUS DAILY
Status: DISCONTINUED | OUTPATIENT
Start: 2024-01-14 | End: 2024-01-18

## 2024-01-14 RX ORDER — ASCORBIC ACID 500 MG
500 TABLET ORAL 2 TIMES DAILY
Status: DISCONTINUED | OUTPATIENT
Start: 2024-01-14 | End: 2024-01-14

## 2024-01-14 RX ORDER — FUROSEMIDE 10 MG/ML
40 INJECTION INTRAMUSCULAR; INTRAVENOUS DAILY
Status: DISCONTINUED | OUTPATIENT
Start: 2024-01-14 | End: 2024-01-15

## 2024-01-14 RX ORDER — NALOXONE HCL 0.4 MG/ML
0.02 VIAL (ML) INJECTION
Status: DISCONTINUED | OUTPATIENT
Start: 2024-01-14 | End: 2024-01-23 | Stop reason: HOSPADM

## 2024-01-14 RX ORDER — GUAIFENESIN 100 MG/5ML
200 SOLUTION ORAL EVERY 4 HOURS PRN
Status: DISCONTINUED | OUTPATIENT
Start: 2024-01-14 | End: 2024-01-16

## 2024-01-14 RX ORDER — FENTANYL CITRATE-0.9 % NACL/PF 10 MCG/ML
0-200 PLASTIC BAG, INJECTION (ML) INTRAVENOUS CONTINUOUS
Status: DISCONTINUED | OUTPATIENT
Start: 2024-01-14 | End: 2024-01-15

## 2024-01-14 RX ORDER — HEPARIN SODIUM 5000 [USP'U]/ML
5000 INJECTION, SOLUTION INTRAVENOUS; SUBCUTANEOUS EVERY 8 HOURS
Status: DISCONTINUED | OUTPATIENT
Start: 2024-01-14 | End: 2024-01-14

## 2024-01-14 RX ORDER — INSULIN ASPART 100 [IU]/ML
0-10 INJECTION, SOLUTION INTRAVENOUS; SUBCUTANEOUS
Status: DISCONTINUED | OUTPATIENT
Start: 2024-01-14 | End: 2024-01-16

## 2024-01-14 RX ORDER — IPRATROPIUM BROMIDE AND ALBUTEROL SULFATE 2.5; .5 MG/3ML; MG/3ML
3 SOLUTION RESPIRATORY (INHALATION) EVERY 6 HOURS
Status: DISCONTINUED | OUTPATIENT
Start: 2024-01-14 | End: 2024-01-23 | Stop reason: HOSPADM

## 2024-01-14 RX ORDER — ENOXAPARIN SODIUM 300 MG/3ML
1 INJECTION INTRAVENOUS; SUBCUTANEOUS 2 TIMES DAILY
Status: DISCONTINUED | OUTPATIENT
Start: 2024-01-14 | End: 2024-01-14

## 2024-01-14 RX ORDER — GLUCAGON 1 MG
1 KIT INJECTION
Status: DISCONTINUED | OUTPATIENT
Start: 2024-01-14 | End: 2024-01-23 | Stop reason: HOSPADM

## 2024-01-14 RX ORDER — DEXMEDETOMIDINE HYDROCHLORIDE 4 UG/ML
0-1.4 INJECTION, SOLUTION INTRAVENOUS CONTINUOUS
Status: DISCONTINUED | OUTPATIENT
Start: 2024-01-14 | End: 2024-01-14

## 2024-01-14 RX ORDER — DEXAMETHASONE SODIUM PHOSPHATE 4 MG/ML
6 INJECTION, SOLUTION INTRA-ARTICULAR; INTRALESIONAL; INTRAMUSCULAR; INTRAVENOUS; SOFT TISSUE EVERY 24 HOURS
Status: DISCONTINUED | OUTPATIENT
Start: 2024-01-14 | End: 2024-01-14

## 2024-01-14 RX ADMIN — APIXABAN 5 MG: 5 TABLET, FILM COATED ORAL at 09:01

## 2024-01-14 RX ADMIN — FUROSEMIDE 100 MG: 10 INJECTION, SOLUTION INTRAMUSCULAR; INTRAVENOUS at 05:01

## 2024-01-14 RX ADMIN — PANTOPRAZOLE SODIUM 40 MG: 40 INJECTION, POWDER, FOR SOLUTION INTRAVENOUS at 04:01

## 2024-01-14 RX ADMIN — REMDESIVIR 100 MG: 100 INJECTION, POWDER, LYOPHILIZED, FOR SOLUTION INTRAVENOUS at 05:01

## 2024-01-14 RX ADMIN — FUROSEMIDE 40 MG: 10 INJECTION, SOLUTION INTRAVENOUS at 04:01

## 2024-01-14 RX ADMIN — MUPIROCIN: 20 OINTMENT TOPICAL at 09:01

## 2024-01-14 RX ADMIN — PROPOFOL 20 MCG/KG/MIN: 10 INJECTION, EMULSION INTRAVENOUS at 06:01

## 2024-01-14 RX ADMIN — DEXAMETHASONE 6 MG: 4 TABLET ORAL at 05:01

## 2024-01-14 RX ADMIN — Medication 75 MCG/HR: at 02:01

## 2024-01-14 RX ADMIN — CHLOROTHIAZIDE SODIUM 500 MG: 500 INJECTION, POWDER, LYOPHILIZED, FOR SOLUTION INTRAVENOUS at 06:01

## 2024-01-14 RX ADMIN — Medication 200 MCG/HR: at 09:01

## 2024-01-14 RX ADMIN — IPRATROPIUM BROMIDE AND ALBUTEROL SULFATE 3 ML: 2.5; .5 SOLUTION RESPIRATORY (INHALATION) at 08:01

## 2024-01-14 RX ADMIN — MEROPENEM 2 G: 1 INJECTION INTRAVENOUS at 03:01

## 2024-01-14 RX ADMIN — GANCICLOVIR SODIUM 332 MG: 500 INJECTION, POWDER, LYOPHILIZED, FOR SOLUTION INTRAVENOUS at 05:01

## 2024-01-14 NOTE — ASSESSMENT & PLAN NOTE
- Continue Remdesivir for total of 5 days  - Continue Dexamethasone 6mg QD for 8 days  - Mechanically ventilated, will wean sedation and pressure support as able

## 2024-01-14 NOTE — ASSESSMENT & PLAN NOTE
Patient is a 39 y/o male with a PMH of kidney transplant in 2010 that was transferred from [facility] with blood cultures positive for Staph haemolyticus. Patient was started on Merrem at the time.     - Will continue Merrem  - ID consulted, appreciate recs  - Redrawing blood cultures on transfer; will follow up with outside culture results  - Patient currently volume overloaded; will defer further fluid resuscitation  - Trending daily CBC

## 2024-01-14 NOTE — H&P
[facility] - Cardiac Medical ICU  Critical Care Medicine  History & Physical    Patient Name: Ubaldo Tripp  MRN: [id number]  Admission Date: 1/14/2024  Hospital Length of Stay: 0 days  Code Status: Full Code  Attending Physician: Rafita Andrew*   Primary Care Provider: Jaime Jauregui (Inactive)   Principal Problem: Acute hypoxic respiratory failure    Subjective:     HPI:  37 y/o M with a medical history of kidney transplant in 2010 and HTN [place] [facility] for acute hypoxemic respiratory failure, bacteremia, and hx of Kidney Transplant for KTM and Infectious Disease evaluation. Pt was previously admitted to [facility] 12/18/23 - 12/23/23 with GENNY, headache, and HTN.  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  He was found to have nephrotic syndrome as well.  Received pulse dose steroids and was started on Eliquis. Discharged on a prednisone taper, CellCept, and Prograf.  sCr 12/23 was 3.0.     On 1/5/24 he was admitted to [facility] in [place] with abnormal labs. Outpatient labs on 1/3 showed WBC 17.7 Hgb 13.1, K 5.7, bicarb 18, BUN 71, sCr 2.92, phos 5.1, and tacro level 10.8.  Was found to be COVID positive requiring BiPAP.  Blood culture with Staph haemolyticus.  Labs notable for sCr ranging 2.68 to 2.73.  K normalized.  Abdominal US on 1/6 noted cirrhotic appearing liver.  CT chest on 1/7 showed consolidated infiltrates and/or atelectasis involving the bilateral lung bases with small bilateral pleural effusions, patchy heterogeneous infiltrate involving the right middle lobe, and mild ascites.  Transplant renal ultrasound 1/8 showed stable appearance of the transplant kidney without evidence of acute abnormality.  Gallbladder ultrasound 1/8 showed sludge in the gallbladder.   Medications prior to transfer include CellCept, tacrolimus, valganciclovir, prednisone, Eliquis, and meropenem.    The patient was initially going to transfer to hospital medicine however he was intubated prior to transfer and was accepted to [facility] [place] [place] [facility] for a higher level of care.  He arrived on propofol and fentanyl.  KTM and transplant ID consulted on admission.    Hospital/ICU Course:  No notes on file     Past Medical History:   Diagnosis Date    Chronic interstitial nephritis     CKD (chronic kidney disease), stage III 2013    Hypertension     Immunocompromised state 2018    Living-donor kidney transplant recipient     Need for prophylactic immunotherapy        Past Surgical History:   Procedure Laterality Date    KIDNEY TRANSPLANT      PERITONEAL CATHETER REMOVAL         Review of patient's allergies indicates:   Allergen Reactions    Penicillins      Other reaction(s): Hives  Other reaction(s): Edema       Family History       Problem Relation (Age of Onset)    Heart disease Father    Stroke Mother          Tobacco Use    Smoking status: Former     Current packs/day: 0.00     Average packs/day: 0.5 packs/day for 15.0 years (7.5 ttl pk-yrs)     Types: Cigarettes     Start date: 1995     Quit date: 2010     Years since quittin.5    Smokeless tobacco: Never   Substance and Sexual Activity    Alcohol use: No    Drug use: No    Sexual activity: Not on file     Comment:       Review of Systems   Constitutional:  Positive for activity change.   Respiratory:  Positive for cough and shortness of breath.    Cardiovascular:  Positive for leg swelling. Negative for chest pain.   Gastrointestinal:  Positive for abdominal pain.   Genitourinary:  Negative for dysuria.   Neurological:  Positive for weakness.   Psychiatric/Behavioral:  Negative for agitation and confusion.      Objective:     Vital Signs (Most Recent):  Temp: 96.2 °F (35.7 °C) (24 1400)  Pulse: (!) 53 (24 1400)  Resp:  (!) 37 (01/14/24 1422)  SpO2: (!) 93 % (01/14/24 1400) Vital Signs (24h Range):  Temp:  [96.2 °F (35.7 °C)-97 °F (36.1 °C)] 96.2 °F (35.7 °C)  Pulse:  [51-60] 53  Resp:  [9-37] 37  SpO2:  [91 %-97 %] 93 %  BP: (141)/(89) 141/89   Weight: 132.8 kg (292 lb 12.3 oz)  Body mass index is 39.71 kg/m².      Intake/Output Summary (Last 24 hours) at 1/14/2024 1450  Last data filed at 1/14/2024 1400  Gross per 24 hour   Intake --   Output 200 ml   Net -200 ml          Physical Exam  Vitals and nursing note reviewed.   Constitutional:       Appearance: He is obese. He is ill-appearing.      Comments: Intubated   HENT:      Head: Normocephalic and atraumatic.      Nose: Nose normal.      Mouth/Throat:      Comments: ETT in place  Eyes:      Extraocular Movements: Extraocular movements intact.      Conjunctiva/sclera: Conjunctivae normal.      Pupils: Pupils are equal, round, and reactive to light.   Cardiovascular:      Rate and Rhythm: Regular rhythm. Bradycardia present.      Pulses: Normal pulses.      Heart sounds: Normal heart sounds. No murmur heard.  Pulmonary:      Effort: Respiratory distress present.      Breath sounds: Decreased air movement present. Examination of the right-lower field reveals decreased breath sounds. Examination of the left-lower field reveals decreased breath sounds. Decreased breath sounds present.      Comments: Mechanical breath sounds  Abdominal:      General: Bowel sounds are normal. There is distension.      Tenderness: There is abdominal tenderness.   Musculoskeletal:         General: Swelling present. Normal range of motion.      Right lower leg: Edema present.      Left lower leg: Edema present.   Skin:     General: Skin is warm and dry.      Capillary Refill: Capillary refill takes less than 2 seconds.   Neurological:      General: No focal deficit present.      Mental Status: He is oriented to person, place, and time. Mental status is at baseline.      Cranial Nerves: No cranial nerve  deficit.      Motor: No weakness.   Psychiatric:         Mood and Affect: Mood normal.            Vents:  Vent Mode: A/C (01/14/24 1353)  Set Rate: 22 BPM (01/14/24 1353)  Vt Set: 500 mL (01/14/24 1353)  PEEP/CPAP: 10 cmH20 (01/14/24 1353)  Oxygen Concentration (%): 70 (01/14/24 1400)  Peak Airway Pressure: 25 cmH20 (01/14/24 1353)  Plateau Pressure: 0 cmH20 (01/14/24 1353)  Total Ve: 14.7 L/m (01/14/24 1353)  Negative Inspiratory Force (cm H2O): 0 (01/14/24 1353)  F/VT Ratio<105 (RSBI): (!) 18.6 (01/14/24 1348)  Lines/Drains/Airways       Drain  Duration                  Urethral Catheter 01/14/24 1345 <1 day              Peripheral Intravenous Line  Duration                  Peripheral IV - Single Lumen 01/11/24 1500 20 G Anterior;Proximal;Right Forearm 2 days         Midline Catheter Insertion/Assessment  - Single Lumen 01/13/24 1500 Right basilic vein (medial side of arm) <1 day                  Significant Labs:    CBC/Anemia Profile:  Recent Labs   Lab 01/14/24  1403   WBC 13.77*   HGB 10.8*   HCT 32.9*      MCV 91   RDW 12.7        Chemistries:  Recent Labs   Lab 01/14/24  1403   *   K 5.2*      CO2 9*   CREATININE 2.8*   CALCIUM 7.0*   ALBUMIN 1.2*   PROT 3.8*   BILITOT 0.2   ALKPHOS 86   ALT 28   AST 20   MG 1.9   PHOS 7.5*       All pertinent labs within the past 24 hours have been reviewed.    Significant Imaging: I have reviewed all pertinent imaging results/findings within the past 24 hours.  Assessment/Plan:     Pulmonary  Pneumonia  See AHRF    Renal/  Acute on chronic renal failure  Cr was 1.3 in 2021, no other Cr results until the recent admission with Cr at 2.92. Likely prerenal 2/2 sepsis, potential component of rejection.    - KTM consulted due to history of renal transplant   - Trend CMP/BMPs  - Strict intake/output  - Renally dose meds  - Avoid nephrotoxic agents  - Replete electrolytes PRN    Living-donor kidney transplant recipient - 7/26/10  Patient with kidney  "transplant 2010. He was previously admitted to [facility] December 18-23 with GENNY. 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  He was found to have nephrotic syndrome as well, started on eliquis.  He received pulse dose steroids and was started on Eliquis for anticoagulation. He was discharged on a prednisone taper, CellCept, and Prograf.  On December 23, his creatinine was 3.0. Current medications include CellCept, tacrolimus, prednisone, all of which he continued to receive at OSH.    - KTM consulted, appreciate recs  - Gangciclovir for CMV prophylaxis  - Eliquis for nephrotic syndrome    ID  Bacteremia  Patient is a 39 y/o male with a PMH of kidney transplant in 2010 that was transferred from [facility] with blood cultures positive for Staph haemolyticus. Patient was started on Merrem at the time.     - Will continue Merrem  - ID consulted, appreciate recs  - Redrawing blood cultures on transfer; will follow up with outside culture results  - Patient currently volume overloaded; will defer further fluid resuscitation  - Trending daily CBC    COVID  Patient noted to be covid positive 1/10/24, requiring BIPAP. Patient was intubated prior to transfer to [facility] [place]. Acute hypoxemic respiratory failure also complicated by consolidations noted in bilateral lung bases w/ small b/l pleural effusions.     - Continue Remdesivir for total of 5 days  - Continue Dexamethasone 6mg QD for 8 days  - Mechanically ventilated, will wean sedation and pressure support as able  - Continuous oximetry  - Trending daily CBC/CMP, Mg, Phos    Immunology/Multi System  Immunocompromised state due to drug therapy  Pt takes Cellcept and Prograf at home    - there is a note in report how pt stated he did not take his immunotherapy for 1 month bc he "ran " "out"  - will hold immunosuppression in setting of infection, will f/u with KTM and ID recs    GI  Transaminitis  Patient noted to have transaminitis at outside facility with imaging concerning for cirrhosis and ascites.     - Trend CMPs  - Consider Liver doppler     Other  * Acute hypoxic respiratory failure  Patient with Hypoxic Respiratory failure which is Acute.  he is not on home oxygen. Contributing diagnoses includes - ARDS and Pneumonia Labs and images were reviewed. Patient Has not had a recent ABG. Supplemental oxygen was provided and noted- Vent Mode: A/C  Oxygen Concentration (%):  [70] 70  Resp Rate Total:  [23 br/min-31 br/min] 28 br/min  Vt Set:  [500 mL] 500 mL  PEEP/CPAP:  [10 cmH20] 10 cmH20  Mean Airway Pressure:  [12 fyK41-02 cmH20] 12 cmH20    Plan:   - Meropenem for BSA  - ID consultation  - Remdesivir/dexamethasone for COVID + status  - Diuresis for volume overload  - Q6 hour nebs         Critical Care Daily Checklist:    A: Awake: RASS Goal/Actual Goal: RASS Goal: 0-->alert and calm  Actual:     B: Spontaneous Breathing Trial Performed?     C: SAT & SBT Coordinated?  yes                      D: Delirium: CAM-ICU Overall CAM-ICU: Positive   E: Early Mobility Performed? Yes   F: Feeding Goal:    Status:     Current Diet Order   Procedures    Diet NPO      AS: Analgesia/Sedation Precedex and Fentanyl   T: Thromboembolic Prophylaxis Eliquis   H: HOB > 300 Yes   U: Stress Ulcer Prophylaxis (if needed) PPI   G: Glucose Control yes   B: Bowel Function     I: Indwelling Catheter (Lines & Mayen) Necessity PIV, Midline, Mayen, ETT   D: De-escalation of Antimicrobials/Pharmacotherapies IV Meropenem, IV ganciclovir, dex, Remdesevir    Plan for the day/ETD Admit to MICU, consults placed    Code Status:  Family/Goals of Care: Full Code  ongoing       Critical secondary to Patient has a condition that poses threat to life and bodily function: Severe Respiratory Distress    Critical care was time spent " personally by me on the following activities: development of treatment plan with patient or surrogate and bedside caregivers, discussions with consultants, evaluation of patient's response to treatment, examination of patient, ordering and performing treatments and interventions, ordering and review of laboratory studies, ordering and review of radiographic studies, pulse oximetry, re-evaluation of patient's condition. This critical care time did not overlap with that of any other provider or involve time for any procedures.     Skip Carpio, DO  Critical Care Medicine  [facility] - Cardiac Medical ICU

## 2024-01-14 NOTE — ASSESSMENT & PLAN NOTE
Patient noted to be covid positive 1/10/24, requiring BIPAP. Patient was intubated prior to transfer to [facility]. Acute hypoxemic respiratory failure also complicated by consolidations noted in bilateral lung bases w/ small b/l pleural effusions.     - Continue Remdesivir for total of 5 days  - Continue Dexamethasone 6mg QD for 8 days  - Mechanically ventilated, will wean sedation and pressure support as able  - Continuous oximetry  - Trending daily CBC/CMP, Mg, Phos

## 2024-01-14 NOTE — ASSESSMENT & PLAN NOTE
"Pt takes Cellcept and Prograf at home    - there is a note in report how pt stated he did not take his immunotherapy for 1 month bc he "ran out"  - will hold immunosuppression in setting of infection, will f/u with KTM and ID recs  "

## 2024-01-14 NOTE — ASSESSMENT & PLAN NOTE
- On dexamethazone  - Off MMF due to COVID  - Off prograf due to high tac level from 1/13/24  - Check tac level daily to avoid toxicity

## 2024-01-14 NOTE — ASSESSMENT & PLAN NOTE
Patient with Hypoxic Respiratory failure which is Acute.  he is not on home oxygen. Contributing diagnoses includes - ARDS and Pneumonia Labs and images were reviewed. Patient Has not had a recent ABG. Supplemental oxygen was provided and noted- Vent Mode: A/C  Oxygen Concentration (%):  [70] 70  Resp Rate Total:  [23 br/min-31 br/min] 28 br/min  Vt Set:  [500 mL] 500 mL  PEEP/CPAP:  [10 cmH20] 10 cmH20  Mean Airway Pressure:  [12 fqF86-11 cmH20] 12 cmH20    Plan:   - Meropenem for BSA  - ID consultation  - Remdesivir/dexamethasone for COVID + status  - Diuresis for volume overload  - Q6 hour nebs

## 2024-01-14 NOTE — SUBJECTIVE & OBJECTIVE
Subjective:   History of Present Illness:    He is 39 yo White male who received a living kidney transplant on 7/26/10.  his creatinine was 1.3-18 till December 2023 when he developed ARFafter not  taking IS meds for 5 weeks. [place] [facility] [place] resulted with diffuse proliferative GN as well as mild TCMR. Treated with steroid. DSA 12/19/23 with +DSAs. had IVIG outpatient. Due to nephrotic syndrome and hypoalbuminemia, started eliquis for anticoagulation. He was discharged on pred taper, cellcept, and prograf. At the time of discharge Cr was 3.       [place] [facility] [place] acute hypoxemic respiratory failure, bacteremia today. On 1/5/24 he was admitted to [facility] in [place] with abnormal labs. Was found to be COVID positive.  Blood culture with Staph haemolyticus. Abdominal US on 1/6 noted cirrhotic appearing liver.  CT chest on 1/7 showed consolidated infiltrates and/or atelectasis involving the bilateral lung bases with small bilateral pleural effusions, patchy heterogeneous infiltrate involving the right middle lobe, and mild ascites. He was intubated prior to transfer and was [place] [facility] [place] level of care.  He arrived on propofol and fentanyl.     Has Mayen cath.200 cc urine in the bag       Past Medical, Surgical, Family, and Social History:   Unchanged from H&P.    Scheduled Meds:   albuterol-ipratropium  3 mL Nebulization Q6H    apixaban  5 mg Per OG tube BID    dexAMETHasone  6 mg Per OG tube Daily    furosemide (LASIX) injection  40 mg Intravenous Daily    ganciclovir (CYTOVENE) 332 mg in sodium chloride 0.9% 100 mL IVPB  2.5 mg/kg Intravenous Q24H    meropenem (MERREM) IVPB  2 g Intravenous Q12H    multivitamin  1 tablet Per OG tube Daily    mupirocin   Nasal BID    pantoprazole  40 mg Intravenous Daily    remdesivir infusion  100 mg Intravenous Daily     Continuous Infusions:   dexmedeTOMIDine (Precedex) infusion (titrating) Stopped (01/14/24  1500)    fentanyl 75 mcg/hr (01/14/24 1422)     PRN Meds:dextrose 10%, dextrose 10%, glucagon (human recombinant), glucose, glucose, guaiFENesin 100 mg/5 ml, insulin aspart U-100, naloxone, ondansetron, sodium chloride 0.9%    Intake/Output - Last 3 Shifts         01/12 0700  01/13 0659 01/13 0700  01/14 0659 01/14 0700  01/15 0659    Urine (mL/kg/hr)   200    Total Output   200    Net   -200                    Review of Systems     Unable to obtain   Objective:     Vital Signs (Most Recent):  Temp: 96.2 °F (35.7 °C) (01/14/24 1400)  Pulse: (!) 53 (01/14/24 1400)  Resp: (!) 37 (01/14/24 1422)  SpO2: (!) 93 % (01/14/24 1400) Vital Signs (24h Range):  Temp:  [96.2 °F (35.7 °C)-97 °F (36.1 °C)] 96.2 °F (35.7 °C)  Pulse:  [51-60] 53  Resp:  [9-37] 37  SpO2:  [91 %-97 %] 93 %  BP: (141)/(89) 141/89     Weight: 132.8 kg (292 lb 12.3 oz)     Body mass index is 39.71 kg/m².     Physical Exam  Constitutional:       Appearance: ill-appearing.      Comments: Intubated   Cardiovascular:      Rate and Rhythm: Regular rhythm. Bradycardia present.      Pulses: Normal pulses.      Heart sounds: Normal heart sounds. No murmur heard.  Pulmonary:      Effort: Respiratory distress present.       Comments: Mechanical breath sounds  Abdominal:      General: Bowel sounds are normal. There is distension.      Tenderness: There is abdominal tenderness.      Mayen cath in place  Musculoskeletal:         General: Swelling present. Normal range of motion.      Right lower leg: Edema present.      Left lower leg: Edema present.

## 2024-01-14 NOTE — ASSESSMENT & PLAN NOTE
Cr was 1.3 in 2021, no other Cr results until the recent admission with Cr at 2.92. Likely prerenal 2/2 sepsis, potential component of rejection.    - KTM consulted due to history of renal transplant   - Trend CMP/BMPs  - Strict intake/output  - Renally dose meds  - Avoid nephrotoxic agents  - Replete electrolytes PRN

## 2024-01-14 NOTE — SUBJECTIVE & OBJECTIVE
Past Medical History:   Diagnosis Date    Chronic interstitial nephritis     CKD (chronic kidney disease), stage III 2013    Hypertension     Immunocompromised state 2018    Living-donor kidney transplant recipient     Need for prophylactic immunotherapy        Past Surgical History:   Procedure Laterality Date    KIDNEY TRANSPLANT      PERITONEAL CATHETER REMOVAL         Review of patient's allergies indicates:   Allergen Reactions    Penicillins      Other reaction(s): Hives  Other reaction(s): Edema       Family History       Problem Relation (Age of Onset)    Heart disease Father    Stroke Mother          Tobacco Use    Smoking status: Former     Current packs/day: 0.00     Average packs/day: 0.5 packs/day for 15.0 years (7.5 ttl pk-yrs)     Types: Cigarettes     Start date: 1995     Quit date: 2010     Years since quittin.5    Smokeless tobacco: Never   Substance and Sexual Activity    Alcohol use: No    Drug use: No    Sexual activity: Not on file     Comment:       Review of Systems   Constitutional:  Positive for activity change.   Respiratory:  Positive for cough and shortness of breath.    Cardiovascular:  Positive for leg swelling. Negative for chest pain.   Gastrointestinal:  Positive for abdominal pain.   Genitourinary:  Negative for dysuria.   Neurological:  Positive for weakness.   Psychiatric/Behavioral:  Negative for agitation and confusion.      Objective:     Vital Signs (Most Recent):  Temp: 96.2 °F (35.7 °C) (24 1400)  Pulse: (!) 53 (24 1400)  Resp: (!) 37 (24 1422)  SpO2: (!) 93 % (24 1400) Vital Signs (24h Range):  Temp:  [96.2 °F (35.7 °C)-97 °F (36.1 °C)] 96.2 °F (35.7 °C)  Pulse:  [51-60] 53  Resp:  [9-37] 37  SpO2:  [91 %-97 %] 93 %  BP: (141)/(89) 141/89   Weight: 132.8 kg (292 lb 12.3 oz)  Body mass index is 39.71 kg/m².      Intake/Output Summary (Last 24 hours) at 2024 4540  Last data filed at 2024 1400  Gross per 24 hour    Intake --   Output 200 ml   Net -200 ml          Physical Exam  Vitals and nursing note reviewed.   Constitutional:       Appearance: He is obese. He is ill-appearing.      Comments: Intubated   HENT:      Head: Normocephalic and atraumatic.      Nose: Nose normal.      Mouth/Throat:      Comments: ETT in place  Eyes:      Extraocular Movements: Extraocular movements intact.      Conjunctiva/sclera: Conjunctivae normal.      Pupils: Pupils are equal, round, and reactive to light.   Cardiovascular:      Rate and Rhythm: Regular rhythm. Bradycardia present.      Pulses: Normal pulses.      Heart sounds: Normal heart sounds. No murmur heard.  Pulmonary:      Effort: Respiratory distress present.      Breath sounds: Decreased air movement present. Examination of the right-lower field reveals decreased breath sounds. Examination of the left-lower field reveals decreased breath sounds. Decreased breath sounds present.      Comments: Mechanical breath sounds  Abdominal:      General: Bowel sounds are normal. There is distension.      Tenderness: There is abdominal tenderness.   Musculoskeletal:         General: Swelling present. Normal range of motion.      Right lower leg: Edema present.      Left lower leg: Edema present.   Skin:     General: Skin is warm and dry.      Capillary Refill: Capillary refill takes less than 2 seconds.   Neurological:      General: No focal deficit present.      Mental Status: He is oriented to person, place, and time. Mental status is at baseline.      Cranial Nerves: No cranial nerve deficit.      Motor: No weakness.   Psychiatric:         Mood and Affect: Mood normal.            Vents:  Vent Mode: A/C (01/14/24 1353)  Set Rate: 22 BPM (01/14/24 1353)  Vt Set: 500 mL (01/14/24 1353)  PEEP/CPAP: 10 cmH20 (01/14/24 1353)  Oxygen Concentration (%): 70 (01/14/24 1400)  Peak Airway Pressure: 25 cmH20 (01/14/24 1353)  Plateau Pressure: 0 cmH20 (01/14/24 1353)  Total Ve: 14.7 L/m (01/14/24  1353)  Negative Inspiratory Force (cm H2O): 0 (01/14/24 1353)  F/VT Ratio<105 (RSBI): (!) 18.6 (01/14/24 1348)  Lines/Drains/Airways       Drain  Duration                  Urethral Catheter 01/14/24 1345 <1 day              Peripheral Intravenous Line  Duration                  Peripheral IV - Single Lumen 01/11/24 1500 20 G Anterior;Proximal;Right Forearm 2 days         Midline Catheter Insertion/Assessment  - Single Lumen 01/13/24 1500 Right basilic vein (medial side of arm) <1 day                  Significant Labs:    CBC/Anemia Profile:  Recent Labs   Lab 01/14/24  1403   WBC 13.77*   HGB 10.8*   HCT 32.9*      MCV 91   RDW 12.7        Chemistries:  Recent Labs   Lab 01/14/24  1403   *   K 5.2*      CO2 9*   CREATININE 2.8*   CALCIUM 7.0*   ALBUMIN 1.2*   PROT 3.8*   BILITOT 0.2   ALKPHOS 86   ALT 28   AST 20   MG 1.9   PHOS 7.5*       All pertinent labs within the past 24 hours have been reviewed.    Significant Imaging: I have reviewed all pertinent imaging results/findings within the past 24 hours.

## 2024-01-14 NOTE — CONSULTS
[facility] - Cardiac Medical ICU  Kidney Transplant  Progress Note      Reason for Follow-up: Reassessment of Kidney Transplant - 7/26/2010  (#1) recipient and management of immunosuppression.           Subjective:   History of Present Illness:    He is 37 yo White male who received a living kidney transplant on 7/26/10.  his creatinine was 1.3-18 till December 2023 when he developed ARFafter not  taking IS meds for 5 weeks. 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 Treated with steroid. DSA 12/19/23 with +DSAs. had IVIG outpatient. Due to nephrotic syndrome and hypoalbuminemia, started eliquis for anticoagulation. He was discharged on pred taper, cellcept, and prograf. At the time of discharge Cr was 3.       He was transferred to [facility]U for acute hypoxemic respiratory failure, bacteremia today. On 1/5/24 he was admitted to [facility] in [place] with abnormal labs. Was found to be COVID positive.  Blood culture with Staph haemolyticus. Abdominal US on 1/6 noted cirrhotic appearing liver.  CT chest on 1/7 showed consolidated infiltrates and/or atelectasis involving the bilateral lung bases with small bilateral pleural effusions, patchy heterogeneous infiltrate involving the right middle lobe, and mild ascites. He was intubated prior to transfer and was accepted to [facility] for a higher level of care.  He arrived on propofol and fentanyl.     Has Mayen cath.200 cc urine in the bag       Past Medical, Surgical, Family, and Social History:   Unchanged from H&P.    Scheduled Meds:   albuterol-ipratropium  3 mL Nebulization Q6H    apixaban  5 mg Per OG tube BID    dexAMETHasone  6 mg Per OG tube Daily    furosemide (LASIX) injection  40 mg Intravenous Daily    ganciclovir (CYTOVENE) 332 mg in sodium chloride 0.9% 100 mL IVPB  2.5 mg/kg Intravenous Q24H    meropenem (MERREM) IVPB  2 g Intravenous Q12H    multivitamin  1 tablet Per OG tube Daily    mupirocin    Nasal BID    pantoprazole  40 mg Intravenous Daily    remdesivir infusion  100 mg Intravenous Daily     Continuous Infusions:   dexmedeTOMIDine (Precedex) infusion (titrating) Stopped (01/14/24 1500)    fentanyl 75 mcg/hr (01/14/24 1422)     PRN Meds:dextrose 10%, dextrose 10%, glucagon (human recombinant), glucose, glucose, guaiFENesin 100 mg/5 ml, insulin aspart U-100, naloxone, ondansetron, sodium chloride 0.9%    Intake/Output - Last 3 Shifts         01/12 0700 01/13 0659 01/13 0700 01/14 0659 01/14 0700  01/15 0659    Urine (mL/kg/hr)   200    Total Output   200    Net   -200                    Review of Systems     Unable to obtain   Objective:     Vital Signs (Most Recent):  Temp: 96.2 °F (35.7 °C) (01/14/24 1400)  Pulse: (!) 53 (01/14/24 1400)  Resp: (!) 37 (01/14/24 1422)  SpO2: (!) 93 % (01/14/24 1400) Vital Signs (24h Range):  Temp:  [96.2 °F (35.7 °C)-97 °F (36.1 °C)] 96.2 °F (35.7 °C)  Pulse:  [51-60] 53  Resp:  [9-37] 37  SpO2:  [91 %-97 %] 93 %  BP: (141)/(89) 141/89     Weight: 132.8 kg (292 lb 12.3 oz)     Body mass index is 39.71 kg/m².     Physical Exam  Constitutional:       Appearance: ill-appearing.      Comments: Intubated   Cardiovascular:      Rate and Rhythm: Regular rhythm. Bradycardia present.      Pulses: Normal pulses.      Heart sounds: Normal heart sounds. No murmur heard.  Pulmonary:      Effort: Respiratory distress present.       Comments: Mechanical breath sounds  Abdominal:      General: Bowel sounds are normal. There is distension.      Tenderness: There is abdominal tenderness.      Mayen cath in place  Musculoskeletal:         General: Swelling present. Normal range of motion.      Right lower leg: Edema present.      Left lower leg: Edema present.     Assessment/Plan:     Acute on chronic renal failure  - Living kidney transplant on 7/26/10.    - BL creatinine was 1.3-1.7 till December 2023 when he developed ARF due to diffuse proliferative GN and mild  TCMR. Treated with  steroid. Due to nephrotic syndrome       and hypoalbuminemia, started eliquis for anticoagulation. He was discharged on pred taper, cellcept, and prograf. At the time of discharge on 12/23/23  Cr was 3.    -  last Cr 2.8   - Strict I/O  - pending kidney US   - pending BK, CMV PCR, DSA   - Avoid nephrotoxins, volume shifts, aim for BP within target to prevent additional renal injury  - Avoid nephrotoxic agents (NSAIDs, IV contrast dye, ACEI/ARB anti-HTN medications, Aminoglycoside-containing antibiotics)  - Renally dose all appropriate medications, including antibiotics  - No need for RRT at this point. Will assess with next lab   - check lab every 8 hours    Prophylactic immunotherapy  - On dexamethazone  - Off MMF due to COVID  - Off prograf due to high tac level from 1/13/24  - Check tac level daily to avoid toxicity     COVID  - Continue Remdesivir for total of 5 days  - Continue Dexamethasone 6mg QD for 8 days  - Mechanically ventilated, will wean sedation and pressure support as able          Tona Leon MD  Kidney Transplant  [facility] - Cardiac Medical ICU

## 2024-01-14 NOTE — HPI
39 y/o M with a medical history of kidney transplant in 2010 and HTN transferred to [facility]U for acute hypoxemic respiratory failure, bacteremia, and hx of Kidney Transplant for KTM and Infectious Disease evaluation. Pt was previously admitted to [facility] 12/18/23 - 12/23/23 with GENNY, headache, and HTN.  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  He was found to have nephrotic syndrome as well.  Received pulse dose steroids and was started on Eliquis. Discharged on a prednisone taper, CellCept, and Prograf.  sCr 12/23 was 3.0.     On 1/5/24 he was admitted to [facility] in [place] with abnormal labs. Outpatient labs on 1/3 showed WBC 17.7 Hgb 13.1, K 5.7, bicarb 18, BUN 71, sCr 2.92, phos 5.1, and tacro level 10.8.  Was found to be COVID positive requiring BiPAP.  Blood culture with Staph haemolyticus.  Labs notable for sCr ranging 2.68 to 2.73.  K normalized.  Abdominal US on 1/6 noted cirrhotic appearing liver.  CT chest on 1/7 showed consolidated infiltrates and/or atelectasis involving the bilateral lung bases with small bilateral pleural effusions, patchy heterogeneous infiltrate involving the right middle lobe, and mild ascites.  Transplant renal ultrasound 1/8 showed stable appearance of the transplant kidney without evidence of acute abnormality.  Gallbladder ultrasound 1/8 showed sludge in the gallbladder.  Medications prior to transfer include CellCept, tacrolimus, valganciclovir, prednisone, Eliquis, and meropenem.    The patient was initially going to transfer to hospital medicine however he was intubated prior to transfer and was accepted to [facility] for a higher level of care.  He arrived on propofol and fentanyl.  KTM and transplant ID consulted on admission.

## 2024-01-14 NOTE — ASSESSMENT & PLAN NOTE
Patient noted to have transaminitis at outside facility with imaging concerning for cirrhosis and ascites.     - Trend CMPs  - Consider Liver doppler

## 2024-01-14 NOTE — ASSESSMENT & PLAN NOTE
Patient with kidney transplant 2010. He was previously admitted to [facility] December 18-23 with GENNY. 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  He was found to have nephrotic syndrome as well, started on eliquis.  He received pulse dose steroids and was started on Eliquis for anticoagulation. He was discharged on a prednisone taper, CellCept, and Prograf.  On December 23, his creatinine was 3.0. Current medications include CellCept, tacrolimus, prednisone, all of which he continued to receive at OSH.    - KTM consulted, appreciate recs  - Gangciclovir for CMV prophylaxis  - Eliquis for nephrotic syndrome

## 2024-01-14 NOTE — HPI
He is 39 yo White male who received a living kidney transplant on 7/26/10.  his creatinine was 1.3-18 till December 2023 when he developed ARFafter not  taking IS meds for 5 weeks. 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 Treated with steroid. DSA 12/19/23 with +DSAs. had IVIG outpatient. Due to nephrotic syndrome and hypoalbuminemia, started eliquis for anticoagulation. He was discharged on pred taper, cellcept, and prograf. At the time of discharge Cr was 3.       transferred to [facility]U for acute hypoxemic respiratory failure, bacteremia. On 1/5/24 he was admitted to [facility] in [place] with abnormal labs. Was found to be COVID positive.  Blood culture with Staph haemolyticus. Abdominal US on 1/6 noted cirrhotic appearing liver.  CT chest on 1/7 showed consolidated infiltrates and/or atelectasis involving the bilateral lung bases with small bilateral pleural effusions, patchy heterogeneous infiltrate involving the right middle lobe, and mild ascites. The patient was initially going to transfer to hospital medicine however he was intubated prior to transfer and was accepted to [facility] for a higher level of care.  He arrived on propofol and fentanyl.  KTM and transplant ID consulted on admission.

## 2024-01-14 NOTE — ASSESSMENT & PLAN NOTE
- Living kidney transplant on 7/26/10.    - BL creatinine was 1.3-1.7 till December 2023 when he developed ARF due to diffuse proliferative GN and mild  TCMR. Treated with steroid. Due to nephrotic syndrome       and hypoalbuminemia, started eliquis for anticoagulation. He was discharged on pred taper, cellcept, and prograf. At the time of discharge on 12/23/23  Cr was 3.    -  last Cr 2.8   - Strict I/O  - pending kidney US   - pending BK, CMV PCR, DSA   - Avoid nephrotoxins, volume shifts, aim for BP within target to prevent additional renal injury  - Avoid nephrotoxic agents (NSAIDs, IV contrast dye, ACEI/ARB anti-HTN medications, Aminoglycoside-containing antibiotics)  - Renally dose all appropriate medications, including antibiotics  - No need for RRT at this point. Will assess with next lab   - check lab every 8 hours

## 2024-01-14 NOTE — NURSING
Patient transferred to [facility] via EMS. Patient arrived intubated, RT at bedside and placed on 70% and P10. Patient is alert and following commands. Sedation is paused. Patient is maik with HR 40s-55 but remains normotensive with bp's. MD at bedside to eval patient.

## 2024-01-15 LAB
ALBUMIN SERPL BCP-MCNC: 1.3 G/DL (ref 3.5–5.2)
ALBUMIN SERPL BCP-MCNC: 1.3 G/DL (ref 3.5–5.2)
ALBUMIN SERPL BCP-MCNC: 1.5 G/DL (ref 3.5–5.2)
ALLENS TEST: ABNORMAL
ALLENS TEST: NORMAL
ALP SERPL-CCNC: 110 U/L (ref 55–135)
ALP SERPL-CCNC: 88 U/L (ref 55–135)
ALP SERPL-CCNC: 91 U/L (ref 55–135)
ALT SERPL W/O P-5'-P-CCNC: 23 U/L (ref 10–44)
ALT SERPL W/O P-5'-P-CCNC: 26 U/L (ref 10–44)
ALT SERPL W/O P-5'-P-CCNC: 33 U/L (ref 10–44)
ANION GAP SERPL CALC-SCNC: 14 MMOL/L (ref 8–16)
ANION GAP SERPL CALC-SCNC: 15 MMOL/L (ref 8–16)
ANION GAP SERPL CALC-SCNC: 18 MMOL/L (ref 8–16)
AST SERPL-CCNC: 14 U/L (ref 10–40)
AST SERPL-CCNC: 15 U/L (ref 10–40)
AST SERPL-CCNC: 20 U/L (ref 10–40)
BASOPHILS # BLD AUTO: 0.03 K/UL (ref 0–0.2)
BASOPHILS NFR BLD: 0.2 % (ref 0–1.9)
BILIRUB SERPL-MCNC: 0.2 MG/DL (ref 0.1–1)
BUN SERPL-MCNC: 143 MG/DL (ref 6–20)
BUN SERPL-MCNC: 147 MG/DL (ref 6–20)
BUN SERPL-MCNC: 152 MG/DL (ref 6–20)
CALCIUM SERPL-MCNC: 7.6 MG/DL (ref 8.7–10.5)
CALCIUM SERPL-MCNC: 8.1 MG/DL (ref 8.7–10.5)
CALCIUM SERPL-MCNC: 8.5 MG/DL (ref 8.7–10.5)
CHLORIDE SERPL-SCNC: 102 MMOL/L (ref 95–110)
CHLORIDE SERPL-SCNC: 102 MMOL/L (ref 95–110)
CHLORIDE SERPL-SCNC: 103 MMOL/L (ref 95–110)
CO2 SERPL-SCNC: 12 MMOL/L (ref 23–29)
CO2 SERPL-SCNC: 13 MMOL/L (ref 23–29)
CO2 SERPL-SCNC: 14 MMOL/L (ref 23–29)
CREAT SERPL-MCNC: 2.7 MG/DL (ref 0.5–1.4)
CREAT SERPL-MCNC: 3 MG/DL (ref 0.5–1.4)
CREAT SERPL-MCNC: 3.3 MG/DL (ref 0.5–1.4)
DELSYS: ABNORMAL
DIFFERENTIAL METHOD BLD: ABNORMAL
EOSINOPHIL # BLD AUTO: 0 K/UL (ref 0–0.5)
EOSINOPHIL NFR BLD: 0 % (ref 0–8)
ERYTHROCYTE [DISTWIDTH] IN BLOOD BY AUTOMATED COUNT: 12.8 % (ref 11.5–14.5)
ERYTHROCYTE [SEDIMENTATION RATE] IN BLOOD BY WESTERGREN METHOD: 28 MM/H
EST. GFR  (NO RACE VARIABLE): 23.3 ML/MIN/1.73 M^2
EST. GFR  (NO RACE VARIABLE): 26.1 ML/MIN/1.73 M^2
EST. GFR  (NO RACE VARIABLE): 29.6 ML/MIN/1.73 M^2
FIO2: 60
GLUCOSE SERPL-MCNC: 120 MG/DL (ref 70–110)
GLUCOSE SERPL-MCNC: 151 MG/DL (ref 70–110)
GLUCOSE SERPL-MCNC: 166 MG/DL (ref 70–110)
HCO3 UR-SCNC: 18.7 MMOL/L (ref 24–28)
HCO3 UR-SCNC: 18.8 MMOL/L (ref 24–28)
HCO3 UR-SCNC: 19.4 MMOL/L (ref 24–28)
HCO3 UR-SCNC: 19.7 MMOL/L (ref 24–28)
HCT VFR BLD AUTO: 33.1 % (ref 40–54)
HCT VFR BLD CALC: 24 %PCV (ref 36–54)
HCT VFR BLD CALC: 27 %PCV (ref 36–54)
HCT VFR BLD CALC: 28 %PCV (ref 36–54)
HGB BLD-MCNC: 11.5 G/DL (ref 14–18)
IMM GRANULOCYTES # BLD AUTO: 0.42 K/UL (ref 0–0.04)
IMM GRANULOCYTES NFR BLD AUTO: 2.5 % (ref 0–0.5)
INR PPP: 1 (ref 0.8–1.2)
LDH SERPL L TO P-CCNC: 0.86 MMOL/L (ref 0.36–1.25)
LYMPHOCYTES # BLD AUTO: 0.8 K/UL (ref 1–4.8)
LYMPHOCYTES NFR BLD: 5 % (ref 18–48)
MAGNESIUM SERPL-MCNC: 2.3 MG/DL (ref 1.6–2.6)
MCH RBC QN AUTO: 29.8 PG (ref 27–31)
MCHC RBC AUTO-ENTMCNC: 34.7 G/DL (ref 32–36)
MCV RBC AUTO: 86 FL (ref 82–98)
MODE: ABNORMAL
MONOCYTES # BLD AUTO: 0.9 K/UL (ref 0.3–1)
MONOCYTES NFR BLD: 5.4 % (ref 4–15)
NEUTROPHILS # BLD AUTO: 14.4 K/UL (ref 1.8–7.7)
NEUTROPHILS NFR BLD: 86.9 % (ref 38–73)
NRBC BLD-RTO: 0 /100 WBC
PCO2 BLDA: 34.6 MMHG (ref 35–45)
PCO2 BLDA: 37.2 MMHG (ref 35–45)
PCO2 BLDA: 38.8 MMHG (ref 35–45)
PCO2 BLDA: 40.6 MMHG (ref 35–45)
PEEP: 10
PH SMN: 7.29 [PH] (ref 7.35–7.45)
PH SMN: 7.29 [PH] (ref 7.35–7.45)
PH SMN: 7.31 [PH] (ref 7.35–7.45)
PH SMN: 7.36 [PH] (ref 7.35–7.45)
PHOSPHATE SERPL-MCNC: 8.4 MG/DL (ref 2.7–4.5)
PHOSPHATE SERPL-MCNC: 8.8 MG/DL (ref 2.7–4.5)
PHOSPHATE SERPL-MCNC: 9.4 MG/DL (ref 2.7–4.5)
PLATELET # BLD AUTO: 267 K/UL (ref 150–450)
PMV BLD AUTO: 8.9 FL (ref 9.2–12.9)
PO2 BLDA: 124 MMHG (ref 80–100)
PO2 BLDA: 157 MMHG (ref 80–100)
PO2 BLDA: 187 MMHG (ref 80–100)
PO2 BLDA: 59 MMHG (ref 80–100)
POC BE: -6 MMOL/L
POC BE: -7 MMOL/L
POC BE: -7 MMOL/L
POC BE: -8 MMOL/L
POC IONIZED CALCIUM: 1.15 MMOL/L (ref 1.06–1.42)
POC IONIZED CALCIUM: 1.2 MMOL/L (ref 1.06–1.42)
POC IONIZED CALCIUM: 1.2 MMOL/L (ref 1.06–1.42)
POC SATURATED O2: 100 % (ref 95–100)
POC SATURATED O2: 88 % (ref 95–100)
POC SATURATED O2: 98 % (ref 95–100)
POC SATURATED O2: 99 % (ref 95–100)
POC TCO2: 20 MMOL/L (ref 23–27)
POC TCO2: 21 MMOL/L (ref 23–27)
POCT GLUCOSE: 121 MG/DL (ref 70–110)
POCT GLUCOSE: 135 MG/DL (ref 70–110)
POTASSIUM BLD-SCNC: 4.7 MMOL/L (ref 3.5–5.1)
POTASSIUM BLD-SCNC: 4.8 MMOL/L (ref 3.5–5.1)
POTASSIUM BLD-SCNC: 5.1 MMOL/L (ref 3.5–5.1)
POTASSIUM SERPL-SCNC: 4.8 MMOL/L (ref 3.5–5.1)
POTASSIUM SERPL-SCNC: 5.3 MMOL/L (ref 3.5–5.1)
POTASSIUM SERPL-SCNC: 5.5 MMOL/L (ref 3.5–5.1)
PROT SERPL-MCNC: 4.3 G/DL (ref 6–8.4)
PROT SERPL-MCNC: 4.6 G/DL (ref 6–8.4)
PROT SERPL-MCNC: 4.7 G/DL (ref 6–8.4)
PROTHROMBIN TIME: 10.6 SEC (ref 9–12.5)
PTH-INTACT SERPL-MCNC: 532.5 PG/ML (ref 9–77)
RBC # BLD AUTO: 3.86 M/UL (ref 4.6–6.2)
SAMPLE: ABNORMAL
SAMPLE: NORMAL
SITE: ABNORMAL
SITE: NORMAL
SODIUM BLD-SCNC: 126 MMOL/L (ref 136–145)
SODIUM BLD-SCNC: 129 MMOL/L (ref 136–145)
SODIUM BLD-SCNC: 130 MMOL/L (ref 136–145)
SODIUM SERPL-SCNC: 129 MMOL/L (ref 136–145)
SODIUM SERPL-SCNC: 130 MMOL/L (ref 136–145)
SODIUM SERPL-SCNC: 134 MMOL/L (ref 136–145)
TACROLIMUS BLD-MCNC: 19 NG/ML (ref 5–15)
TACROLIMUS BLD-MCNC: 9.5 NG/ML (ref 5–15)
VT: 460
WBC # BLD AUTO: 16.57 K/UL (ref 3.9–12.7)

## 2024-01-15 PROCEDURE — 99900035 HC TECH TIME PER 15 MIN (STAT)

## 2024-01-15 PROCEDURE — 20000000 HC ICU ROOM

## 2024-01-15 PROCEDURE — 99900026 HC AIRWAY MAINTENANCE (STAT)

## 2024-01-15 PROCEDURE — 27100171 HC OXYGEN HIGH FLOW UP TO 24 HOURS

## 2024-01-15 PROCEDURE — 83605 ASSAY OF LACTIC ACID: CPT

## 2024-01-15 PROCEDURE — 63600175 PHARM REV CODE 636 W HCPCS: Performed by: INTERNAL MEDICINE

## 2024-01-15 PROCEDURE — 84100 ASSAY OF PHOSPHORUS: CPT | Mod: 91

## 2024-01-15 PROCEDURE — 27000207 HC ISOLATION

## 2024-01-15 PROCEDURE — 27000923 HC TRIALYSIS CATHETER, ANY SIZE

## 2024-01-15 PROCEDURE — 94640 AIRWAY INHALATION TREATMENT: CPT

## 2024-01-15 PROCEDURE — 86977 RBC SERUM PRETX INCUBJ/INHIB: CPT | Mod: 59 | Performed by: INTERNAL MEDICINE

## 2024-01-15 PROCEDURE — 63600175 PHARM REV CODE 636 W HCPCS

## 2024-01-15 PROCEDURE — 36600 WITHDRAWAL OF ARTERIAL BLOOD: CPT

## 2024-01-15 PROCEDURE — 99255 IP/OBS CONSLTJ NEW/EST HI 80: CPT | Mod: ,,, | Performed by: INTERNAL MEDICINE

## 2024-01-15 PROCEDURE — 83970 ASSAY OF PARATHORMONE: CPT | Performed by: INTERNAL MEDICINE

## 2024-01-15 PROCEDURE — 82800 BLOOD PH: CPT

## 2024-01-15 PROCEDURE — 84132 ASSAY OF SERUM POTASSIUM: CPT

## 2024-01-15 PROCEDURE — 27000221 HC OXYGEN, UP TO 24 HOURS

## 2024-01-15 PROCEDURE — 84295 ASSAY OF SERUM SODIUM: CPT

## 2024-01-15 PROCEDURE — 94761 N-INVAS EAR/PLS OXIMETRY MLT: CPT | Mod: XB

## 2024-01-15 PROCEDURE — 99233 SBSQ HOSP IP/OBS HIGH 50: CPT | Mod: ,,, | Performed by: INTERNAL MEDICINE

## 2024-01-15 PROCEDURE — 83735 ASSAY OF MAGNESIUM: CPT

## 2024-01-15 PROCEDURE — 25000242 PHARM REV CODE 250 ALT 637 W/ HCPCS

## 2024-01-15 PROCEDURE — 25000003 PHARM REV CODE 250

## 2024-01-15 PROCEDURE — 80100014 HC HEMODIALYSIS 1:1

## 2024-01-15 PROCEDURE — 99291 CRITICAL CARE FIRST HOUR: CPT | Mod: ,,, | Performed by: INTERNAL MEDICINE

## 2024-01-15 PROCEDURE — 02HV33Z INSERTION OF INFUSION DEVICE INTO SUPERIOR VENA CAVA, PERCUTANEOUS APPROACH: ICD-10-PCS | Performed by: INTERNAL MEDICINE

## 2024-01-15 PROCEDURE — 86833 HLA CLASS II HIGH DEFIN QUAL: CPT | Performed by: INTERNAL MEDICINE

## 2024-01-15 PROCEDURE — 25000003 PHARM REV CODE 250: Performed by: INTERNAL MEDICINE

## 2024-01-15 PROCEDURE — 94003 VENT MGMT INPAT SUBQ DAY: CPT

## 2024-01-15 PROCEDURE — 86832 HLA CLASS I HIGH DEFIN QUAL: CPT | Performed by: INTERNAL MEDICINE

## 2024-01-15 PROCEDURE — 85610 PROTHROMBIN TIME: CPT

## 2024-01-15 PROCEDURE — 82803 BLOOD GASES ANY COMBINATION: CPT

## 2024-01-15 PROCEDURE — 5A1D70Z PERFORMANCE OF URINARY FILTRATION, INTERMITTENT, LESS THAN 6 HOURS PER DAY: ICD-10-PCS | Performed by: INTERNAL MEDICINE

## 2024-01-15 PROCEDURE — 85025 COMPLETE CBC W/AUTO DIFF WBC: CPT

## 2024-01-15 PROCEDURE — 87799 DETECT AGENT NOS DNA QUANT: CPT | Performed by: INTERNAL MEDICINE

## 2024-01-15 PROCEDURE — 80053 COMPREHEN METABOLIC PANEL: CPT

## 2024-01-15 PROCEDURE — 82330 ASSAY OF CALCIUM: CPT

## 2024-01-15 PROCEDURE — C9113 INJ PANTOPRAZOLE SODIUM, VIA: HCPCS

## 2024-01-15 PROCEDURE — 80197 ASSAY OF TACROLIMUS: CPT

## 2024-01-15 PROCEDURE — 85014 HEMATOCRIT: CPT

## 2024-01-15 RX ORDER — AMOXICILLIN 250 MG
1 CAPSULE ORAL DAILY
Status: DISCONTINUED | OUTPATIENT
Start: 2024-01-16 | End: 2024-01-16

## 2024-01-15 RX ORDER — MIDAZOLAM HYDROCHLORIDE 1 MG/ML
0-5 INJECTION, SOLUTION INTRAVENOUS CONTINUOUS
Status: DISCONTINUED | OUTPATIENT
Start: 2024-01-15 | End: 2024-01-15

## 2024-01-15 RX ORDER — FENTANYL CITRATE-0.9 % NACL/PF 10 MCG/ML
0-200 PLASTIC BAG, INJECTION (ML) INTRAVENOUS CONTINUOUS
Status: DISCONTINUED | OUTPATIENT
Start: 2024-01-15 | End: 2024-01-16

## 2024-01-15 RX ORDER — SODIUM CHLORIDE 9 MG/ML
INJECTION, SOLUTION INTRAVENOUS
Status: CANCELLED | OUTPATIENT
Start: 2024-01-15

## 2024-01-15 RX ORDER — FUROSEMIDE 10 MG/ML
80 INJECTION INTRAMUSCULAR; INTRAVENOUS ONCE
Status: COMPLETED | OUTPATIENT
Start: 2024-01-15 | End: 2024-01-15

## 2024-01-15 RX ORDER — SODIUM CHLORIDE 9 MG/ML
INJECTION, SOLUTION INTRAVENOUS ONCE
Status: CANCELLED | OUTPATIENT
Start: 2024-01-15 | End: 2024-01-15

## 2024-01-15 RX ORDER — FUROSEMIDE 10 MG/ML
100 INJECTION INTRAMUSCULAR; INTRAVENOUS ONCE
Status: COMPLETED | OUTPATIENT
Start: 2024-01-15 | End: 2024-01-15

## 2024-01-15 RX ORDER — MIDAZOLAM HYDROCHLORIDE 1 MG/ML
0-5 INJECTION, SOLUTION INTRAVENOUS CONTINUOUS
Status: DISCONTINUED | OUTPATIENT
Start: 2024-01-15 | End: 2024-01-16

## 2024-01-15 RX ORDER — PROPOFOL 10 MG/ML
0-50 INJECTION, EMULSION INTRAVENOUS CONTINUOUS
Status: DISCONTINUED | OUTPATIENT
Start: 2024-01-15 | End: 2024-01-16

## 2024-01-15 RX ORDER — POLYETHYLENE GLYCOL 3350 17 G/17G
17 POWDER, FOR SOLUTION ORAL DAILY
Status: DISCONTINUED | OUTPATIENT
Start: 2024-01-16 | End: 2024-01-16

## 2024-01-15 RX ORDER — ALBUTEROL SULFATE 2.5 MG/.5ML
10 SOLUTION RESPIRATORY (INHALATION) ONCE
Status: COMPLETED | OUTPATIENT
Start: 2024-01-15 | End: 2024-01-15

## 2024-01-15 RX ADMIN — Medication 200 MCG/HR: at 10:01

## 2024-01-15 RX ADMIN — MEROPENEM 2 G: 1 INJECTION INTRAVENOUS at 03:01

## 2024-01-15 RX ADMIN — DEXAMETHASONE 6 MG: 4 TABLET ORAL at 08:01

## 2024-01-15 RX ADMIN — APIXABAN 5 MG: 5 TABLET, FILM COATED ORAL at 09:01

## 2024-01-15 RX ADMIN — IPRATROPIUM BROMIDE AND ALBUTEROL SULFATE 3 ML: 2.5; .5 SOLUTION RESPIRATORY (INHALATION) at 02:01

## 2024-01-15 RX ADMIN — FUROSEMIDE 80 MG: 10 INJECTION, SOLUTION INTRAVENOUS at 03:01

## 2024-01-15 RX ADMIN — PROPOFOL 50 MCG/KG/MIN: 10 INJECTION, EMULSION INTRAVENOUS at 11:01

## 2024-01-15 RX ADMIN — SODIUM ZIRCONIUM CYCLOSILICATE 10 G: 5 POWDER, FOR SUSPENSION ORAL at 03:01

## 2024-01-15 RX ADMIN — MUPIROCIN: 20 OINTMENT TOPICAL at 08:01

## 2024-01-15 RX ADMIN — ALBUTEROL SULFATE 10 MG: 2.5 SOLUTION RESPIRATORY (INHALATION) at 03:01

## 2024-01-15 RX ADMIN — REMDESIVIR 100 MG: 100 INJECTION, POWDER, LYOPHILIZED, FOR SOLUTION INTRAVENOUS at 09:01

## 2024-01-15 RX ADMIN — PROPOFOL 50 MCG/KG/MIN: 10 INJECTION, EMULSION INTRAVENOUS at 05:01

## 2024-01-15 RX ADMIN — CISATRACURIUM BESYLATE 1 MCG/KG/MIN: 200 INJECTION INTRAVENOUS at 08:01

## 2024-01-15 RX ADMIN — MINERAL OIL, PETROLATUM: 425; 573 OINTMENT OPHTHALMIC at 03:01

## 2024-01-15 RX ADMIN — IPRATROPIUM BROMIDE AND ALBUTEROL SULFATE 3 ML: 2.5; .5 SOLUTION RESPIRATORY (INHALATION) at 07:01

## 2024-01-15 RX ADMIN — PROPOFOL 50 MCG/KG/MIN: 10 INJECTION, EMULSION INTRAVENOUS at 01:01

## 2024-01-15 RX ADMIN — MIDAZOLAM HYDROCHLORIDE 4 MG/HR: 1 INJECTION, SOLUTION INTRAVENOUS at 11:01

## 2024-01-15 RX ADMIN — FUROSEMIDE 40 MG: 10 INJECTION, SOLUTION INTRAVENOUS at 08:01

## 2024-01-15 RX ADMIN — THERA TABS 1 TABLET: TAB at 08:01

## 2024-01-15 RX ADMIN — PROPOFOL 50 MCG/KG/MIN: 10 INJECTION, EMULSION INTRAVENOUS at 04:01

## 2024-01-15 RX ADMIN — MUPIROCIN: 20 OINTMENT TOPICAL at 09:01

## 2024-01-15 RX ADMIN — PROPOFOL 50 MCG/KG/MIN: 10 INJECTION, EMULSION INTRAVENOUS at 03:01

## 2024-01-15 RX ADMIN — APIXABAN 5 MG: 5 TABLET, FILM COATED ORAL at 08:01

## 2024-01-15 RX ADMIN — Medication 200 MCG/HR: at 11:01

## 2024-01-15 RX ADMIN — MEROPENEM 2 G: 1 INJECTION INTRAVENOUS at 04:01

## 2024-01-15 RX ADMIN — MIDAZOLAM HYDROCHLORIDE 0.5 MG/HR: 1 INJECTION, SOLUTION INTRAVENOUS at 04:01

## 2024-01-15 RX ADMIN — PROPOFOL 50 MCG/KG/MIN: 10 INJECTION, EMULSION INTRAVENOUS at 09:01

## 2024-01-15 RX ADMIN — FUROSEMIDE 100 MG: 10 INJECTION, SOLUTION INTRAMUSCULAR; INTRAVENOUS at 10:01

## 2024-01-15 RX ADMIN — PANTOPRAZOLE SODIUM 40 MG: 40 INJECTION, POWDER, FOR SOLUTION INTRAVENOUS at 08:01

## 2024-01-15 RX ADMIN — GANCICLOVIR SODIUM 332 MG: 500 INJECTION, POWDER, LYOPHILIZED, FOR SOLUTION INTRAVENOUS at 04:01

## 2024-01-15 RX ADMIN — IPRATROPIUM BROMIDE AND ALBUTEROL SULFATE 3 ML: 2.5; .5 SOLUTION RESPIRATORY (INHALATION) at 01:01

## 2024-01-15 NOTE — ASSESSMENT & PLAN NOTE
Cr was 1.3 in 2021, no other Cr results until the recent admission with Cr at 2.92. Likely prerenal 2/2 sepsis, potential component of rejection.    - KTM consulted due to history of renal transplant   - HD for volume overload - will place trialysis line today   - Trend CMP/BMPs  - Strict intake/output  - Renally dose meds  - Avoid nephrotoxic agents  - Replete electrolytes PRN

## 2024-01-15 NOTE — SUBJECTIVE & OBJECTIVE
Subjective:   History of Present Illness:    He is 37 yo White male who received a living kidney transplant on 7/26/10.  his creatinine was 1.3-18 till December 2023 when he developed ARFafter not  taking IS meds for 5 weeks. 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 Treated with steroid. DSA 12/19/23 with +DSAs. had IVIG outpatient. Due to nephrotic syndrome and hypoalbuminemia, started eliquis for anticoagulation. He was discharged on pred taper, cellcept, and prograf. At the time of discharge Cr was 3.       transferred to [facility]U for acute hypoxemic respiratory failure, bacteremia. On 1/5/24 he was admitted to [facility] in [place] with abnormal labs. Was found to be COVID positive.  Blood culture with Staph haemolyticus. Abdominal US on 1/6 noted cirrhotic appearing liver.  CT chest on 1/7 showed consolidated infiltrates and/or atelectasis involving the bilateral lung bases with small bilateral pleural effusions, patchy heterogeneous infiltrate involving the right middle lobe, and mild ascites. The patient was initially going to transfer to hospital medicine however he was intubated prior to transfer and was accepted to [facility] for a higher level of care.  He arrived on propofol and fentanyl.  KTM and transplant ID consulted on admission.      Hospital Course:  Intubated. On vent  FIO2%90. His UOP ~ 1 liter with lasix. Has metabolic acidosis on ABG. Plan for HD to remove some fluid and correct acidosis. Pt needs to have line today - discussed the plan with ICU team       Past Medical, Surgical, Family, and Social History:   Unchanged from H&P.    Scheduled Meds:   albuterol-ipratropium  3 mL Nebulization Q6H    apixaban  5 mg Per OG tube BID    dexAMETHasone  6 mg Per OG tube Daily    ganciclovir (CYTOVENE) 332 mg in sodium chloride 0.9% 100 mL IVPB  2.5 mg/kg Intravenous Q24H    meropenem (MERREM) IVPB  2 g Intravenous Q12H    multivitamin  1  tablet Per OG tube Daily    mupirocin   Nasal BID    pantoprazole  40 mg Intravenous Daily    white petrolatum-mineral oil 57.3-42.5%   Both Eyes Q8H     Continuous Infusions:   cisatracurium (NIMBEX) 200 mg in dextrose 5 % (D5W) 100 mL infusion Stopped (01/15/24 1129)    fentanyl 200 mcg/hr (01/15/24 1134)    midazolam 4 mg/hr (01/15/24 1134)    propofoL 50 mcg/kg/min (01/15/24 1134)     PRN Meds:dextrose 10%, dextrose 10%, glucagon (human recombinant), glucose, glucose, guaiFENesin 100 mg/5 ml, insulin aspart U-100, naloxone, ondansetron, sodium chloride 0.9%    Intake/Output - Last 3 Shifts         01/13 0700 01/14 0659 01/14 0700  01/15 0659 01/15 0700 01/16 0659    I.V. (mL/kg)  539.2 (4.1) 360.7 (2.7)    NG/GT   10    IV Piggyback  573 250.1    Total Intake(mL/kg)  1112.2 (8.4) 620.8 (4.7)    Urine (mL/kg/hr)  1230 625 (0.9)    Total Output  1230 625    Net  -117.8 -4.2                    Objective:     Vital Signs (Most Recent):  Temp: 97.9 °F (36.6 °C) (01/15/24 1200)  Pulse: (!) 59 (01/15/24 1200)  Resp: (!) 26 (01/15/24 1200)  BP: 112/68 (01/15/24 1200)  SpO2: 99 % (01/15/24 1200) Vital Signs (24h Range):  Temp:  [96.2 °F (35.7 °C)-98.3 °F (36.8 °C)] 97.9 °F (36.6 °C)  Pulse:  [51-64] 59  Resp:  [6-37] 26  SpO2:  [90 %-100 %] 99 %  BP: (106-154)/() 112/68     Weight: 132.8 kg (292 lb 12.3 oz)     Body mass index is 39.71 kg/m².     Physical Exam   Constitutional:       Appearance: ill-appearing.      Comments: Intubated   Cardiovascular:      Rate and Rhythm: Regular rhythm. Bradycardia present.      Pulses: Normal pulses.      Heart sounds: Normal heart sounds. No murmur heard.  Pulmonary:      Effort: Respiratory distress present.       Comments: Mechanical breath sounds  Abdominal:      General: Bowel sounds are normal. There is distension.      Tenderness: There is abdominal tenderness.      Mayen cath in place  Musculoskeletal:         General: Swelling present. Normal range of motion.       Right lower leg: Edema present.      Left lower leg: Edema present.   Laboratory:  Labs within the past 24 hours have been reviewed.

## 2024-01-15 NOTE — PROGRESS NOTES
[facility] - Cardiac Medical ICU  Critical Care Medicine  Progress Note    Patient Name: Ubaldo Tripp  MRN: [id number]  Admission Date: 1/14/2024  Hospital Length of Stay: 1 days  Code Status: Full Code  Attending Provider: Kristian Smith MD  Primary Care Provider: Jaime Jauregui (Inactive)   Principal Problem: Acute hypoxic respiratory failure    Subjective:     HPI:  37 y/o M with a medical history of kidney transplant in 2010 and HTN [place] [facility] for acute hypoxemic respiratory failure, bacteremia, and hx of Kidney Transplant for KTM and Infectious Disease evaluation. [facility] with GENNY, headache, and HTN.  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  He was found to have nephrotic syndrome as well.  Received pulse dose steroids and was started on Eliquis. Discharged on a prednisone taper, CellCept, and Prograf.  sCr 12/23 was 3.0.     On 1/5/24 he was admitted to [facility] in [place] with abnormal labs. Outpatient labs on 1/3 showed WBC 17.7 Hgb 13.1, K 5.7, bicarb 18, BUN 71, sCr 2.92, phos 5.1, and tacro level 10.8.  Was found to be COVID positive requiring BiPAP.  Blood culture with Staph haemolyticus.  Labs notable for sCr ranging 2.68 to 2.73.  K normalized.  Abdominal US on 1/6 noted cirrhotic appearing liver.  CT chest on 1/7 showed consolidated infiltrates and/or atelectasis involving the bilateral lung bases with small bilateral pleural effusions, patchy heterogeneous infiltrate involving the right middle lobe, and mild ascites.  Transplant renal ultrasound 1/8 showed stable appearance of the transplant kidney without evidence of acute abnormality.  Gallbladder ultrasound 1/8 showed sludge in the gallbladder.  Medications  prior to transfer include CellCept, tacrolimus, valganciclovir, prednisone, Eliquis, and meropenem.    The patient was initially going to transfer to hospital medicine however he was intubated prior to transfer and was [place] [facility] [place] higher level of care.  He arrived on propofol and fentanyl.  KTM and transplant ID consulted on admission.    Hospital/ICU Course:  Started tube feeds. KTM on board, recommending trialysis line placement and HD.     Interval History/Significant Events: Worsening P/F ratio, versed and nimbex added. Nimbex paused this AM in attempts to wean vent settings.    Review of Systems   Unable to perform ROS: Intubated     Objective:     Vital Signs (Most Recent):  Temp: 97.9 °F (36.6 °C) (01/15/24 1200)  Pulse: (!) 53 (01/15/24 1601)  Resp: (!) 26 (01/15/24 1400)  BP: 109/67 (01/15/24 1500)  SpO2: 98 % (01/15/24 1500) Vital Signs (24h Range):  Temp:  [96.6 °F (35.9 °C)-98.3 °F (36.8 °C)] 97.9 °F (36.6 °C)  Pulse:  [53-64] 53  Resp:  [6-26] 26  SpO2:  [90 %-100 %] 98 %  BP: (106-154)/() 109/67   Weight: 132.8 kg (292 lb 12.3 oz)  Body mass index is 39.71 kg/m².      Intake/Output Summary (Last 24 hours) at 1/15/2024 1606  Last data filed at 1/15/2024 1529  Gross per 24 hour   Intake 1869.84 ml   Output 1855 ml   Net 14.84 ml          Physical Exam  Vitals and nursing note reviewed.   Constitutional:       Appearance: He is obese. He is ill-appearing.      Comments: Intubated   HENT:      Head: Normocephalic and atraumatic.      Nose: Nose normal.      Mouth/Throat:      Comments: ETT in place  Eyes:      Extraocular Movements: Extraocular movements intact.      Conjunctiva/sclera: Conjunctivae normal.      Pupils: Pupils are equal, round, and reactive to light.   Cardiovascular:      Rate and Rhythm: Regular rhythm. Bradycardia present.      Pulses: Normal pulses.      Heart sounds: Normal heart sounds. No murmur heard.  Pulmonary:      Effort: Respiratory distress present.       Breath sounds: Decreased air movement present. Examination of the right-lower field reveals decreased breath sounds. Examination of the left-lower field reveals decreased breath sounds. Decreased breath sounds present.      Comments: Mechanical breath sounds  Abdominal:      General: Bowel sounds are normal. There is distension.      Tenderness: There is abdominal tenderness.   Musculoskeletal:         General: Swelling present. Normal range of motion.      Right lower leg: Edema present.      Left lower leg: Edema present.   Skin:     General: Skin is warm and dry.      Capillary Refill: Capillary refill takes less than 2 seconds.   Neurological:      General: No focal deficit present.      Mental Status: He is oriented to person, place, and time. Mental status is at baseline.      Cranial Nerves: No cranial nerve deficit.      Motor: No weakness.   Psychiatric:         Mood and Affect: Mood normal.            Vents:  Vent Mode: A/C (01/15/24 0400)  Set Rate: 26 BPM (01/15/24 0400)  Vt Set: 500 mL (01/15/24 0400)  PEEP/CPAP: 12 cmH20 (01/15/24 0400)  Oxygen Concentration (%): 60 (01/15/24 1500)  Peak Airway Pressure: 28 cmH20 (01/15/24 0400)  Plateau Pressure: 26 cmH20 (01/15/24 0400)  Total Ve: 14.3 L/m (01/15/24 0400)  Negative Inspiratory Force (cm H2O): 0 (01/15/24 0400)  F/VT Ratio<105 (RSBI): (!) 20.68 (01/15/24 0400)  Lines/Drains/Airways       Drain  Duration                  Urethral Catheter 01/14/24 1345 1 day         NG/OG Tube 01/14/24 1625 18 Fr. Left nostril <1 day              Airway  Duration                  Airway - Non-Surgical 01/14/24 0000 1 day              Peripheral Intravenous Line  Duration                  Peripheral IV - Single Lumen 01/11/24 1500 20 G Anterior;Proximal;Right Forearm 4 days         Midline Catheter Insertion/Assessment  - Single Lumen 01/13/24 1500 Right basilic vein (medial side of arm) 2 days         Peripheral IV - Single Lumen 01/14/24 1845 Anterior;Distal;Left  Forearm <1 day         Peripheral IV - Single Lumen 01/15/24 0826 18 G;1 3/4 in Anterior;Right Upper Arm <1 day         Peripheral IV - Single Lumen 01/15/24 0826 20 G Posterior;Right Hand <1 day                  Significant Labs:    CBC/Anemia Profile:  Recent Labs   Lab 01/14/24  1403 01/14/24  2008 01/15/24  0351 01/15/24  0424 01/15/24  1205   WBC 13.77*  --   --  16.57*  --    HGB 10.8*  --   --  11.5*  --    HCT 32.9*   < > 28* 33.1* 27*     --   --  267  --    MCV 91  --   --  86  --    RDW 12.7  --   --  12.8  --     < > = values in this interval not displayed.        Chemistries:  Recent Labs   Lab 01/14/24  1403 01/14/24  2348 01/15/24  0424 01/15/24  0758   * 129*  --  130*   K 5.2* 5.5*  --  5.3*    102  --  102   CO2 9* 12*  --  14*   * 147*  --  152*   CREATININE 2.8* 3.3*  --  3.0*   CALCIUM 7.0* 8.5*  --  8.1*   ALBUMIN 1.2* 1.5*  --  1.3*   PROT 3.8* 4.7*  --  4.3*   BILITOT 0.2 0.2  --  0.2   ALKPHOS 86 110  --  91   ALT 28 33  --  26   AST 20 20  --  14   MG 1.9  --  2.3  --    PHOS 7.5* 9.4*  --  8.8*       Recent Lab Results  (Last 5 results in the past 24 hours)        01/15/24  1601   01/15/24  1205   01/15/24  0805   01/15/24  0758   01/15/24  0454        Albumin       1.3         ALP       91         Allens Test Pass   Pass             ALT       26         Anion Gap       14         AST       14         Baso #               Basophil %               BILIRUBIN TOTAL       0.2  Comment: For infants and newborns, interpretation of results should be based  on gestational age, weight and in agreement with clinical  observations.    Premature Infant recommended reference ranges:  Up to 24 hours.............<8.0 mg/dL  Up to 48 hours............<12.0 mg/dL  3-5 days..................<15.0 mg/dL  6-29 days.................<15.0 mg/dL           Site RR   RR             BUN       152         Calcium       8.1         Chloride       102         CO2       14         Creatinine        3.0         LIQUITY Adult Vent   Adult Vent             Differential Method               eGFR       26.1         Eos #               Eosinophil %               Glucose       120         Gran # (ANC)               Gran %               Hematocrit               Hemoglobin               Immature Grans (Abs)               Immature Granulocytes               INR               Lymph #               Lymph %               Magnesium                MCH               MCHC               MCV               Mode AC/PRVC   AC/PRVC             Mono #               Mono %               MPV               nRBC               Phosphorus Level       8.8         Platelet Count               POC BE -7   -8             POC HCO3 19.7   18.7             POC Hematocrit   27             POC Ionized Calcium   1.20             POC PCO2 40.6   38.8             POC PH 7.294   7.293             POC PO2 157   124             Potassium, Blood Gas   4.8             POC SATURATED O2 99   98             Sodium, Blood Gas   130             POC TCO2 21   20             POCT Glucose     121     135       Potassium       5.3  Comment: *No Visible Hemolysis         PROTEIN TOTAL       4.3  Comment: *Specimen lipemic.  Result may be interfered by lipemia         Protime               PTH               RBC               RDW               Sample ARTERIAL   ARTERIAL             Sodium       130         Tacrolimus Lvl               WBC                                      Significant Imaging:  I have reviewed all pertinent imaging results/findings within the past 24 hours.    ABG  Recent Labs   Lab 01/15/24  1601   PH 7.294*   PO2 157*   PCO2 40.6   HCO3 19.7*   BE -7*     Assessment/Plan:     Pulmonary  Pneumonia  See AHRF    Renal/  Acute on chronic renal failure  Cr was 1.3 in 2021, no other Cr results until the recent admission with Cr at 2.92. Likely prerenal 2/2 sepsis, potential component of rejection.    - KTM consulted due to history of renal  transplant   - HD for volume overload - will place trialysis line today   - Trend CMP/BMPs  - Strict intake/output  - Renally dose meds  - Avoid nephrotoxic agents  - Replete electrolytes PRN    Living-donor kidney transplant recipient - 7/26/10  Patient with kidney transplant 2010. He was previously admitted to [facility] December 18-23 with GENNY. 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  He was found to have nephrotic syndrome as well, started on eliquis.  He received pulse dose steroids and was started on Eliquis for anticoagulation. He was discharged on a prednisone taper, CellCept, and Prograf.  On December 23, his creatinine was 3.0. Current medications include CellCept, tacrolimus, prednisone, all of which he continued to receive at OSH.    - KTM consulted, appreciate recs  - Gangciclovir for CMV prophylaxis  - Eliquis for nephrotic syndrome    ID  Bacteremia  Patient is a 37 y/o male with a PMH of kidney transplant in 2010 that was transferred from [facility] with blood cultures positive for Staph haemolyticus. Patient was started on Merrem at the time.     - Will continue Merrem  - ID consulted, appreciate recs  - Redrawing blood cultures on transfer; will follow up with outside culture results  - Patient currently volume overloaded; will defer further fluid resuscitation  - Trending daily CBC    COVID  Patient noted to be covid positive 1/10/24, requiring BIPAP. Patient was intubated prior to transfer to [facility]. Acute hypoxemic respiratory failure also complicated by consolidations noted in bilateral lung bases w/ small b/l pleural effusions.     - Continue Remdesivir for total of 5 days  - Continue Dexamethasone 6mg QD for 8 days  - Mechanically ventilated, will wean sedation and pressure support as able  -  "Continuous oximetry  - Trending daily CBC/CMP, Mg, Phos    Immunology/Multi System  Immunocompromised state due to drug therapy  Pt takes Cellcept and Prograf at home    - there is a note in report how pt stated he did not take his immunotherapy for 1 month bc he "ran out"  - will hold immunosuppression in setting of infection, will f/u with KTM and ID recs    GI  Transaminitis  Patient noted to have transaminitis at outside facility with imaging concerning for cirrhosis and ascites.     - Trend CMPs  - Consider Liver doppler     Other  * Acute hypoxic respiratory failure  Patient with Hypoxic Respiratory failure which is Acute.  he is not on home oxygen. Contributing diagnoses includes - ARDS and Pneumonia Labs and images were reviewed. Patient Has not had a recent ABG. Supplemental oxygen was provided and noted- Vent Mode: A/C  Oxygen Concentration (%):  [60-90] 60  Resp Rate Total:  [22 br/min-37 br/min] 26 br/min  Vt Set:  [500 mL] 500 mL  PEEP/CPAP:  [10 qbI12-24 cmH20] 12 cmH20  Mean Airway Pressure:  [15 kfP07-82 cmH20] 19 cmH20    Plan:   - Meropenem for BSA  - ID consultation - ID will attempt to retrieve records from OSH tomorrow for more information to tailor abx  - Remdesivir/dexamethasone for COVID + status  - HD for volume overload - will place trialysis line today  - Q6 hour nebs        Critical Care Daily Checklist:    A: Awake: RASS Goal/Actual Goal: RASS Goal: -2-->light sedation  Actual:     B: Spontaneous Breathing Trial Performed?  No   C: SAT & SBT Coordinated?  Not yet                      D: Delirium: CAM-ICU Overall CAM-ICU: Negative   E: Early Mobility Performed? Yes   F: Feeding Goal: Goals: Meet % EEN, EPN by RD f/u date  Status: Nutrition Goal Status: new   Current Diet Order   Procedures    Diet NPO      AS: Analgesia/Sedation Propofol, versed, fentanyl   T: Thromboembolic Prophylaxis eliquis   H: HOB > 300 Yes   U: Stress Ulcer Prophylaxis (if needed) protonix   G: Glucose " Control SSI   B: Bowel Function  Bowel reg added   I: Indwelling Catheter (Lines & Mayen) Necessity Mayen, midline    D: De-escalation of Antimicrobials/Pharmacotherapies Continue meropenem    Plan for the day/ETD Trialysis line placement to start HD    Code Status:  Family/Goals of Care: Full Code         Critical secondary to Patient has a condition that poses threat to life and bodily function: Severe Respiratory Distress      Critical care was time spent personally by me on the following activities: development of treatment plan with patient or surrogate and bedside caregivers, discussions with consultants, evaluation of patient's response to treatment, examination of patient, ordering and performing treatments and interventions, ordering and review of laboratory studies, ordering and review of radiographic studies, pulse oximetry, re-evaluation of patient's condition. This critical care time did not overlap with that of any other provider or involve time for any procedures.     Lukasz Kemp MD  Critical Care Medicine  [facility] - Cardiac Medical ICU

## 2024-01-15 NOTE — HPI
37 y/o M with a medical history of kidney transplant in 2010 and HTN transferred to [facility] for acute hypoxemic respiratory failure, bacteremia, and hx of Kidney Transplant for KTM and Infectious Disease evaluation. Pt was previously admitted to [facility] 12/18/23 - 12/23/23 with GENNY, headache, and HTN. 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  He was found to have nephrotic syndrome as well. Received pulse dose steroids and was started on Eliquis. Discharged on a prednisone taper, CellCept, and Prograf.  sCr 12/23 was 3.0.     On 1/5/24 he was admitted to [facility] in [place] with abnormal labs. Outpatient labs on 1/3 showed WBC 17.7 Hgb 13.1, K 5.7, bicarb 18, BUN 71, sCr 2.92, phos 5.1, and tacro level 10.8. Was found to be COVID positive requiring BiPAP. Blood culture with Staph haemolyticus. Labs notable for sCr ranging 2.68 to 2.73. K normalized.  Abdominal US on 1/6 noted cirrhotic appearing liver. CT chest on 1/7 showed consolidated infiltrates and/or atelectasis involving the bilateral lung bases with small bilateral pleural effusions, patchy heterogeneous infiltrate involving the right middle lobe, and mild ascites. Transplant renal ultrasound 1/8 showed stable appearance of the transplant kidney without evidence of acute abnormality. Gallbladder ultrasound 1/8 showed sludge in the gallbladder. Medications prior to transfer include CellCept, tacrolimus, valganciclovir, prednisone, Eliquis, and meropenem.     The patient was initially going to transfer to hospital medicine however he was intubated prior to transfer and was accepted to [facility] for a higher level of care. ID consulted for assistance in work up of bacteremia.

## 2024-01-15 NOTE — ASSESSMENT & PLAN NOTE
39 y/o M with a medical history of kidney transplant in 2010 and HTN transferred to [facility] for acute hypoxemic respiratory failure, bacteremia, and hx of Kidney Transplant for KTM and ID.     Report given stated blood cx from outside hospital grew Staph Haemolyticus, was broadened to Meropenem for bacteremia before being transferred to [facility]. Not sure if there was suspicion for gram negative infection as well. Will need outside culture report.     Pt could very well be bacteremic, but his main problem seems to be ARDS, COVID PNA. Will f/u on our cultures taken at [facility], and try to obtain outside report to tailor abx regimen.     Recommendations:  - continue COVID pneumonia course of Remdesivir and Dex  - continue Meropenem for now, usually not warranted if it's just Staph Haemolyticus growing in cultures, but need report from outside facility  - continue IV Ganciclovir   - will call [facility] tomorrow, 1/16/24, to see if blood cx report can be sent over to [facility], will tailor abx once full report obtained  - f/u blood, urine, and resp cx

## 2024-01-15 NOTE — CONSULTS
[facility] - Cardiac Medical ICU  Adult Nutrition  Consult Note    SUMMARY     Recommendations    If/when able, initiate TFs. With current Propofol rate, rec'd Novasource @ 30 mL/hr to provide 2491 kcals (1051 from Propofol), 65 g of protein, 516 mL fluid.  - When Propofol discontinued, rec'd Novasource @ 50 mL/hr to provide 2400 kcals, 109 g of protein, 860 mL fluid.   RD to monitor & follow-up.    Goals: Meet % EEN, EPN by RD f/u date  Nutrition Goal Status: new  Communication of RD Recs: reviewed with RN    Assessment and Plan    Nutrition Problem:  Inadequate energy intake    Related to (etiology):   Inability to consume sufficient energy     Signs and Symptoms (as evidenced by):   NPO    Interventions(treatment strategy):  Collaboration of nutrition care w/ other providers  EN?    Nutrition Diagnosis Status:   New    Reason for Assessment    Reason For Assessment: consult  Diagnosis: other (see comments) (Resp. fx)  Relevant Medical History: Kidney tx, HTN  Interdisciplinary Rounds: attended    General Information Comments: Intubated, sedated, paralyzed - noted proning being discussed. Pt w/ UBW ~270# per chart review (currently w/ +3 edema). Appears nourished w/ no physical signs of malnutrition.   Nutrition Discharge Planning: Pending clinical course    Nutrition/Diet History    Factors Affecting Nutritional Intake: on mechanical ventilation, NPO    Anthropometrics    Temp: 97.9 °F (36.6 °C)  Height: 6' (182.9 cm)  Height (inches): 72 in  Weight Method: Bed Scale  Weight: 132.8 kg (292 lb 12.3 oz)  Weight (lb): 292.77 lb  Ideal Body Weight (IBW), Male: 178 lb  % Ideal Body Weight, Male (lb): 164.48 %  BMI (Calculated): 39.7  BMI Grade: 35 - 39.9 - obesity - grade II    Lab/Procedures/Meds    Pertinent Labs Reviewed: reviewed  Pertinent Labs Comments: K 5.3, Creat 3, GFR 26.1, P 8.8  Pertinent Medications Reviewed: reviewed  Pertinent Medications Comments: Nimbex, Propofol, Fentanyl,  Midazolam    Estimated/Assessed Needs    Weight Used For Calorie Calculations: 132.8 kg (292 lb 12.3 oz)    Energy Calorie Requirements (kcal): 2448 kcal/d  Energy Need Method: [facility]    Protein Requirements: 122-162 g/d (1.5-2 g/kg IBW)  Weight Used For Protein Calculations: 81 kg (178 lb 9.2 oz)    Estimated Fluid Requirement Method: other (see comments) (Per MD)  RDA Method (mL): 2448    Nutrition Prescription Ordered    Current Diet Order: NPO    Evaluation of Received Nutrient/Fluid Intake    Other Calories (kcal): 1051 (Propofol)    I/O: -9.4L since admit    Comments: LBM: PTA    Nutrition Risk    Level of Risk/Frequency of Follow-up:  (1x/week)     Monitor and Evaluation    Food and Nutrient Intake: enteral nutrition intake, food and beverage intake, energy intake  Food and Nutrient Adminstration: diet order, enteral and parenteral nutrition administration  Physical Activity and Function: nutrition-related ADLs and IADLs  Anthropometric Measurements: weight, weight change  Biochemical Data, Medical Tests and Procedures: inflammatory profile, lipid profile, glucose/endocrine profile, gastrointestinal profile, electrolyte and renal panel  Nutrition-Focused Physical Findings: overall appearance     Nutrition Follow-Up    RD Follow-up?: Yes

## 2024-01-15 NOTE — SUBJECTIVE & OBJECTIVE
Interval History/Significant Events: Worsening P/F ratio, versed and nimbex added. Nimbex paused this AM in attempts to wean vent settings.    Review of Systems   Unable to perform ROS: Intubated     Objective:     Vital Signs (Most Recent):  Temp: 97.9 °F (36.6 °C) (01/15/24 1200)  Pulse: (!) 53 (01/15/24 1601)  Resp: (!) 26 (01/15/24 1400)  BP: 109/67 (01/15/24 1500)  SpO2: 98 % (01/15/24 1500) Vital Signs (24h Range):  Temp:  [96.6 °F (35.9 °C)-98.3 °F (36.8 °C)] 97.9 °F (36.6 °C)  Pulse:  [53-64] 53  Resp:  [6-26] 26  SpO2:  [90 %-100 %] 98 %  BP: (106-154)/() 109/67   Weight: 132.8 kg (292 lb 12.3 oz)  Body mass index is 39.71 kg/m².      Intake/Output Summary (Last 24 hours) at 1/15/2024 1606  Last data filed at 1/15/2024 1529  Gross per 24 hour   Intake 1869.84 ml   Output 1855 ml   Net 14.84 ml          Physical Exam  Vitals and nursing note reviewed.   Constitutional:       Appearance: He is obese. He is ill-appearing.      Comments: Intubated   HENT:      Head: Normocephalic and atraumatic.      Nose: Nose normal.      Mouth/Throat:      Comments: ETT in place  Eyes:      Extraocular Movements: Extraocular movements intact.      Conjunctiva/sclera: Conjunctivae normal.      Pupils: Pupils are equal, round, and reactive to light.   Cardiovascular:      Rate and Rhythm: Regular rhythm. Bradycardia present.      Pulses: Normal pulses.      Heart sounds: Normal heart sounds. No murmur heard.  Pulmonary:      Effort: Respiratory distress present.      Breath sounds: Decreased air movement present. Examination of the right-lower field reveals decreased breath sounds. Examination of the left-lower field reveals decreased breath sounds. Decreased breath sounds present.      Comments: Mechanical breath sounds  Abdominal:      General: Bowel sounds are normal. There is distension.      Tenderness: There is abdominal tenderness.   Musculoskeletal:         General: Swelling present. Normal range of motion.       Right lower leg: Edema present.      Left lower leg: Edema present.   Skin:     General: Skin is warm and dry.      Capillary Refill: Capillary refill takes less than 2 seconds.   Neurological:      General: No focal deficit present.      Mental Status: He is oriented to person, place, and time. Mental status is at baseline.      Cranial Nerves: No cranial nerve deficit.      Motor: No weakness.   Psychiatric:         Mood and Affect: Mood normal.            Vents:  Vent Mode: A/C (01/15/24 0400)  Set Rate: 26 BPM (01/15/24 0400)  Vt Set: 500 mL (01/15/24 0400)  PEEP/CPAP: 12 cmH20 (01/15/24 0400)  Oxygen Concentration (%): 60 (01/15/24 1500)  Peak Airway Pressure: 28 cmH20 (01/15/24 0400)  Plateau Pressure: 26 cmH20 (01/15/24 0400)  Total Ve: 14.3 L/m (01/15/24 0400)  Negative Inspiratory Force (cm H2O): 0 (01/15/24 0400)  F/VT Ratio<105 (RSBI): (!) 20.68 (01/15/24 0400)  Lines/Drains/Airways       Drain  Duration                  Urethral Catheter 01/14/24 1345 1 day         NG/OG Tube 01/14/24 1625 18 Fr. Left nostril <1 day              Airway  Duration                  Airway - Non-Surgical 01/14/24 0000 1 day              Peripheral Intravenous Line  Duration                  Peripheral IV - Single Lumen 01/11/24 1500 20 G Anterior;Proximal;Right Forearm 4 days         Midline Catheter Insertion/Assessment  - Single Lumen 01/13/24 1500 Right basilic vein (medial side of arm) 2 days         Peripheral IV - Single Lumen 01/14/24 1845 Anterior;Distal;Left Forearm <1 day         Peripheral IV - Single Lumen 01/15/24 0826 18 G;1 3/4 in Anterior;Right Upper Arm <1 day         Peripheral IV - Single Lumen 01/15/24 0826 20 G Posterior;Right Hand <1 day                  Significant Labs:    CBC/Anemia Profile:  Recent Labs   Lab 01/14/24  1403 01/14/24  2008 01/15/24  0351 01/15/24  0424 01/15/24  1205   WBC 13.77*  --   --  16.57*  --    HGB 10.8*  --   --  11.5*  --    HCT 32.9*   < > 28* 33.1* 27*     --   --   267  --    MCV 91  --   --  86  --    RDW 12.7  --   --  12.8  --     < > = values in this interval not displayed.        Chemistries:  Recent Labs   Lab 01/14/24  1403 01/14/24  2348 01/15/24  0424 01/15/24  0758   * 129*  --  130*   K 5.2* 5.5*  --  5.3*    102  --  102   CO2 9* 12*  --  14*   * 147*  --  152*   CREATININE 2.8* 3.3*  --  3.0*   CALCIUM 7.0* 8.5*  --  8.1*   ALBUMIN 1.2* 1.5*  --  1.3*   PROT 3.8* 4.7*  --  4.3*   BILITOT 0.2 0.2  --  0.2   ALKPHOS 86 110  --  91   ALT 28 33  --  26   AST 20 20  --  14   MG 1.9  --  2.3  --    PHOS 7.5* 9.4*  --  8.8*       Recent Lab Results  (Last 5 results in the past 24 hours)        01/15/24  1601   01/15/24  1205   01/15/24  0805   01/15/24  0758   01/15/24  0454        Albumin       1.3         ALP       91         Allens Test Pass   Pass             ALT       26         Anion Gap       14         AST       14         Baso #               Basophil %               BILIRUBIN TOTAL       0.2  Comment: For infants and newborns, interpretation of results should be based  on gestational age, weight and in agreement with clinical  observations.    Premature Infant recommended reference ranges:  Up to 24 hours.............<8.0 mg/dL  Up to 48 hours............<12.0 mg/dL  3-5 days..................<15.0 mg/dL  6-29 days.................<15.0 mg/dL           Site RR   RR             BUN       152         Calcium       8.1         Chloride       102         CO2       14         Creatinine       3.0         DelSys Adult Vent   Adult Vent             Differential Method               eGFR       26.1         Eos #               Eosinophil %               Glucose       120         Gran # (ANC)               Gran %               Hematocrit               Hemoglobin               Immature Grans (Abs)               Immature Granulocytes               INR               Lymph #               Lymph %               Magnesium                MCH                MCHC               MCV               Mode AC/PRVC   AC/PRVC             Mono #               Mono %               MPV               nRBC               Phosphorus Level       8.8         Platelet Count               POC BE -7   -8             POC HCO3 19.7   18.7             POC Hematocrit   27             POC Ionized Calcium   1.20             POC PCO2 40.6   38.8             POC PH 7.294   7.293             POC PO2 157   124             Potassium, Blood Gas   4.8             POC SATURATED O2 99   98             Sodium, Blood Gas   130             POC TCO2 21   20             POCT Glucose     121     135       Potassium       5.3  Comment: *No Visible Hemolysis         PROTEIN TOTAL       4.3  Comment: *Specimen lipemic.  Result may be interfered by lipemia         Protime               PTH               RBC               RDW               Sample ARTERIAL   ARTERIAL             Sodium       130         Tacrolimus Lvl               WBC                                      Significant Imaging:  I have reviewed all pertinent imaging results/findings within the past 24 hours.

## 2024-01-15 NOTE — ASSESSMENT & PLAN NOTE
Patient with Hypoxic Respiratory failure which is Acute.  he is not on home oxygen. Contributing diagnoses includes - ARDS and Pneumonia Labs and images were reviewed. Patient Has not had a recent ABG. Supplemental oxygen was provided and noted- Vent Mode: A/C  Oxygen Concentration (%):  [60-90] 60  Resp Rate Total:  [22 br/min-37 br/min] 26 br/min  Vt Set:  [500 mL] 500 mL  PEEP/CPAP:  [10 snD55-12 cmH20] 12 cmH20  Mean Airway Pressure:  [15 suW72-00 cmH20] 19 cmH20    Plan:   - Meropenem for BSA  - ID consultation - ID will attempt to retrieve records from OSH tomorrow for more information to tailor abx  - Remdesivir/dexamethasone for COVID + status  - HD for volume overload - will place trialysis line today  - Q6 hour nebs

## 2024-01-15 NOTE — NURSING
Patient was becoming increasingly more agitated and restless in the bed. Patient agree that he felt uncomfortable. Patient then began to gag on ETT and has one episode of emesis. MD made aware and additional sedation was added (prop) and NGT was placed on int. Suction.

## 2024-01-15 NOTE — PLAN OF CARE
MICU DAILY GOALS     Family/Goals of care/Code Status   Code Status: Full Code    24H Vital Sign Range  Temp:  [96.2 °F (35.7 °C)-98.3 °F (36.8 °C)]   Pulse:  [51-63]   Resp:  [6-37]   BP: (108-154)/()   SpO2:  [90 %-100 %]      Shift Events (include procedures and significant events)   Worsening P/F ratio overnight. Added on versed gtt and awaiting nimbex from pharmacy with plan to prone.    AWAKE RASS: Goal - RASS Goal: 0-->alert and calm  Actual - RASS (Dash Agitation-Sedation Scale): drowsy    Restraint necessity: Treatment interference   BREATHE SBT: Not attempted    Coordinate A & B, analgesics/sedatives Pain: managed   SAT: Not attempted   Delirium CAM-ICU: Overall CAM-ICU: Positive   Early(intubated/ Progressive (non-intubated) Mobility MOVE Screen (INTUBATED ONLY): Not attempted    Activity: Activity Management: Rolling - L1   Feeding/Nutrition Diet order: Diet/Nutrition Received: NPO,     Thrombus DVT prophylaxis: VTE Required Core Measure: Pharmacological prophylaxis initiated/maintained   HOB Elevation Head of Bed (HOB) Positioning: HOB at 30-45 degrees   Ulcer Prophylaxis GI: yes   Glucose control managed     Skin Skin assessed during: Q Shift Change    Sacrum intact/not altered? Yes  Heels intact/not altered? Yes  Surgical wound? No    Check one (no altered skin or altered skin) and sub boxes:  [x] No Altered Skin Integrity Present    [x]Prevention Measures Documented    [] Altered Skin Integrity Present or Discovered   [] LDA present in EPIC              [] LDA added in EPIC   [] Wound Image Taken (required on admit,                   transfer/discharge and every Tuesday)    Wound Care Consulted? No    Attending Nurse:     Second RN/Staff Member:    Bowel Function no issues    Indwelling Catheter Necessity      Urethral Catheter 01/14/24 1345-Reason for Continuing Urinary Catheterization: Critically ill in ICU and requiring hourly monitoring of intake/output        De-escalation Antibiotics  Yes       VS and assessment per flow sheet, patient progressing towards goals as tolerated, plan of care reviewed with patient, all concerns addressed, will continue to monitor.

## 2024-01-15 NOTE — ASSESSMENT & PLAN NOTE
Patient with kidney transplant 2010. He was previously admitted to [facility] December 18-23 with GENNY. 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  He was found to have nephrotic syndrome as well, started on eliquis.  He received pulse dose steroids and was started on Eliquis for anticoagulation. He was discharged on a prednisone taper, CellCept, and Prograf.  On December 23, his creatinine was 3.0. Current medications include CellCept, tacrolimus, prednisone, all of which he continued to receive at OSH.    - KTM consulted, appreciate recs  - Gangciclovir for CMV prophylaxis  - Eliquis for nephrotic syndrome   Zyclara Counseling:  I discussed with the patient the risks of imiquimod including but not limited to erythema, scaling, itching, weeping, crusting, and pain.  Patient understands that the inflammatory response to imiquimod is variable from person to person and was educated regarded proper titration schedule.  If flu-like symptoms develop, patient knows to discontinue the medication and contact us.

## 2024-01-15 NOTE — HOSPITAL COURSE
Intubated. On vent  FIO2%90. His UOP ~ 1 liter with lasix. Has metabolic acidosis on ABG. Plan for HD to remove some fluid and correct acidosis. Pt needs to have line today

## 2024-01-15 NOTE — SUBJECTIVE & OBJECTIVE
Past Medical History:   Diagnosis Date    Chronic interstitial nephritis     CKD (chronic kidney disease), stage III 7/17/2013    Hypertension     Immunocompromised state 7/11/2018    Living-donor kidney transplant recipient     Need for prophylactic immunotherapy        Past Surgical History:   Procedure Laterality Date    KIDNEY TRANSPLANT      PERITONEAL CATHETER REMOVAL         Review of patient's allergies indicates:   Allergen Reactions    Ibuprofen Other (See Comments)    Penicillins      Other reaction(s): Hives  Other reaction(s): Edema       Medications:  Medications Prior to Admission   Medication Sig    apixaban (ELIQUIS) 5 mg Tab Take 1 tablet (5 mg total) by mouth 2 (two) times daily.    carvediloL (COREG) 12.5 MG tablet Take 1 tablet (12.5 mg total) by mouth 2 (two) times daily.    furosemide (LASIX) 20 MG tablet Take 3 tablets (60 mg total) by mouth 2 (two) times a day.    mycophenolate (CELLCEPT) 250 mg Cap Take 4 capsules (1,000 mg total) by mouth 2 (two) times daily. (Patient taking differently: Take 500 mg by mouth 2 (two) times daily.)    NIFEdipine (PROCARDIA-XL) 60 MG (OSM) 24 hr tablet Take 1 tablet (60 mg total) by mouth 2 (two) times a day.    olmesartan (BENICAR) 40 MG tablet Take 1 tablet (40 mg total) by mouth once daily.    predniSONE (DELTASONE) 20 MG tablet 60 mg PO daily x 3 weeks, then 50 mg PO daily x 3 weeks then follow up in clinic for subsequent taper    sevelamer carbonate (RENVELA) 800 mg Tab Take 1 tablet (800 mg total) by mouth 3 (three) times daily with meals.    sodium bicarbonate 650 MG tablet Take 2 tablets (1,300 mg total) by mouth 3 (three) times daily.    tacrolimus (PROGRAF) 1 MG Cap Take 4 capsules (4 mg total) by mouth every 12 (twelve) hours.    valGANciclovir (VALCYTE) 450 mg Tab Take 1 tablet (450 mg total) by mouth once daily. Stop: 6/22/24     Antibiotics (From admission, onward)      Start     Stop Route Frequency Ordered    01/14/24 2100  mupirocin 2 %  ointment         24 Nasl 2 times daily 24 1340    24 1600  meropenem (MERREM) 2 g in sodium chloride 0.9% 100 mL IVPB         -- IV Every 12 hours (non-standard times) 24 1443          Antifungals (From admission, onward)      None          Antivirals (From admission, onward)          Stop Route Frequency     ganciclovir (CYTOVENE) injection         -- IV Every 24 hours (non-standard times)               There is no immunization history on file for this patient.    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 Previously in [employer] ([employer] He works full time     Social Determinants of Health     Financial Resource Strain: Low Risk  (2023)    Overall Financial Resource Strain (CARDIA)     Difficulty of Paying Living Expenses: Not very hard   Food Insecurity: No Food Insecurity (2023)    Hunger Vital Sign     Worried About Running Out of Food in the Last Year: Never true     Ran Out of Food in the Last Year: Never true   Transportation Needs: No Transportation Needs (2023)    PRAPARE - Transportation     Lack of Transportation (Medical): No     Lack of Transportation (Non-Medical): No   Physical Activity: Inactive (2023)    Exercise Vital Sign     Days of Exercise per Week: 0 days     Minutes of Exercise per Session: 0 min   Stress: Stress Concern Present (2023)    [employer] [employer] - Occupational Stress  Questionnaire     Feeling of Stress : To some extent   Social Connections: Moderately Isolated (12/20/2023)    Social Connection and Isolation Panel [NHANES]     Frequency of Communication with Friends and Family: More than three times a week     Frequency of Social Gatherings with Friends and Family: More than three times a week     Attends Christianity Services: Never     Active Member of Clubs or Organizations: No     Attends Club or Organization Meetings: Never     Marital Status:    Housing Stability: Low Risk  (12/20/2023)    Housing Stability Vital Sign     Unable to Pay for Housing in the Last Year: No     Number of Places Lived in the Last Year: 1     Unstable Housing in the Last Year: No     Review of Systems   Unable to perform ROS: Intubated     Objective:     Vital Signs (Most Recent):  Temp: 97.9 °F (36.6 °C) (01/15/24 1200)  Pulse: (!) 59 (01/15/24 1400)  Resp: (!) 26 (01/15/24 1400)  BP: 114/70 (01/15/24 1400)  SpO2: 98 % (01/15/24 1400) Vital Signs (24h Range):  Temp:  [96.6 °F (35.9 °C)-98.3 °F (36.8 °C)] 97.9 °F (36.6 °C)  Pulse:  [55-64] 59  Resp:  [6-26] 26  SpO2:  [90 %-100 %] 98 %  BP: (106-154)/() 114/70     Weight: 132.8 kg (292 lb 12.3 oz)  Body mass index is 39.71 kg/m².    Estimated Creatinine Clearance: 46.2 mL/min (A) (based on SCr of 3 mg/dL (H)).     Physical Exam  Vitals and nursing note reviewed.   Constitutional:       Appearance: He is obese. He is ill-appearing.      Comments: Intubated   HENT:      Head: Normocephalic and atraumatic.      Nose: Nose normal.      Mouth/Throat:      Comments: ETT in place  Eyes:      Extraocular Movements: Extraocular movements intact.      Conjunctiva/sclera: Conjunctivae normal.      Pupils: Pupils are equal, round, and reactive to light.   Cardiovascular:      Rate and Rhythm: Regular rhythm. Bradycardia present.      Pulses: Normal pulses.      Heart sounds: Normal heart sounds. No murmur heard.  Pulmonary:      Effort: Respiratory  distress present.      Breath sounds: Decreased air movement present. Examination of the right-lower field reveals decreased breath sounds. Examination of the left-lower field reveals decreased breath sounds. Decreased breath sounds present.      Comments: Mechanical breath sounds  Abdominal:      General: Bowel sounds are normal. There is distension.      Tenderness: There is abdominal tenderness.   Musculoskeletal:         General: Swelling present. Normal range of motion.      Right lower leg: Edema present.      Left lower leg: Edema present.   Skin:     General: Skin is warm and dry.      Capillary Refill: Capillary refill takes less than 2 seconds.   Neurological:      General: No focal deficit present.      Mental Status: He is oriented to person, place, and time. Mental status is at baseline.      Cranial Nerves: No cranial nerve deficit.      Motor: No weakness.   Psychiatric:         Mood and Affect: Mood normal.          Significant Labs: CBC:   Recent Labs   Lab 01/14/24  1403 01/14/24  2008 01/15/24  0351 01/15/24  0424 01/15/24  1205   WBC 13.77*  --   --  16.57*  --    HGB 10.8*  --   --  11.5*  --    HCT 32.9*   < > 28* 33.1* 27*     --   --  267  --     < > = values in this interval not displayed.     CMP:   Recent Labs   Lab 01/14/24  1403 01/14/24  2348 01/15/24  0758   * 129* 130*   K 5.2* 5.5* 5.3*    102 102   CO2 9* 12* 14*   * 166* 120*   * 147* 152*   CREATININE 2.8* 3.3* 3.0*   CALCIUM 7.0* 8.5* 8.1*   PROT 3.8* 4.7* 4.3*   ALBUMIN 1.2* 1.5* 1.3*   BILITOT 0.2 0.2 0.2   ALKPHOS 86 110 91   AST 20 20 14   ALT 28 33 26   ANIONGAP 14 15 14       Significant Imaging: I have reviewed all pertinent imaging results/findings within the past 24 hours.

## 2024-01-15 NOTE — PLAN OF CARE
MICU DAILY GOALS     Family/Goals of care/Code Status   Code Status: Full Code    24H Vital Sign Range  Temp:  [96.6 °F (35.9 °C)-98.3 °F (36.8 °C)]   Pulse:  [53-64]   Resp:  [6-26]   BP: (106-154)/()   SpO2:  [93 %-100 %]      Shift Events (include procedures and significant events)   No acute events throughout shift; patient placed on versed infusion for better ventilator synchrony. Noted increase of PF ratio during shift. No longer requiring possible prone intervention. Trialysis line being  placed for HD. 2 Hrs ordered- if not tolerating will transition to SLED.     AWAKE RASS: Goal - RASS Goal: -2-->light sedation  Actual - RASS (Dash Agitation-Sedation Scale): light sedation    Restraint necessity: Not necessary   BREATHE SBT: Not attempted    Coordinate A & B, analgesics/sedatives Pain: managed   SAT: Not attempted   Delirium CAM-ICU: Overall CAM-ICU: Negative   Early(intubated/ Progressive (non-intubated) Mobility MOVE Screen (INTUBATED ONLY): Fail    Activity: Activity Management: Patient unable to perform activities   Feeding/Nutrition Diet order: Diet/Nutrition Received: NPO,     Thrombus DVT prophylaxis: VTE Required Core Measure: Pharmacological prophylaxis initiated/maintained   HOB Elevation Head of Bed (HOB) Positioning: HOB at 30-45 degrees   Ulcer Prophylaxis GI: yes   Glucose control managed     Skin Skin assessed during: Daily Assessment    Sacrum intact/not altered? Yes  Heels intact/not altered? Yes  Surgical wound? No    Check one (no altered skin or altered skin) and sub boxes:  [x] No Altered Skin Integrity Present    [x]Prevention Measures Documented    [] Altered Skin Integrity Present or Discovered   [] LDA present in EPIC              [] LDA added in EPIC   [] Wound Image Taken (required on admit,                   transfer/discharge and every Tuesday)    Wound Care Consulted? No    Attending Nurse: Tanner MALLORY     Second RN/Staff Member: TERE Saenz RN    Bowel Function no  issues    Indwelling Catheter Necessity      Urethral Catheter 01/14/24 1345-Reason for Continuing Urinary Catheterization: Critically ill in ICU and requiring hourly monitoring of intake/output          De-escalation Antibiotics Yes       VS and assessment per flow sheet, patient progressing towards goals as tolerated, plan of care reviewed with family, all concerns addressed, will continue to monitor.

## 2024-01-15 NOTE — PLAN OF CARE
Recommendations     If/when able, initiate TFs. With current Propofol rate, rec'd Novasource @ 30 mL/hr to provide 2491 kcals (1051 from Propofol), 65 g of protein, 516 mL fluid.  - When Propofol discontinued, rec'd Novasource @ 50 mL/hr to provide 2400 kcals, 109 g of protein, 860 mL fluid.   RD to monitor & follow-up.     Goals: Meet % EEN, EPN by RD f/u date  Nutrition Goal Status: new  Communication of RD Recs: reviewed with RN

## 2024-01-15 NOTE — ASSESSMENT & PLAN NOTE
Patient is a 37 y/o male with a PMH of kidney transplant in 2010 that was transferred from [facility] with blood cultures positive for Staph haemolyticus. Patient was started on Merrem at the time.     - Will continue Merrem  - ID consulted, appreciate recs  - Redrawing blood cultures on transfer; will follow up with outside culture results  - Patient currently volume overloaded; will defer further fluid resuscitation  - Trending daily CBC

## 2024-01-15 NOTE — HOSPITAL COURSE
Pt admitted to MICU for respiratory failure 2/2 COVID pneumonia and volume overload. Extubated on 1/17/24 to HFNC and switched to 6L bubble flow on 1/19/24. Patient had an GENNY on admission and required a session of CRRT on 1/15 and and 1/16. Diuretic dosage was increased aggressively and GENNY improved, patient with great urine output. Patient stable for step down on 1/19/2024.

## 2024-01-15 NOTE — CONSULTS
[facility] - Cardiac Medical ICU  Infectious Disease  Consult Note    Patient Name: Ubaldo Tripp  MRN: [id number]  Admission Date: 1/14/2024  Hospital Length of Stay: 1 days  Attending Physician: Kristian Smith MD  Primary Care Provider: Jaime Jauregui (Inactive)     Isolation Status: Airborne and Contact and Droplet    Patient information was obtained from past medical records and ER records.      Inpatient consult to Infectious Diseases  Consult performed by: Skip Carpio DO  Consult ordered by: Skip Carpio DO        Assessment/Plan:     ID  Bacteremia  39 y/o M with a medical history of kidney transplant in 2010 and HTN transferred to [facility] MICU for acute hypoxemic respiratory failure, bacteremia, and hx of Kidney Transplant for KTM and ID.     Report given stated blood cx from outside hospital grew Staph Haemolyticus, was broadened to Meropenem for bacteremia before being transferred to [facility]. Not sure if there was suspicion for gram negative infection as well. Will need outside culture report.     Pt could very well be bacteremic, but his main problem seems to be ARDS, COVID PNA. Will f/u on our cultures taken at [facility], and try to obtain outside report to tailor abx regimen.     Recommendations:  - continue COVID pneumonia course of Remdesivir and Dex  - continue Meropenem for now, usually not warranted if it's just Staph Haemolyticus growing in cultures, but need report from outside facility  - continue IV Ganciclovir   - will call [facility] tomorrow, 1/16/24, to see if blood cx report can be sent over to [facility], will tailor abx once full report obtained  - f/u blood, urine, and resp cx          Thank you for your consult. I will follow-up with patient. Please contact us if you have any additional questions.    Skip Carpio DO  Infectious Disease  [facility] - Cardiac Medical ICU    Subjective:     Principal Problem: Acute hypoxic respiratory failure    HPI: 39 y/o M with a medical history of  kidney transplant in 2010 and HTN transferred to [facility] for acute hypoxemic respiratory failure, bacteremia, and hx of Kidney Transplant for KTM and Infectious Disease evaluation. Pt was previously admitted to [facility] 12/18/23 - 12/23/23 with GENNY, headache, and HTN. 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  He was found to have nephrotic syndrome as well. Received pulse dose steroids and was started on Eliquis. Discharged on a prednisone taper, CellCept, and Prograf.  sCr 12/23 was 3.0.     On 1/5/24 he was admitted to [facility] in [place] with abnormal labs. Outpatient labs on 1/3 showed WBC 17.7 Hgb 13.1, K 5.7, bicarb 18, BUN 71, sCr 2.92, phos 5.1, and tacro level 10.8. Was found to be COVID positive requiring BiPAP. Blood culture with Staph haemolyticus. Labs notable for sCr ranging 2.68 to 2.73. K normalized.  Abdominal US on 1/6 noted cirrhotic appearing liver. CT chest on 1/7 showed consolidated infiltrates and/or atelectasis involving the bilateral lung bases with small bilateral pleural effusions, patchy heterogeneous infiltrate involving the right middle lobe, and mild ascites. Transplant renal ultrasound 1/8 showed stable appearance of the transplant kidney without evidence of acute abnormality. Gallbladder ultrasound 1/8 showed sludge in the gallbladder. Medications prior to transfer include CellCept, tacrolimus, valganciclovir, prednisone, Eliquis, and meropenem.     The patient was initially going to transfer to hospital medicine however he was intubated prior to transfer and was accepted to [facility]U for a higher level of care. ID consulted for assistance in work up of bacteremia.     Past Medical History:   Diagnosis Date    Chronic interstitial nephritis     CKD (chronic kidney  disease), stage III 7/17/2013    Hypertension     Immunocompromised state 7/11/2018    Living-donor kidney transplant recipient     Need for prophylactic immunotherapy        Past Surgical History:   Procedure Laterality Date    KIDNEY TRANSPLANT      PERITONEAL CATHETER REMOVAL         Review of patient's allergies indicates:   Allergen Reactions    Ibuprofen Other (See Comments)    Penicillins      Other reaction(s): Hives  Other reaction(s): Edema       Medications:  Medications Prior to Admission   Medication Sig    apixaban (ELIQUIS) 5 mg Tab Take 1 tablet (5 mg total) by mouth 2 (two) times daily.    carvediloL (COREG) 12.5 MG tablet Take 1 tablet (12.5 mg total) by mouth 2 (two) times daily.    furosemide (LASIX) 20 MG tablet Take 3 tablets (60 mg total) by mouth 2 (two) times a day.    mycophenolate (CELLCEPT) 250 mg Cap Take 4 capsules (1,000 mg total) by mouth 2 (two) times daily. (Patient taking differently: Take 500 mg by mouth 2 (two) times daily.)    NIFEdipine (PROCARDIA-XL) 60 MG (OSM) 24 hr tablet Take 1 tablet (60 mg total) by mouth 2 (two) times a day.    olmesartan (BENICAR) 40 MG tablet Take 1 tablet (40 mg total) by mouth once daily.    predniSONE (DELTASONE) 20 MG tablet 60 mg PO daily x 3 weeks, then 50 mg PO daily x 3 weeks then follow up in clinic for subsequent taper    sevelamer carbonate (RENVELA) 800 mg Tab Take 1 tablet (800 mg total) by mouth 3 (three) times daily with meals.    sodium bicarbonate 650 MG tablet Take 2 tablets (1,300 mg total) by mouth 3 (three) times daily.    tacrolimus (PROGRAF) 1 MG Cap Take 4 capsules (4 mg total) by mouth every 12 (twelve) hours.    valGANciclovir (VALCYTE) 450 mg Tab Take 1 tablet (450 mg total) by mouth once daily. Stop: 6/22/24     Antibiotics (From admission, onward)      Start     Stop Route Frequency Ordered    01/14/24 2100  mupirocin 2 % ointment         01/19/24 2059 Nasl 2 times daily 01/14/24 1340    01/14/24 1600  meropenem (MERREM) 2  g in sodium chloride 0.9% 100 mL IVPB         -- IV Every 12 hours (non-standard times) 24 1443          Antifungals (From admission, onward)      None          Antivirals (From admission, onward)          Stop Route Frequency     ganciclovir (CYTOVENE) injection         -- IV Every 24 hours (non-standard times)               There is no immunization history on file for this patient.    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 Previously in  (, trades). He works full time     Social Determinants of Health     Financial Resource Strain: Low Risk  (2023)    Overall Financial Resource Strain (CARDIA)     Difficulty of Paying Living Expenses: Not very hard   Food Insecurity: No Food Insecurity (2023)    Hunger Vital Sign     Worried About Running Out of Food in the Last Year: Never true     Ran Out of Food in the Last Year: Never true   Transportation Needs: No Transportation Needs (2023)    PRAPARE - Transportation     Lack of Transportation (Medical): No     Lack of Transportation (Non-Medical): No   Physical Activity: Inactive (2023)    Exercise Vital Sign     Days of Exercise per Week: 0 days     Minutes of Exercise per Session: 0 min   Stress: Stress Concern Present (2023)    Citizen of [place] of Occupational Health - Occupational Stress Questionnaire     Feeling of Stress : To some extent   Social Connections: Moderately Isolated (2023)     Social Connection and Isolation Panel [NHANES]     Frequency of Communication with Friends and Family: More than three times a week     Frequency of Social Gatherings with Friends and Family: More than three times a week     Attends Oriental orthodox Services: Never     Active Member of Clubs or Organizations: No     Attends Club or Organization Meetings: Never     Marital Status:    Housing Stability: Low Risk  (12/20/2023)    Housing Stability Vital Sign     Unable to Pay for Housing in the Last Year: No     Number of Places Lived in the Last Year: 1     Unstable Housing in the Last Year: No     Review of Systems   Unable to perform ROS: Intubated     Objective:     Vital Signs (Most Recent):  Temp: 97.9 °F (36.6 °C) (01/15/24 1200)  Pulse: (!) 59 (01/15/24 1400)  Resp: (!) 26 (01/15/24 1400)  BP: 114/70 (01/15/24 1400)  SpO2: 98 % (01/15/24 1400) Vital Signs (24h Range):  Temp:  [96.6 °F (35.9 °C)-98.3 °F (36.8 °C)] 97.9 °F (36.6 °C)  Pulse:  [55-64] 59  Resp:  [6-26] 26  SpO2:  [90 %-100 %] 98 %  BP: (106-154)/() 114/70     Weight: 132.8 kg (292 lb 12.3 oz)  Body mass index is 39.71 kg/m².    Estimated Creatinine Clearance: 46.2 mL/min (A) (based on SCr of 3 mg/dL (H)).     Physical Exam  Vitals and nursing note reviewed.   Constitutional:       Appearance: He is obese. He is ill-appearing.      Comments: Intubated   HENT:      Head: Normocephalic and atraumatic.      Nose: Nose normal.      Mouth/Throat:      Comments: ETT in place  Eyes:      Extraocular Movements: Extraocular movements intact.      Conjunctiva/sclera: Conjunctivae normal.      Pupils: Pupils are equal, round, and reactive to light.   Cardiovascular:      Rate and Rhythm: Regular rhythm. Bradycardia present.      Pulses: Normal pulses.      Heart sounds: Normal heart sounds. No murmur heard.  Pulmonary:      Effort: Respiratory distress present.      Breath sounds: Decreased air movement present. Examination of the right-lower field  reveals decreased breath sounds. Examination of the left-lower field reveals decreased breath sounds. Decreased breath sounds present.      Comments: Mechanical breath sounds  Abdominal:      General: Bowel sounds are normal. There is distension.      Tenderness: There is abdominal tenderness.   Musculoskeletal:         General: Swelling present. Normal range of motion.      Right lower leg: Edema present.      Left lower leg: Edema present.   Skin:     General: Skin is warm and dry.      Capillary Refill: Capillary refill takes less than 2 seconds.   Neurological:      General: No focal deficit present.      Mental Status: He is oriented to person, place, and time. Mental status is at baseline.      Cranial Nerves: No cranial nerve deficit.      Motor: No weakness.   Psychiatric:         Mood and Affect: Mood normal.          Significant Labs: CBC:   Recent Labs   Lab 01/14/24  1403 01/14/24  2008 01/15/24  0351 01/15/24  0424 01/15/24  1205   WBC 13.77*  --   --  16.57*  --    HGB 10.8*  --   --  11.5*  --    HCT 32.9*   < > 28* 33.1* 27*     --   --  267  --     < > = values in this interval not displayed.     CMP:   Recent Labs   Lab 01/14/24  1403 01/14/24  2348 01/15/24  0758   * 129* 130*   K 5.2* 5.5* 5.3*    102 102   CO2 9* 12* 14*   * 166* 120*   * 147* 152*   CREATININE 2.8* 3.3* 3.0*   CALCIUM 7.0* 8.5* 8.1*   PROT 3.8* 4.7* 4.3*   ALBUMIN 1.2* 1.5* 1.3*   BILITOT 0.2 0.2 0.2   ALKPHOS 86 110 91   AST 20 20 14   ALT 28 33 26   ANIONGAP 14 15 14       Significant Imaging: I have reviewed all pertinent imaging results/findings within the past 24 hours.

## 2024-01-15 NOTE — ASSESSMENT & PLAN NOTE
Patient noted to be covid positive 1/10/24, requiring BIPAP. Patient was intubated prior to transfer to [facility]. Acute hypoxemic respiratory failure also complicated by consolidations noted in bilateral lung bases w/ small b/l pleural effusions.     - Continue Remdesivir for total of 5 days  - Continue Dexamethasone 6mg QD for 8 days  - Mechanically ventilated, will wean sedation and pressure support as able  - Continuous oximetry  - Trending daily CBC/CMP, Mg, Phos

## 2024-01-16 LAB
ALBUMIN SERPL BCP-MCNC: 1.3 G/DL (ref 3.5–5.2)
ALBUMIN SERPL BCP-MCNC: 1.3 G/DL (ref 3.5–5.2)
ALBUMIN SERPL BCP-MCNC: 1.4 G/DL (ref 3.5–5.2)
ALBUMIN SERPL BCP-MCNC: 1.6 G/DL (ref 3.5–5.2)
ALLENS TEST: ABNORMAL
ALP SERPL-CCNC: 108 U/L (ref 55–135)
ALP SERPL-CCNC: 84 U/L (ref 55–135)
ALP SERPL-CCNC: 86 U/L (ref 55–135)
ALT SERPL W/O P-5'-P-CCNC: 20 U/L (ref 10–44)
ALT SERPL W/O P-5'-P-CCNC: 21 U/L (ref 10–44)
ALT SERPL W/O P-5'-P-CCNC: 27 U/L (ref 10–44)
ANION GAP SERPL CALC-SCNC: 12 MMOL/L (ref 8–16)
ANION GAP SERPL CALC-SCNC: 18 MMOL/L (ref 8–16)
ANION GAP SERPL CALC-SCNC: 18 MMOL/L (ref 8–16)
ANION GAP SERPL CALC-SCNC: 19 MMOL/L (ref 8–16)
ASCENDING AORTA: 3.83 CM
AST SERPL-CCNC: 15 U/L (ref 10–40)
AST SERPL-CCNC: 15 U/L (ref 10–40)
AST SERPL-CCNC: 16 U/L (ref 10–40)
AV INDEX (PROSTH): 1.05
AV MEAN GRADIENT: 2 MMHG
AV PEAK GRADIENT: 4 MMHG
AV VALVE AREA BY VELOCITY RATIO: 3.44 CM²
AV VALVE AREA: 4.07 CM²
AV VELOCITY RATIO: 0.89
BACTERIA UR CULT: NO GROWTH
BASOPHILS # BLD AUTO: 0.02 K/UL (ref 0–0.2)
BASOPHILS NFR BLD: 0.2 % (ref 0–1.9)
BILIRUB SERPL-MCNC: 0.2 MG/DL (ref 0.1–1)
BILIRUB SERPL-MCNC: 0.2 MG/DL (ref 0.1–1)
BILIRUB SERPL-MCNC: 0.3 MG/DL (ref 0.1–1)
BSA FOR ECHO PROCEDURE: 2.61 M2
BUN SERPL-MCNC: 117 MG/DL (ref 6–20)
BUN SERPL-MCNC: 120 MG/DL (ref 6–20)
BUN SERPL-MCNC: 122 MG/DL (ref 6–20)
BUN SERPL-MCNC: 77 MG/DL (ref 6–20)
CALCIUM SERPL-MCNC: 7.7 MG/DL (ref 8.7–10.5)
CALCIUM SERPL-MCNC: 7.9 MG/DL (ref 8.7–10.5)
CALCIUM SERPL-MCNC: 7.9 MG/DL (ref 8.7–10.5)
CALCIUM SERPL-MCNC: 8.3 MG/DL (ref 8.7–10.5)
CHLORIDE SERPL-SCNC: 100 MMOL/L (ref 95–110)
CHLORIDE SERPL-SCNC: 104 MMOL/L (ref 95–110)
CMV DNA SPEC QL NAA+PROBE: NORMAL
CO2 SERPL-SCNC: 11 MMOL/L (ref 23–29)
CO2 SERPL-SCNC: 12 MMOL/L (ref 23–29)
CO2 SERPL-SCNC: 13 MMOL/L (ref 23–29)
CO2 SERPL-SCNC: 20 MMOL/L (ref 23–29)
CREAT SERPL-MCNC: 1.2 MG/DL (ref 0.5–1.4)
CREAT SERPL-MCNC: 2.3 MG/DL (ref 0.5–1.4)
CV ECHO LV RWT: 0.51 CM
CYTOMEGALOVIRUS PCR, QUANT: NOT DETECTED IU/ML
DELSYS: ABNORMAL
DIFFERENTIAL METHOD BLD: ABNORMAL
DOP CALC AO PEAK VEL: 0.99 M/S
DOP CALC AO VTI: 17.14 CM
DOP CALC LVOT AREA: 3.9 CM2
DOP CALC LVOT DIAMETER: 2.22 CM
DOP CALC LVOT PEAK VEL: 0.88 M/S
DOP CALC LVOT STROKE VOLUME: 69.75 CM3
DOP CALCLVOT PEAK VEL VTI: 18.03 CM
E WAVE DECELERATION TIME: 204.78 MSEC
E/A RATIO: 1.09
E/E' RATIO: 6.61 M/S
ECHO LV POSTERIOR WALL: 1.21 CM (ref 0.6–1.1)
EOSINOPHIL # BLD AUTO: 0 K/UL (ref 0–0.5)
EOSINOPHIL NFR BLD: 0 % (ref 0–8)
ERYTHROCYTE [DISTWIDTH] IN BLOOD BY AUTOMATED COUNT: 12.6 % (ref 11.5–14.5)
ERYTHROCYTE [SEDIMENTATION RATE] IN BLOOD BY WESTERGREN METHOD: 28 MM/H
EST. GFR  (NO RACE VARIABLE): 35.9 ML/MIN/1.73 M^2
EST. GFR  (NO RACE VARIABLE): >60 ML/MIN/1.73 M^2
FIO2: 50
FIO2: 60
FRACTIONAL SHORTENING: 33 % (ref 28–44)
GLUCOSE SERPL-MCNC: 112 MG/DL (ref 70–110)
GLUCOSE SERPL-MCNC: 117 MG/DL (ref 70–110)
GLUCOSE SERPL-MCNC: 119 MG/DL (ref 70–110)
GLUCOSE SERPL-MCNC: 178 MG/DL (ref 70–110)
HCO3 UR-SCNC: 21.2 MMOL/L (ref 24–28)
HCO3 UR-SCNC: 21.5 MMOL/L (ref 24–28)
HCO3 UR-SCNC: 21.8 MMOL/L (ref 24–28)
HCO3 UR-SCNC: 22.6 MMOL/L (ref 24–28)
HCO3 UR-SCNC: 23.8 MMOL/L (ref 24–28)
HCT VFR BLD AUTO: 29 % (ref 40–54)
HCT VFR BLD CALC: 18 %PCV (ref 36–54)
HCT VFR BLD CALC: 26 %PCV (ref 36–54)
HCT VFR BLD CALC: 27 %PCV (ref 36–54)
HCT VFR BLD CALC: 30 %PCV (ref 36–54)
HGB BLD-MCNC: 10.6 G/DL (ref 14–18)
IMM GRANULOCYTES # BLD AUTO: 0.54 K/UL (ref 0–0.04)
IMM GRANULOCYTES NFR BLD AUTO: 4.1 % (ref 0–0.5)
INR PPP: 1 (ref 0.8–1.2)
INTERVENTRICULAR SEPTUM: 0.71 CM (ref 0.6–1.1)
LA MAJOR: 4.92 CM
LA MINOR: 5.13 CM
LA WIDTH: 3.93 CM
LEFT ATRIUM SIZE: 3.03 CM
LEFT ATRIUM VOLUME INDEX: 20.1 ML/M2
LEFT ATRIUM VOLUME: 50.84 CM3
LEFT INTERNAL DIMENSION IN SYSTOLE: 3.17 CM (ref 2.1–4)
LEFT VENTRICLE DIASTOLIC VOLUME INDEX: 41.54 ML/M2
LEFT VENTRICLE DIASTOLIC VOLUME: 105.1 ML
LEFT VENTRICLE MASS INDEX: 63 G/M2
LEFT VENTRICLE SYSTOLIC VOLUME INDEX: 15.8 ML/M2
LEFT VENTRICLE SYSTOLIC VOLUME: 39.98 ML
LEFT VENTRICULAR INTERNAL DIMENSION IN DIASTOLE: 4.75 CM (ref 3.5–6)
LEFT VENTRICULAR MASS: 158.32 G
LV LATERAL E/E' RATIO: 5.85 M/S
LV SEPTAL E/E' RATIO: 7.6 M/S
LYMPHOCYTES # BLD AUTO: 0.6 K/UL (ref 1–4.8)
LYMPHOCYTES NFR BLD: 4.2 % (ref 18–48)
MAGNESIUM SERPL-MCNC: 2.1 MG/DL (ref 1.6–2.6)
MAGNESIUM SERPL-MCNC: 2.3 MG/DL (ref 1.6–2.6)
MCH RBC QN AUTO: 32.3 PG (ref 27–31)
MCHC RBC AUTO-ENTMCNC: 36.6 G/DL (ref 32–36)
MCV RBC AUTO: 88 FL (ref 82–98)
MIN VOL: 40
MIN VOL: 511
MODE: ABNORMAL
MONOCYTES # BLD AUTO: 0.6 K/UL (ref 0.3–1)
MONOCYTES NFR BLD: 4.7 % (ref 4–15)
MV PEAK A VEL: 0.7 M/S
MV PEAK E VEL: 0.76 M/S
MV STENOSIS PRESSURE HALF TIME: 59.38 MS
MV VALVE AREA P 1/2 METHOD: 3.7 CM2
NEUTROPHILS # BLD AUTO: 11.6 K/UL (ref 1.8–7.7)
NEUTROPHILS NFR BLD: 86.8 % (ref 38–73)
NRBC BLD-RTO: 0 /100 WBC
PCO2 BLDA: 33 MMHG (ref 35–45)
PCO2 BLDA: 37.4 MMHG (ref 35–45)
PCO2 BLDA: 38.6 MMHG (ref 35–45)
PCO2 BLDA: 39.4 MMHG (ref 35–45)
PCO2 BLDA: 40.1 MMHG (ref 35–45)
PEEP: 10
PEEP: 5
PEEP: 8
PH SMN: 7.34 [PH] (ref 7.35–7.45)
PH SMN: 7.35 [PH] (ref 7.35–7.45)
PH SMN: 7.35 [PH] (ref 7.35–7.45)
PH SMN: 7.39 [PH] (ref 7.35–7.45)
PH SMN: 7.47 [PH] (ref 7.35–7.45)
PHOSPHATE SERPL-MCNC: 4.3 MG/DL (ref 2.7–4.5)
PHOSPHATE SERPL-MCNC: 7.2 MG/DL (ref 2.7–4.5)
PHOSPHATE SERPL-MCNC: 7.3 MG/DL (ref 2.7–4.5)
PHOSPHATE SERPL-MCNC: 8.5 MG/DL (ref 2.7–4.5)
PLATELET # BLD AUTO: 222 K/UL (ref 150–450)
PMV BLD AUTO: 9 FL (ref 9.2–12.9)
PO2 BLDA: 101 MMHG (ref 80–100)
PO2 BLDA: 136 MMHG (ref 80–100)
PO2 BLDA: 140 MMHG (ref 80–100)
PO2 BLDA: 160 MMHG (ref 80–100)
PO2 BLDA: 178 MMHG (ref 80–100)
POC BE: -2 MMOL/L
POC BE: -4 MMOL/L
POC BE: 0 MMOL/L
POC IONIZED CALCIUM: 1.15 MMOL/L (ref 1.06–1.42)
POC IONIZED CALCIUM: 1.17 MMOL/L (ref 1.06–1.42)
POC IONIZED CALCIUM: 1.17 MMOL/L (ref 1.06–1.42)
POC IONIZED CALCIUM: 1.19 MMOL/L (ref 1.06–1.42)
POC SATURATED O2: 100 % (ref 95–100)
POC SATURATED O2: 98 % (ref 95–100)
POC SATURATED O2: 99 % (ref 95–100)
POC TCO2: 22 MMOL/L (ref 23–27)
POC TCO2: 23 MMOL/L (ref 23–27)
POC TCO2: 23 MMOL/L (ref 23–27)
POC TCO2: 24 MMOL/L (ref 23–27)
POC TCO2: 25 MMOL/L (ref 23–27)
POTASSIUM BLD-SCNC: 4.6 MMOL/L (ref 3.5–5.1)
POTASSIUM BLD-SCNC: 4.7 MMOL/L (ref 3.5–5.1)
POTASSIUM SERPL-SCNC: 4.4 MMOL/L (ref 3.5–5.1)
POTASSIUM SERPL-SCNC: 4.8 MMOL/L (ref 3.5–5.1)
POTASSIUM SERPL-SCNC: 5.1 MMOL/L (ref 3.5–5.1)
POTASSIUM SERPL-SCNC: 5.1 MMOL/L (ref 3.5–5.1)
PROT SERPL-MCNC: 5.4 G/DL (ref 6–8.4)
PROT SERPL-MCNC: 5.5 G/DL (ref 6–8.4)
PROT SERPL-MCNC: 6.2 G/DL (ref 6–8.4)
PROTHROMBIN TIME: 10.8 SEC (ref 9–12.5)
RA MAJOR: 4.86 CM
RA WIDTH: 3.35 CM
RBC # BLD AUTO: 3.28 M/UL (ref 4.6–6.2)
RIGHT VENTRICULAR END-DIASTOLIC DIMENSION: 3.52 CM
SAMPLE: ABNORMAL
SINUS: 3.28 CM
SITE: ABNORMAL
SODIUM BLD-SCNC: 130 MMOL/L (ref 136–145)
SODIUM BLD-SCNC: 131 MMOL/L (ref 136–145)
SODIUM SERPL-SCNC: 131 MMOL/L (ref 136–145)
SODIUM SERPL-SCNC: 134 MMOL/L (ref 136–145)
SODIUM SERPL-SCNC: 134 MMOL/L (ref 136–145)
SODIUM SERPL-SCNC: 136 MMOL/L (ref 136–145)
SP02: 95
SP02: 97
STJ: 3.12 CM
TACROLIMUS BLD-MCNC: 4.8 NG/ML (ref 5–15)
TDI LATERAL: 0.13 M/S
TDI SEPTAL: 0.1 M/S
TDI: 0.12 M/S
TRICUSPID ANNULAR PLANE SYSTOLIC EXCURSION: 1.66 CM
TRIGL SERPL-MCNC: 2114 MG/DL (ref 30–150)
TRIGL SERPL-MCNC: 2129 MG/DL (ref 30–150)
VT: 460
VT: 480
WBC # BLD AUTO: 13.3 K/UL (ref 3.9–12.7)
Z-SCORE OF LEFT VENTRICULAR DIMENSION IN END DIASTOLE: -11.56
Z-SCORE OF LEFT VENTRICULAR DIMENSION IN END SYSTOLE: -8.18

## 2024-01-16 PROCEDURE — 99291 CRITICAL CARE FIRST HOUR: CPT | Mod: ,,, | Performed by: INTERNAL MEDICINE

## 2024-01-16 PROCEDURE — 25000003 PHARM REV CODE 250

## 2024-01-16 PROCEDURE — 90945 DIALYSIS ONE EVALUATION: CPT

## 2024-01-16 PROCEDURE — 25000003 PHARM REV CODE 250: Performed by: INTERNAL MEDICINE

## 2024-01-16 PROCEDURE — 99900035 HC TECH TIME PER 15 MIN (STAT)

## 2024-01-16 PROCEDURE — 85610 PROTHROMBIN TIME: CPT

## 2024-01-16 PROCEDURE — 84478 ASSAY OF TRIGLYCERIDES: CPT

## 2024-01-16 PROCEDURE — 85025 COMPLETE CBC W/AUTO DIFF WBC: CPT

## 2024-01-16 PROCEDURE — 84478 ASSAY OF TRIGLYCERIDES: CPT | Mod: 91

## 2024-01-16 PROCEDURE — 36600 WITHDRAWAL OF ARTERIAL BLOOD: CPT

## 2024-01-16 PROCEDURE — 84100 ASSAY OF PHOSPHORUS: CPT | Mod: 91

## 2024-01-16 PROCEDURE — 80197 ASSAY OF TACROLIMUS: CPT

## 2024-01-16 PROCEDURE — 80053 COMPREHEN METABOLIC PANEL: CPT | Mod: 91

## 2024-01-16 PROCEDURE — 27100171 HC OXYGEN HIGH FLOW UP TO 24 HOURS

## 2024-01-16 PROCEDURE — 27200966 HC CLOSED SUCTION SYSTEM

## 2024-01-16 PROCEDURE — 51798 US URINE CAPACITY MEASURE: CPT

## 2024-01-16 PROCEDURE — 99233 SBSQ HOSP IP/OBS HIGH 50: CPT | Mod: ,,, | Performed by: INTERNAL MEDICINE

## 2024-01-16 PROCEDURE — 20000000 HC ICU ROOM

## 2024-01-16 PROCEDURE — 27000207 HC ISOLATION

## 2024-01-16 PROCEDURE — 63600175 PHARM REV CODE 636 W HCPCS: Performed by: STUDENT IN AN ORGANIZED HEALTH CARE EDUCATION/TRAINING PROGRAM

## 2024-01-16 PROCEDURE — C9113 INJ PANTOPRAZOLE SODIUM, VIA: HCPCS

## 2024-01-16 PROCEDURE — 94640 AIRWAY INHALATION TREATMENT: CPT

## 2024-01-16 PROCEDURE — 25000242 PHARM REV CODE 250 ALT 637 W/ HCPCS

## 2024-01-16 PROCEDURE — 63600175 PHARM REV CODE 636 W HCPCS: Performed by: INTERNAL MEDICINE

## 2024-01-16 PROCEDURE — 63600175 PHARM REV CODE 636 W HCPCS

## 2024-01-16 PROCEDURE — 99900026 HC AIRWAY MAINTENANCE (STAT)

## 2024-01-16 PROCEDURE — 82803 BLOOD GASES ANY COMBINATION: CPT

## 2024-01-16 PROCEDURE — 83735 ASSAY OF MAGNESIUM: CPT | Mod: 91 | Performed by: STUDENT IN AN ORGANIZED HEALTH CARE EDUCATION/TRAINING PROGRAM

## 2024-01-16 PROCEDURE — 94761 N-INVAS EAR/PLS OXIMETRY MLT: CPT | Mod: XB

## 2024-01-16 PROCEDURE — 94003 VENT MGMT INPAT SUBQ DAY: CPT

## 2024-01-16 PROCEDURE — 83735 ASSAY OF MAGNESIUM: CPT

## 2024-01-16 PROCEDURE — 80069 RENAL FUNCTION PANEL: CPT | Performed by: STUDENT IN AN ORGANIZED HEALTH CARE EDUCATION/TRAINING PROGRAM

## 2024-01-16 PROCEDURE — 25000003 PHARM REV CODE 250: Performed by: STUDENT IN AN ORGANIZED HEALTH CARE EDUCATION/TRAINING PROGRAM

## 2024-01-16 RX ORDER — TACROLIMUS 1 MG/1
2 CAPSULE ORAL 2 TIMES DAILY
Status: DISCONTINUED | OUTPATIENT
Start: 2024-01-16 | End: 2024-01-18

## 2024-01-16 RX ORDER — HYDROCODONE BITARTRATE AND ACETAMINOPHEN 500; 5 MG/1; MG/1
TABLET ORAL CONTINUOUS
Status: ACTIVE | OUTPATIENT
Start: 2024-01-16 | End: 2024-01-17

## 2024-01-16 RX ORDER — PROPOFOL 10 MG/ML
0-50 INJECTION, EMULSION INTRAVENOUS CONTINUOUS
Status: DISCONTINUED | OUTPATIENT
Start: 2024-01-16 | End: 2024-01-16

## 2024-01-16 RX ORDER — FENTANYL CITRATE-0.9 % NACL/PF 10 MCG/ML
0-200 PLASTIC BAG, INJECTION (ML) INTRAVENOUS CONTINUOUS
Status: CANCELLED | OUTPATIENT
Start: 2024-01-16

## 2024-01-16 RX ORDER — DEXAMETHASONE SODIUM PHOSPHATE 4 MG/ML
6 INJECTION, SOLUTION INTRA-ARTICULAR; INTRALESIONAL; INTRAMUSCULAR; INTRAVENOUS; SOFT TISSUE EVERY 24 HOURS
Status: DISCONTINUED | OUTPATIENT
Start: 2024-01-17 | End: 2024-01-17

## 2024-01-16 RX ORDER — FENTANYL CITRATE-0.9 % NACL/PF 10 MCG/ML
0-200 PLASTIC BAG, INJECTION (ML) INTRAVENOUS CONTINUOUS
Status: DISCONTINUED | OUTPATIENT
Start: 2024-01-16 | End: 2024-01-18

## 2024-01-16 RX ORDER — ATOVAQUONE 750 MG/5ML
1500 SUSPENSION ORAL DAILY
Status: DISCONTINUED | OUTPATIENT
Start: 2024-01-16 | End: 2024-01-16

## 2024-01-16 RX ORDER — POLYETHYLENE GLYCOL 3350 17 G/17G
17 POWDER, FOR SOLUTION ORAL DAILY
Status: DISCONTINUED | OUTPATIENT
Start: 2024-01-17 | End: 2024-01-18

## 2024-01-16 RX ORDER — ATOVAQUONE 750 MG/5ML
1500 SUSPENSION ORAL DAILY
Status: DISCONTINUED | OUTPATIENT
Start: 2024-01-16 | End: 2024-01-18

## 2024-01-16 RX ORDER — MAGNESIUM SULFATE HEPTAHYDRATE 40 MG/ML
2 INJECTION, SOLUTION INTRAVENOUS
Status: ACTIVE | OUTPATIENT
Start: 2024-01-16 | End: 2024-01-17

## 2024-01-16 RX ORDER — AMOXICILLIN 250 MG
1 CAPSULE ORAL 2 TIMES DAILY
Status: DISCONTINUED | OUTPATIENT
Start: 2024-01-16 | End: 2024-01-18

## 2024-01-16 RX ADMIN — TACROLIMUS 2 MG: 1 CAPSULE ORAL at 06:01

## 2024-01-16 RX ADMIN — PROPOFOL 50 MCG/KG/MIN: 10 INJECTION, EMULSION INTRAVENOUS at 01:01

## 2024-01-16 RX ADMIN — SODIUM CHLORIDE: 9 INJECTION, SOLUTION INTRAVENOUS at 05:01

## 2024-01-16 RX ADMIN — DEXAMETHASONE 6 MG: 4 TABLET ORAL at 08:01

## 2024-01-16 RX ADMIN — MIDAZOLAM HYDROCHLORIDE 1.5 MG/HR: 1 INJECTION, SOLUTION INTRAVENOUS at 05:01

## 2024-01-16 RX ADMIN — MUPIROCIN: 20 OINTMENT TOPICAL at 08:01

## 2024-01-16 RX ADMIN — Medication 200 MCG/HR: at 11:01

## 2024-01-16 RX ADMIN — PROPOFOL 50 MCG/KG/MIN: 10 INJECTION, EMULSION INTRAVENOUS at 06:01

## 2024-01-16 RX ADMIN — IPRATROPIUM BROMIDE AND ALBUTEROL SULFATE 3 ML: 2.5; .5 SOLUTION RESPIRATORY (INHALATION) at 08:01

## 2024-01-16 RX ADMIN — ATOVAQUONE 1500 MG: 750 SUSPENSION ORAL at 11:01

## 2024-01-16 RX ADMIN — APIXABAN 5 MG: 5 TABLET, FILM COATED ORAL at 08:01

## 2024-01-16 RX ADMIN — MEROPENEM 2 G: 1 INJECTION INTRAVENOUS at 03:01

## 2024-01-16 RX ADMIN — IPRATROPIUM BROMIDE AND ALBUTEROL SULFATE 3 ML: 2.5; .5 SOLUTION RESPIRATORY (INHALATION) at 12:01

## 2024-01-16 RX ADMIN — PROPOFOL 50 MCG/KG/MIN: 10 INJECTION, EMULSION INTRAVENOUS at 11:01

## 2024-01-16 RX ADMIN — THERA TABS 1 TABLET: TAB at 08:01

## 2024-01-16 RX ADMIN — SENNOSIDES AND DOCUSATE SODIUM 1 TABLET: 50; 8.6 TABLET ORAL at 08:01

## 2024-01-16 RX ADMIN — MEROPENEM 2 G: 1 INJECTION INTRAVENOUS at 11:01

## 2024-01-16 RX ADMIN — PROPOFOL 50 MCG/KG/MIN: 10 INJECTION, EMULSION INTRAVENOUS at 10:01

## 2024-01-16 RX ADMIN — PROPOFOL 50 MCG/KG/MIN: 10 INJECTION, EMULSION INTRAVENOUS at 03:01

## 2024-01-16 RX ADMIN — APIXABAN 5 MG: 5 TABLET, FILM COATED ORAL at 09:01

## 2024-01-16 RX ADMIN — PANTOPRAZOLE SODIUM 40 MG: 40 INJECTION, POWDER, FOR SOLUTION INTRAVENOUS at 08:01

## 2024-01-16 RX ADMIN — Medication 200 MCG/HR: at 10:01

## 2024-01-16 RX ADMIN — SENNOSIDES AND DOCUSATE SODIUM 1 TABLET: 8.6; 5 TABLET ORAL at 09:01

## 2024-01-16 RX ADMIN — POLYETHYLENE GLYCOL 3350 17 G: 17 POWDER, FOR SOLUTION ORAL at 08:01

## 2024-01-16 RX ADMIN — PROPOFOL 50 MCG/KG/MIN: 10 INJECTION, EMULSION INTRAVENOUS at 08:01

## 2024-01-16 RX ADMIN — MUPIROCIN: 20 OINTMENT TOPICAL at 09:01

## 2024-01-16 RX ADMIN — GANCICLOVIR SODIUM 332 MG: 500 INJECTION, POWDER, LYOPHILIZED, FOR SOLUTION INTRAVENOUS at 04:01

## 2024-01-16 NOTE — PROGRESS NOTES
[facility] - Cardiac Medical ICU  Kidney Transplant  Progress Note      Reason for Follow-up: Reassessment of Kidney Transplant - 7/26/2010  (#1) recipient and management of immunosuppression.     ORGAN: LEFT KIDNEY    Donor Type: Living        Subjective:   History of Present Illness:    He is 37 yo White male who received a living kidney transplant on 7/26/10.  his creatinine was 1.3-18 till December 2023 when he developed ARFafter not  taking IS meds for 5 weeks. Transferred to [facility] and kidney biopsy resulted with diffuse proliferative GN as well as mild TCMR. Treated with steroid. DSA 12/19/23 with +DSAs. had IVIG outpatient. Due to nephrotic syndrome and hypoalbuminemia, started eliquis for anticoagulation. He was discharged on pred taper, cellcept, and prograf. At the time of discharge Cr was 3.       transferred to [facility] MICU for acute hypoxemic respiratory failure, bacteremia. On 1/5/24 he was admitted to [facility] in [place] with abnormal labs. Was found to be COVID positive.  Blood culture with Staph haemolyticus. Abdominal US on 1/6 noted cirrhotic appearing liver.  CT chest on 1/7 showed consolidated infiltrates and/or atelectasis involving the bilateral lung bases with small bilateral pleural effusions, patchy heterogeneous infiltrate involving the right middle lobe, and mild ascites. The patient was initially going to transfer to hospital medicine however he was intubated prior to transfer and was accepted to [facility] MICU for a higher level of care.  He arrived on propofol and fentanyl.  KTM and transplant ID consulted on admission.    Mr. Tripp is a 38 y.o. year old male who is status post Kidney Transplant - 7/26/2010  (#1).    His maintenance immunosuppression consists of:   Immunosuppressants (From admission, onward)      None            Hospital Course:  Intubated. On vent  FIO2%90. His UOP ~ 1 liter with lasix. Has metabolic acidosis on ABG. Plan for HD to remove some fluid and correct  acidosis. Pt needs to have line today     Interval History:  No acute events overnight. Patient tolerated iHD well with ~1.5L of UF performed. Remains intubated on 50% FiO2 but improved from prior days.     Past Medical, Surgical, Family, and Social History:   Unchanged from H&P.    Scheduled Meds:   albuterol-ipratropium  3 mL Nebulization Q6H    apixaban  5 mg Per OG tube BID    atovaquone  1,500 mg Per NG tube Daily    [START ON 1/17/2024] dexAMETHasone  6 mg Intravenous Daily    ganciclovir (CYTOVENE) 332 mg in sodium chloride 0.9% 100 mL IVPB  2.5 mg/kg Intravenous Q24H    meropenem (MERREM) IVPB  2 g Intravenous Q12H    mupirocin   Nasal BID    pantoprazole  40 mg Intravenous Daily    [START ON 1/17/2024] polyethylene glycol  17 g Per NG tube Daily    senna-docusate 8.6-50 mg  1 tablet Per NG tube BID     Continuous Infusions:   sodium chloride 0.9%      fentanyl 175 mcg/hr (01/16/24 1535)    propofoL 50 mcg/kg/min (01/16/24 1535)     PRN Meds:dextrose 10%, dextrose 10%, glucagon (human recombinant), magnesium sulfate IVPB, naloxone, ondansetron, sodium chloride 0.9%, sodium phosphate 20.01 mmol in dextrose 5 % (D5W) 250 mL IVPB, sodium phosphate 30 mmol in dextrose 5 % (D5W) 250 mL IVPB, sodium phosphate 39.99 mmol in dextrose 5 % (D5W) 250 mL IVPB    Intake/Output - Last 3 Shifts         01/14 0700  01/15 0659 01/15 0700 01/16 0659 01/16 0700 01/17 0659    I.V. (mL/kg) 539.2 (4.1) 1509 (11.4) 578.1 (4.4)    Other  500     NG/GT  230 60    IV Piggyback 573 494.3 6.5    Total Intake(mL/kg) 1112.2 (8.4) 2733.3 (20.6) 644.6 (4.9)    Urine (mL/kg/hr) 1230 2110 (0.7) 975 (0.8)    Other  2000     Total Output 1230 4110 975    Net -117.8 -1376.7 -330.4                    Review of Systems   Objective:     Vital Signs (Most Recent):  Temp: 98.2 °F (36.8 °C) (01/16/24 1400)  Pulse: 62 (01/16/24 1600)  Resp: (!) 25 (01/16/24 1219)  BP: 134/85 (01/16/24 1600)  SpO2: 97 % (01/16/24 1600) Vital Signs (24h Range):  Temp:  " [96.2 °F (35.7 °C)-98.6 °F (37 °C)] 98.2 °F (36.8 °C)  Pulse:  [] 62  Resp:  [25-28] 25  SpO2:  [93 %-99 %] 97 %  BP: (102-134)/(63-85) 134/85     Weight: 132.5 kg (292 lb)  Height: 6' 1" (185.4 cm)  Body mass index is 38.52 kg/m².     Physical Exam  Vitals and nursing note reviewed.   Constitutional:       Appearance: He is obese. He is ill-appearing.      Comments: Intubated   HENT:      Head: Normocephalic and atraumatic.      Nose: Nose normal.      Mouth/Throat:      Comments: ETT in place  Eyes:      Extraocular Movements: Extraocular movements intact.      Conjunctiva/sclera: Conjunctivae normal.      Pupils: Pupils are equal, round, and reactive to light.   Cardiovascular:      Rate and Rhythm: Regular rhythm. Bradycardia present.      Pulses: Normal pulses.      Heart sounds: Normal heart sounds. No murmur heard.  Pulmonary:      Effort: Respiratory distress present.      Breath sounds: Decreased air movement present. Examination of the right-lower field reveals decreased breath sounds. Examination of the left-lower field reveals decreased breath sounds. Decreased breath sounds present.      Comments: Mechanical breath sounds  Abdominal:      General: Bowel sounds are normal. There is distension.      Tenderness: There is abdominal tenderness.   Musculoskeletal:         General: Swelling present. Normal range of motion.      Right lower leg: Edema present.      Left lower leg: Edema present.   Skin:     General: Skin is warm and dry.      Capillary Refill: Capillary refill takes less than 2 seconds.   Neurological:      General: No focal deficit present.      Mental Status: He is oriented to person, place, and time. Mental status is at baseline.      Cranial Nerves: No cranial nerve deficit.      Motor: No weakness.   Psychiatric:         Mood and Affect: Mood normal.          Laboratory:  Labs within the past 24 hours have been reviewed.    Diagnostic Results:  None  Assessment/Plan:     COVID  Per " primary team      Acute on chronic renal failure  - Living kidney transplant on 7/26/10.    - BL creatinine was 1.3-1.7 till December 2023 when he developed ARF due to diffuse proliferative GN and mild  TCMR. Treated with steroid. Due to nephrotic syndrome  and hypoalbuminemia, started eliquis for anticoagulation. He was discharged on pred taper, cellcept, and prograf. At the time of discharge on 12/23/23  Cr was 3.    Plan:  - 12hrs SLED for metabolic clearance and volume removal. Goal UF 2-3L  - RFP per CRRT Protocol  - Strict I/O  - Avoid nephrotoxins, volume shifts, aim for BP within target to prevent additional renal injury  - Avoid nephrotoxic agents (NSAIDs, IV contrast dye, ACEI/ARB anti-HTN medications, Aminoglycoside-containing antibiotics)  - Renally dose all appropriate medications, including antibiotics  - check lab every 8 hours    Prophylactic immunotherapy  - Off MMF due to COVID  - Resume Prograf 2mg BID (order placed)  - Check tac level daily to avoid toxicity         Discharge Planning:    Medical decision making for this encounter includes review of pertinent labs and diagnostic studies, assessment and planning, discussions with consulting providers, discussion with patient/family, and participation in multidisciplinary rounds. Time spent caring for patient:     Darek Blue DO  Kidney Transplant  [facility] - Cardiac Medical ICU

## 2024-01-16 NOTE — PROCEDURES
Central Line    Date/Time: 1/15/2024 6:49 PM    Performed by: Haja Yepez MD  Authorized by: Haja Yepez MD    Location procedure was performed:  [facility] CR[facility]ICINE  Assisting Provider:  Elda Ospina MD  Indications:  Hemodialysis  Anesthesia:  General anesthesia and local infiltration  Preparation:  Skin prepped with ChloraPrep  Skin prep agent dried: Skin prep agent completely dried prior to procedure    Sterile barriers: All five maximal sterile barriers used - gloves, gown, cap, mask and large sterile sheet    Hand hygiene: Hand hygiene performed immediately prior to central venous catheter insertion    Location:  Right internal jugular  Catheter type:  Trialysis  Catheter size:  13 Fr  Ultrasound guidance: Yes    Vessel Caliber:  Medium  Comprressibility:  Normal  Needle advanced into vessel with real time ultrasound guidance.    Guidewire confirmed in vessel.    Image recorded and saved.    Steril sheath on probe.    Sterile gel used.  Manometry: Yes    Number of attempts:  1  Securement:  Line sutured, chlorhexidine patch, sterile dressing applied and blood return through all ports  Complications: No    Estimated blood loss (mL):  10  Specimens: No    Implants: No    XRay:  Placement verified by x-rayTermination Site: superior vena cava    Haja Yepez MD  [employer] [facility], PGY II  0541 01/15/2024

## 2024-01-16 NOTE — ASSESSMENT & PLAN NOTE
- Living kidney transplant on 7/26/10.    - BL creatinine was 1.3-1.7 till December 2023 when he developed ARF due to diffuse proliferative GN and mild  TCMR. Treated with steroid. Due to nephrotic syndrome  and hypoalbuminemia, started eliquis for anticoagulation. He was discharged on pred taper, cellcept, and prograf. At the time of discharge on 12/23/23  Cr was 3.    Plan:  - 12hrs SLED for metabolic clearance and volume removal. Goal UF 2-3L  - RFP per CRRT Protocol  - Strict I/O  - Avoid nephrotoxins, volume shifts, aim for BP within target to prevent additional renal injury  - Avoid nephrotoxic agents (NSAIDs, IV contrast dye, ACEI/ARB anti-HTN medications, Aminoglycoside-containing antibiotics)  - Renally dose all appropriate medications, including antibiotics  - check lab every 8 hours

## 2024-01-16 NOTE — PROGRESS NOTES
01/15/24 6023   Post-Hemodialysis Assessment   Rinseback Volume (mL) 250 mL   Blood Volume Processed (Liters) 28.4 L   Dialyzer Clearance Lightly streaked   Duration of Treatment 120 minutes   Additional Fluid Intake (mL) 500 mL   Total UF (mL) 2000 mL   Net Fluid Removal 1500   Patient Response to Treatment cheikh well   Post-Hemodialysis Comments stable     HD completed per MD order. Report given to primary RN.

## 2024-01-16 NOTE — SUBJECTIVE & OBJECTIVE
Interval History: NAEO. Pt now on dialysis for volume removal. NGTD on cultures. Will call [facility] for outside culture reports.    Review of Systems  Objective:     Vital Signs (Most Recent):  Temp: 98.4 °F (36.9 °C) (01/16/24 0800)  Pulse: 65 (01/16/24 1030)  Resp: (!) 28 (01/16/24 1000)  BP: 118/69 (01/16/24 1030)  SpO2: 97 % (01/16/24 1030) Vital Signs (24h Range):  Temp:  [96.2 °F (35.7 °C)-98.6 °F (37 °C)] 98.4 °F (36.9 °C)  Pulse:  [] 65  Resp:  [26-28] 28  SpO2:  [93 %-100 %] 97 %  BP: (102-130)/(63-78) 118/69     Weight: 132.5 kg (292 lb)  Body mass index is 38.52 kg/m².    Estimated Creatinine Clearance: 60.9 mL/min (A) (based on SCr of 2.3 mg/dL (H)).     Physical Exam     Significant Labs: Blood Culture:   Recent Labs   Lab 01/14/24  1510 01/14/24  1517   LABBLOO No Growth to date  No Growth to date No Growth to date  No Growth to date     CBC:   Recent Labs   Lab 01/14/24  1403 01/14/24  2008 01/15/24  0424 01/15/24  1205 01/16/24  0355 01/16/24  0405 01/16/24  0826   WBC 13.77*  --  16.57*  --  13.30*  --   --    HGB 10.8*  --  11.5*  --  10.6*  --   --    HCT 32.9*   < > 33.1*   < > 29.0* 26* 18*     --  267  --  222  --   --     < > = values in this interval not displayed.     CMP:   Recent Labs   Lab 01/15/24  1529 01/15/24  2331 01/16/24  0755   * 131* 134*   K 4.8 4.8 5.1    100 104   CO2 13* 13* 12*   * 112* 119*   * 117* 122*   CREATININE 2.7* 2.3* 2.3*   CALCIUM 7.6* 8.3* 7.9*   PROT 4.6* 6.2 5.5*   ALBUMIN 1.3* 1.6* 1.3*   BILITOT 0.2 0.3 0.2   ALKPHOS 88 108 84   AST 15 15 16   ALT 23 27 21   ANIONGAP 18* 18* 18*       Significant Imaging: I have reviewed all pertinent imaging results/findings within the past 24 hours.

## 2024-01-16 NOTE — PROGRESS NOTES
01/15/24 2100   During Hemodialysis Assessment   Blood Flow Rate (mL/min) 350 mL/min   Dialysate Flow Rate (mL/min) 600 ml/min   Ultrafiltration Rate (mL/Hr) 530 mL/Hr   Arteriovenous Lines Secure Yes   Arterial Pressure (mmHg) -90 mmHg   Venous Pressure (mmHg) 60   Blood Volume Processed (Liters) 0 L   UF Removed (mL) 0 mL   TMP 30   Venous Line in Air Detector Yes   Intake (mL) 250 mL   Intra-Hemodialysis Comments HD initiated     Report received from primary RN. HD started per MD order.

## 2024-01-16 NOTE — SUBJECTIVE & OBJECTIVE
Subjective:   History of Present Illness:    He is 37 yo White male who received a living kidney transplant on 7/26/10.  his creatinine was 1.3-18 till December 2023 when he developed ARFafter not  taking IS meds for 5 weeks. 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 Treated with steroid. DSA 12/19/23 with +DSAs. had IVIG outpatient. Due to nephrotic syndrome and hypoalbuminemia, started eliquis for anticoagulation. He was discharged on pred taper, cellcept, and prograf. At the time of discharge Cr was 3.       transferred to [facility] MICU for acute hypoxemic respiratory failure, bacteremia. On 1/5/24 he was admitted to [facility] in [place] with abnormal labs. Was found to be COVID positive.  Blood culture with Staph haemolyticus. Abdominal US on 1/6 noted cirrhotic appearing liver.  CT chest on 1/7 showed consolidated infiltrates and/or atelectasis involving the bilateral lung bases with small bilateral pleural effusions, patchy heterogeneous infiltrate involving the right middle lobe, and mild ascites. The patient was initially going to transfer to hospital medicine however he was intubated prior to transfer and was accepted to [facility] MICU for a higher level of care.  He arrived on propofol and fentanyl.  KTM and transplant ID consulted on admission.    Mr. Tripp is a 38 y.o. year old male who is status post Kidney Transplant - 7/26/2010  (#1).    His maintenance immunosuppression consists of:   Immunosuppressants (From admission, onward)      None            Hospital Course:  Intubated. On vent  FIO2%90. His UOP ~ 1 liter with lasix. Has metabolic acidosis on ABG. Plan for HD to remove some fluid and correct acidosis. Pt needs to have line today     Interval History:  No acute events overnight. Patient tolerated iHD well with ~1.5L of UF performed. Remains intubated on 50% FiO2 but improved from prior days.     Past Medical, Surgical, Family, and  "Social History:   Unchanged from H&P.    Scheduled Meds:   albuterol-ipratropium  3 mL Nebulization Q6H    apixaban  5 mg Per OG tube BID    atovaquone  1,500 mg Per NG tube Daily    [START ON 1/17/2024] dexAMETHasone  6 mg Intravenous Daily    ganciclovir (CYTOVENE) 332 mg in sodium chloride 0.9% 100 mL IVPB  2.5 mg/kg Intravenous Q24H    meropenem (MERREM) IVPB  2 g Intravenous Q12H    mupirocin   Nasal BID    pantoprazole  40 mg Intravenous Daily    [START ON 1/17/2024] polyethylene glycol  17 g Per NG tube Daily    senna-docusate 8.6-50 mg  1 tablet Per NG tube BID     Continuous Infusions:   sodium chloride 0.9%      fentanyl 175 mcg/hr (01/16/24 1535)    propofoL 50 mcg/kg/min (01/16/24 1535)     PRN Meds:dextrose 10%, dextrose 10%, glucagon (human recombinant), magnesium sulfate IVPB, naloxone, ondansetron, sodium chloride 0.9%, sodium phosphate 20.01 mmol in dextrose 5 % (D5W) 250 mL IVPB, sodium phosphate 30 mmol in dextrose 5 % (D5W) 250 mL IVPB, sodium phosphate 39.99 mmol in dextrose 5 % (D5W) 250 mL IVPB    Intake/Output - Last 3 Shifts         01/14 0700  01/15 0659 01/15 0700  01/16 0659 01/16 0700  01/17 0659    I.V. (mL/kg) 539.2 (4.1) 1509 (11.4) 578.1 (4.4)    Other  500     NG/GT  230 60    IV Piggyback 573 494.3 6.5    Total Intake(mL/kg) 1112.2 (8.4) 2733.3 (20.6) 644.6 (4.9)    Urine (mL/kg/hr) 1230 2110 (0.7) 975 (0.8)    Other  2000     Total Output 1230 4110 975    Net -117.8 -1376.7 -330.4                    Review of Systems   Objective:     Vital Signs (Most Recent):  Temp: 98.2 °F (36.8 °C) (01/16/24 1400)  Pulse: 62 (01/16/24 1600)  Resp: (!) 25 (01/16/24 1219)  BP: 134/85 (01/16/24 1600)  SpO2: 97 % (01/16/24 1600) Vital Signs (24h Range):  Temp:  [96.2 °F (35.7 °C)-98.6 °F (37 °C)] 98.2 °F (36.8 °C)  Pulse:  [] 62  Resp:  [25-28] 25  SpO2:  [93 %-99 %] 97 %  BP: (102-134)/(63-85) 134/85     Weight: 132.5 kg (292 lb)  Height: 6' 1" (185.4 cm)  Body mass index is 38.52 kg/m².   "   Physical Exam  Vitals and nursing note reviewed.   Constitutional:       Appearance: He is obese. He is ill-appearing.      Comments: Intubated   HENT:      Head: Normocephalic and atraumatic.      Nose: Nose normal.      Mouth/Throat:      Comments: ETT in place  Eyes:      Extraocular Movements: Extraocular movements intact.      Conjunctiva/sclera: Conjunctivae normal.      Pupils: Pupils are equal, round, and reactive to light.   Cardiovascular:      Rate and Rhythm: Regular rhythm. Bradycardia present.      Pulses: Normal pulses.      Heart sounds: Normal heart sounds. No murmur heard.  Pulmonary:      Effort: Respiratory distress present.      Breath sounds: Decreased air movement present. Examination of the right-lower field reveals decreased breath sounds. Examination of the left-lower field reveals decreased breath sounds. Decreased breath sounds present.      Comments: Mechanical breath sounds  Abdominal:      General: Bowel sounds are normal. There is distension.      Tenderness: There is abdominal tenderness.   Musculoskeletal:         General: Swelling present. Normal range of motion.      Right lower leg: Edema present.      Left lower leg: Edema present.   Skin:     General: Skin is warm and dry.      Capillary Refill: Capillary refill takes less than 2 seconds.   Neurological:      General: No focal deficit present.      Mental Status: He is oriented to person, place, and time. Mental status is at baseline.      Cranial Nerves: No cranial nerve deficit.      Motor: No weakness.   Psychiatric:         Mood and Affect: Mood normal.          Laboratory:  Labs within the past 24 hours have been reviewed.    Diagnostic Results:  None

## 2024-01-16 NOTE — ASSESSMENT & PLAN NOTE
39 y/o M with a medical history of kidney transplant in 2010 and HTN transferred to [facility] MICU for acute hypoxemic respiratory failure, bacteremia, and hx of Kidney Transplant for KTM and ID.     Report given stated blood cx from outside hospital grew Staph Haemolyticus, was broadened to Meropenem for bacteremia before being transferred to [facility]. Not sure if there was suspicion for gram negative infection as well. Will need outside culture report.     Pt could very well be bacteremic, but his main problem seems to be ARDS, COVID PNA. Will f/u on our cultures taken at [facility], and try to obtain outside report to tailor abx regimen.     Ventilator settings improving in the setting of volume removal and Covid tx.    Recommendations:  - continue COVID pneumonia course of Remdesivir and Dex  - continue Meropenem for now, usually not warranted if it's just Staph Haemolyticus growing in cultures, but need report from outside facility  - continue IV Ganciclovir   - will call [facility] [place] to see if blood cx report can be sent over to [facility], will tailor abx once full report obtained  - f/u blood, urine, and resp cx

## 2024-01-16 NOTE — PROGRESS NOTES
[facility] - Cardiac Medical ICU  Critical Care Medicine  Progress Note    Patient Name: Ubaldo Tripp  MRN: [id number]  Admission Date: 1/14/2024  Hospital Length of Stay: 2 days  Code Status: Full Code  Attending Provider: Kristian Smith MD  Primary Care Provider: Jaime Jauregui (Inactive)   Principal Problem: Acute hypoxic respiratory failure    Subjective:     HPI:  37 y/o M with a medical history of kidney transplant in 2010 and HTN transferred to [facility] MICU for acute hypoxemic respiratory failure, bacteremia, and hx of Kidney Transplant for KTM and Infectious Disease evaluation. Pt was previously admitted to [facility] 12/18/23 - 12/23/23 with GENNY, headache, and HTN.  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  He was found to have nephrotic syndrome as well.  Received pulse dose steroids and was started on Eliquis. Discharged on a prednisone taper, CellCept, and Prograf.  sCr 12/23 was 3.0.     On 1/5/24 he was admitted to [facility] in [place] with abnormal labs. Outpatient labs on 1/3 showed WBC 17.7 Hgb 13.1, K 5.7, bicarb 18, BUN 71, sCr 2.92, phos 5.1, and tacro level 10.8.  Was found to be COVID positive requiring BiPAP.  Blood culture with Staph haemolyticus.  Labs notable for sCr ranging 2.68 to 2.73.  K normalized.  Abdominal US on 1/6 noted cirrhotic appearing liver.  CT chest on 1/7 showed consolidated infiltrates and/or atelectasis involving the bilateral lung bases with small bilateral pleural effusions, patchy heterogeneous infiltrate involving the right middle lobe, and mild ascites.  Transplant renal ultrasound 1/8 showed stable appearance of the transplant kidney without evidence of acute abnormality.  Gallbladder ultrasound 1/8 showed sludge in the gallbladder.  Medications  prior to transfer include CellCept, tacrolimus, valganciclovir, prednisone, Eliquis, and meropenem.    The patient was initially going to transfer to hospital medicine however he was intubated prior to transfer and was accepted to [facility]U for a higher level of care.  He arrived on propofol and fentanyl.  KTM and transplant ID consulted on admission.    Hospital/ICU Course:  Started tube feeds. KTM on board, recommending trialysis line placement and HD.     Overnight Events:  HD x 2 hours overnight with 1.5L removed. No complications. Vital signs stable. Afebrile.    ABG  Recent Labs   Lab 01/16/24  1719   PH 7.390   PO2 140*   PCO2 37.4   HCO3 22.6*   BE -2     Assessment/Plan:     [facility]  [facility]    Renal/  Acute on chronic renal failure  Cr was 1.3 in 2021, no other Cr results until the recent admission with Cr at 2.92. Likely prerenal 2/2 sepsis, potential component of rejection.    - KTM consulted due to history of renal transplant   - HD for volume overload overnight  - Trend CMP/BMPs  - Strict intake/output  - Renally dose meds  - Avoid nephrotoxic agents  - Replete electrolytes PRN    Living-donor kidney transplant recipient - 7/26/10  Patient with kidney transplant 2010. He was previously admitted to [facility] December 18-23 with GENNY. 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  He was found to have nephrotic syndrome as well, started on eliquis.  He received pulse dose steroids and was started on Eliquis for anticoagulation. He was discharged on a prednisone taper, CellCept, and Prograf.  On December 23, his creatinine was 3.0. Current medications include CellCept, tacrolimus, prednisone, all of which he continued to receive at OSH.    - KTM consulted, appreciate recs  - Strict I/O  - pending kidney  "US   - pending BK, CMV PCR, DSA   - Avoid nephrotoxins, volume shifts, aim for BP within target to prevent additional renal injury  - Avoid nephrotoxic agents (NSAIDs, IV contrast dye, ACEI/ARB anti-HTN medications, Aminoglycoside-containing antibiotics)  - Renally dose all appropriate medications, including antibiotics  -  High Cr, acidosis, high FIO2 requirement: plan for iHD for fluid removal. Await line   placement by ICU team. Lasix  mg X 1 time    - SLED   - Prograf 2mg BID  - Gangciclovir for CMV prophylaxis  - Atovaquone for PJP prophylaxis  - Eliquis for nephrotic syndrome    ID  Bacteremia  Patient is a 39 y/o male with a PMH of kidney transplant in 2010 that was transferred from [facility] with blood cultures positive for Staph haemolyticus. Patient was started on Merrem at the time.     - Will continue Meropenem  - ID consulted, appreciate recs   - Continue Remdesivir and Dex   - Continue Meropenem   - Continue IV Ganciclovir  - Redrawing blood cultures on transfer; will follow up with outside culture results  - Patient currently volume overloaded; will defer further fluid resuscitation  - Trending daily CBC    COVID  Patient noted to be covid positive 1/10/24, requiring BIPAP. Patient was intubated prior to transfer to [facility]. Acute hypoxemic respiratory failure also complicated by consolidations noted in bilateral lung bases w/ small b/l pleural effusions.     - Continue Remdesivir for total of 5 days  - Continue Dexamethasone 6mg QD for 8 days  - Mechanically ventilated, will wean sedation and pressure support as able  - Continuous oximetry  - Trending daily CBC/CMP, Mg, Phos    Immunology/Multi System  Immunocompromised state due to drug therapy  Pt takes Cellcept and Prograf at home    - there is a note in report how pt stated he did not take his immunotherapy for 1 month bc he "ran out"  - will hold immunosuppression in setting of infection, will f/u with KTM and ID " recs      GI  Transaminitis  Patient noted to have transaminitis at outside facility with imaging concerning for cirrhosis and ascites.     - Trend CMPs  - Consider Liver doppler     Other  * Acute hypoxic respiratory failure  Patient with Hypoxic Respiratory failure which is Acute.  he is not on home oxygen. Contributing diagnoses includes - ARDS and Pneumonia Labs and images were reviewed. Patient Has not had a recent ABG. Supplemental oxygen was provided and noted- Vent Mode: A/C  Oxygen Concentration (%):  [60-90] 40  Resp Rate Total:  [22 br/min-37 br/min] 28 br/min  Vt Set:  [500 mL] 460 mL  PEEP/CPAP:  [10 dgP39-72 cmH20] 5 cmH20  Mean Airway Pressure:  [15 ikF65-66 cmH20] 19 cmH20    Plan:   - Meropenem for BSA  - ID consultation - Continue demsevir, dexamethasone, Ann, gancyclovir   - Remdesivir/dexamethasone for COVID + status  - Q6 hour nebs   - ARDS-NET ladder    Hyperglyceridemia  Triglycerides 2114 > 2129. Suspicious for adverse reaction to propofol vs pancreatitis. Normal levels from historical data 13 years ago.     Plan:  - Discontinue propofol  - Trend triglyceride            Critical Care Daily Checklist:    A: Awake: RASS Goal/Actual Goal: RASS Goal: -1-->drowsy  Actual:     B: Spontaneous Breathing Trial Performed?  Yes   C: SAT & SBT Coordinated?  Yes                      D: Delirium: CAM-ICU Overall CAM-ICU: Negative   E: Early Mobility Performed? Yes   F: Feeding Goal: Goals: Meet % EEN, EPN by RD f/u date  Status: Nutrition Goal Status: new   Current Diet Order   Procedures    Diet NPO      AS: Analgesia/Sedation Precedex, fentanyl   T: Thromboembolic Prophylaxis eliquis   H: HOB > 300 Yes   U: Stress Ulcer Prophylaxis (if needed) protonix   G: Glucose Control SSI   B: Bowel Function  Bowel reg added   I: Indwelling Catheter (Lines & Mayen) Necessity Mayen, midline    D: De-escalation of Antimicrobials/Pharmacotherapies Meropenem, Ganciclovir, Atovaquone     Plan for the day/ETD  Trialysis line placement to start HD    Code Status:  Family/Goals of Care: Full Code         Critical secondary to Patient has a condition that poses threat to life and bodily function: Severe Respiratory Distress      Critical care was time spent personally by me on the following activities: development of treatment plan with patient or surrogate and bedside caregivers, discussions with consultants, evaluation of patient's response to treatment, examination of patient, ordering and performing treatments and interventions, ordering and review of laboratory studies, ordering and review of radiographic studies, pulse oximetry, re-evaluation of patient's condition. This critical care time did not overlap with that of any other provider or involve time for any procedures.     Haja Yepez MD  Critical Care Medicine  [facility] - Cardiac Medical ICU

## 2024-01-16 NOTE — PLAN OF CARE
MICU DAILY GOALS     Family/Goals of care/Code Status   Code Status: Full Code    24H Vital Sign Range  Temp:  [96.2 °F (35.7 °C)-98.6 °F (37 °C)]   Pulse:  [49-71]   Resp:  [7-28]   BP: (102-120)/(63-78)   SpO2:  [96 %-100 %]      Shift Events (include procedures and significant events)   No acute events throughout shift. HD ran for 2 hours overnight and pulled off 1.5L with no complications. TF advanced to goal of 30 and tolerating well. Titrated sedation down.     AWAKE RASS: Goal - RASS Goal: -2-->light sedation  Actual - RASS (Dash Agitation-Sedation Scale): light sedation    Restraint necessity: Not necessary   BREATHE SBT: Not attempted    Coordinate A & B, analgesics/sedatives Pain: managed   SAT: Not attempted   Delirium CAM-ICU: Overall CAM-ICU: Positive   Early(intubated/ Progressive (non-intubated) Mobility MOVE Screen (INTUBATED ONLY): Not attempted    Activity: Activity Management: Patient unable to perform activities   Feeding/Nutrition Diet order: Diet/Nutrition Received: tube feeding, Specialty Diet/Nutrition Received: renal diet   Thrombus DVT prophylaxis: VTE Required Core Measure: Pharmacological prophylaxis initiated/maintained   HOB Elevation Head of Bed (HOB) Positioning: HOB at 30-45 degrees   Ulcer Prophylaxis GI: yes   Glucose control managed     Skin Skin assessed during: Q Shift Change    Sacrum intact/not altered? Yes  Heels intact/not altered? Yes  Surgical wound? No    Check one (no altered skin or altered skin) and sub boxes:  [] No Altered Skin Integrity Present    []Prevention Measures Documented    [] Altered Skin Integrity Present or Discovered   [] LDA present in EPIC              [] LDA added in EPIC   [] Wound Image Taken (required on admit,                   transfer/discharge and every Tuesday)    Wound Care Consulted? No    Attending Nurse:     Second RN/Staff Member:    Bowel Function constipation    Indwelling Catheter Necessity      Urethral Catheter 01/14/24  1345-Reason for Continuing Urinary Catheterization: Critically ill in ICU and requiring hourly monitoring of intake/output    Trialysis (Dialysis) Catheter 01/15/24 1802 right internal jugular-Line Necessity Review: CRRT/HD     De-escalation Antibiotics Yes       VS and assessment per flow sheet, patient progressing towards goals as tolerated, plan of care reviewed with patient, all concerns addressed, will continue to monitor.

## 2024-01-16 NOTE — PLAN OF CARE
MICU DAILY GOALS     Family/Goals of care/Code Status   Code Status: Full Code    24H Vital Sign Range  Temp:  [96.2 °F (35.7 °C)-98.6 °F (37 °C)]   Pulse:  []   Resp:  [25-28]   BP: (102-135)/(63-85)   SpO2:  [93 %-99 %]      Shift Events (include procedures and significant events)   No acute events throughout shift; Began weaning patient off sedation; awakens spontaneously. VSS. Remains on fentanyl at 175mcg/hr. To get 12 hours of CRRT today. Follows commands appropriately. Possible extubation tomorrow - peep decreased to 8 and fio2: 40%.     AWAKE RASS: Goal - RASS Goal: -1-->drowsy  Actual - RASS (Dash Agitation-Sedation Scale): drowsy    Restraint necessity: Not necessary   BREATHE SBT: Not attempted    Coordinate A & B, analgesics/sedatives Pain: managed   SAT: Pass   Delirium CAM-ICU: Overall CAM-ICU: Negative   Early(intubated/ Progressive (non-intubated) Mobility MOVE Screen (INTUBATED ONLY): Fail    Activity: Activity Management: Patient unable to perform activities   Feeding/Nutrition Diet order: Diet/Nutrition Received: tube feeding, Specialty Diet/Nutrition Received: renal diet   Thrombus DVT prophylaxis: VTE Required Core Measure: Pharmacological prophylaxis initiated/maintained   HOB Elevation Head of Bed (HOB) Positioning: HOB at 30-45 degrees   Ulcer Prophylaxis GI: yes   Glucose control managed     Skin Skin assessed during: Daily Assessment    Sacrum intact/not altered? Yes  Heels intact/not altered? Yes  Surgical wound? No    Check one (no altered skin or altered skin) and sub boxes:  [x] No Altered Skin Integrity Present    [x]Prevention Measures Documented    [] Altered Skin Integrity Present or Discovered   [] LDA present in EPIC              [] LDA added in EPIC   [] Wound Image Taken (required on admit,                   transfer/discharge and every Tuesday)    Wound Care Consulted? No    Attending Nurse: Tanner MALLORY     Second RN/Staff Member: BENJI Espinal RN      Bowel Function no  issues    Indwelling Catheter Necessity [REMOVED]      Urethral Catheter 01/14/24 1345-Reason for Continuing Urinary Catheterization: Critically ill in ICU and requiring hourly monitoring of intake/output    Trialysis (Dialysis) Catheter 01/15/24 1802 right internal jugular-Line Necessity Review: CRRT/HD  Removed wade;    De-escalation Antibiotics No       VS and assessment per flow sheet, patient progressing towards goals as tolerated, plan of care reviewed with family, all concerns addressed, will continue to monitor.

## 2024-01-16 NOTE — PROGRESS NOTES
[facility] - Cardiac Medical ICU  Infectious Disease  Progress Note    Patient Name: Ubaldo Tripp  MRN: [id number]  Admission Date: 1/14/2024  Length of Stay: 2 days  Attending Physician: Kristian Smith MD  Primary Care Provider: Jaime Jauregui (Inactive)    Isolation Status: Airborne and Contact and Droplet  Assessment/Plan:      ID  Bacteremia  39 y/o M with a medical history of kidney transplant in 2010 and HTN transferred to [facility] MICU for acute hypoxemic respiratory failure, bacteremia, and hx of Kidney Transplant for KTM and ID.     Report given stated blood cx from outside hospital grew Staph Haemolyticus, was broadened to Meropenem for bacteremia before being transferred to [facility]. Not sure if there was suspicion for gram negative infection as well. Will need outside culture report.     Pt could very well be bacteremic, but his main problem seems to be ARDS, COVID PNA. Will f/u on our cultures taken at [facility], and try to obtain outside report to tailor abx regimen.     Ventilator settings improving in the setting of volume removal and Covid tx.    Recommendations:  - continue COVID pneumonia course of Remdesivir and Dex  - continue Meropenem for now, usually not warranted if it's just Staph Haemolyticus growing in cultures, but need report from outside facility  - continue IV Ganciclovir   - Outside blood cultures show only Staph Haemolyticus growth, pt had been given Vanc and Meropenem at outside facility. Likely has gotten appropriate coverage. Continue IV meropenem for now, will confirm with [facility] if there were any other bottles that grew positive results. Low threshold to d/c abx coverage.   - f/u blood, urine, and resp cx                      Thank you for your consult. I will follow-up with patient. Please contact us if you have any additional questions.    Skip Carpio,   Infectious Disease  [facility] - Cardiac Medical [facility]    Subjective:     Principal Problem:Acute hypoxic  respiratory failure    HPI: 39 y/o M with a medical history of kidney transplant in 2010 and HTN transferred to [facility] MICU for acute hypoxemic respiratory failure, bacteremia, and hx of Kidney Transplant for KTM and Infectious Disease evaluation. Pt was previously admitted to [facility] 12/18/23 - 12/23/23 with GENNY, headache, and HTN. 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  He was found to have nephrotic syndrome as well. Received pulse dose steroids and was started on Eliquis. Discharged on a prednisone taper, CellCept, and Prograf.  sCr 12/23 was 3.0.     On 1/5/24 he was admitted to [facility] in [place] with abnormal labs. Outpatient labs on 1/3 showed WBC 17.7 Hgb 13.1, K 5.7, bicarb 18, BUN 71, sCr 2.92, phos 5.1, and tacro level 10.8. Was found to be COVID positive requiring BiPAP. Blood culture with Staph haemolyticus. Labs notable for sCr ranging 2.68 to 2.73. K normalized.  Abdominal US on 1/6 noted cirrhotic appearing liver. CT chest on 1/7 showed consolidated infiltrates and/or atelectasis involving the bilateral lung bases with small bilateral pleural effusions, patchy heterogeneous infiltrate involving the right middle lobe, and mild ascites. Transplant renal ultrasound 1/8 showed stable appearance of the transplant kidney without evidence of acute abnormality. Gallbladder ultrasound 1/8 showed sludge in the gallbladder. Medications prior to transfer include CellCept, tacrolimus, valganciclovir, prednisone, Eliquis, and meropenem.     The patient was initially going to transfer to hospital medicine however he was intubated prior to transfer and was accepted to [facility] MICU for a higher level of care. ID consulted for assistance in work up of bacteremia.   Interval History: NAEO. Pt now on dialysis  for volume removal. NGTD on cultures. Will call [facility] for outside culture reports.    Review of Systems  Objective:     Vital Signs (Most Recent):  Temp: 98.4 °F (36.9 °C) (01/16/24 0800)  Pulse: 65 (01/16/24 1030)  Resp: (!) 28 (01/16/24 1000)  BP: 118/69 (01/16/24 1030)  SpO2: 97 % (01/16/24 1030) Vital Signs (24h Range):  Temp:  [96.2 °F (35.7 °C)-98.6 °F (37 °C)] 98.4 °F (36.9 °C)  Pulse:  [] 65  Resp:  [26-28] 28  SpO2:  [93 %-100 %] 97 %  BP: (102-130)/(63-78) 118/69     Weight: 132.5 kg (292 lb)  Body mass index is 38.52 kg/m².    Estimated Creatinine Clearance: 60.9 mL/min (A) (based on SCr of 2.3 mg/dL (H)).     Physical Exam     Significant Labs: Blood Culture:   Recent Labs   Lab 01/14/24  1510 01/14/24  1517   LABBLOO No Growth to date  No Growth to date No Growth to date  No Growth to date     CBC:   Recent Labs   Lab 01/14/24  1403 01/14/24  2008 01/15/24  0424 01/15/24  1205 01/16/24  0355 01/16/24  0405 01/16/24  0826   WBC 13.77*  --  16.57*  --  13.30*  --   --    HGB 10.8*  --  11.5*  --  10.6*  --   --    HCT 32.9*   < > 33.1*   < > 29.0* 26* 18*     --  267  --  222  --   --     < > = values in this interval not displayed.     CMP:   Recent Labs   Lab 01/15/24  1529 01/15/24  2331 01/16/24  0755   * 131* 134*   K 4.8 4.8 5.1    100 104   CO2 13* 13* 12*   * 112* 119*   * 117* 122*   CREATININE 2.7* 2.3* 2.3*   CALCIUM 7.6* 8.3* 7.9*   PROT 4.6* 6.2 5.5*   ALBUMIN 1.3* 1.6* 1.3*   BILITOT 0.2 0.3 0.2   ALKPHOS 88 108 84   AST 15 15 16   ALT 23 27 21   ANIONGAP 18* 18* 18*       Significant Imaging: I have reviewed all pertinent imaging results/findings within the past 24 hours.

## 2024-01-16 NOTE — PROGRESS NOTES
01/16/24 1755   Treatment   Treatment Type SLED   Treatment Status New start   Dialysis Machine Number [id number]   Dialyzer Time (hours) 0   BVP (Liters) 0 L   Solutions Labeled and Current  Yes   Access Temporary Cath;Right;IJ   Catheter Dressing Intact  Yes   Alarms Engaged Yes   CRRT Comments crrt initiated as ordered   Prescription   Time (Hours) 12   Dialysate K + (mEq/L) 4  (3k x3 hours then switch to 4k)   Dialysate CA + (mEq/L) 2.25   Dialysate HCO3 - (Bicarb) (mEq/L) Other (Comment)  (33)   Dialysate Na + (mEq/L) 140   Cartridge Type Other   Dialysate Flow Rate (mL/min) 200   UF Goal Rate 450 mL/hr   CRRT Hourly Documentation   Blood Flow (mL/min) 150   UF Rate 200 cc/hr   Arterial Pressure (mmHg) -30 mmHg   Venous Pressure (mmHg) 40 mmHg   Effluent Pressure (EP) (mmHg) 30 mmHg   Total UF (Hourly Cleared) (mL) 0     SLED initiated as ordered. RIJ CVC aspirated, flushed, and accessed using aseptic technique. Lines connected and secured. Pt to run on 3K bath x3 hours, then switch to 4K bath.

## 2024-01-17 LAB
ALBUMIN SERPL BCP-MCNC: 1.2 G/DL (ref 3.5–5.2)
ALBUMIN SERPL BCP-MCNC: 1.3 G/DL (ref 3.5–5.2)
ALBUMIN SERPL BCP-MCNC: 1.3 G/DL (ref 3.5–5.2)
ALBUMIN SERPL BCP-MCNC: 1.4 G/DL (ref 3.5–5.2)
ALBUMIN SERPL BCP-MCNC: 1.4 G/DL (ref 3.5–5.2)
ALLENS TEST: ABNORMAL
ALLENS TEST: NORMAL
ALP SERPL-CCNC: 100 U/L (ref 55–135)
ALP SERPL-CCNC: 102 U/L (ref 55–135)
ALP SERPL-CCNC: 96 U/L (ref 55–135)
ALT SERPL W/O P-5'-P-CCNC: 16 U/L (ref 10–44)
ALT SERPL W/O P-5'-P-CCNC: 18 U/L (ref 10–44)
ALT SERPL W/O P-5'-P-CCNC: 19 U/L (ref 10–44)
ANION GAP SERPL CALC-SCNC: 11 MMOL/L (ref 8–16)
ANION GAP SERPL CALC-SCNC: 9 MMOL/L (ref 8–16)
ANION GAP SERPL CALC-SCNC: 9 MMOL/L (ref 8–16)
ANISOCYTOSIS BLD QL SMEAR: SLIGHT
AST SERPL-CCNC: 17 U/L (ref 10–40)
AST SERPL-CCNC: 18 U/L (ref 10–40)
AST SERPL-CCNC: 18 U/L (ref 10–40)
BACTERIA SPEC AEROBE CULT: NORMAL
BACTERIA SPEC AEROBE CULT: NORMAL
BASOPHILS # BLD AUTO: ABNORMAL K/UL (ref 0–0.2)
BASOPHILS NFR BLD: 0 % (ref 0–1.9)
BILIRUB SERPL-MCNC: 0.2 MG/DL (ref 0.1–1)
BILIRUB SERPL-MCNC: 0.3 MG/DL (ref 0.1–1)
BILIRUB SERPL-MCNC: 0.3 MG/DL (ref 0.1–1)
BUN SERPL-MCNC: 40 MG/DL (ref 6–20)
BUN SERPL-MCNC: 51 MG/DL (ref 6–20)
BUN SERPL-MCNC: 53 MG/DL (ref 6–20)
BUN SERPL-MCNC: 57 MG/DL (ref 6–20)
BUN SERPL-MCNC: 57 MG/DL (ref 6–20)
CALCIUM SERPL-MCNC: 7.7 MG/DL (ref 8.7–10.5)
CALCIUM SERPL-MCNC: 7.8 MG/DL (ref 8.7–10.5)
CALCIUM SERPL-MCNC: 7.9 MG/DL (ref 8.7–10.5)
CALCIUM SERPL-MCNC: 7.9 MG/DL (ref 8.7–10.5)
CALCIUM SERPL-MCNC: 8 MG/DL (ref 8.7–10.5)
CHLORIDE SERPL-SCNC: 104 MMOL/L (ref 95–110)
CHLORIDE SERPL-SCNC: 105 MMOL/L (ref 95–110)
CHLORIDE SERPL-SCNC: 105 MMOL/L (ref 95–110)
CHLORIDE SERPL-SCNC: 106 MMOL/L (ref 95–110)
CHLORIDE SERPL-SCNC: 106 MMOL/L (ref 95–110)
CO2 SERPL-SCNC: 20 MMOL/L (ref 23–29)
CO2 SERPL-SCNC: 21 MMOL/L (ref 23–29)
CO2 SERPL-SCNC: 22 MMOL/L (ref 23–29)
CO2 SERPL-SCNC: 23 MMOL/L (ref 23–29)
CO2 SERPL-SCNC: 23 MMOL/L (ref 23–29)
CREAT SERPL-MCNC: 0.8 MG/DL (ref 0.5–1.4)
CREAT SERPL-MCNC: 0.9 MG/DL (ref 0.5–1.4)
CREAT SERPL-MCNC: 1.2 MG/DL (ref 0.5–1.4)
CREAT SERPL-MCNC: 1.3 MG/DL (ref 0.5–1.4)
CREAT SERPL-MCNC: 1.3 MG/DL (ref 0.5–1.4)
CREAT UR-MCNC: 53 MG/DL (ref 23–375)
DELSYS: NORMAL
DIFFERENTIAL METHOD BLD: ABNORMAL
EOSINOPHIL # BLD AUTO: ABNORMAL K/UL (ref 0–0.5)
EOSINOPHIL NFR BLD: 0 % (ref 0–8)
ERYTHROCYTE [DISTWIDTH] IN BLOOD BY AUTOMATED COUNT: 12.8 % (ref 11.5–14.5)
ERYTHROCYTE [SEDIMENTATION RATE] IN BLOOD BY WESTERGREN METHOD: 24 MM/H
EST. GFR  (NO RACE VARIABLE): >60 ML/MIN/1.73 M^2
FIO2: 50
GLUCOSE SERPL-MCNC: 101 MG/DL (ref 70–110)
GLUCOSE SERPL-MCNC: 107 MG/DL (ref 70–110)
GLUCOSE SERPL-MCNC: 136 MG/DL (ref 70–110)
GLUCOSE SERPL-MCNC: 136 MG/DL (ref 70–110)
GLUCOSE SERPL-MCNC: 143 MG/DL (ref 70–110)
GRAM STN SPEC: NORMAL
HCO3 UR-SCNC: 25.4 MMOL/L (ref 24–28)
HCO3 UR-SCNC: 26 MMOL/L (ref 24–28)
HCT VFR BLD AUTO: 31.8 % (ref 40–54)
HGB BLD-MCNC: 11.7 G/DL (ref 14–18)
IMM GRANULOCYTES # BLD AUTO: ABNORMAL K/UL (ref 0–0.04)
IMM GRANULOCYTES NFR BLD AUTO: ABNORMAL % (ref 0–0.5)
INR PPP: 1 (ref 0.8–1.2)
LYMPHOCYTES # BLD AUTO: ABNORMAL K/UL (ref 1–4.8)
LYMPHOCYTES NFR BLD: 2 % (ref 18–48)
MAGNESIUM SERPL-MCNC: 1.9 MG/DL (ref 1.6–2.6)
MAGNESIUM SERPL-MCNC: 2 MG/DL (ref 1.6–2.6)
MCH RBC QN AUTO: 31.8 PG (ref 27–31)
MCHC RBC AUTO-ENTMCNC: 36.8 G/DL (ref 32–36)
MCV RBC AUTO: 86 FL (ref 82–98)
MODE: NORMAL
MONOCYTES # BLD AUTO: ABNORMAL K/UL (ref 0.3–1)
MONOCYTES NFR BLD: 1 % (ref 4–15)
MYELOCYTES NFR BLD MANUAL: 1 %
NEUTROPHILS NFR BLD: 96 % (ref 38–73)
NRBC BLD-RTO: 0 /100 WBC
PCO2 BLDA: 37 MMHG (ref 35–45)
PCO2 BLDA: 37.8 MMHG (ref 35–45)
PEEP: 5
PH SMN: 7.44 [PH] (ref 7.35–7.45)
PH SMN: 7.45 [PH] (ref 7.35–7.45)
PHOSPHATE SERPL-MCNC: 2.9 MG/DL (ref 2.7–4.5)
PHOSPHATE SERPL-MCNC: 3.5 MG/DL (ref 2.7–4.5)
PHOSPHATE SERPL-MCNC: 3.6 MG/DL (ref 2.7–4.5)
PHOSPHATE SERPL-MCNC: 4 MG/DL (ref 2.7–4.5)
PHOSPHATE SERPL-MCNC: 4 MG/DL (ref 2.7–4.5)
PLATELET # BLD AUTO: 274 K/UL (ref 150–450)
PLATELET BLD QL SMEAR: ABNORMAL
PMV BLD AUTO: 8.9 FL (ref 9.2–12.9)
PO2 BLDA: 59 MMHG (ref 80–100)
PO2 BLDA: 82 MMHG (ref 80–100)
POC BE: 1 MMOL/L
POC BE: 2 MMOL/L
POC SATURATED O2: 91 % (ref 95–100)
POC SATURATED O2: 97 % (ref 95–100)
POC TCO2: 27 MMOL/L (ref 23–27)
POC TCO2: 27 MMOL/L (ref 23–27)
POLYCHROMASIA BLD QL SMEAR: ABNORMAL
POTASSIUM SERPL-SCNC: 4.6 MMOL/L (ref 3.5–5.1)
POTASSIUM SERPL-SCNC: 4.7 MMOL/L (ref 3.5–5.1)
POTASSIUM SERPL-SCNC: 4.9 MMOL/L (ref 3.5–5.1)
PROCALCITONIN SERPL IA-MCNC: 0.2 NG/ML
PROT SERPL-MCNC: 4.7 G/DL (ref 6–8.4)
PROT SERPL-MCNC: 5 G/DL (ref 6–8.4)
PROT SERPL-MCNC: 5.4 G/DL (ref 6–8.4)
PROT UR-MCNC: 407 MG/DL (ref 0–15)
PROT/CREAT UR: 7.68 MG/G{CREAT} (ref 0–0.2)
PROTHROMBIN TIME: 10.7 SEC (ref 9–12.5)
RBC # BLD AUTO: 3.68 M/UL (ref 4.6–6.2)
SAMPLE: ABNORMAL
SAMPLE: NORMAL
SITE: ABNORMAL
SITE: NORMAL
SODIUM SERPL-SCNC: 135 MMOL/L (ref 136–145)
SODIUM SERPL-SCNC: 137 MMOL/L (ref 136–145)
SODIUM SERPL-SCNC: 137 MMOL/L (ref 136–145)
SODIUM SERPL-SCNC: 138 MMOL/L (ref 136–145)
SODIUM SERPL-SCNC: 139 MMOL/L (ref 136–145)
TACROLIMUS BLD-MCNC: 4.3 NG/ML (ref 5–15)
VT: 460
WBC # BLD AUTO: 19.06 K/UL (ref 3.9–12.7)

## 2024-01-17 PROCEDURE — 84100 ASSAY OF PHOSPHORUS: CPT | Mod: 91

## 2024-01-17 PROCEDURE — 85027 COMPLETE CBC AUTOMATED: CPT

## 2024-01-17 PROCEDURE — 27000207 HC ISOLATION

## 2024-01-17 PROCEDURE — 80053 COMPREHEN METABOLIC PANEL: CPT | Mod: 91

## 2024-01-17 PROCEDURE — 80069 RENAL FUNCTION PANEL: CPT | Performed by: STUDENT IN AN ORGANIZED HEALTH CARE EDUCATION/TRAINING PROGRAM

## 2024-01-17 PROCEDURE — 94010 BREATHING CAPACITY TEST: CPT

## 2024-01-17 PROCEDURE — 63600175 PHARM REV CODE 636 W HCPCS

## 2024-01-17 PROCEDURE — 90945 DIALYSIS ONE EVALUATION: CPT

## 2024-01-17 PROCEDURE — 63600175 PHARM REV CODE 636 W HCPCS: Performed by: INTERNAL MEDICINE

## 2024-01-17 PROCEDURE — 25000003 PHARM REV CODE 250: Performed by: INTERNAL MEDICINE

## 2024-01-17 PROCEDURE — 80053 COMPREHEN METABOLIC PANEL: CPT

## 2024-01-17 PROCEDURE — 25000003 PHARM REV CODE 250

## 2024-01-17 PROCEDURE — 80053 COMPREHEN METABOLIC PANEL: CPT | Mod: 91 | Performed by: INTERNAL MEDICINE

## 2024-01-17 PROCEDURE — 84145 PROCALCITONIN (PCT): CPT

## 2024-01-17 PROCEDURE — 94640 AIRWAY INHALATION TREATMENT: CPT

## 2024-01-17 PROCEDURE — 27200966 HC CLOSED SUCTION SYSTEM

## 2024-01-17 PROCEDURE — 20000000 HC ICU ROOM

## 2024-01-17 PROCEDURE — 25000242 PHARM REV CODE 250 ALT 637 W/ HCPCS

## 2024-01-17 PROCEDURE — 82803 BLOOD GASES ANY COMBINATION: CPT

## 2024-01-17 PROCEDURE — 84100 ASSAY OF PHOSPHORUS: CPT

## 2024-01-17 PROCEDURE — 94150 VITAL CAPACITY TEST: CPT

## 2024-01-17 PROCEDURE — 36600 WITHDRAWAL OF ARTERIAL BLOOD: CPT

## 2024-01-17 PROCEDURE — 51702 INSERT TEMP BLADDER CATH: CPT

## 2024-01-17 PROCEDURE — 99900017 HC EXTUBATION W/PARAMETERS (STAT)

## 2024-01-17 PROCEDURE — 63600175 PHARM REV CODE 636 W HCPCS: Performed by: STUDENT IN AN ORGANIZED HEALTH CARE EDUCATION/TRAINING PROGRAM

## 2024-01-17 PROCEDURE — 99900035 HC TECH TIME PER 15 MIN (STAT)

## 2024-01-17 PROCEDURE — 85610 PROTHROMBIN TIME: CPT

## 2024-01-17 PROCEDURE — 94761 N-INVAS EAR/PLS OXIMETRY MLT: CPT | Mod: XB

## 2024-01-17 PROCEDURE — 5A0935A ASSISTANCE WITH RESPIRATORY VENTILATION, LESS THAN 24 CONSECUTIVE HOURS, HIGH NASAL FLOW/VELOCITY: ICD-10-PCS | Performed by: INTERNAL MEDICINE

## 2024-01-17 PROCEDURE — C9113 INJ PANTOPRAZOLE SODIUM, VIA: HCPCS

## 2024-01-17 PROCEDURE — 84100 ASSAY OF PHOSPHORUS: CPT | Mod: 91 | Performed by: INTERNAL MEDICINE

## 2024-01-17 PROCEDURE — 99233 SBSQ HOSP IP/OBS HIGH 50: CPT | Mod: ,,, | Performed by: INTERNAL MEDICINE

## 2024-01-17 PROCEDURE — 80100008 HC CRRT DAILY MAINTENANCE

## 2024-01-17 PROCEDURE — 83735 ASSAY OF MAGNESIUM: CPT | Performed by: STUDENT IN AN ORGANIZED HEALTH CARE EDUCATION/TRAINING PROGRAM

## 2024-01-17 PROCEDURE — 80197 ASSAY OF TACROLIMUS: CPT | Performed by: INTERNAL MEDICINE

## 2024-01-17 PROCEDURE — 27100171 HC OXYGEN HIGH FLOW UP TO 24 HOURS

## 2024-01-17 PROCEDURE — 84156 ASSAY OF PROTEIN URINE: CPT | Performed by: STUDENT IN AN ORGANIZED HEALTH CARE EDUCATION/TRAINING PROGRAM

## 2024-01-17 PROCEDURE — 85007 BL SMEAR W/DIFF WBC COUNT: CPT

## 2024-01-17 RX ORDER — DEXAMETHASONE SODIUM PHOSPHATE 4 MG/ML
6 INJECTION, SOLUTION INTRA-ARTICULAR; INTRALESIONAL; INTRAMUSCULAR; INTRAVENOUS; SOFT TISSUE EVERY 24 HOURS
Status: DISCONTINUED | OUTPATIENT
Start: 2024-01-18 | End: 2024-01-19

## 2024-01-17 RX ORDER — FUROSEMIDE 10 MG/ML
100 INJECTION INTRAMUSCULAR; INTRAVENOUS EVERY 8 HOURS
Status: DISCONTINUED | OUTPATIENT
Start: 2024-01-17 | End: 2024-01-20

## 2024-01-17 RX ORDER — FUROSEMIDE 10 MG/ML
100 INJECTION INTRAMUSCULAR; INTRAVENOUS 3 TIMES DAILY
Status: DISCONTINUED | OUTPATIENT
Start: 2024-01-17 | End: 2024-01-17

## 2024-01-17 RX ORDER — LORAZEPAM 2 MG/ML
0.5 INJECTION INTRAMUSCULAR ONCE
Status: COMPLETED | OUTPATIENT
Start: 2024-01-17 | End: 2024-01-17

## 2024-01-17 RX ORDER — VALGANCICLOVIR HYDROCHLORIDE 50 MG/ML
450 POWDER, FOR SOLUTION ORAL DAILY
Status: DISCONTINUED | OUTPATIENT
Start: 2024-01-19 | End: 2024-01-18

## 2024-01-17 RX ORDER — VALGANCICLOVIR HYDROCHLORIDE 50 MG/ML
450 POWDER, FOR SOLUTION ORAL DAILY
Status: DISCONTINUED | OUTPATIENT
Start: 2024-01-18 | End: 2024-01-17

## 2024-01-17 RX ORDER — CARVEDILOL 12.5 MG/1
12.5 TABLET ORAL 2 TIMES DAILY
Status: DISCONTINUED | OUTPATIENT
Start: 2024-01-17 | End: 2024-01-23 | Stop reason: HOSPADM

## 2024-01-17 RX ORDER — NIFEDIPINE 30 MG/1
60 TABLET, EXTENDED RELEASE ORAL 2 TIMES DAILY
Status: DISCONTINUED | OUTPATIENT
Start: 2024-01-17 | End: 2024-01-18

## 2024-01-17 RX ADMIN — FUROSEMIDE 100 MG: 10 INJECTION, SOLUTION INTRAMUSCULAR; INTRAVENOUS at 12:01

## 2024-01-17 RX ADMIN — SENNOSIDES AND DOCUSATE SODIUM 1 TABLET: 8.6; 5 TABLET ORAL at 09:01

## 2024-01-17 RX ADMIN — MEROPENEM 2 G: 1 INJECTION INTRAVENOUS at 08:01

## 2024-01-17 RX ADMIN — APIXABAN 5 MG: 5 TABLET, FILM COATED ORAL at 08:01

## 2024-01-17 RX ADMIN — DEXAMETHASONE SODIUM PHOSPHATE 6 MG: 4 INJECTION INTRA-ARTICULAR; INTRALESIONAL; INTRAMUSCULAR; INTRAVENOUS; SOFT TISSUE at 08:01

## 2024-01-17 RX ADMIN — MUPIROCIN: 20 OINTMENT TOPICAL at 09:01

## 2024-01-17 RX ADMIN — ONDANSETRON 4 MG: 2 INJECTION INTRAMUSCULAR; INTRAVENOUS at 09:01

## 2024-01-17 RX ADMIN — ATOVAQUONE 1500 MG: 750 SUSPENSION ORAL at 08:01

## 2024-01-17 RX ADMIN — SENNOSIDES AND DOCUSATE SODIUM 1 TABLET: 8.6; 5 TABLET ORAL at 08:01

## 2024-01-17 RX ADMIN — IPRATROPIUM BROMIDE AND ALBUTEROL SULFATE 3 ML: 2.5; .5 SOLUTION RESPIRATORY (INHALATION) at 01:01

## 2024-01-17 RX ADMIN — IPRATROPIUM BROMIDE AND ALBUTEROL SULFATE 3 ML: 2.5; .5 SOLUTION RESPIRATORY (INHALATION) at 08:01

## 2024-01-17 RX ADMIN — TACROLIMUS 2 MG: 1 CAPSULE ORAL at 05:01

## 2024-01-17 RX ADMIN — GANCICLOVIR SODIUM 332 MG: 500 INJECTION, POWDER, LYOPHILIZED, FOR SOLUTION INTRAVENOUS at 04:01

## 2024-01-17 RX ADMIN — PANTOPRAZOLE SODIUM 40 MG: 40 INJECTION, POWDER, FOR SOLUTION INTRAVENOUS at 08:01

## 2024-01-17 RX ADMIN — MEROPENEM 2 G: 1 INJECTION INTRAVENOUS at 03:01

## 2024-01-17 RX ADMIN — LORAZEPAM 0.5 MG: 2 INJECTION INTRAMUSCULAR; INTRAVENOUS at 10:01

## 2024-01-17 RX ADMIN — NIFEDIPINE 60 MG: 30 TABLET, FILM COATED, EXTENDED RELEASE ORAL at 09:01

## 2024-01-17 RX ADMIN — APIXABAN 5 MG: 5 TABLET, FILM COATED ORAL at 09:01

## 2024-01-17 RX ADMIN — TACROLIMUS 2 MG: 1 CAPSULE ORAL at 08:01

## 2024-01-17 RX ADMIN — Medication 175 MCG/HR: at 12:01

## 2024-01-17 RX ADMIN — CARVEDILOL 12.5 MG: 12.5 TABLET, FILM COATED ORAL at 09:01

## 2024-01-17 RX ADMIN — FUROSEMIDE 100 MG: 10 INJECTION, SOLUTION INTRAMUSCULAR; INTRAVENOUS at 11:01

## 2024-01-17 RX ADMIN — IPRATROPIUM BROMIDE AND ALBUTEROL SULFATE 3 ML: 2.5; .5 SOLUTION RESPIRATORY (INHALATION) at 07:01

## 2024-01-17 NOTE — PLAN OF CARE
[facility] - Cardiac Medical ICU  Initial Discharge Assessment       Primary Care Provider: Jaime Jauregui (Inactive)    Admission Diagnosis: Pneumonia [J18.9]    Admission Date: 1/14/2024  Expected Discharge Date: 1/19/2024    Transition of Care Barriers: None    Payor:  / Plan:  [employer] / Product Type: Government /     Extended Emergency Contact Information  Primary Emergency Contact: Nusrat Tripp  Address: [address] [place] [address] [facility]  Home Phone: [phone]  Mobile Phone: [phone]  Relation: Spouse    Discharge Plan A: Home with family  Discharge Plan B: Home      [id number] [place] [place] - [address]  [address]  [place] [address]  Phone: [phone] Fax: [phone]    [id number] Cl[facility] - [place] - [address] AT [facility]  [address] [place] [address]  [place] [address]  Phone: [phone] Fax: [phone]    [facility] - [place], [place] - [address]  [address]  [place] [address]  Phone: [phone] Fax: [phone]    [facility] - [place], [place] - [address] [place] [address]  Phone: [phone] Fax: [phone]    [facility] - [place], [place] - [address] [place] [address]  Phone: [phone] Fax: [phone]    [id number] - [place] - [address]  [address]  [place] [address]  Phone: [phone] Fax: [phone]      Initial Assessment (most recent)       Adult Discharge Assessment - 01/16/24 1915          Discharge Assessment    Assessment Type Discharge Planning Assessment     Confirmed/corrected address, phone number and insurance Yes     Confirmed Demographics Correct on Facesheet     Source of Information family     If unable to respond/provide information was family/caregiver  contacted? Yes     Contact Name/Number Nusrat Tripp, spouse/cp# [phone]     When was your last doctors appointment? --   A few years ago    Does patient/caregiver understand observation status Yes     Communicated ZAY with patient/caregiver Date not available/Unable to determine     Reason For Admission Acute Hypoxic Respiratory Failure and CoVid +     People in Home spouse;child(jesus), dependent     Facility Arrived From: [facility] in [place], [place]     Do you expect to return to your current living situation? Yes     Do you have help at home or someone to help you manage your care at home? No     Who are your caregiver(s) and their phone number(s)? Nusrat Tripp, spouse/cp# [phone]     Prior to hospitilization cognitive status: Alert/Oriented     Current cognitive status: Alert/Oriented     Walking or Climbing Stairs Difficulty no     Dressing/Bathing Difficulty no     Home Layout Able to live on 1st floor     Equipment Currently Used at Home none     Readmission within 30 days? Yes     Patient currently being followed by outpatient case management? No     Do you currently have service(s) that help you manage your care at home? No     Do you take prescription medications? Yes     Do you have prescription coverage? Yes     Coverage  -  [facility]     Do you have any problems affording any of your prescribed medications? No     Is the patient taking medications as prescribed? yes     Who is going to help you get home at discharge? Nusrat Tripp (spouse)/cp# [phone]     How do you get to doctors appointments? car, drives self     Are you on dialysis? No     Do you take coumadin? No     Discharge Plan A Home with family     Discharge Plan B Home     DME Needed Upon Discharge  none     Discharge Plan discussed with: Spouse/sig other     Name(s) and Number(s) Nusrat Tripp, spouse/cp# [phone]     Transition of Care Barriers None        Physical  Activity    On average, how many days per week do you engage in moderate to strenuous exercise (like a brisk walk)? Patient declined     On average, how many minutes do you engage in exercise at this level? Patient declined        Financial Resource Strain    How hard is it for you to pay for the very basics like food, housing, medical care, and heating? Not hard at all        Housing Stability    In the last 12 months, was there a time when you were not able to pay the mortgage or rent on time? No     In the last 12 months, how many places have you lived? 1     In the last 12 months, was there a time when you did not have a steady place to sleep or slept in a shelter (including now)? No        Transportation Needs    In the past 12 months, has lack of transportation kept you from medical appointments or from getting medications? No     In the past 12 months, has lack of transportation kept you from meetings, work, or from getting things needed for daily living? No        Food Insecurity    Within the past 12 months, you worried that your food would run out before you got the money to buy more. Never true     Within the past 12 months, the food you bought just didn't last and you didn't have money to get more. Never true        Stress    Do you feel stress - tense, restless, nervous, or anxious, or unable to sleep at night because your mind is troubled all the time - these days? Only a little        Social Connections    In a typical week, how many times do you talk on the phone with family, friends, or neighbors? More than three times a week     How often do you get together with friends or relatives? More than three times a week     How often do you attend Restoration or Mu-ism services? Never     Do you belong to any clubs or organizations such as Restoration groups, unions, fraternal or athletic groups, or school groups? No     How often do you attend meetings of the clubs or organizations you belong to? Never     Are  "you , , , , never , or living with a partner?         Alcohol Use    Q1: How often do you have a drink containing alcohol? 2-3 times a week     Q2: How many drinks containing alcohol do you have on a typical day when you are drinking? 3 or 4     Q3: How often do you have six or more drinks on one occasion? Never        OTHER    Name(s) of People in Home Nusrat Clickstine,spouse, and 4 dependent children: 3 boys and 1 dgtr.                     Readmission Assessment (most recent)       Readmission Assessment - 01/16/24 1209          Readmission    Why were you hospitalized in the last 30 days? Acute on Chronic Renal Failure     Why were you readmitted? Alarmed about signs/symptoms     When you left the hospital how did you feel? "better"     When you left the hospital where did you go? Home with Family     Did patient/caregiver refused recommended DC plan? No     Tell me about what happened between when you left the hospital and the day you returned. Wife states patient started having real bad headaches     When did you start not feeling well? a week ago"     Did you try to manage your symptoms your self? Yes     What did you do? take medication     Did you call anyone? No     Why? went to the ED at [facility] in [place], [place]     Did you try to see or did see a doctor or nurse before you came? No     Did you have  a follow-up appointment on discharge? Yes     Did you go? No     Was this a planned readmission? No                    Discharge Plan A and Plan B have been determined by review of patient's clinical status, future medical and therapeutic needs, and coverage/benefits for post-acute care in coordination with multidisciplinary team members.    Hermilo White LMSW  [facility]  [phone]            "

## 2024-01-17 NOTE — NURSING
Pt refusing straight catheterization. Education completed on need for procedure. Dr. Yepez aware.

## 2024-01-17 NOTE — SUBJECTIVE & OBJECTIVE
Subjective:   History of Present Illness:    He is 39 yo White male who received a living kidney transplant on 7/26/10.  his creatinine was 1.3-18 till December 2023 when he developed ARFafter not  taking IS meds for 5 weeks. 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 Treated with steroid. DSA 12/19/23 with +DSAs. had IVIG outpatient. Due to nephrotic syndrome and hypoalbuminemia, started eliquis for anticoagulation. He was discharged on pred taper, cellcept, and prograf. At the time of discharge Cr was 3.       transferred to [facility] MICU for acute hypoxemic respiratory failure, bacteremia. On 1/5/24 he was admitted to [facility] in [place] with abnormal labs. Was found to be COVID positive.  Blood culture with Staph haemolyticus. Abdominal US on 1/6 noted cirrhotic appearing liver.  CT chest on 1/7 showed consolidated infiltrates and/or atelectasis involving the bilateral lung bases with small bilateral pleural effusions, patchy heterogeneous infiltrate involving the right middle lobe, and mild ascites. The patient was initially going to transfer to hospital medicine however he was intubated prior to transfer and was accepted to [facility] MICU for a higher level of care.  He arrived on propofol and fentanyl.  KTM and transplant ID consulted on admission.    Mr. Tripp is a 38 y.o. year old male who is status post Kidney Transplant - 7/26/2010  (#1).    His maintenance immunosuppression consists of:   Immunosuppressants (From admission, onward)      Start     Stop Route Frequency Ordered    01/16/24 1800  tacrolimus capsule 2 mg         -- Oral 2 times daily 01/16/24 1617            Hospital Course:  Intubated. On vent  FIO2%90. His UOP ~ 1 liter with lasix. Has metabolic acidosis on ABG. Plan for HD to remove some fluid and correct acidosis. Pt needs to have line today     Interval History:    No acute events overnight. Patient tolerated SLED well without  issue. Extubated this AM to nasal cannula. Noted to have urinary retention with ~800cc in bladder scan, wade placed again.     Net (-) 1.3L in last 24hrs.     Past Medical, Surgical, Family, and Social History:   Unchanged from H&P.    Scheduled Meds:   albuterol-ipratropium  3 mL Nebulization Q6H    apixaban  5 mg Per OG tube BID    atovaquone  1,500 mg Per NG tube Daily    dexAMETHasone  6 mg Intravenous Daily    furosemide (LASIX) injection  100 mg Intravenous Q8H    ganciclovir (CYTOVENE) 332 mg in sodium chloride 0.9% 100 mL IVPB  2.5 mg/kg Intravenous Q24H    meropenem (MERREM) IVPB  2 g Intravenous Q8H    mupirocin   Nasal BID    pantoprazole  40 mg Intravenous Daily    polyethylene glycol  17 g Per NG tube Daily    senna-docusate 8.6-50 mg  1 tablet Per NG tube BID    tacrolimus  2 mg Oral BID     Continuous Infusions:   sodium chloride 0.9% Stopped (01/17/24 0637)    fentanyl Stopped (01/17/24 0724)     PRN Meds:dextrose 10%, dextrose 10%, fentanyl, glucagon (human recombinant), magnesium sulfate IVPB, naloxone, ondansetron, sodium chloride 0.9%, sodium phosphate 20.01 mmol in dextrose 5 % (D5W) 250 mL IVPB, sodium phosphate 30 mmol in dextrose 5 % (D5W) 250 mL IVPB, sodium phosphate 39.99 mmol in dextrose 5 % (D5W) 250 mL IVPB    Intake/Output - Last 3 Shifts         01/15 0700 01/16 0659 01/16 0700 01/17 0659 01/17 0700 01/18 0659    I.V. (mL/kg) 1509 (11.4) 3253.7 (24.6) 130.7 (1)    Other 500      NG/ 420 120    IV Piggyback 494.3 297 48.3    Total Intake(mL/kg) 2733.3 (20.6) 3970.8 (30) 299 (2.3)    Urine (mL/kg/hr) 2110 (0.7) 975 (0.3) 1155 (1.3)    Drains   475    Other 2000 4372     Total Output 4110 5347 1630    Net -1376.7 -1376.2 -1331.1                    Review of Systems   Objective:     Vital Signs (Most Recent):  Temp: 99.1 °F (37.3 °C) (01/17/24 1100)  Pulse: 86 (01/17/24 1300)  Resp: 13 (01/17/24 0723)  BP: (!) 163/109 (01/17/24 1300)  SpO2: 95 % (01/17/24 1300) Vital Signs (24h  "Range):  Temp:  [98 °F (36.7 °C)-99.1 °F (37.3 °C)] 99.1 °F (37.3 °C)  Pulse:  [59-96] 86  Resp:  [13-30] 13  SpO2:  [87 %-97 %] 95 %  BP: (117-168)/() 163/109     Weight: 132.5 kg (292 lb)  Height: 6' 1" (185.4 cm)  Body mass index is 38.52 kg/m².     Physical Exam  Constitutional:       Appearance: Normal appearance.   HENT:      Head: Normocephalic and atraumatic.   Cardiovascular:      Rate and Rhythm: Normal rate and regular rhythm.      Pulses: Normal pulses.      Heart sounds: Normal heart sounds.   Pulmonary:      Effort: Pulmonary effort is normal.      Breath sounds: Normal breath sounds.   Abdominal:      General: Abdomen is flat. There is distension.   Musculoskeletal:      Cervical back: Normal range of motion and neck supple.      Right lower leg: Edema present.      Left lower leg: Edema present.   Skin:     General: Skin is warm and dry.   Neurological:      General: No focal deficit present.      Mental Status: He is alert and oriented to person, place, and time. Mental status is at baseline.   Psychiatric:         Mood and Affect: Mood normal.          Laboratory:  Labs within the past 24 hours have been reviewed.    Diagnostic Results:  None  "

## 2024-01-17 NOTE — ASSESSMENT & PLAN NOTE
- Off MMF due to COVID  - Resume Prograf 2mg BID  - obtain tacro level qAM. Will titrate based on results.     Once COVID dexamethasone is complete, plan to start PO prednisone 30mg QD, with plans to taper.

## 2024-01-17 NOTE — PLAN OF CARE
MICU DAILY GOALS     Family/Goals of care/Code Status   Code Status: Full Code    24H Vital Sign Range  Temp:  [98 °F (36.7 °C)-98.9 °F (37.2 °C)]   Pulse:  []   Resp:  [22-30]   BP: (116-158)/()   SpO2:  [93 %-99 %]      Shift Events (include procedures and significant events)   Ran on CRRT throughout night. No UOP overnight, bladder scanned x2 (max 200), MD Yair notified and okay with no UOP while on CRRT.    AWAKE RASS: Goal - RASS Goal: 0-->alert and calm  Actual - RASS (Dash Agitation-Sedation Scale): alert and calm    Restraint necessity: Not necessary   BREATHE SBT: Not attempted    Coordinate A & B, analgesics/sedatives Pain: managed   SAT: Pass   Delirium CAM-ICU: Overall CAM-ICU: Negative   Early(intubated/ Progressive (non-intubated) Mobility MOVE Screen (INTUBATED ONLY): Not attempted    Activity: Activity Management: Patient unable to perform activities   Feeding/Nutrition Diet order: Diet/Nutrition Received: tube feeding, Specialty Diet/Nutrition Received: renal diet   Thrombus DVT prophylaxis: VTE Required Core Measure: Pharmacological prophylaxis initiated/maintained   HOB Elevation Head of Bed (HOB) Positioning: HOB at 30-45 degrees   Ulcer Prophylaxis GI: yes   Glucose control managed     Skin Skin assessed during: Q Shift Change    Sacrum intact/not altered? Yes  Heels intact/not altered? Yes  Surgical wound? No    Check one (no altered skin or altered skin) and sub boxes:  [x] No Altered Skin Integrity Present    [x]Prevention Measures Documented    [] Altered Skin Integrity Present or Discovered   [] LDA present in EPIC              [] LDA added in EPIC   [] Wound Image Taken (required on admit,                   transfer/discharge and every Tuesday)    Wound Care Consulted? Yes    Attending Nurse:     Second RN/Staff Member:    Bowel Function constipation    Indwelling Catheter Necessity [REMOVED]      Urethral Catheter 01/14/24 1345-Reason for Continuing Urinary  Catheterization: Critically ill in ICU and requiring hourly monitoring of intake/output    Trialysis (Dialysis) Catheter 01/15/24 1803 right internal jugular-Line Necessity Review: CRRT/HD     De-escalation Antibiotics Yes       VS and assessment per flow sheet, patient progressing towards goals as tolerated, plan of care reviewed with patient , all concerns addressed, will continue to monitor.

## 2024-01-17 NOTE — NURSING
Dr. Yepez notified that pt has not voided x 12 hr. Bladder scan performed at bedside showing 550-700mL of urine. MD states okay to straight cath pt. MD also made aware pt' SpO2 86-89% during SBT, however, fentanyl gtt that had been infusing at 175 mcg/hr and was not discontinued until SBT initiated. MD states okay to hold tube feeds for probable extubation.

## 2024-01-17 NOTE — ASSESSMENT & PLAN NOTE
- Living kidney transplant on 7/26/10.    - BL creatinine was 1.3-1.7 till December 2023 when he developed ARF due to diffuse proliferative GN and mild  TCMR. Treated with steroid. Due to nephrotic syndrome  and hypoalbuminemia, started eliquis for anticoagulation. He was discharged on pred taper, cellcept, and prograf. At the time of discharge on 12/23/23  Cr was 3.    Plan:  - IV lasix 100mg TID  - repeat UPCR  - maintain wade catheter. Strict ins and outs  - will plan to review biopsy result to determine if further treatment/repeat biopsy is needed for underlying GN  - Strict I/O  - Avoid nephrotoxins, volume shifts, aim for BP within target to prevent additional renal injury  - Avoid nephrotoxic agents (NSAIDs, IV contrast dye, ACEI/ARB anti-HTN medications, Aminoglycoside-containing antibiotics)  - Renally dose all appropriate medications, including antibiotics  - check lab every 8 hours

## 2024-01-17 NOTE — PROGRESS NOTES
[facility] - Cardiac Medical ICU  Kidney Transplant  Progress Note      Reason for Follow-up: Reassessment of Kidney Transplant - 7/26/2010  (#1) recipient and management of immunosuppression.     ORGAN: LEFT KIDNEY    Donor Type: Living      Subjective:   History of Present Illness:    He is 39 yo White male who received a living kidney transplant on 7/26/10.  his creatinine was 1.3-18 till December 2023 when he developed ARFafter not  taking IS meds for 5 weeks. 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 Treated with steroid. DSA 12/19/23 with +DSAs. had IVIG outpatient. Due to nephrotic syndrome and hypoalbuminemia, started eliquis for anticoagulation. He was discharged on pred taper, cellcept, and prograf. At the time of discharge Cr was 3.       [place] [facility] [place] acute hypoxemic respiratory failure, bacteremia. On 1/5/24 he was admitted to [facility] in [place] with abnormal labs. Was found to be COVID positive.  Blood culture with Staph haemolyticus. Abdominal US on 1/6 noted cirrhotic appearing liver.  CT chest on 1/7 showed consolidated infiltrates and/or atelectasis involving the bilateral lung bases with small bilateral pleural effusions, patchy heterogeneous infiltrate involving the right middle lobe, and mild ascites. The patient was initially going to transfer to hospital medicine however he was intubated prior to transfer and was [place] [facility] [place] higher level of care.  He arrived on propofol and fentanyl.  KTM and transplant ID consulted on admission.    Mr. Tripp is a 38 y.o. year old male who is status post Kidney Transplant - 7/26/2010  (#1).    His maintenance immunosuppression consists of:   Immunosuppressants (From admission, onward)      Start     Stop Route Frequency Ordered    01/16/24 1800  tacrolimus capsule 2 mg         -- Oral 2 times daily 01/16/24 1617            Hospital Course:  Intubated. On vent   FIO2%90. His UOP ~ 1 liter with lasix. Has metabolic acidosis on ABG. Plan for HD to remove some fluid and correct acidosis. Pt needs to have line today     Interval History:    No acute events overnight. Patient tolerated SLED well without issue. Extubated this AM to nasal cannula. Noted to have urinary retention with ~800cc in bladder scan, wade placed again.     Net (-) 1.3L in last 24hrs.     Past Medical, Surgical, Family, and Social History:   Unchanged from H&P.    Scheduled Meds:   albuterol-ipratropium  3 mL Nebulization Q6H    apixaban  5 mg Per OG tube BID    atovaquone  1,500 mg Per NG tube Daily    dexAMETHasone  6 mg Intravenous Daily    furosemide (LASIX) injection  100 mg Intravenous Q8H    ganciclovir (CYTOVENE) 332 mg in sodium chloride 0.9% 100 mL IVPB  2.5 mg/kg Intravenous Q24H    meropenem (MERREM) IVPB  2 g Intravenous Q8H    mupirocin   Nasal BID    pantoprazole  40 mg Intravenous Daily    polyethylene glycol  17 g Per NG tube Daily    senna-docusate 8.6-50 mg  1 tablet Per NG tube BID    tacrolimus  2 mg Oral BID     Continuous Infusions:   sodium chloride 0.9% Stopped (01/17/24 0637)    fentanyl Stopped (01/17/24 0724)     PRN Meds:dextrose 10%, dextrose 10%, fentanyl, glucagon (human recombinant), magnesium sulfate IVPB, naloxone, ondansetron, sodium chloride 0.9%, sodium phosphate 20.01 mmol in dextrose 5 % (D5W) 250 mL IVPB, sodium phosphate 30 mmol in dextrose 5 % (D5W) 250 mL IVPB, sodium phosphate 39.99 mmol in dextrose 5 % (D5W) 250 mL IVPB    Intake/Output - Last 3 Shifts         01/15 0700  01/16 0659 01/16 0700 01/17 0659 01/17 0700 01/18 0659    I.V. (mL/kg) 1509 (11.4) 3253.7 (24.6) 130.7 (1)    Other 500      NG/ 420 120    IV Piggyback 494.3 297 48.3    Total Intake(mL/kg) 2733.3 (20.6) 3970.8 (30) 299 (2.3)    Urine (mL/kg/hr) 2110 (0.7) 975 (0.3) 1155 (1.3)    Drains   475    Other 2000 4372     Total Output 4110 5389 1630    Net -1376.7 -1376.2 -1331.1              "       Review of Systems   Objective:     Vital Signs (Most Recent):  Temp: 99.1 °F (37.3 °C) (01/17/24 1100)  Pulse: 86 (01/17/24 1300)  Resp: 13 (01/17/24 0723)  BP: (!) 163/109 (01/17/24 1300)  SpO2: 95 % (01/17/24 1300) Vital Signs (24h Range):  Temp:  [98 °F (36.7 °C)-99.1 °F (37.3 °C)] 99.1 °F (37.3 °C)  Pulse:  [59-96] 86  Resp:  [13-30] 13  SpO2:  [87 %-97 %] 95 %  BP: (117-168)/() 163/109     Weight: 132.5 kg (292 lb)  Height: 6' 1" (185.4 cm)  Body mass index is 38.52 kg/m².     Physical Exam  Constitutional:       Appearance: Normal appearance.   HENT:      Head: Normocephalic and atraumatic.   Cardiovascular:      Rate and Rhythm: Normal rate and regular rhythm.      Pulses: Normal pulses.      Heart sounds: Normal heart sounds.   Pulmonary:      Effort: Pulmonary effort is normal.      Breath sounds: Normal breath sounds.   Abdominal:      General: Abdomen is flat. There is distension.   Musculoskeletal:      Cervical back: Normal range of motion and neck supple.      Right lower leg: Edema present.      Left lower leg: Edema present.   Skin:     General: Skin is warm and dry.   Neurological:      General: No focal deficit present.      Mental Status: He is alert and oriented to person, place, and time. Mental status is at baseline.   Psychiatric:         Mood and Affect: Mood normal.          Laboratory:  Labs within the past 24 hours have been reviewed.    Diagnostic Results:  None  Assessment/Plan:     Bacteremia  Possible contaminant?  ID on board      COVID  Per primary team      Acute on chronic renal failure  - Living kidney transplant on 7/26/10.    - BL creatinine was 1.3-1.7 till December 2023 when he developed ARF due to diffuse proliferative GN and mild  TCMR. Treated with steroid. Due to nephrotic syndrome  and hypoalbuminemia, started eliquis for anticoagulation. He was discharged on pred taper, cellcept, and prograf. At the time of discharge on 12/23/23  Cr was 3.    Plan:  - IV " lasix 100mg TID  - repeat UPCR  - maintain wade catheter. Strict ins and outs  - will plan to review biopsy result to determine if further treatment/repeat biopsy is needed for underlying GN  - Strict I/O  - Avoid nephrotoxins, volume shifts, aim for BP within target to prevent additional renal injury  - Avoid nephrotoxic agents (NSAIDs, IV contrast dye, ACEI/ARB anti-HTN medications, Aminoglycoside-containing antibiotics)  - Renally dose all appropriate medications, including antibiotics  - check lab every 8 hours    Prophylactic immunotherapy  - Off MMF due to COVID  - Resume Prograf 2mg BID  - obtain tacro level qAM. Will titrate based on results.     Once COVID dexamethasone is complete, plan to start PO prednisone 30mg QD, with plans to taper.         Discharge Planning:    Medical decision making for this encounter includes review of pertinent labs and diagnostic studies, assessment and planning, discussions with consulting providers, discussion with patient/family, and participation in multidisciplinary rounds. JORGE Blue DO  Kidney Transplant  [facility] - Card[facility] Medical ICU

## 2024-01-17 NOTE — PLAN OF CARE
Pt extubated today, 1/17/24, clinical status improving. Has finished 5 days total of Remdesivir, still needs to complete 10 total days of Dexamethasone.     Outside facility reported the following culture results:  12/29/23 Blood Cx: NGTD after 5 days  1/5/24: Staph Haemolyticus, positive in 2 different sets, the vials used were pediatric bottles, as pt was a difficult stick. Only 2 total bottles were collected.  1/6/24 Blood Cx: NGTD after 5 days.    Pt started on Vanc and Meropenem at outside facility, has been on Meropenem for 4 days at [facility]. Likely contaminant, but still received appropriate coverage. Clinical picture improving with COVID tx and volume removal. Okay to d/c all abx going forward. ID will sign off. Please reach back out to our team if any new concerns or questions arise.

## 2024-01-17 NOTE — PROGRESS NOTES
01/17/24 0311   Treatment   Treatment Type SLED   Treatment Status Daily equipment check   Dialysis Machine Number [id number]   Dialyzer Time (hours) 9.17   BVP (Liters) 81.3 L   Solutions Labeled and Current  Yes   Access Temporary Cath   Catheter Dressing Intact  Yes   Alarms Engaged Yes   CRRT Comments daily check   $ CRRT Charges   $ CRRT Daily Assessment Complete   $ CRRT Daily Maintenance Complete     Daily check completed. Orders and machine settings verified

## 2024-01-17 NOTE — NURSING
01/17/24 1500   Vital Signs   Temp 99 °F (37.2 °C)   Temp Source Axillary   Pulse 85   Heart Rate Source Monitor;Continuous   SpO2 97 %   Pulse Oximetry Type Continuous   Oximetry Probe Status Assessed;Intact;No Change Needed   Flow (L/min) 20   Oxygen Concentration (%) 50   Device (Oxygen Therapy) high flow nasal cannula w/device  (airvo)   BP (!) 167/109   MAP (mmHg) 132   BP Method Automatic   Patient Position Lying        Cardiac/Telemetry Details / Alarms   Cardiac/Telemetry Monitor On Yes   Cardiac/Telemetry Audible Yes   Cardiac/Telemetry Alarms Set Yes        Urethral Catheter 01/17/24 1130 16 Fr.   Placement Date/Time: 01/17/24 1130   Present Prior to Hospital Arrival?: No  Hand Hygiene: Performed  Inserted by: RN   Name: HOLLY Beck  Insertion attempts (enter comment if more than 2 attempts): 1  Tube Size (Fr.): 16 Fr.  Catheter ...   Output (mL) 200 mL     Dr. Yepez notified of continued hypertension. Pt states he usually takes carvedilol at home.

## 2024-01-17 NOTE — PROGRESS NOTES
[facility] - Cardiac Medical ICU  Critical Care Medicine  Progress Note    Patient Name: Ubaldo Tripp  MRN: [id number]  Admission Date: 1/14/2024  Hospital Length of Stay: 3 days  Code Status: Full Code  Attending Provider: Kristian Smith MD  Primary Care Provider: Jaime Jauregui (Inactive)   Principal Problem: Acute hypoxic respiratory failure    Subjective:     HPI:  37 y/o M with a medical history of kidney transplant in 2010 and HTN transferred to [facility] for acute hypoxemic respiratory failure, bacteremia, and hx of Kidney Transplant for KTM and Infectious Disease evaluation. Pt was previously admitted to [facility] 12/18/23 - 12/23/23 with GENNY, headache, and HTN.  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  He was found to have nephrotic syndrome as well.  Received pulse dose steroids and was started on Eliquis. Discharged on a prednisone taper, CellCept, and Prograf.  sCr 12/23 was 3.0.     On 1/5/24 he was admitted to [facility] in [place] with abnormal labs. Outpatient labs on 1/3 showed WBC 17.7 Hgb 13.1, K 5.7, bicarb 18, BUN 71, sCr 2.92, phos 5.1, and tacro level 10.8.  Was found to be COVID positive requiring BiPAP.  Blood culture with Staph haemolyticus.  Labs notable for sCr ranging 2.68 to 2.73.  K normalized.  Abdominal US on 1/6 noted cirrhotic appearing liver.  CT chest on 1/7 showed consolidated infiltrates and/or atelectasis involving the bilateral lung bases with small bilateral pleural effusions, patchy heterogeneous infiltrate involving the right middle lobe, and mild ascites.  Transplant renal ultrasound 1/8 showed stable appearance of the transplant kidney without evidence of acute abnormality.  Gallbladder ultrasound 1/8 showed sludge in the gallbladder.  Medications  prior to transfer include CellCept, tacrolimus, valganciclovir, prednisone, Eliquis, and meropenem.    The patient was initially going to transfer to hospital medicine however he was intubated prior to transfer and [place] [facility] [place] level of care.  He arrived on propofol and fentanyl.  KTM and transplant ID consulted on admission.    Hospital/ICU Course:  Started tube feeds. KTM on board, recommending trialysis line placement and HD.     Overnight Events:  NAEO. CRRT overnight. Hemodynamically stable off pressors.     ABG  Recent Labs   Lab 01/17/24  0609   PH 7.445   PO2 82   PCO2 37.8   HCO3 26.0   BE 2     Assessment/Plan:     Pulmonary  [facility]  [facility]    Renal/  Acute on chronic renal failure  Cr was 1.3 in 2021, no other Cr results until the recent admission with Cr at 2.92. Likely prerenal 2/2 sepsis, potential component of rejection.    - KTM consulted due to history of renal transplant   - SLED for volume overload overnight  - Trend CMP/BMPs  - Strict intake/output  - Renally dose meds  - Avoid nephrotoxic agents  - Replete electrolytes PRN    Living-donor kidney transplant recipient - 7/26/10  Patient with kidney transplant 2010. He was previously admitted to [facility] December 18-23 with GENNY. 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  He was found to have nephrotic syndrome as well, started on eliquis.  He received pulse dose steroids and was started on Eliquis for anticoagulation. He was discharged on a prednisone taper, CellCept, and Prograf.  On December 23, his creatinine was 3.0. Current medications include CellCept, tacrolimus, prednisone, all of which he continued to receive at OSH.    - KTM consulted, appreciate recs  - Strict I/O  - pending kidney US   - pending BK, CMV PCR, DSA  "  - Avoid nephrotoxins, volume shifts, aim for BP within target to prevent additional renal injury  - Avoid nephrotoxic agents (NSAIDs, IV contrast dye, ACEI/ARB anti-HTN medications, Aminoglycoside-containing antibiotics)  - Renally dose all appropriate medications, including antibiotics  -  High Cr, acidosis, high FIO2 requirement: plan for iHD for fluid removal. Await line   placement by ICU team. Lasix  mg X 1 time    - SLED   - Prograf 2mg BID  - Gangciclovir for CMV prophylaxis  - Atovaquone for PJP prophylaxis  - Eliquis for nephrotic syndrome    ID  Bacteremia  Patient is a 39 y/o male with a PMH of kidney transplant in 2010 that was transferred from [facility] with blood cultures positive for Staph haemolyticus. Patient was started on Merrem at the time.     - Will continue Meropenem  - ID consulted, appreciate recs   - Continue Dex; Finished remdesivir course   - Continue Meropenem   - Continue IV Ganciclovir  - Redrawing blood cultures on transfer; will follow up with outside culture results  - Patient currently volume overloaded; will defer further fluid resuscitation  - Trending daily CBC    COVID  Patient noted to be covid positive 1/10/24, requiring BIPAP. Patient [place] [facility] Acute hypoxemic respiratory failure also complicated by consolidations noted in bilateral lung bases w/ small b/l pleural effusions.     - Completed remdesivir x 5 days  - Continue Dexamethasone 6mg QD for 8 days  - Mechanically ventilated, will wean sedation and pressure support as able  - Continuous oximetry  - Trending daily CBC/CMP, Mg, Phos    Immunology/Multi System  Immunocompromised state due to drug therapy  Pt takes Cellcept and Prograf at home    - there is a note in report how pt stated he did not take his immunotherapy for 1 month bc he "ran out"  -  Prograf & Tacrolimus per KTM      GI  Transaminitis  Patient noted to have transaminitis at outside facility with imaging " concerning for cirrhosis and ascites.     - Trend CMPs  - Consider Liver doppler     Other  * Acute hypoxic respiratory failure  Patient with Hypoxic Respiratory failure which is Acute.  he is not on home oxygen. Contributing diagnoses includes - ARDS and Pneumonia Labs and images were reviewed. Patient Has not had a recent ABG. Supplemental oxygen was provided and noted- Vent Mode: A/C  Oxygen Concentration (%):  [60-90] 40  Resp Rate Total:  [22 br/min-37 br/min] 22 br/min  Vt Set:  [500 mL] 460 mL  PEEP/CPAP:  [10 psX05-12 cmH20] 5 cmH20  Mean Airway Pressure:  [15 ejZ10-15 cmH20] 19 cmH20    Plan:   - Meropenem for BSA  - ID consultation - Continue demsevir, dexamethasone, Ann, gancyclovir   - Remdesivir/dexamethasone for COVID + status  - Q6 hour nebs   - ARDS-NET ladder    Hyperglyceridemia  Triglycerides 2114 > 2129. Suspicious for adverse reaction to propofol vs pancreatitis. Normal levels from historical data 13 years ago.     Plan:  - Discontinue propofol  - Trend triglyceride            Critical Care Daily Checklist:    A: Awake: RASS Goal/Actual Goal: RASS Goal: 0-->alert and calm  Actual:     B: Spontaneous Breathing Trial Performed? Spon. Breathing Trial Initiated?: Initiated (01/17/24 0723)Yes   C: SAT & SBT Coordinated?  Yes                      D: Delirium: CAM-ICU Overall CAM-ICU: Negative   E: Early Mobility Performed? Yes   F: Feeding Goal: Goals: Meet % EEN, EPN by RD f/u date  Status: Nutrition Goal Status: new   Current Diet Order   Procedures    Diet NPO      AS: Analgesia/Sedation Fentanyl   T: Thromboembolic Prophylaxis eliquis   H: HOB > 300 Yes   U: Stress Ulcer Prophylaxis (if needed) protonix   G: Glucose Control SSI   B: Bowel Function  Senna,    I: Indwelling Catheter (Lines & Mayen) Necessity Mayen, midline    D: De-escalation of Antimicrobials/Pharmacotherapies Meropenem, Ganciclovir, Atovaquone,     Plan for the day/ETD     Code Status:  Family/Goals of Care: Full Code          Critical secondary to Patient has a condition that poses threat to life and bodily function: Severe Respiratory Distress      Critical care was time spent personally by me on the following activities: development of treatment plan with patient or surrogate and bedside caregivers, discussions with consultants, evaluation of patient's response to treatment, examination of patient, ordering and performing treatments and interventions, ordering and review of laboratory studies, ordering and review of radiographic studies, pulse oximetry, re-evaluation of patient's condition. This critical care time did not overlap with that of any other provider or involve time for any procedures.     Haja Yepez MD  Critical Care Medicine  [facility] - Cardiac Medical ICU

## 2024-01-17 NOTE — PLAN OF CARE
MICU DAILY GOALS     Family/Goals of care/Code Status   Code Status: Full Code    24H Vital Sign Range  Temp:  [98 °F (36.7 °C)-99.1 °F (37.3 °C)]   Pulse:  [59-96]   Resp:  [13-30]   BP: (117-168)/()   SpO2:  [87 %-97 %]      Shift Events (include procedures and significant events)   Pt extubated and transitioned to airvo 20L/50%. SpO2 goal is greater than or equal to 90% per Dr. Yepez. Mayen catheter inserted per MD order for urinary retention. Pt able to tolerate ice chips and passed Gomez bedside swallow screening. SLP evaluation pending.  NGT remains in place and clamped. Used to administer PO medications.     AWAKE RASS: Goal - RASS Goal: 0-->alert and calm  Actual - RASS (Dash Agitation-Sedation Scale): alert and calm    Restraint necessity: Not necessary   BREATHE SBT: Pass    Coordinate A & B, analgesics/sedatives Pain: managed   SAT: Pass   Delirium CAM-ICU: Overall CAM-ICU: Negative   Early(intubated/ Progressive (non-intubated) Mobility MOVE Screen (INTUBATED ONLY): Pass    Activity: Activity Management: Straight leg raise - L1   Feeding/Nutrition Diet order: Diet/Nutrition Received: NPO, Specialty Diet/Nutrition Received: renal diet   Thrombus DVT prophylaxis: VTE Required Core Measure: (SCDs) Sequential compression device initiated/maintained, Pharmacological prophylaxis initiated/maintained   HOB Elevation Head of Bed (HOB) Positioning: HOB at 45 degrees   Ulcer Prophylaxis GI: yes   Glucose control managed     Skin Skin assessed during: Daily Assessment    Sacrum intact/not altered? Yes  Heels intact/not altered? Yes  Surgical wound? Yes    Check one (no altered skin or altered skin) and sub boxes:  [] No Altered Skin Integrity Present    []Prevention Measures Documented    [x] Altered Skin Integrity Present or Discovered   [x] LDA present in EPIC              [] LDA added in EPIC   [] Wound Image Taken (required on admit,                   transfer/discharge and every Tuesday)    Wound  Care Consulted? Yes    Attending Nurse:     Second RN/Staff Member:    Bowel Function constipation    Indwelling Catheter Necessity [REMOVED]      Urethral Catheter 01/14/24 1345-Reason for Continuing Urinary Catheterization: Critically ill in ICU and requiring hourly monitoring of intake/output       Urethral Catheter 01/17/24 1130 16 Fr.-Reason for Continuing Urinary Catheterization: Urinary retention, Critically ill in ICU and requiring hourly monitoring of intake/output    Trialysis (Dialysis) Catheter 01/15/24 1803 right internal jugular-Line Necessity Review: CRRT/HD     De-escalation Antibiotics No       VS and assessment per flow sheet, patient progressing towards goals as tolerated, plan of care reviewed with Mr. Tripp, all concerns addressed, will continue to monitor.   Problem: Adult Inpatient Plan of Care  Goal: Plan of Care Review  Outcome: Ongoing, Progressing  Flowsheets (Taken 1/17/2024 1443)  Plan of Care Reviewed With: patient  Goal: Absence of Hospital-Acquired Illness or Injury  Outcome: Ongoing, Progressing  Intervention: Identify and Manage Fall Risk  Flowsheets (Taken 1/17/2024 1443)  Safety Promotion/Fall Prevention:   bed alarm set   Fall Risk reviewed with patient/family   assistive device/personal item within reach   lighting adjusted   high risk medications identified   medications reviewed   nonskid shoes/socks when out of bed   pulse ox   side rails raised x 3   instructed to call staff for mobility  Intervention: Prevent Skin Injury  Flowsheets (Taken 1/17/2024 1443)  Body Position: weight shifting  Skin Protection:   adhesive use limited   incontinence pads utilized   pulse oximeter probe site changed   silicone foam dressing in place   skin sealant/moisture barrier applied   skin-to-device areas padded   skin-to-skin areas padded   transparent dressing maintained   tubing/devices free from skin contact  Intervention: Prevent and Manage VTE (Venous Thromboembolism)  Risk  Flowsheets (Taken 1/17/2024 1443)  Activity Management: Straight leg raise - L1  VTE Prevention/Management:   bleeding risk assessed   bleeding precations maintained   ROM (active) performed  Range of Motion: active ROM (range of motion) encouraged  Intervention: Prevent Infection  Flowsheets (Taken 1/17/2024 1443)  Infection Prevention:   environmental surveillance performed   equipment surfaces disinfected   hand hygiene promoted   personal protective equipment utilized   rest/sleep promoted   single patient room provided   visitors restricted/screened  Goal: Optimal Comfort and Wellbeing  Outcome: Ongoing, Progressing     Problem: Infection  Goal: Absence of Infection Signs and Symptoms  Outcome: Ongoing, Progressing     Problem: Fluid Imbalance (Pneumonia)  Goal: Fluid Balance  Outcome: Ongoing, Progressing

## 2024-01-17 NOTE — CARE UPDATE
Pt coughing and requesting suctioning. Inline suctioning performed. SpO2 > 90%. Thick blood clot extracted. Pt endorsed feeling nauseated. 4 mg IV ondansetron given per existing MD orders. NGT connected to LIWS. Approximately 350 mL of brown/yellow drainage noted. Dr. Smith and MICU team 1 notified. SpO2 remained 90% and RR 10-14 breaths/min on spontaneous ventilator settings with PEEP = 5 and FiO2 = 40%. No distress noted.

## 2024-01-18 LAB
ALBUMIN SERPL BCP-MCNC: 0.9 G/DL (ref 3.5–5.2)
ALBUMIN SERPL BCP-MCNC: 1.1 G/DL (ref 3.5–5.2)
ALBUMIN SERPL BCP-MCNC: 1.3 G/DL (ref 3.5–5.2)
ALP SERPL-CCNC: 103 U/L (ref 55–135)
ALP SERPL-CCNC: 113 U/L (ref 55–135)
ALP SERPL-CCNC: 81 U/L (ref 55–135)
ALP SERPL-CCNC: 97 U/L (ref 55–135)
ALT SERPL W/O P-5'-P-CCNC: 13 U/L (ref 10–44)
ALT SERPL W/O P-5'-P-CCNC: 16 U/L (ref 10–44)
ALT SERPL W/O P-5'-P-CCNC: 18 U/L (ref 10–44)
ALT SERPL W/O P-5'-P-CCNC: 19 U/L (ref 10–44)
ANION GAP SERPL CALC-SCNC: 10 MMOL/L (ref 8–16)
ANION GAP SERPL CALC-SCNC: 11 MMOL/L (ref 8–16)
ANION GAP SERPL CALC-SCNC: 5 MMOL/L (ref 8–16)
ANION GAP SERPL CALC-SCNC: 7 MMOL/L (ref 8–16)
ANION GAP SERPL CALC-SCNC: 8 MMOL/L (ref 8–16)
ANION GAP SERPL CALC-SCNC: 9 MMOL/L (ref 8–16)
ANISOCYTOSIS BLD QL SMEAR: SLIGHT
AST SERPL-CCNC: 20 U/L (ref 10–40)
AST SERPL-CCNC: 23 U/L (ref 10–40)
AST SERPL-CCNC: 24 U/L (ref 10–40)
AST SERPL-CCNC: 26 U/L (ref 10–40)
BASOPHILS NFR BLD: 0 % (ref 0–1.9)
BILIRUB SERPL-MCNC: 0.3 MG/DL (ref 0.1–1)
BILIRUB SERPL-MCNC: 0.4 MG/DL (ref 0.1–1)
BILIRUB SERPL-MCNC: 0.4 MG/DL (ref 0.1–1)
BILIRUB SERPL-MCNC: 0.5 MG/DL (ref 0.1–1)
BUN SERPL-MCNC: 54 MG/DL (ref 6–20)
BUN SERPL-MCNC: 62 MG/DL (ref 6–20)
BUN SERPL-MCNC: 63 MG/DL (ref 6–20)
BUN SERPL-MCNC: 64 MG/DL (ref 6–20)
BUN SERPL-MCNC: 65 MG/DL (ref 6–20)
BUN SERPL-MCNC: 71 MG/DL (ref 6–20)
CA-I BLDV-SCNC: 1.14 MMOL/L (ref 1.06–1.42)
CALCIUM SERPL-MCNC: 6.7 MG/DL (ref 8.7–10.5)
CALCIUM SERPL-MCNC: 7.9 MG/DL (ref 8.7–10.5)
CALCIUM SERPL-MCNC: 7.9 MG/DL (ref 8.7–10.5)
CALCIUM SERPL-MCNC: 8 MG/DL (ref 8.7–10.5)
CALCIUM SERPL-MCNC: 8.1 MG/DL (ref 8.7–10.5)
CALCIUM SERPL-MCNC: 8.1 MG/DL (ref 8.7–10.5)
CHLORIDE SERPL-SCNC: 105 MMOL/L (ref 95–110)
CHLORIDE SERPL-SCNC: 106 MMOL/L (ref 95–110)
CHLORIDE SERPL-SCNC: 106 MMOL/L (ref 95–110)
CHLORIDE SERPL-SCNC: 107 MMOL/L (ref 95–110)
CHLORIDE SERPL-SCNC: 107 MMOL/L (ref 95–110)
CHLORIDE SERPL-SCNC: 112 MMOL/L (ref 95–110)
CO2 SERPL-SCNC: 22 MMOL/L (ref 23–29)
CO2 SERPL-SCNC: 24 MMOL/L (ref 23–29)
CO2 SERPL-SCNC: 24 MMOL/L (ref 23–29)
CO2 SERPL-SCNC: 25 MMOL/L (ref 23–29)
CREAT SERPL-MCNC: 1.2 MG/DL (ref 0.5–1.4)
CREAT SERPL-MCNC: 1.4 MG/DL (ref 0.5–1.4)
CREAT SERPL-MCNC: 1.5 MG/DL (ref 0.5–1.4)
CREAT UR-MCNC: 84 MG/DL (ref 23–375)
DACRYOCYTES BLD QL SMEAR: ABNORMAL
DIFFERENTIAL METHOD BLD: ABNORMAL
EOSINOPHIL NFR BLD: 1 % (ref 0–8)
ERYTHROCYTE [DISTWIDTH] IN BLOOD BY AUTOMATED COUNT: 13.2 % (ref 11.5–14.5)
EST. GFR  (NO RACE VARIABLE): 60 ML/MIN/1.73 M^2
EST. GFR  (NO RACE VARIABLE): >60 ML/MIN/1.73 M^2
EST. GFR  (NO RACE VARIABLE): >60 ML/MIN/1.73 M^2
GLUCOSE SERPL-MCNC: 103 MG/DL (ref 70–110)
GLUCOSE SERPL-MCNC: 115 MG/DL (ref 70–110)
GLUCOSE SERPL-MCNC: 115 MG/DL (ref 70–110)
GLUCOSE SERPL-MCNC: 79 MG/DL (ref 70–110)
GLUCOSE SERPL-MCNC: 91 MG/DL (ref 70–110)
GLUCOSE SERPL-MCNC: 92 MG/DL (ref 70–110)
HCT VFR BLD AUTO: 28.4 % (ref 40–54)
HGB BLD-MCNC: 9.7 G/DL (ref 14–18)
IMM GRANULOCYTES # BLD AUTO: ABNORMAL K/UL (ref 0–0.04)
IMM GRANULOCYTES NFR BLD AUTO: ABNORMAL % (ref 0–0.5)
INR PPP: 1.1 (ref 0.8–1.2)
LYMPHOCYTES NFR BLD: 4 % (ref 18–48)
MAGNESIUM SERPL-MCNC: 2.1 MG/DL (ref 1.6–2.6)
MAGNESIUM SERPL-MCNC: 2.4 MG/DL (ref 1.6–2.6)
MCH RBC QN AUTO: 29.8 PG (ref 27–31)
MCHC RBC AUTO-ENTMCNC: 34.2 G/DL (ref 32–36)
MCV RBC AUTO: 87 FL (ref 82–98)
METAMYELOCYTES NFR BLD MANUAL: 2 %
MONOCYTES NFR BLD: 2 % (ref 4–15)
MYELOCYTES NFR BLD MANUAL: 1 %
NEUTROPHILS NFR BLD: 89 % (ref 38–73)
NEUTS BAND NFR BLD MANUAL: 1 %
NRBC BLD-RTO: 0 /100 WBC
PHOSPHATE SERPL-MCNC: 3.2 MG/DL (ref 2.7–4.5)
PHOSPHATE SERPL-MCNC: 3.5 MG/DL (ref 2.7–4.5)
PHOSPHATE SERPL-MCNC: 3.6 MG/DL (ref 2.7–4.5)
PHOSPHATE SERPL-MCNC: 3.6 MG/DL (ref 2.7–4.5)
PHOSPHATE SERPL-MCNC: 3.7 MG/DL (ref 2.7–4.5)
PHOSPHATE SERPL-MCNC: 4 MG/DL (ref 2.7–4.5)
PHOSPHATE SERPL-MCNC: 4 MG/DL (ref 2.7–4.5)
PLATELET # BLD AUTO: 268 K/UL (ref 150–450)
PLATELET BLD QL SMEAR: ABNORMAL
PMV BLD AUTO: 8.6 FL (ref 9.2–12.9)
POLYCHROMASIA BLD QL SMEAR: ABNORMAL
POTASSIUM SERPL-SCNC: 3.5 MMOL/L (ref 3.5–5.1)
POTASSIUM SERPL-SCNC: 4.1 MMOL/L (ref 3.5–5.1)
POTASSIUM SERPL-SCNC: 4.5 MMOL/L (ref 3.5–5.1)
POTASSIUM SERPL-SCNC: 4.6 MMOL/L (ref 3.5–5.1)
PROT SERPL-MCNC: 3.5 G/DL (ref 6–8.4)
PROT SERPL-MCNC: 4.1 G/DL (ref 6–8.4)
PROT SERPL-MCNC: 4.3 G/DL (ref 6–8.4)
PROT SERPL-MCNC: 5 G/DL (ref 6–8.4)
PROT UR-MCNC: 299 MG/DL (ref 0–15)
PROT/CREAT UR: 3.56 MG/G{CREAT} (ref 0–0.2)
PROTHROMBIN TIME: 11.7 SEC (ref 9–12.5)
RBC # BLD AUTO: 3.25 M/UL (ref 4.6–6.2)
SMUDGE CELLS BLD QL SMEAR: PRESENT
SODIUM SERPL-SCNC: 137 MMOL/L (ref 136–145)
SODIUM SERPL-SCNC: 138 MMOL/L (ref 136–145)
SODIUM SERPL-SCNC: 139 MMOL/L (ref 136–145)
SODIUM SERPL-SCNC: 140 MMOL/L (ref 136–145)
TACROLIMUS BLD-MCNC: 4.1 NG/ML (ref 5–15)
TRIGL SERPL-MCNC: 851 MG/DL (ref 30–150)
WBC # BLD AUTO: 16.88 K/UL (ref 3.9–12.7)

## 2024-01-18 PROCEDURE — 94640 AIRWAY INHALATION TREATMENT: CPT

## 2024-01-18 PROCEDURE — 25000003 PHARM REV CODE 250

## 2024-01-18 PROCEDURE — 83735 ASSAY OF MAGNESIUM: CPT | Mod: 91 | Performed by: STUDENT IN AN ORGANIZED HEALTH CARE EDUCATION/TRAINING PROGRAM

## 2024-01-18 PROCEDURE — 80069 RENAL FUNCTION PANEL: CPT | Performed by: STUDENT IN AN ORGANIZED HEALTH CARE EDUCATION/TRAINING PROGRAM

## 2024-01-18 PROCEDURE — 80053 COMPREHEN METABOLIC PANEL: CPT | Mod: 91 | Performed by: INTERNAL MEDICINE

## 2024-01-18 PROCEDURE — 99232 SBSQ HOSP IP/OBS MODERATE 35: CPT | Mod: ,,, | Performed by: INTERNAL MEDICINE

## 2024-01-18 PROCEDURE — 25000003 PHARM REV CODE 250: Performed by: INTERNAL MEDICINE

## 2024-01-18 PROCEDURE — 84100 ASSAY OF PHOSPHORUS: CPT | Mod: 91

## 2024-01-18 PROCEDURE — 84100 ASSAY OF PHOSPHORUS: CPT | Mod: 91 | Performed by: INTERNAL MEDICINE

## 2024-01-18 PROCEDURE — 25000242 PHARM REV CODE 250 ALT 637 W/ HCPCS

## 2024-01-18 PROCEDURE — 84478 ASSAY OF TRIGLYCERIDES: CPT

## 2024-01-18 PROCEDURE — 80053 COMPREHEN METABOLIC PANEL: CPT | Mod: 91

## 2024-01-18 PROCEDURE — 92610 EVALUATE SWALLOWING FUNCTION: CPT

## 2024-01-18 PROCEDURE — 97535 SELF CARE MNGMENT TRAINING: CPT

## 2024-01-18 PROCEDURE — 82570 ASSAY OF URINE CREATININE: CPT

## 2024-01-18 PROCEDURE — 27000207 HC ISOLATION

## 2024-01-18 PROCEDURE — 99900035 HC TECH TIME PER 15 MIN (STAT)

## 2024-01-18 PROCEDURE — 63600175 PHARM REV CODE 636 W HCPCS

## 2024-01-18 PROCEDURE — 27100171 HC OXYGEN HIGH FLOW UP TO 24 HOURS

## 2024-01-18 PROCEDURE — 80197 ASSAY OF TACROLIMUS: CPT | Performed by: INTERNAL MEDICINE

## 2024-01-18 PROCEDURE — 85007 BL SMEAR W/DIFF WBC COUNT: CPT

## 2024-01-18 PROCEDURE — C9113 INJ PANTOPRAZOLE SODIUM, VIA: HCPCS

## 2024-01-18 PROCEDURE — 20000000 HC ICU ROOM

## 2024-01-18 PROCEDURE — 99233 SBSQ HOSP IP/OBS HIGH 50: CPT | Mod: ,,, | Performed by: INTERNAL MEDICINE

## 2024-01-18 PROCEDURE — 94761 N-INVAS EAR/PLS OXIMETRY MLT: CPT

## 2024-01-18 PROCEDURE — 63600175 PHARM REV CODE 636 W HCPCS: Performed by: STUDENT IN AN ORGANIZED HEALTH CARE EDUCATION/TRAINING PROGRAM

## 2024-01-18 PROCEDURE — 25000003 PHARM REV CODE 250: Performed by: STUDENT IN AN ORGANIZED HEALTH CARE EDUCATION/TRAINING PROGRAM

## 2024-01-18 PROCEDURE — 85027 COMPLETE CBC AUTOMATED: CPT

## 2024-01-18 PROCEDURE — 82330 ASSAY OF CALCIUM: CPT

## 2024-01-18 PROCEDURE — 80069 RENAL FUNCTION PANEL: CPT | Mod: 91 | Performed by: STUDENT IN AN ORGANIZED HEALTH CARE EDUCATION/TRAINING PROGRAM

## 2024-01-18 PROCEDURE — 85610 PROTHROMBIN TIME: CPT

## 2024-01-18 RX ORDER — AMOXICILLIN 250 MG
1 CAPSULE ORAL 2 TIMES DAILY
Status: DISCONTINUED | OUTPATIENT
Start: 2024-01-18 | End: 2024-01-23 | Stop reason: HOSPADM

## 2024-01-18 RX ORDER — AMLODIPINE BESYLATE 10 MG/1
10 TABLET ORAL DAILY
Status: DISCONTINUED | OUTPATIENT
Start: 2024-01-18 | End: 2024-01-23 | Stop reason: HOSPADM

## 2024-01-18 RX ORDER — VALGANCICLOVIR HYDROCHLORIDE 50 MG/ML
450 POWDER, FOR SOLUTION ORAL DAILY
Status: DISCONTINUED | OUTPATIENT
Start: 2024-01-18 | End: 2024-01-18

## 2024-01-18 RX ORDER — ATOVAQUONE 750 MG/5ML
1500 SUSPENSION ORAL DAILY
Status: DISCONTINUED | OUTPATIENT
Start: 2024-01-19 | End: 2024-01-23 | Stop reason: HOSPADM

## 2024-01-18 RX ORDER — TACROLIMUS 1 MG/1
3 CAPSULE ORAL 2 TIMES DAILY
Status: DISCONTINUED | OUTPATIENT
Start: 2024-01-18 | End: 2024-01-19

## 2024-01-18 RX ORDER — METOLAZONE 2.5 MG/1
5 TABLET ORAL DAILY
Status: DISCONTINUED | OUTPATIENT
Start: 2024-01-18 | End: 2024-01-20

## 2024-01-18 RX ORDER — POLYETHYLENE GLYCOL 3350 17 G/17G
17 POWDER, FOR SOLUTION ORAL DAILY
Status: DISCONTINUED | OUTPATIENT
Start: 2024-01-19 | End: 2024-01-23 | Stop reason: HOSPADM

## 2024-01-18 RX ORDER — VALGANCICLOVIR HYDROCHLORIDE 50 MG/ML
450 POWDER, FOR SOLUTION ORAL DAILY
Status: DISCONTINUED | OUTPATIENT
Start: 2024-01-18 | End: 2024-01-23 | Stop reason: HOSPADM

## 2024-01-18 RX ADMIN — ONDANSETRON 4 MG: 2 INJECTION INTRAMUSCULAR; INTRAVENOUS at 09:01

## 2024-01-18 RX ADMIN — IPRATROPIUM BROMIDE AND ALBUTEROL SULFATE 3 ML: 2.5; .5 SOLUTION RESPIRATORY (INHALATION) at 07:01

## 2024-01-18 RX ADMIN — IPRATROPIUM BROMIDE AND ALBUTEROL SULFATE 3 ML: 2.5; .5 SOLUTION RESPIRATORY (INHALATION) at 12:01

## 2024-01-18 RX ADMIN — CARVEDILOL 12.5 MG: 12.5 TABLET, FILM COATED ORAL at 09:01

## 2024-01-18 RX ADMIN — SENNOSIDES AND DOCUSATE SODIUM 1 TABLET: 8.6; 5 TABLET ORAL at 08:01

## 2024-01-18 RX ADMIN — MUPIROCIN: 20 OINTMENT TOPICAL at 09:01

## 2024-01-18 RX ADMIN — MUPIROCIN: 20 OINTMENT TOPICAL at 08:01

## 2024-01-18 RX ADMIN — ONDANSETRON 4 MG: 2 INJECTION INTRAMUSCULAR; INTRAVENOUS at 08:01

## 2024-01-18 RX ADMIN — CARVEDILOL 12.5 MG: 12.5 TABLET, FILM COATED ORAL at 08:01

## 2024-01-18 RX ADMIN — FUROSEMIDE 100 MG: 10 INJECTION, SOLUTION INTRAMUSCULAR; INTRAVENOUS at 04:01

## 2024-01-18 RX ADMIN — ATOVAQUONE 1500 MG: 750 SUSPENSION ORAL at 08:01

## 2024-01-18 RX ADMIN — TACROLIMUS 3 MG: 1 CAPSULE ORAL at 06:01

## 2024-01-18 RX ADMIN — IPRATROPIUM BROMIDE AND ALBUTEROL SULFATE 3 ML: 2.5; .5 SOLUTION RESPIRATORY (INHALATION) at 01:01

## 2024-01-18 RX ADMIN — DEXAMETHASONE SODIUM PHOSPHATE 6 MG: 4 INJECTION INTRA-ARTICULAR; INTRALESIONAL; INTRAMUSCULAR; INTRAVENOUS; SOFT TISSUE at 09:01

## 2024-01-18 RX ADMIN — FUROSEMIDE 100 MG: 10 INJECTION, SOLUTION INTRAMUSCULAR; INTRAVENOUS at 07:01

## 2024-01-18 RX ADMIN — METOLAZONE 5 MG: 2.5 TABLET ORAL at 04:01

## 2024-01-18 RX ADMIN — VALGANCICLOVIR HYDROCHLORIDE 450 MG: 50 POWDER, FOR SOLUTION ORAL at 12:01

## 2024-01-18 RX ADMIN — TACROLIMUS 2 MG: 1 CAPSULE ORAL at 07:01

## 2024-01-18 RX ADMIN — PANTOPRAZOLE SODIUM 40 MG: 40 INJECTION, POWDER, FOR SOLUTION INTRAVENOUS at 09:01

## 2024-01-18 RX ADMIN — AMLODIPINE BESYLATE 10 MG: 10 TABLET ORAL at 01:01

## 2024-01-18 RX ADMIN — APIXABAN 5 MG: 5 TABLET, FILM COATED ORAL at 09:01

## 2024-01-18 RX ADMIN — APIXABAN 5 MG: 5 TABLET, FILM COATED ORAL at 08:01

## 2024-01-18 RX ADMIN — POLYETHYLENE GLYCOL 3350 17 G: 17 POWDER, FOR SOLUTION ORAL at 08:01

## 2024-01-18 RX ADMIN — SENNOSIDES AND DOCUSATE SODIUM 1 TABLET: 8.6; 5 TABLET ORAL at 09:01

## 2024-01-18 NOTE — PT/OT/SLP EVAL
Speech Language Pathology Evaluation  Bedside Swallow    Patient Name:  Ubaldo Tripp   MRN:  [id number]  Admitting Diagnosis: Acute hypoxic respiratory failure    Recommendations:                 General Recommendations:   ongoing swallowing assessment/monitoring PO tolerance and medication administration tolerance  Diet recommendations:   , Full liquids, Thin liquids - IDDSI Level 0   Aspiration Precautions: 1 bite/sip at a time, Alternating bites/sips, Assistance with meals, Avoid talking while eating, Feed only when awake/alert, Frequent oral care, HOB to 90 degrees, Meds crushed in puree, Monitor for s/s of aspiration, Remain upright 30 minutes post meal, Small bites/sips, and Strict aspiration precautions   General Precautions: Standard, aspiration, fall, airborne, droplet, special contact  Communication strategies:  none    Assessment:     Ubaldo Tripp is a 38 y.o. male with an SLP diagnosis of Dysphagia.      History:     Past Medical History:   Diagnosis Date    Chronic interstitial nephritis     CKD (chronic kidney disease), stage III 7/17/2013    Hypertension     Immunocompromised state 7/11/2018    Living-donor kidney transplant recipient     Need for prophylactic immunotherapy        Past Surgical History:   Procedure Laterality Date    KIDNEY TRANSPLANT      PERITONEAL CATHETER REMOVAL       HPI:  39 y/o M with a medical history of kidney transplant in 2010 and HTN [place] [facility] for acute hypoxemic respiratory failure, bacteremia, and hx of Kidney Transplant for KTM and Infectious Disease evaluation. Pt was previously admitted to [facility] 12/18/23 - 12/23/23 with GENNY, headache, and HTN.  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  He was found to have nephrotic syndrome as well.   "Received pulse dose steroids and was started on Eliquis. Discharged on a prednisone taper, CellCept, and Prograf.  sCr 12/23 was 3.0.     On 1/5/24 he was admitted to [facility] in [place] with abnormal labs. Outpatient labs on 1/3 showed WBC 17.7 Hgb 13.1, K 5.7, bicarb 18, BUN 71, sCr 2.92, phos 5.1, and tacro level 10.8.  Was found to be COVID positive requiring BiPAP.  Blood culture with Staph haemolyticus.  Labs notable for sCr ranging 2.68 to 2.73.  K normalized.  Abdominal US on 1/6 noted cirrhotic appearing liver.  CT chest on 1/7 showed consolidated infiltrates and/or atelectasis involving the bilateral lung bases with small bilateral pleural effusions, patchy heterogeneous infiltrate involving the right middle lobe, and mild ascites.  Transplant renal ultrasound 1/8 showed stable appearance of the transplant kidney without evidence of acute abnormality.  Gallbladder ultrasound 1/8 showed sludge in the gallbladder.  Medications prior to transfer include CellCept, tacrolimus, valganciclovir, prednisone, Eliquis, and meropenem.     The patient was initially going to transfer to hospital medicine however he was intubated prior to transfer and was [place] [facility] [place] higher level of care.  He arrived on propofol and fentanyl.  KTM and transplant ID consulted on admission.    Prior Intubation HX:  1/14 - 1/17    Modified Barium Swallow: none on file    Chest X-Rays: 1/15/24: Bilateral airspace opacities, improved from prior.     Prior diet: currently NPO; NGT was in place but had to be removed due to gagging     Subjective     "I don't take my medicine with pudding, I take it with applesauce." Pt reports having always had difficulty swallowing pills and had to bury in his food.     Pain/Comfort:  Pain Rating 1: 0/10    Respiratory Status: High flow, flow 50 L/min, concentration 55%    Objective:     Oral Musculature Evaluation  Oral Musculature: WFL  Dentition: present and adequate  Secretion " Management: adequate  Mucosal Quality: adequate  Mandibular Strength and Mobility: WFL  Oral Labial Strength and Mobility: WFL  Lingual Strength and Mobility: WFL  Velar Elevation: WFL  Buccal Strength and Mobility: WFL  Volitional Cough: volitional throat clear was adequate  Volitional Swallow: elicited after delay for dry swallow  Voice Prior to PO Intake: dry, clear, good volume and intensity    Bedside Swallow Eval:   Consistencies Assessed:  Thin liquids tsp x 1, cup x 1, multiple straw sips   Puree 1/2 tsp x 1, full tsp x 3, multiple teaspoons for attempting to swallow pills whole buried in puree, meds crushed and buried in puree  Pt deferred solid trials for fear of gagging    Oral Phase:   WFL for thin liquids and purees. Unable to clear pill from oral cavity when buried whole in applesauce    Pharyngeal Phase:   no overt clinical signs/symptoms of aspiration  no overt clinical signs/symptoms of pharyngeal dysphagia    Compensatory Strategies  Med crushed and buried in puree    Treatment: Education provided to pt regarding role of SLP, purpose of swallowing assessment, aspiration and its risks, complications associated with aspiration, altered intraoral pressure due to high flow oxygen likely contributing to difficulty swallowing pills in usual manner, diet recommendations, recommendations to crush and bury meds in puree, and plan for ongoing swallowing assessment. Pt expressed understanding.      Goals:   Multidisciplinary Problems       SLP Goals          Problem: SLP    Goal Priority Disciplines Outcome   SLP Goal     SLP    Description: Speech Language Pathology Goals  Goals expected to be met by 1/25:  1. Pt will tolerate least restrictive diet without s/s of aspiration.                                Plan:     Patient to be seen:  4 x/week   Plan of Care expires:  02/17/24  Plan of Care reviewed with:  patient   SLP Follow-Up:  Yes       Discharge recommendations:   (tbd)     Time Tracking:     SLP  Treatment Date:   01/18/24  Speech Start Time:  1039  Speech Stop Time:  1107     Speech Total Time (min):  28 min    Billable Minutes: Eval Swallow and Oral Function 18 and Self Care/Home Management Training 10    01/18/2024

## 2024-01-18 NOTE — PROGRESS NOTES
[facility] - Cardiac Medical ICU  Critical Care Medicine  Progress Note    Patient Name: Ubaldo Tripp  MRN: [id number]  Admission Date: 1/14/2024  Hospital Length of Stay: 4 days  Code Status: Full Code  Attending Provider: Kristian Smith MD  Primary Care Provider: Jaime Jauregui (Inactive)   Principal Problem: Acute hypoxic respiratory failure    Subjective:     HPI:  37 y/o M with a medical history of kidney transplant in 2010 and HTN transferred to [facility] for acute hypoxemic respiratory failure, bacteremia, and hx of Kidney Transplant for KTM and Infectious Disease evaluation. Pt was previously admitted to [facility] 12/18/23 - 12/23/23 with GENNY, headache, and HTN.  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  He was found to have nephrotic syndrome as well.  Received pulse dose steroids and was started on Eliquis. Discharged on a prednisone taper, CellCept, and Prograf.  sCr 12/23 was 3.0.     On 1/5/24 he was admitted to [facility] in [place] with abnormal labs. Outpatient labs on 1/3 showed WBC 17.7 Hgb 13.1, K 5.7, bicarb 18, BUN 71, sCr 2.92, phos 5.1, and tacro level 10.8.  Was found to be COVID positive requiring BiPAP.  Blood culture with Staph haemolyticus.  Labs notable for sCr ranging 2.68 to 2.73.  K normalized.  Abdominal US on 1/6 noted cirrhotic appearing liver.  CT chest on 1/7 showed consolidated infiltrates and/or atelectasis involving the bilateral lung bases with small bilateral pleural effusions, patchy heterogeneous infiltrate involving the right middle lobe, and mild ascites.  Transplant renal ultrasound 1/8 showed stable appearance of the transplant kidney without evidence of acute abnormality.  Gallbladder ultrasound 1/8 showed sludge in the gallbladder.  Medications  prior to transfer include CellCept, tacrolimus, valganciclovir, prednisone, Eliquis, and meropenem.    The patient was initially going to transfer to hospital medicine however he was intubated prior to transfer and was accepted to [facility] for a higher level of care.  He arrived on propofol and fentanyl.  KTM and transplant ID consulted on admission.    Hospital/ICU Course:  Started tube feeds. KTM on board, recommending trialysis line placement and HD.     Overnight Events:  Patient had an acute episode of anxiety with increase in O2 requirements on HFNC, given 0.5 Ativan, and his O2 requirements are being titrated back down. Additionally, patient became hypertensive to 160s/100s and was started on home antihypertensives.     ABG  Recent Labs   Lab 01/17/24  0812   PH 7.444   PO2 59*   PCO2 37.0   HCO3 25.4   BE 1     Assessment/Plan:     [facility]  [facility]  [facility]    Renal/  Acute on chronic renal failure  Cr was 1.3 in 2021, no other Cr results until the recent admission with Cr at 2.92. Likely prerenal 2/2 sepsis, potential component of rejection.    - KTM consulted due to history of renal transplant    - Lasix 100mg IV TID   - Repeat UPCR   - Mayen catheter; Strict Ins & Outs  - Trend CMP/BMPs  - Strict intake/output  - Renally dose meds  - Avoid nephrotoxic agents  - Replete electrolytes PRN    Living-donor kidney transplant recipient - 7/26/10  Patient with kidney transplant 2010. He was previously admitted to [facility] December 18-23 with GENNY. 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  He was found to have nephrotic syndrome as well, started on eliquis.  He received pulse dose steroids and was started on Eliquis for anticoagulation. He was discharged on a prednisone taper, CellCept, and Prograf.  On December 23,  his creatinine was 3.0. Current medications include CellCept, tacrolimus, prednisone, all of which he continued to receive at OSH.    - KTM consulted, appreciate recs  - Strict I/O  - pending kidney US   - pending BK, CMV PCR, DSA   - Avoid nephrotoxins, volume shifts, aim for BP within target to prevent additional renal injury  - Avoid nephrotoxic agents (NSAIDs, IV contrast dye, ACEI/ARB anti-HTN medications, Aminoglycoside-containing antibiotics)  - Renally dose all appropriate medications, including antibiotics  -  High Cr, acidosis, high FIO2 requirement: plan for iHD for fluid removal. Await line   placement by ICU team. Lasix  mg X 1 time    - SLED   - Prograf 2mg BID  - Gangciclovir for CMV prophylaxis  - Atovaquone for PJP prophylaxis  - Eliquis for nephrotic syndrome    ID  Bacteremia  Patient is a 39 y/o male with a PMH of kidney transplant in 2010 that was transferred from [facility] with blood cultures positive for Staph haemolyticus. Patient was started on Merrem at the time.     - Will continue Meropenem  - ID consulted, appreciate recs   - Continue Dex; Finished remdesivir course   - Discontinued Meropenem   - Continue oral Valganciclovir  - Redrawing blood cultures on transfer; will follow up with outside culture results  - Patient currently volume overloaded; will defer further fluid resuscitation  - Trending daily CBC    COVID  Patient noted to be covid positive 1/10/24, requiring BIPAP. Patient was [place] [facility] [place] Acute hypoxemic respiratory failure also complicated by consolidations noted in bilateral lung bases w/ small b/l pleural effusions.   HFNC 50L/50%    - Completed remdesivir x 5 days  - Continue Dexamethasone 6mg QD for 8 days  - Mechanically ventilated, will wean sedation and pressure support as able  - Continuous oximetry  - Trending daily CBC/CMP, Mg, Phos    Immunology/Multi System  Immunocompromised state due to drug therapy  Pt takes Cellcept and  "Prograf at home    - there is a note in report how pt stated he did not take his immunotherapy for 1 month bc he "ran out"  -  Prograf & Tacrolimus per KTM      GI  Transaminitis  Patient noted to have transaminitis at outside facility with imaging concerning for cirrhosis and ascites.     - Trend CMPs  - Consider Liver doppler     Other  * Acute hypoxic respiratory failure  Patient with Hypoxic Respiratory failure which is Acute.  he is not on home oxygen. Contributing diagnoses includes - ARDS and Pneumonia Labs and images were reviewed. Patient Has not had a recent ABG. Supplemental oxygen was provided and noted- Vent     Plan:   - S/P Meropenem for BSA  - ID consultation - Continue demsevir, dexamethasone, Ann, gancyclovir   - Remdesivir/dexamethasone for COVID + status  - Q6 hour nebs   - ARDS-NET ladder    Hyperglyceridemia  Triglycerides 2114 > 2129 > 851. Suspicious for adverse reaction to propofol vs pancreatitis. Normal levels from historical data 13 years ago.     Plan:  - Discontinue propofol  - Trend triglyceride            Critical Care Daily Checklist:    A: Awake: RASS Goal/Actual Goal: RASS Goal: 0-->alert and calm  Actual:     B: Spontaneous Breathing Trial Performed? Spon. Breathing Trial Initiated?: Initiated (01/17/24 0723)Yes   C: SAT & SBT Coordinated?  Yes                      D: Delirium: CAM-ICU Overall CAM-ICU: Negative   E: Early Mobility Performed? Yes   F: Feeding Goal: Goals: Meet % EEN, EPN by RD f/u date  Status: Nutrition Goal Status: new   Current Diet Order   Procedures    Diet NPO      AS: Analgesia/Sedation Fentanyl   T: Thromboembolic Prophylaxis eliquis   H: HOB > 300 Yes   U: Stress Ulcer Prophylaxis (if needed) protonix   G: Glucose Control SSI   B: Bowel Function  Senna, PEG   I: Indwelling Catheter (Lines & Mayen) Necessity Mayen, midline    D: De-escalation of Antimicrobials/Pharmacotherapies Meropenem, Ganciclovir, Atovaquone,     Plan for the day/ETD     Code " Status:  Family/Goals of Care: Full Code         Critical secondary to Patient has a condition that poses threat to life and bodily function: Severe Respiratory Distress      Critical care was time spent personally by me on the following activities: development of treatment plan with patient or surrogate and bedside caregivers, discussions with consultants, evaluation of patient's response to treatment, examination of patient, ordering and performing treatments and interventions, ordering and review of laboratory studies, ordering and review of radiographic studies, pulse oximetry, re-evaluation of patient's condition. This critical care time did not overlap with that of any other provider or involve time for any procedures.     Haja Yepez MD  Critical Care Medicine  [facility] - Cardiac Medical ICU

## 2024-01-18 NOTE — SUBJECTIVE & OBJECTIVE
Subjective:   History of Present Illness:    He is 37 yo White male who received a living kidney transplant on 7/26/10.  his creatinine was 1.3-18 till December 2023 when he developed ARFafter not  taking IS meds for 5 weeks. Transferred to [facility] and kidney biopsy resulted with diffuse proliferative GN as well as mild TCMR. Treated with steroid. DSA 12/19/23 with +DSAs. had IVIG outpatient. Due to nephrotic syndrome and hypoalbuminemia, started eliquis for anticoagulation. He was discharged on pred taper, cellcept, and prograf. At the time of discharge Cr was 3.       transferred to [facility] for acute hypoxemic respiratory failure, bacteremia. On 1/5/24 he was admitted to [facility] in [place] with abnormal labs. Was found to be COVID positive.  Blood culture with Staph haemolyticus. Abdominal US on 1/6 noted cirrhotic appearing liver.  CT chest on 1/7 showed consolidated infiltrates and/or atelectasis involving the bilateral lung bases with small bilateral pleural effusions, patchy heterogeneous infiltrate involving the right middle lobe, and mild ascites. The patient was initially going to transfer to hospital medicine however he was intubated prior to transfer and was accepted to [facility] for a higher level of care.  He arrived on propofol and fentanyl.  KTM and transplant ID consulted on admission.    Mr. Tripp is a 38 y.o. year old male who is status post Kidney Transplant - 7/26/2010  (#1).    His maintenance immunosuppression consists of:   Immunosuppressants (From admission, onward)      Start     Stop Route Frequency Ordered    01/16/24 1800  tacrolimus capsule 2 mg         -- Oral 2 times daily 01/16/24 1617            Hospital Course:  Intubated. On vent  FIO2%90. His UOP ~ 1 liter with lasix. Has metabolic acidosis on ABG. Plan for HD to remove some fluid and correct acidosis. Pt needs to have line today     Interval History:   No acute events overnight. Patient did have anxiety episode and  "is now on hiFLo nasal cannula. Hemodynamically stable. UOP robust with IV lasix 100mg TID. Denies further complaints at this time.     Past Medical, Surgical, Family, and Social History:   Unchanged from H&P.    Scheduled Meds:   albuterol-ipratropium  3 mL Nebulization Q6H    apixaban  5 mg Per OG tube BID    atovaquone  1,500 mg Per NG tube Daily    carvediloL  12.5 mg Oral BID    dexAMETHasone  6 mg Intravenous Daily    Followed by    [START ON 1/22/2024] predniSONE  30 mg Oral Daily    furosemide (LASIX) injection  100 mg Intravenous Q8H    mupirocin   Nasal BID    NIFEdipine  60 mg Oral BID    pantoprazole  40 mg Intravenous Daily    polyethylene glycol  17 g Per NG tube Daily    senna-docusate 8.6-50 mg  1 tablet Per NG tube BID    tacrolimus  2 mg Oral BID    valGANciclovir 50 mg/ml  450 mg Per NG tube Daily     Continuous Infusions:  PRN Meds:dextrose 10%, dextrose 10%, glucagon (human recombinant), naloxone, ondansetron, sodium chloride 0.9%    Intake/Output - Last 3 Shifts         01/16 0700  01/17 0659 01/17 0700 01/18 0659 01/18 0700  01/19 0659    I.V. (mL/kg) 3253.7 (24.6) 130.7 (1)     Other       NG/ 305     IV Piggyback 297 124.1     Total Intake(mL/kg) 3970.8 (30) 559.7 (4.2)     Urine (mL/kg/hr) 975 (0.3) 2760 (0.9) 850 (1.6)    Drains  475     Other 4372      Total Output 5347 3235 850    Net -1376.2 -2675.3 -850                    Review of Systems   Objective:     Vital Signs (Most Recent):  Temp: 99.7 °F (37.6 °C) (01/18/24 0800)  Pulse: 98 (01/18/24 1000)  Resp: 20 (01/18/24 0724)  BP: (!) 157/97 (01/18/24 1000)  SpO2: (!) 93 % (01/18/24 1000) Vital Signs (24h Range):  Temp:  [99 °F (37.2 °C)-99.9 °F (37.7 °C)] 99.7 °F (37.6 °C)  Pulse:  [] 98  Resp:  [18-24] 20  SpO2:  [90 %-97 %] 93 %  BP: (119-179)/() 157/97     Weight: 132.5 kg (292 lb)  Height: 6' 1" (185.4 cm)  Body mass index is 38.52 kg/m².     Physical Exam  Constitutional:       Appearance: Normal appearance. "   HENT:      Head: Normocephalic and atraumatic.      Nose:      Comments: On HiFlo NC  Cardiovascular:      Rate and Rhythm: Normal rate and regular rhythm.      Pulses: Normal pulses.      Heart sounds: Normal heart sounds.   Pulmonary:      Effort: Pulmonary effort is normal.      Breath sounds: Normal breath sounds.   Abdominal:      General: Abdomen is flat. There is distension.   Musculoskeletal:      Cervical back: Normal range of motion and neck supple.      Right lower leg: Edema present.      Left lower leg: Edema present.   Skin:     General: Skin is warm and dry.   Neurological:      General: No focal deficit present.      Mental Status: He is alert and oriented to person, place, and time. Mental status is at baseline.   Psychiatric:         Mood and Affect: Mood normal.          Laboratory:  Labs within the past 24 hours have been reviewed.    Diagnostic Results:  None

## 2024-01-18 NOTE — PLAN OF CARE
MICU DAILY GOALS     Family/Goals of care/Code Status   Code Status: Full Code    24H Vital Sign Range  Temp:  [98.8 °F (37.1 °C)-99.9 °F (37.7 °C)]   Pulse:  []   Resp:  [13-24]   BP: (119-179)/()   SpO2:  [87 %-97 %]      Shift Events (include procedures and significant events)   Episode of anxiety overnight. Ativan given per order. Increase in oxygen requirements, slowing beginning to titrate back down.     AWAKE RASS: Goal - RASS Goal: 0-->alert and calm  Actual - RASS (Dash Agitation-Sedation Scale): alert and calm    Restraint necessity: Not necessary   BREATHE SBT: Not intubated    Coordinate A & B, analgesics/sedatives Pain: managed   SAT: Not intubated   Delirium CAM-ICU: Overall CAM-ICU: Negative   Early(intubated/ Progressive (non-intubated) Mobility MOVE Screen (INTUBATED ONLY): Not intubated    Activity: Activity Management: Arm raise - L1   Feeding/Nutrition Diet order: Diet/Nutrition Received: NPO, Specialty Diet/Nutrition Received: renal diet   Thrombus DVT prophylaxis: VTE Required Core Measure: Pharmacological prophylaxis initiated/maintained   HOB Elevation Head of Bed (HOB) Positioning: HOB at 60-90 degrees   Ulcer Prophylaxis GI: yes   Glucose control managed     Skin Skin assessed during: Q Shift Change    Sacrum intact/not altered? Yes  Heels intact/not altered? Yes  Surgical wound? No    Check one (no altered skin or altered skin) and sub boxes:  [x] No Altered Skin Integrity Present    [x]Prevention Measures Documented    [] Altered Skin Integrity Present or Discovered   [] LDA present in EPIC              [] LDA added in EPIC   [] Wound Image Taken (required on admit,                   transfer/discharge and every Tuesday)    Wound Care Consulted? No    Attending Nurse:     Second RN/Staff Member:    Bowel Function constipation    Indwelling Catheter Necessity [REMOVED]      Urethral Catheter 01/14/24 1345-Reason for Continuing Urinary Catheterization: Critically ill in ICU  and requiring hourly monitoring of intake/output       Urethral Catheter 01/17/24 1130 16 Fr.-Reason for Continuing Urinary Catheterization: Urinary retention    Trialysis (Dialysis) Catheter 01/15/24 1803 right internal jugular-Line Necessity Review: CRRT/HD     De-escalation Antibiotics Yes       VS and assessment per flow sheet, patient progressing towards goals as tolerated, plan of care reviewed with patient, all concerns addressed, will continue to monitor.

## 2024-01-18 NOTE — ASSESSMENT & PLAN NOTE
- Off MMF due to COVID  - Increase to Prograf 3mg BID  - obtain tacro level qAM. Will titrate based on results.     Once COVID dexamethasone is complete, plan to start PO prednisone 20mg QD, with plans to taper.

## 2024-01-18 NOTE — ASSESSMENT & PLAN NOTE
- Living kidney transplant on 7/26/10.    - BL creatinine was 1.3-1.7 till December 2023 when he developed ARF due to diffuse proliferative GN and mild  TCMR. Treated with steroid. Due to nephrotic syndrome  and hypoalbuminemia, started eliquis for anticoagulation. He was discharged on pred taper, cellcept, and prograf. At the time of discharge on 12/23/23  Cr was 3.    Plan:  - Continue IV lasix 100mg TID  - maintain wade catheter. Strict ins and outs  - will plan to review biopsy result to determine if further treatment/repeat biopsy is needed for underlying GN  - Strict I/O  - Avoid nephrotoxins, volume shifts, aim for BP within target to prevent additional renal injury  - Avoid nephrotoxic agents (NSAIDs, IV contrast dye, ACEI/ARB anti-HTN medications, Aminoglycoside-containing antibiotics)  - Renally dose all appropriate medications, including antibiotics

## 2024-01-18 NOTE — PROGRESS NOTES
[facility] - Cardiac Medical ICU  Kidney Transplant  Progress Note      Reason for Follow-up: Reassessment of Kidney Transplant - 7/26/2010  (#1) recipient and management of immunosuppression.     ORGAN: LEFT KIDNEY    Donor Type: Living      Subjective:   History of Present Illness:    He is 39 yo White male who received a living kidney transplant on 7/26/10.  his creatinine was 1.3-18 till December 2023 when he developed ARFafter not  taking IS meds for 5 weeks. 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 Treated with steroid. DSA 12/19/23 with +DSAs. had IVIG outpatient. Due to nephrotic syndrome and hypoalbuminemia, started eliquis for anticoagulation. He was discharged on pred taper, cellcept, and prograf. At the time of discharge Cr was 3.       transferred to [facility] MICU for acute hypoxemic respiratory failure, bacteremia. On 1/5/24 he was admitted to [facility] in [place] with abnormal labs. Was found to be COVID positive.  Blood culture with Staph haemolyticus. Abdominal US on 1/6 noted cirrhotic appearing liver.  CT chest on 1/7 showed consolidated infiltrates and/or atelectasis involving the bilateral lung bases with small bilateral pleural effusions, patchy heterogeneous infiltrate involving the right middle lobe, and mild ascites. The patient was initially going to transfer to hospital medicine however he was intubated prior to transfer and was accepted to [facility] MICU for a higher level of care.  He arrived on propofol and fentanyl.  KTM and transplant ID consulted on admission.    Mr. Tripp is a 38 y.o. year old male who is status post Kidney Transplant - 7/26/2010  (#1).    His maintenance immunosuppression consists of:   Immunosuppressants (From admission, onward)      Start     Stop Route Frequency Ordered    01/16/24 1800  tacrolimus capsule 2 mg         -- Oral 2 times daily 01/16/24 1617            Hospital Course:  Intubated. On vent   FIO2%90. His UOP ~ 1 liter with lasix. Has metabolic acidosis on ABG. Plan for HD to remove some fluid and correct acidosis. Pt needs to have line today     Interval History:   No acute events overnight. Patient did have anxiety episode and is now on hiFLo nasal cannula. Hemodynamically stable. UOP robust with IV lasix 100mg TID. Denies further complaints at this time.     Past Medical, Surgical, Family, and Social History:   Unchanged from H&P.    Scheduled Meds:   albuterol-ipratropium  3 mL Nebulization Q6H    apixaban  5 mg Per OG tube BID    atovaquone  1,500 mg Per NG tube Daily    carvediloL  12.5 mg Oral BID    dexAMETHasone  6 mg Intravenous Daily    Followed by    [START ON 1/22/2024] predniSONE  30 mg Oral Daily    furosemide (LASIX) injection  100 mg Intravenous Q8H    mupirocin   Nasal BID    NIFEdipine  60 mg Oral BID    pantoprazole  40 mg Intravenous Daily    polyethylene glycol  17 g Per NG tube Daily    senna-docusate 8.6-50 mg  1 tablet Per NG tube BID    tacrolimus  2 mg Oral BID    valGANciclovir 50 mg/ml  450 mg Per NG tube Daily     Continuous Infusions:  PRN Meds:dextrose 10%, dextrose 10%, glucagon (human recombinant), naloxone, ondansetron, sodium chloride 0.9%    Intake/Output - Last 3 Shifts         01/16 0700  01/17 0659 01/17 0700 01/18 0659 01/18 0700 01/19 0659    I.V. (mL/kg) 3253.7 (24.6) 130.7 (1)     Other       NG/ 305     IV Piggyback 297 124.1     Total Intake(mL/kg) 3970.8 (30) 559.7 (4.2)     Urine (mL/kg/hr) 975 (0.3) 2760 (0.9) 850 (1.6)    Drains  475     Other 4372      Total Output 5347 0015 850    Net -1376.2 -2675.3 -850                    Review of Systems   Objective:     Vital Signs (Most Recent):  Temp: 99.7 °F (37.6 °C) (01/18/24 0800)  Pulse: 98 (01/18/24 1000)  Resp: 20 (01/18/24 0724)  BP: (!) 157/97 (01/18/24 1000)  SpO2: (!) 93 % (01/18/24 1000) Vital Signs (24h Range):  Temp:  [99 °F (37.2 °C)-99.9 °F (37.7 °C)] 99.7 °F (37.6 °C)  Pulse:  []  "98  Resp:  [18-24] 20  SpO2:  [90 %-97 %] 93 %  BP: (119-179)/() 157/97     Weight: 132.5 kg (292 lb)  Height: 6' 1" (185.4 cm)  Body mass index is 38.52 kg/m².     Physical Exam  Constitutional:       Appearance: Normal appearance.   HENT:      Head: Normocephalic and atraumatic.      Nose:      Comments: On HiFlo NC  Cardiovascular:      Rate and Rhythm: Normal rate and regular rhythm.      Pulses: Normal pulses.      Heart sounds: Normal heart sounds.   Pulmonary:      Effort: Pulmonary effort is normal.      Breath sounds: Normal breath sounds.   Abdominal:      General: Abdomen is flat. There is distension.   Musculoskeletal:      Cervical back: Normal range of motion and neck supple.      Right lower leg: Edema present.      Left lower leg: Edema present.   Skin:     General: Skin is warm and dry.   Neurological:      General: No focal deficit present.      Mental Status: He is alert and oriented to person, place, and time. Mental status is at baseline.   Psychiatric:         Mood and Affect: Mood normal.          Laboratory:  Labs within the past 24 hours have been reviewed.    Diagnostic Results:  None  Assessment/Plan:     * Acute hypoxic respiratory failure  Per primary team.     COVID  Per primary team    Immunocompromised state due to drug therapy  -     Acute on chronic renal failure  - Living kidney transplant on 7/26/10.    - BL creatinine was 1.3-1.7 till December 2023 when he developed ARF due to diffuse proliferative GN and mild  TCMR. Treated with steroid. Due to nephrotic syndrome  and hypoalbuminemia, started eliquis for anticoagulation. He was discharged on pred taper, cellcept, and prograf. At the time of discharge on 12/23/23  Cr was 3.    Plan:  - Continue IV lasix 100mg TID  - Add metolazone 5mg QD  - maintain wade catheter. Strict ins and outs  - will plan to review biopsy result to determine if further treatment/repeat biopsy is needed for underlying GN  - Strict I/O  - Avoid " nephrotoxins, volume shifts, aim for BP within target to prevent additional renal injury  - Avoid nephrotoxic agents (NSAIDs, IV contrast dye, ACEI/ARB anti-HTN medications, Aminoglycoside-containing antibiotics)  - Renally dose all appropriate medications, including antibiotics    Prophylactic immunotherapy  - Off MMF due to COVID  - Increase to Prograf 3mg BID  - obtain tacro level qAM. Will titrate based on results.     Once COVID dexamethasone is complete, plan to start PO prednisone 20mg QD, with plans to taper.     Living-donor kidney transplant recipient - 7/26/10  -       Discharge Planning:    Medical decision making for this encounter includes review of pertinent labs and diagnostic studies, assessment and planning, discussions with consulting providers, discussion with patient/family, and participation in multidisciplinary rounds. Time spent caring for patient:     Darek Blue DO  Kidney Transplant  [facility] - Card[facility] Medical [facility]

## 2024-01-19 PROBLEM — Z79.899 IMMUNOCOMPROMISED STATE DUE TO DRUG THERAPY: Status: RESOLVED | Noted: 2023-12-18 | Resolved: 2024-01-19

## 2024-01-19 PROBLEM — D84.821 IMMUNOCOMPROMISED STATE DUE TO DRUG THERAPY: Status: RESOLVED | Noted: 2023-12-18 | Resolved: 2024-01-19

## 2024-01-19 PROBLEM — R74.01 TRANSAMINITIS: Status: RESOLVED | Noted: 2024-01-14 | Resolved: 2024-01-19

## 2024-01-19 PROBLEM — J18.9 PNEUMONIA: Status: RESOLVED | Noted: 2024-01-14 | Resolved: 2024-01-19

## 2024-01-19 LAB
ALBUMIN SERPL BCP-MCNC: 1 G/DL (ref 3.5–5.2)
ALBUMIN SERPL BCP-MCNC: 1 G/DL (ref 3.5–5.2)
ALP SERPL-CCNC: 109 U/L (ref 55–135)
ALP SERPL-CCNC: 112 U/L (ref 55–135)
ALT SERPL W/O P-5'-P-CCNC: 25 U/L (ref 10–44)
ALT SERPL W/O P-5'-P-CCNC: 27 U/L (ref 10–44)
ANION GAP SERPL CALC-SCNC: 7 MMOL/L (ref 8–16)
ANION GAP SERPL CALC-SCNC: 9 MMOL/L (ref 8–16)
ANISOCYTOSIS BLD QL SMEAR: SLIGHT
AST SERPL-CCNC: 30 U/L (ref 10–40)
AST SERPL-CCNC: 33 U/L (ref 10–40)
BACTERIA BLD CULT: NORMAL
BACTERIA BLD CULT: NORMAL
BASOPHILS NFR BLD: 0 % (ref 0–1.9)
BILIRUB SERPL-MCNC: 0.5 MG/DL (ref 0.1–1)
BILIRUB SERPL-MCNC: 0.5 MG/DL (ref 0.1–1)
BUN SERPL-MCNC: 70 MG/DL (ref 6–20)
BUN SERPL-MCNC: 71 MG/DL (ref 6–20)
CALCIUM SERPL-MCNC: 7.8 MG/DL (ref 8.7–10.5)
CALCIUM SERPL-MCNC: 8 MG/DL (ref 8.7–10.5)
CHLORIDE SERPL-SCNC: 107 MMOL/L (ref 95–110)
CHLORIDE SERPL-SCNC: 107 MMOL/L (ref 95–110)
CLASS I ANTIBODY COMMENTS - LUMINEX: NORMAL
CLASS II ANTIBODIES - LUMINEX: NORMAL
CLASS II ANTIBODY COMMENTS - LUMINEX: NORMAL
CO2 SERPL-SCNC: 24 MMOL/L (ref 23–29)
CO2 SERPL-SCNC: 27 MMOL/L (ref 23–29)
CREAT SERPL-MCNC: 1.4 MG/DL (ref 0.5–1.4)
CREAT SERPL-MCNC: 1.6 MG/DL (ref 0.5–1.4)
DIFFERENTIAL METHOD BLD: ABNORMAL
DSA1 TESTING DATE: NORMAL
DSA12 TESTING DATE: NORMAL
DSA2 TESTING DATE: NORMAL
EOSINOPHIL NFR BLD: 2 % (ref 0–8)
ERYTHROCYTE [DISTWIDTH] IN BLOOD BY AUTOMATED COUNT: 13.2 % (ref 11.5–14.5)
EST. GFR  (NO RACE VARIABLE): 55.5 ML/MIN/1.73 M^2
EST. GFR  (NO RACE VARIABLE): >60 ML/MIN/1.73 M^2
GLUCOSE SERPL-MCNC: 85 MG/DL (ref 70–110)
GLUCOSE SERPL-MCNC: 86 MG/DL (ref 70–110)
HCT VFR BLD AUTO: 25.9 % (ref 40–54)
HGB BLD-MCNC: 8.8 G/DL (ref 14–18)
IMM GRANULOCYTES # BLD AUTO: ABNORMAL K/UL (ref 0–0.04)
IMM GRANULOCYTES NFR BLD AUTO: ABNORMAL % (ref 0–0.5)
INR PPP: 1.2 (ref 0.8–1.2)
LUMINEX DSA CLASS I & II SPECIFICITY INTERPRETATION: NORMAL
LYMPHOCYTES NFR BLD: 3 % (ref 18–48)
MAGNESIUM SERPL-MCNC: 1.9 MG/DL (ref 1.6–2.6)
MAGNESIUM SERPL-MCNC: 2 MG/DL (ref 1.6–2.6)
MCH RBC QN AUTO: 30.2 PG (ref 27–31)
MCHC RBC AUTO-ENTMCNC: 34 G/DL (ref 32–36)
MCV RBC AUTO: 89 FL (ref 82–98)
MONOCYTES NFR BLD: 2 % (ref 4–15)
NEUTROPHILS NFR BLD: 93 % (ref 38–73)
NRBC BLD-RTO: 0 /100 WBC
OVALOCYTES BLD QL SMEAR: ABNORMAL
PHOSPHATE SERPL-MCNC: 4.2 MG/DL (ref 2.7–4.5)
PHOSPHATE SERPL-MCNC: 4.2 MG/DL (ref 2.7–4.5)
PLATELET # BLD AUTO: 249 K/UL (ref 150–450)
PLATELET BLD QL SMEAR: ABNORMAL
PMV BLD AUTO: 8.7 FL (ref 9.2–12.9)
POIKILOCYTOSIS BLD QL SMEAR: SLIGHT
POTASSIUM SERPL-SCNC: 4.3 MMOL/L (ref 3.5–5.1)
POTASSIUM SERPL-SCNC: 4.3 MMOL/L (ref 3.5–5.1)
PROT SERPL-MCNC: 3.8 G/DL (ref 6–8.4)
PROT SERPL-MCNC: 3.9 G/DL (ref 6–8.4)
PROTHROMBIN TIME: 12.4 SEC (ref 9–12.5)
RBC # BLD AUTO: 2.91 M/UL (ref 4.6–6.2)
SERUM COLLECTION DT - LUMINEX CLASS I: NORMAL
SERUM COLLECTION DT - LUMINEX CLASS II: NORMAL
SODIUM SERPL-SCNC: 140 MMOL/L (ref 136–145)
SODIUM SERPL-SCNC: 141 MMOL/L (ref 136–145)
SPHEROCYTES BLD QL SMEAR: ABNORMAL
TACROLIMUS BLD-MCNC: 7.7 NG/ML (ref 5–15)
WBC # BLD AUTO: 13.24 K/UL (ref 3.9–12.7)

## 2024-01-19 PROCEDURE — 97530 THERAPEUTIC ACTIVITIES: CPT

## 2024-01-19 PROCEDURE — 80053 COMPREHEN METABOLIC PANEL: CPT

## 2024-01-19 PROCEDURE — 25000003 PHARM REV CODE 250

## 2024-01-19 PROCEDURE — 85007 BL SMEAR W/DIFF WBC COUNT: CPT

## 2024-01-19 PROCEDURE — 86977 RBC SERUM PRETX INCUBJ/INHIB: CPT | Performed by: STUDENT IN AN ORGANIZED HEALTH CARE EDUCATION/TRAINING PROGRAM

## 2024-01-19 PROCEDURE — 11000001 HC ACUTE MED/SURG PRIVATE ROOM

## 2024-01-19 PROCEDURE — 99232 SBSQ HOSP IP/OBS MODERATE 35: CPT | Mod: ,,, | Performed by: INTERNAL MEDICINE

## 2024-01-19 PROCEDURE — 97535 SELF CARE MNGMENT TRAINING: CPT

## 2024-01-19 PROCEDURE — 25000003 PHARM REV CODE 250: Performed by: INTERNAL MEDICINE

## 2024-01-19 PROCEDURE — 63600175 PHARM REV CODE 636 W HCPCS: Performed by: STUDENT IN AN ORGANIZED HEALTH CARE EDUCATION/TRAINING PROGRAM

## 2024-01-19 PROCEDURE — 83735 ASSAY OF MAGNESIUM: CPT | Performed by: STUDENT IN AN ORGANIZED HEALTH CARE EDUCATION/TRAINING PROGRAM

## 2024-01-19 PROCEDURE — 86832 HLA CLASS I HIGH DEFIN QUAL: CPT | Performed by: STUDENT IN AN ORGANIZED HEALTH CARE EDUCATION/TRAINING PROGRAM

## 2024-01-19 PROCEDURE — 80053 COMPREHEN METABOLIC PANEL: CPT | Mod: 91

## 2024-01-19 PROCEDURE — 63600175 PHARM REV CODE 636 W HCPCS

## 2024-01-19 PROCEDURE — 94640 AIRWAY INHALATION TREATMENT: CPT

## 2024-01-19 PROCEDURE — 94761 N-INVAS EAR/PLS OXIMETRY MLT: CPT

## 2024-01-19 PROCEDURE — 99233 SBSQ HOSP IP/OBS HIGH 50: CPT | Mod: ,,, | Performed by: INTERNAL MEDICINE

## 2024-01-19 PROCEDURE — 97165 OT EVAL LOW COMPLEX 30 MIN: CPT

## 2024-01-19 PROCEDURE — 80197 ASSAY OF TACROLIMUS: CPT | Performed by: INTERNAL MEDICINE

## 2024-01-19 PROCEDURE — 25000242 PHARM REV CODE 250 ALT 637 W/ HCPCS

## 2024-01-19 PROCEDURE — 84100 ASSAY OF PHOSPHORUS: CPT

## 2024-01-19 PROCEDURE — 84100 ASSAY OF PHOSPHORUS: CPT | Mod: 91

## 2024-01-19 PROCEDURE — 27100171 HC OXYGEN HIGH FLOW UP TO 24 HOURS

## 2024-01-19 PROCEDURE — 27000207 HC ISOLATION

## 2024-01-19 PROCEDURE — 99900035 HC TECH TIME PER 15 MIN (STAT)

## 2024-01-19 PROCEDURE — 97112 NEUROMUSCULAR REEDUCATION: CPT

## 2024-01-19 PROCEDURE — 25000003 PHARM REV CODE 250: Performed by: STUDENT IN AN ORGANIZED HEALTH CARE EDUCATION/TRAINING PROGRAM

## 2024-01-19 PROCEDURE — 86833 HLA CLASS II HIGH DEFIN QUAL: CPT | Performed by: STUDENT IN AN ORGANIZED HEALTH CARE EDUCATION/TRAINING PROGRAM

## 2024-01-19 PROCEDURE — 97161 PT EVAL LOW COMPLEX 20 MIN: CPT

## 2024-01-19 PROCEDURE — 92526 ORAL FUNCTION THERAPY: CPT

## 2024-01-19 PROCEDURE — 85610 PROTHROMBIN TIME: CPT

## 2024-01-19 PROCEDURE — 83735 ASSAY OF MAGNESIUM: CPT | Mod: 91 | Performed by: STUDENT IN AN ORGANIZED HEALTH CARE EDUCATION/TRAINING PROGRAM

## 2024-01-19 PROCEDURE — 27000923 HC TRIALYSIS CATHETER, ANY SIZE

## 2024-01-19 PROCEDURE — 85027 COMPLETE CBC AUTOMATED: CPT

## 2024-01-19 RX ORDER — PREDNISONE 20 MG/1
20 TABLET ORAL DAILY
Status: DISCONTINUED | OUTPATIENT
Start: 2024-01-22 | End: 2024-01-21

## 2024-01-19 RX ORDER — DEXAMETHASONE SODIUM PHOSPHATE 4 MG/ML
6 INJECTION, SOLUTION INTRA-ARTICULAR; INTRALESIONAL; INTRAMUSCULAR; INTRAVENOUS; SOFT TISSUE EVERY 24 HOURS
Status: COMPLETED | OUTPATIENT
Start: 2024-01-20 | End: 2024-01-21

## 2024-01-19 RX ORDER — PANTOPRAZOLE SODIUM 40 MG/1
40 FOR SUSPENSION ORAL DAILY
Status: DISCONTINUED | OUTPATIENT
Start: 2024-01-20 | End: 2024-01-21

## 2024-01-19 RX ADMIN — IPRATROPIUM BROMIDE AND ALBUTEROL SULFATE 3 ML: 2.5; .5 SOLUTION RESPIRATORY (INHALATION) at 08:01

## 2024-01-19 RX ADMIN — IPRATROPIUM BROMIDE AND ALBUTEROL SULFATE 3 ML: 2.5; .5 SOLUTION RESPIRATORY (INHALATION) at 01:01

## 2024-01-19 RX ADMIN — AMLODIPINE BESYLATE 10 MG: 10 TABLET ORAL at 01:01

## 2024-01-19 RX ADMIN — FUROSEMIDE 100 MG: 10 INJECTION, SOLUTION INTRAMUSCULAR; INTRAVENOUS at 08:01

## 2024-01-19 RX ADMIN — CARVEDILOL 12.5 MG: 12.5 TABLET, FILM COATED ORAL at 08:01

## 2024-01-19 RX ADMIN — DEXAMETHASONE SODIUM PHOSPHATE 6 MG: 4 INJECTION INTRA-ARTICULAR; INTRALESIONAL; INTRAMUSCULAR; INTRAVENOUS; SOFT TISSUE at 08:01

## 2024-01-19 RX ADMIN — IPRATROPIUM BROMIDE AND ALBUTEROL SULFATE 3 ML: 2.5; .5 SOLUTION RESPIRATORY (INHALATION) at 02:01

## 2024-01-19 RX ADMIN — METOLAZONE 5 MG: 2.5 TABLET ORAL at 01:01

## 2024-01-19 RX ADMIN — FUROSEMIDE 100 MG: 10 INJECTION, SOLUTION INTRAMUSCULAR; INTRAVENOUS at 11:01

## 2024-01-19 RX ADMIN — TACROLIMUS 3 MG: 1 CAPSULE ORAL at 08:01

## 2024-01-19 RX ADMIN — IPRATROPIUM BROMIDE AND ALBUTEROL SULFATE 3 ML: 2.5; .5 SOLUTION RESPIRATORY (INHALATION) at 07:01

## 2024-01-19 RX ADMIN — APIXABAN 5 MG: 5 TABLET, FILM COATED ORAL at 08:01

## 2024-01-19 RX ADMIN — FUROSEMIDE 100 MG: 10 INJECTION, SOLUTION INTRAMUSCULAR; INTRAVENOUS at 03:01

## 2024-01-19 RX ADMIN — ONDANSETRON 4 MG: 2 INJECTION INTRAMUSCULAR; INTRAVENOUS at 08:01

## 2024-01-19 RX ADMIN — ATOVAQUONE 1500 MG: 750 SUSPENSION ORAL at 01:01

## 2024-01-19 RX ADMIN — FUROSEMIDE 100 MG: 10 INJECTION, SOLUTION INTRAMUSCULAR; INTRAVENOUS at 12:01

## 2024-01-19 NOTE — PLAN OF CARE
ICU PLAN OF CARE NOTE    Dx: Acute hypoxic respiratory failure    Goals of Care: SBP , Heart rate , SPO2 >90%    Vital Signs (last 12 hours):   Temp:  [98.3 °F (36.8 °C)-98.6 °F (37 °C)]   Pulse:  [71-89]   Resp:  [9-25]   BP: (154-171)/()   SpO2:  [91 %-97 %]      Neuro: AAO x4, Follows Commands, and Moves All Extremities.Patient is very self limiting regarding mobility. Encouraged participation in care/plan.    Cardiac: NSR    Respiratory: Nasal Cannula. Titrated down from hiflow nasal cannula 20L/ 55% to 5L nasal cannula with humidification.     Urine Output: Urinary Catheter 2625 cc/shift. Plan per team to continue with aggressive diuresis with strict I&O.    Diet: Regular Diet/Renal Diet. Patient passed speech eval, upgraded to regular cosistency from full liquid.     Labs/Accuchecks: Daily labs    Skin: All skin remains free from injury.  Patient refused Q2, out of bed to recliner for 2 hours, transfer x2 assist. Mattress inflated, and bed working correctly.    Shift Events:  Patient refusing many cares including medications, bathing, repositioning and to assist/participate in his care. Patient has a very aggressive mood, angry and frustrated when you don't know what he wants and ask questions to further assist or better understand his needs. Patient complained of 5/10 abdominal pain. MD Jen Mazariegos at bedside to assess. Patient has not had a BM since 1/15 and has been refusing bowel regimen. KUB xray ordered. Patient refused and stated he wants to wait until tomorrow. No further orders. See flowsheet for further assessment/details.  Family updated on current condition/plan of care, questions answered, and emotional support provided.  MD updated on current condition, vitals and labs.

## 2024-01-19 NOTE — ASSESSMENT & PLAN NOTE
Patient noted to be covid positive 1/10/24, requiring BIPAP. Patient was intubated prior to transfer [place] [facility]. Acute hypoxemic respiratory failure also complicated by consolidations noted in bilateral lung bases w/ small b/l pleural effusions.     - Completed remdesevir  - Continue Dexamethasone 6mg QD for 10 days total then switch back to prednisone  - Off abx for potential pneumonia component as cultures have been negative

## 2024-01-19 NOTE — ASSESSMENT & PLAN NOTE
Cr was 1.3 in 2021, no other Cr results until the recent admission with Cr at 2.92.  With aggressive diuresis, Cr stable now at 1.3-1.6.     - KTM following  - Continue aggressive diuresis for volume overload  - Strict intake/output  - Renally dose meds  - Avoid nephrotoxic agents

## 2024-01-19 NOTE — PLAN OF CARE
Problem: Occupational Therapy  Goal: Occupational Therapy Goal  Description: Goals to be met by: 2/19/24 (1 month)      Patient will increase functional independence with ADLs by performing:    Grooming while standing at sink with Crook.  Toileting from toilet with Crook for hygiene and clothing management.   Rolling to Bilateral with Crook.   Supine to sit with Crook.  Step transfer with Crook  Toilet transfer to toilet with Crook.    Evaluated pt and established OT POC. Continue OT as tolerated.  Pinky Diaz OT  1/19/2024    Outcome: Ongoing, Progressing

## 2024-01-19 NOTE — PLAN OF CARE
MICU DAILY GOALS     Family/Goals of care/Code Status   Code Status: Full Code    24H Vital Sign Range  Temp:  [98.6 °F (37 °C)-99.7 °F (37.6 °C)]   Pulse:  []   Resp:  [9-26]   BP: (148-169)/()   SpO2:  [85 %-97 %]      Shift Events (include procedures and significant events)   No acute events throughout shift    AWAKE RASS: Goal - RASS Goal: 0-->alert and calm  Actual - RASS (Dash Agitation-Sedation Scale): alert and calm    Restraint necessity: Not necessary   BREATHE SBT: Not intubated    Coordinate A & B, analgesics/sedatives Pain: managed   SAT: Not intubated   Delirium CAM-ICU: Overall CAM-ICU: Negative   Early(intubated/ Progressive (non-intubated) Mobility MOVE Screen (INTUBATED ONLY): Not intubated    Activity: Activity Management: Rolling - L1, Arm raise - L1   Feeding/Nutrition Diet order: Diet/Nutrition Received: full liquid, Specialty Diet/Nutrition Received: renal diet   Thrombus DVT prophylaxis: VTE Required Core Measure: Pharmacological prophylaxis initiated/maintained   HOB Elevation Head of Bed (HOB) Positioning: HOB at 30-45 degrees   Ulcer Prophylaxis GI: yes   Glucose control managed     Skin Skin assessed during: Q Shift Change    Sacrum intact/not altered? Yes  Heels intact/not altered? Yes  Surgical wound? No    Check one (no altered skin or altered skin) and sub boxes:  [x] No Altered Skin Integrity Present    [x]Prevention Measures Documented    [] Altered Skin Integrity Present or Discovered   [] LDA present in EPIC              [] LDA added in EPIC   [] Wound Image Taken (required on admit,                   transfer/discharge and every Tuesday)    Wound Care Consulted? No    Attending Nurse:     Second RN/Staff Member:    Bowel Function constipation    Indwelling Catheter Necessity [REMOVED]      Urethral Catheter 01/14/24 1345-Reason for Continuing Urinary Catheterization: Critically ill in ICU and requiring hourly monitoring of intake/output       Urethral Catheter  01/17/24 1130 16 Fr.-Reason for Continuing Urinary Catheterization: Critically ill in ICU and requiring hourly monitoring of intake/output    Trialysis (Dialysis) Catheter 01/15/24 1803 right internal jugular-Line Necessity Review: CRRT/HD     De-escalation Antibiotics No       VS and assessment per flow sheet, patient progressing towards goals as tolerated, plan of care reviewed with  patient , all concerns addressed, will continue to monitor.

## 2024-01-19 NOTE — ASSESSMENT & PLAN NOTE
Patient with kidney transplant 2010. He was previously admitted to [facility] December 18-23 with GENNY. 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  He was found to have nephrotic syndrome as well, started on eliquis.  He received pulse dose steroids and was started on Eliquis for anticoagulation. He was discharged on a prednisone taper, CellCept, and Prograf.  On December 23, his creatinine was 3.0. Current medications include CellCept, tacrolimus, prednisone, all of which he continued to receive at OSH.    - KTM following  - Continue immunosuppression  - Cr stable/improving. Holding CRRT.  - Gangciclovir for CMV prophylaxis was discontinued  - Eliquis for nephrotic syndrome

## 2024-01-19 NOTE — PLAN OF CARE
Pt a/ox4. Pt on HFNC 20L 50%. NSR. SPT cleared pt for full liquid. Pt has trouble swallowing pills. Mayen with 2Lcc/shift. Pt turned q2. PT/OT to follow up tomorrow. Trending labs. Safety/isolation precautions maintained.

## 2024-01-19 NOTE — RESIDENT HANDOFF
Handoff     Primary Team: Networked reference to record [facility]  Room Number: 6080/6080 A     Patient Name: Ubaldo Tripp MRN: [id number]     Date of Birth: [id number] Allergies: Ibuprofen and Penicillins     Age: 38 y.o. Admit Date: 1/14/2024     Sex: male  BMI: Body mass index is 38.52 kg/m².     Code Status: Full Code        Illness Level (current clinical status): Watcher - No    Reason for Admission: Acute hypoxic respiratory failure    Brief HPI (pertinent PMH and diagnosis or differential diagnosis): Pt transferred to [facility] for respiratory failure 2/2 COVID. Renal txp patient who also had GENNY on admission, hx rejection.    Procedure Date: NA    Hospital Course (updated, brief assessment by system or problem, significant events):   Extubated 1/17  Received CRRT 1/15 and 1/16  Patient doing well, now on 6L bubble flow  No further plans for CRRT as volume is being managed with aggressive diuresis appropriately    Tasks (specific, using if-then statements):   Continue aggressive diuresis  Wean O2  PT OT      Contingency Plan (special circumstances anticipated and plan): If deteriorates please consult MICU again    Estimated Discharge Date: 1/25/24    Discharge Disposition: Home or Self Care    Mentored By: Dr Smith

## 2024-01-19 NOTE — PROGRESS NOTES
[facility] - Cardiac Medical ICU  Critical Care Medicine  Progress Note    Patient Name: Ubaldo Tripp  MRN: [id number]  Admission Date: 1/14/2024  Hospital Length of Stay: 5 days  Code Status: Full Code  Attending Provider: Kristian Smith MD  Primary Care Provider: Jaime Jauregui (Inactive)   Principal Problem: Acute hypoxic respiratory failure    Subjective:     HPI:  39 y/o M with a medical history of kidney transplant in 2010 and HTN transferred to [facility] MICU for acute hypoxemic respiratory failure, bacteremia, and hx of Kidney Transplant for KTM and Infectious Disease evaluation. Pt was previously admitted to [facility] 12/18/23 - 12/23/23 with GENNY, headache, and HTN.  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  He was found to have nephrotic syndrome as well.  Received pulse dose steroids and was started on Eliquis. Discharged on a prednisone taper, CellCept, and Prograf.  sCr 12/23 was 3.0.     On 1/5/24 he was admitted to [facility] in [place] with abnormal labs. Outpatient labs on 1/3 showed WBC 17.7 Hgb 13.1, K 5.7, bicarb 18, BUN 71, sCr 2.92, phos 5.1, and tacro level 10.8.  Was found to be COVID positive requiring BiPAP.  Blood culture with Staph haemolyticus.  Labs notable for sCr ranging 2.68 to 2.73.  K normalized.  Abdominal US on 1/6 noted cirrhotic appearing liver.  CT chest on 1/7 showed consolidated infiltrates and/or atelectasis involving the bilateral lung bases with small bilateral pleural effusions, patchy heterogeneous infiltrate involving the right middle lobe, and mild ascites.  Transplant renal ultrasound 1/8 showed stable appearance of the transplant kidney without evidence of acute abnormality.  Gallbladder ultrasound 1/8 showed sludge in the gallbladder.  Medications  prior to transfer include CellCept, tacrolimus, valganciclovir, prednisone, Eliquis, and meropenem.    The patient was initially going to transfer to hospital medicine however he was intubated prior to transfer and was accepted to [facility] MICU for a higher level of care.  He arrived on propofol and fentanyl.  KTM and transplant ID consulted on admission.    Hospital/ICU Course:  Pt admitted to MICU for respiratory failure 2/2 COVID pneumonia and volume overload. Extubated on 1/17/24 to HFNC and switched to 6L bubble flow on 1/19/24. Patient had an GENNY on admission and required a session of CRRT on 1/15 and and 1/16. Diuretic dosage was increased aggressively and GENNY improved, patient with great urine output. Patient stable for step down on 1/19/2024.     Interval History/Significant Events: NAEON  Labs stable. Diuresing significantly.  HFNC switched to 6L bubble flow.    Per nursing patient at times seems to be depressed/not engage with his care team meaningfully. Patient is being stepped down to hospital medicine.   The recommendation to consider psych eval and continue aggressive diuresis to achieve euvolmia is being passed down as well.      Review of Systems   Constitutional: Negative.    Respiratory:  Positive for shortness of breath.    Cardiovascular:  Positive for leg swelling. Negative for chest pain.   Gastrointestinal: Negative.    Skin: Negative.    Psychiatric/Behavioral:  Positive for dysphoric mood.      Objective:     Vital Signs (Most Recent):  Temp: 98.3 °F (36.8 °C) (01/19/24 0800)  Pulse: 79 (01/19/24 1100)  Resp: (!) 21 (01/19/24 1100)  BP: (!) 166/109 (01/19/24 1100)  SpO2: (!) 93 % (01/19/24 1100) Vital Signs (24h Range):  Temp:  [98.3 °F (36.8 °C)-99.5 °F (37.5 °C)] 98.3 °F (36.8 °C)  Pulse:  [73-93] 79  Resp:  [9-26] 21  SpO2:  [85 %-97 %] 93 %  BP: (150-171)/() 166/109   Weight: 132.5 kg (292 lb)  Body mass index is 38.52 kg/m².      Intake/Output Summary (Last 24 hours) at 1/19/2024  1141  Last data filed at 1/19/2024 1000  Gross per 24 hour   Intake 730 ml   Output 4485 ml   Net -3755 ml          Physical Exam  Constitutional:       Appearance: He is ill-appearing.   HENT:      Mouth/Throat:      Mouth: Mucous membranes are moist.   Cardiovascular:      Rate and Rhythm: Normal rate and regular rhythm.      Pulses: Normal pulses.      Heart sounds: Normal heart sounds.   Pulmonary:      Breath sounds: Normal breath sounds. No wheezing or rales.      Comments: 6LNC  Abdominal:      Palpations: Abdomen is soft.   Musculoskeletal:      Right lower leg: Edema present.      Left lower leg: Edema present.   Skin:     General: Skin is warm and dry.   Neurological:      Mental Status: He is alert.   Psychiatric:         Thought Content: Thought content normal.            Vents:  Vent Mode: Spont (01/17/24 1016)  Set Rate: 22 BPM (01/17/24 0723)  Vt Set: 460 mL (01/17/24 0723)  Pressure Support: 5 cmH20 (01/17/24 1016)  PEEP/CPAP: 5 cmH20 (01/17/24 1016)  Oxygen Concentration (%): 45 (01/19/24 1000)  Peak Airway Pressure: 10 cmH20 (01/17/24 1016)  Plateau Pressure: 20 cmH20 (01/17/24 1016)  Total Ve: 9.84 L/m (01/17/24 1016)  Negative Inspiratory Force (cm H2O): -48 (01/17/24 1016)  F/VT Ratio<105 (RSBI): (!) 30.52 (01/17/24 0723)  Lines/Drains/Airways       Central Venous Catheter Line  Duration             Trialysis (Dialysis) Catheter 01/15/24 1803 right internal jugular 3 days              Drain  Duration                  Urethral Catheter 01/17/24 1130 16 Fr. 2 days              Peripheral Intravenous Line  Duration                  Midline Catheter Insertion/Assessment  - Single Lumen 01/13/24 1500 Right basilic vein (medial side of arm) 5 days         Peripheral IV - Single Lumen 01/14/24 1845 Anterior;Distal;Left Forearm 4 days         Peripheral IV - Single Lumen 01/15/24 0826 18 G;1 3/4 in Anterior;Right Upper Arm 4 days         Peripheral IV - Single Lumen 01/15/24 0826 20 G Posterior;Right  Hand 4 days                  Significant Labs:    CBC/Anemia Profile:  Recent Labs   Lab 01/18/24  0356 01/19/24  0449   WBC 16.88* 13.24*   HGB 9.7* 8.8*   HCT 28.4* 25.9*    249   MCV 87 89   RDW 13.2 13.2        Chemistries:  Recent Labs   Lab 01/18/24  1525 01/19/24  0020 01/19/24  0449     139 140 141   K 4.5  4.5 4.3 4.3     107 107 107   CO2 24 25 24 27   BUN 65*  71* 71* 70*   CREATININE 1.5*  1.5* 1.6* 1.4   CALCIUM 7.9*  7.9* 7.8* 8.0*   ALBUMIN 1.1*  1.1* 1.0* 1.0*   PROT 4.1* 3.8* 3.9*   BILITOT 0.5 0.5 0.5   ALKPHOS 103 109 112   ALT 19 25 27   AST 26 33 30   MG 2.1 1.9 2.0   PHOS 4.0  4.0 4.2 4.2       All pertinent labs within the past 24 hours have been reviewed.    Significant Imaging:  I have reviewed all pertinent imaging results/findings within the past 24 hours.    ABG  Recent Labs   Lab 01/17/24  0812   PH 7.444   PO2 59*   PCO2 37.0   HCO3 25.4   BE 1     Assessment/Plan:     Renal/  Acute on chronic renal failure  Cr was 1.3 in 2021, no other Cr results until the recent admission with Cr at 2.92.  With aggressive diuresis, Cr stable now at 1.3-1.6.     - KTM following  - Continue aggressive diuresis for volume overload  - Strict intake/output  - Renally dose meds  - Avoid nephrotoxic agents    Living-donor kidney transplant recipient - 7/26/10  Patient with kidney transplant 2010. He was previously admitted to [facility] December 18-23 with GENNY. 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  He was found to have nephrotic syndrome as well, started on eliquis.  He received pulse dose steroids and was started on Eliquis for anticoagulation. He was discharged on a prednisone taper, CellCept, and Prograf.  On December 23, his creatinine was 3.0. Current medications include  CellCept, tacrolimus, prednisone, all of which he continued to receive at OSH.    - KTM following  - Continue immunosuppression  - Cr stable/improving. Holding CRRT.  - Gangciclovir for CMV prophylaxis was discontinued  - Eliquis for nephrotic syndrome    ID  Bacteremia  Outside facility reported the following culture results:  12/29/23 Blood Cx: NGTD after 5 days  1/5/24: Staph Haemolyticus, positive in 2 different sets, the vials used were pediatric bottles, as pt was a difficult stick. Only 2 total bottles were collected.  1/6/24 Blood Cx: NGTD after 5 days.    - Per ID, discontinued all antibiotics    COVID  Patient noted to be covid positive 1/10/24, requiring BIPAP. Patient was intubated prior to [place] [facility]. Acute hypoxemic respiratory failure also complicated by consolidations noted in bilateral lung bases w/ small b/l pleural effusions.     - Completed remdesevir  - Continue Dexamethasone 6mg QD for 10 days total then switch back to prednisone  - Off abx for potential pneumonia component as cultures have been negative    Other  * Acute hypoxic respiratory failure  Patient with Hypoxic Respiratory failure which is Acute.  he is not on home oxygen. Contributing diagnoses includes - ARDS and Pneumonia Labs and images were reviewed. Patient Has not had a recent ABG. Supplemental oxygen was provided and noted- Oxygen Concentration (%):  [45-60] 45  Resp Rate Total:  [20 br/min] 20 br/min  2/2 volume overload and COVID  Improving    Plan:   - Aggressive diuresis  - Wean O2 as tolerated, currently on bubble flow       Critical Care Daily Checklist:    A: Awake: RASS Goal/Actual Goal: RASS Goal: 0-->alert and calm  Actual:     B: Spontaneous Breathing Trial Performed? Spon. Breathing Trial Initiated?: Initiated (01/17/24 0723)   C: SAT & SBT Coordinated?  NA                      D: Delirium: CAM-ICU Overall CAM-ICU: Negative   E: Early Mobility Performed? Yes   F: Feeding Goal: Goals: Meet % EEN, EPN by  RD f/u date  Status: Nutrition Goal Status: new   Current Diet Order   Procedures    Diet renal      AS: Analgesia/Sedation None   T: Thromboembolic Prophylaxis Yes Eliquis   H: HOB > 300 Yes   U: Stress Ulcer Prophylaxis (if needed) Y   G: Glucose Control Y   B: Bowel Function     I: Indwelling Catheter (Lines & Mayen) Necessity PIV   D: De-escalation of Antimicrobials/Pharmacotherapies No abx    Plan for the day/ETD STEPDOWN    Code Status:  Family/Goals of Care: Full Code         Critical secondary to Patient has a condition that poses threat to life and bodily function: Severe Respiratory Distress      Critical care was time spent personally by me on the following activities: development of treatment plan with patient or surrogate and bedside caregivers, discussions with consultants, evaluation of patient's response to treatment, examination of patient, ordering and performing treatments and interventions, ordering and review of laboratory studies, ordering and review of radiographic studies, pulse oximetry, re-evaluation of patient's condition. This critical care time did not overlap with that of any other provider or involve time for any procedures.     Jen Mazariegos MD  Critical Care Medicine  [facility] - Cardiac Medical ICU

## 2024-01-19 NOTE — PT/OT/SLP EVAL
"Occupational Therapy   Co-Evaluation and Co-Treatment    Name: Ubaldo Tripp  MRN: [id number]  Admitting Diagnosis: Acute hypoxic respiratory failure  Recent Surgery: * No surgery found *      Recommendations:     Discharge Recommendations: High Intensity Therapy  Discharge Equipment Recommendations:  none  Barriers to discharge:  None    Assessment:     Ubaldo Tripp is a 38 y.o. male with a medical diagnosis of Acute hypoxic respiratory failure.  Pt presents with decreased endurance and impaired mobility performance as limited by cardiovascular status and generalized weakness. Pt found upright in bed and agreeable for therapy. Pt found upright in bed and agreeable for therapy. Session focused on EOB ADLs and stand pivot to bedside chair. Pt limited today 2/2 decreased endurance and self limiting tendencies. Patient presents with good participation and motivation to return to prior level of function with high intensity therapy.  The patient demonstrates appropriate endurance, strength, and pain control to participate in up to 3 hours or 15hrs of combined therapy post acute. Performance deficits affecting function: weakness, gait instability, impaired balance, impaired endurance, impaired self care skills, impaired functional mobility, pain, decreased safety awareness, impaired cardiopulmonary response to activity, edema.      Rehab Prognosis: Good; patient would benefit from acute skilled OT services to address these deficits and reach maximum level of function.       Plan:     Patient to be seen 4 x/week to address the above listed problems via self-care/home management, therapeutic activities, therapeutic exercises, neuromuscular re-education  Plan of Care Expires: 02/19/24  Plan of Care Reviewed with: patient    Subjective     Chief Complaint: weakness and being very cold during session  Patient/Family Comments/goals: "Ok just give me a minute"    Occupational Profile:  Living Environment: Pt lives with " wife and 4 children (ages 9-12) in a H with 3 ARACELI, BL HR. Pt has a walk in shower for bating.  Previous level of function: I with ADLs/mobility  Roles and Routines: Pt was driving and working as a   Equipment Used at Home: none  Assistance upon Discharge: wife    Pain/Comfort:  Pain Rating 1: other (see comments) (back pain not ranked)  Location - Orientation 1: generalized  Location 1: back  Pain Addressed 1: Reposition, Distraction  Pain Rating Post-Intervention 2: 0/10    Patients cultural, spiritual, Evangelical conflicts given the current situation: no    Objective:   Co-treatment with PT for maximal pt participation, safety, and activity tolerance     Communicated with: RN prior to session.  Patient found HOB elevated with telemetry, peripheral IV, pulse ox (continuous), blood pressure cuff, central line upon OT entry to room.    General Precautions: Standard, fall, droplet, airborne, contact  Orthopedic Precautions: N/A  Braces: N/A  Respiratory Status: Comfort flow, flow 20 L/min, concentration 55%    Occupational Performance:    Bed Mobility:    Patient completed Rolling/Turning to Left with  maximal assistance and 2 persons  Patient completed Scooting/Bridging with maximal assistance and 2 persons  Patient completed Supine to Sit with maximal assistance and 2 persons    Functional Mobility/Transfers:  Patient completed Sit <> Stand Transfer with min assistance  with  no assistive device   Patient completed Bed <> Chair Transfer using Step Transfer technique with min assistance with no assistive device  Functional Mobility: Pt sat EOB ~20 minutes of ADLs. Pt stood with min A to complete pivot to chair placed next to pt.     Activities of Daily Living:  Feeding:  setup A at EOB  to drink water   Grooming: contact guard assistance washing face and brushing teeth at EOB   Upper Body Dressing: minimum assistance donning gown at EOB   Lower Body Dressing: total assistance donning socks      Cognitive/Visual Perceptual:  Cognitive/Psychosocial Skills:     -       Oriented to: Person, Place, Time, and Situation   -       Follows Commands/attention:Follows multistep  commands  -       Communication: clear/fluent  -       Memory: No Deficits noted  -       Safety awareness/insight to disability: intact   -       Mood/Affect/Coping skills/emotional control: Anxious    Physical Exam:  Balance:    -       demo good sitting balance, fair standing balance   Dominant hand:    -       right  Upper Extremity Range of Motion:     -       Right Upper Extremity: WFL  -       Left Upper Extremity: WFL  Upper Extremity Strength:    -       Right Upper Extremity: WFL  -       Left Upper Extremity: WFL   Strength:    -       Right Upper Extremity: WFL  -       Left Upper Extremity: WFL    AMPAC 6 Click ADL:  AMPAC Total Score: 18    Treatment & Education:  Pt educated on role of occupational therapy, POC, and safety during ADLs and functional mobility. Pt and OT discussed importance of safe, continued mobility to optimize daily living skills. Pt verbalized understanding.     White board updated during session. Pt given instruction to call for medical staff/nurse for assistance.       Patient left up in chair with all lines intact, call button in reach, and RN notified    GOALS:   Multidisciplinary Problems       Occupational Therapy Goals          Problem: Occupational Therapy    Goal Priority Disciplines Outcome Interventions   Occupational Therapy Goal     OT, PT/OT Ongoing, Progressing    Description: Goals to be met by: 2/19/24 (1 month)      Patient will increase functional independence with ADLs by performing:    Grooming while standing at sink with [place].  Toileting from toilet with [place] for hygiene and clothing management.   Rolling to Bilateral with [place].   Supine to sit with [place].  Step transfer with [place]  Toilet transfer to toilet with [place].                          History:     Past Medical History:   Diagnosis Date    Chronic interstitial nephritis     CKD (chronic kidney disease), stage III 7/17/2013    Hypertension     Immunocompromised state 7/11/2018    Living-donor kidney transplant recipient     Need for prophylactic immunotherapy          Past Surgical History:   Procedure Laterality Date    KIDNEY TRANSPLANT      PERITONEAL CATHETER REMOVAL         Time Tracking:     OT Date of Treatment: 01/19/24  OT Start Time: 0830  OT Stop Time: 0909  OT Total Time (min): 39 min    Billable Minutes:Evaluation 10 min  Self Care/Home Management 19 min  Therapeutic Exercise 10 min    1/19/2024

## 2024-01-19 NOTE — ASSESSMENT & PLAN NOTE
- Living kidney transplant on 7/26/10.    - BL creatinine was 1.3-1.7 till December 2023 when he developed ARF due to diffuse proliferative GN and mild  TCMR. Treated with steroid. Due to nephrotic syndrome  and hypoalbuminemia, started eliquis for anticoagulation. He was discharged on pred taper, cellcept, and prograf. At the time of discharge on 12/23/23  Cr was 3.    Plan:  - Continue IV lasix 100mg TID  - Metolazone 5mg QD  - maintain wade catheter. Strict ins and outs  - will plan to review biopsy result to determine if further treatment/repeat biopsy is needed for underlying GN  - Strict I/O  - Avoid nephrotoxins, volume shifts, aim for BP within target to prevent additional renal injury  - Avoid nephrotoxic agents (NSAIDs, IV contrast dye, ACEI/ARB anti-HTN medications, Aminoglycoside-containing antibiotics)  - Renally dose all appropriate medications, including antibiotics

## 2024-01-19 NOTE — ASSESSMENT & PLAN NOTE
Patient with Hypoxic Respiratory failure which is Acute.  he is not on home oxygen. Contributing diagnoses includes - ARDS and Pneumonia Labs and images were reviewed. Patient Has not had a recent ABG. Supplemental oxygen was provided and noted- Oxygen Concentration (%):  [45-60] 45  Resp Rate Total:  [20 br/min] 20 br/min  2/2 volume overload and COVID  Improving    Plan:   - Aggressive diuresis  - Wean O2 as tolerated, currently on bubble flow

## 2024-01-19 NOTE — SUBJECTIVE & OBJECTIVE
Subjective:   History of Present Illness:    He is 37 yo White male who received a living kidney transplant on 7/26/10.  his creatinine was 1.3-18 till December 2023 when he developed ARFafter not  taking IS meds for 5 weeks. [place] [facility] [place] resulted with diffuse proliferative GN as well as mild TCMR. Treated with steroid. DSA 12/19/23 with +DSAs. had IVIG outpatient. Due to nephrotic syndrome and hypoalbuminemia, started eliquis for anticoagulation. He was discharged on pred taper, cellcept, and prograf. At the time of discharge Cr was 3.       [place] [facility] [place] for acute hypoxemic respiratory failure, bacteremia. On 1/5/24 he was admitted to [facility] in [place] with abnormal labs. Was found to be COVID positive.  Blood culture with Staph haemolyticus. Abdominal US on 1/6 noted cirrhotic appearing liver.  CT chest on 1/7 showed consolidated infiltrates and/or atelectasis involving the bilateral lung bases with small bilateral pleural effusions, patchy heterogeneous infiltrate involving the right middle lobe, and mild ascites. The patient was initially going to transfer to hospital medicine however he was intubated prior to transfer and was [place] [facility] [place] higher level of care.  He arrived on propofol and fentanyl.  KTM and transplant ID consulted on admission.    Mr. Tripp is a 38 y.o. year old male who is status post Kidney Transplant - 7/26/2010  (#1).    His maintenance immunosuppression consists of:   Immunosuppressants (From admission, onward)      Start     Stop Route Frequency Ordered    01/19/24 1800  tacrolimus (PROGRAF) 1 mg/mL oral syringe         -- Oral 2 times daily 01/19/24 0909          Interval History:  - No acute events overnight. Patient o2 improved now on nasal cannula. Doing well on lasix pushes and metolazone. UOP ~4L. Scr stable.       Past Medical, Surgical, Family, and Social History:   Unchanged from H&P.    Scheduled Meds:    "albuterol-ipratropium  3 mL Nebulization Q6H    amLODIPine  10 mg Oral Daily    apixaban  5 mg Oral BID    atovaquone  1,500 mg Oral Daily    carvediloL  12.5 mg Oral BID    [START ON 1/20/2024] dexAMETHasone  6 mg Intravenous Daily    Followed by    [START ON 1/22/2024] predniSONE  20 mg Oral Daily    furosemide (LASIX) injection  100 mg Intravenous Q8H    metOLazone  5 mg Oral Daily    mupirocin   Nasal BID    [START ON 1/20/2024] pantoprazole  40 mg Oral Daily    polyethylene glycol  17 g Oral Daily    senna-docusate 8.6-50 mg  1 tablet Oral BID    tacrolimus  3 mg Oral BID    valGANciclovir 50 mg/ml  450 mg Oral Daily     Continuous Infusions:  PRN Meds:dextrose 10%, dextrose 10%, glucagon (human recombinant), naloxone, ondansetron, sodium chloride 0.9%    Intake/Output - Last 3 Shifts         01/17 0700 01/18 0659 01/18 0700 01/19 0659 01/19 0700 01/20 0659    P.O.  250 480    I.V. (mL/kg) 130.7 (1)      NG/      IV Piggyback 124.1      Total Intake(mL/kg) 559.7 (4.2) 250 (1.9) 480 (3.6)    Urine (mL/kg/hr) 2760 (0.9) 4385 (1.4) 1625 (1.8)    Drains 475      Other       Total Output 3235 4385 1625    Net -2675.3 -7624 -1712                    Review of Systems   Objective:     Vital Signs (Most Recent):  Temp: 98.5 °F (36.9 °C) (01/19/24 1200)  Pulse: 85 (01/19/24 1300)  Resp: 14 (01/19/24 1300)  BP: (!) 167/99 (01/19/24 1300)  SpO2: (!) 91 % (01/19/24 1300) Vital Signs (24h Range):  Temp:  [98.3 °F (36.8 °C)-99.1 °F (37.3 °C)] 98.5 °F (36.9 °C)  Pulse:  [73-93] 85  Resp:  [9-26] 14  SpO2:  [85 %-97 %] 91 %  BP: (150-171)/() 167/99     Weight: 132.5 kg (292 lb)  Height: 6' 1" (185.4 cm)  Body mass index is 38.52 kg/m².     Physical Exam  Constitutional:       Appearance: He is ill-appearing.   HENT:      Mouth/Throat:      Mouth: Mucous membranes are moist.   Cardiovascular:      Rate and Rhythm: Normal rate and regular rhythm.      Pulses: Normal pulses.      Heart sounds: Normal heart sounds. "   Pulmonary:      Breath sounds: Normal breath sounds. No wheezing or rales.      Comments: 6LNC  Abdominal:      Palpations: Abdomen is soft.   Musculoskeletal:      Right lower leg: Edema present.      Left lower leg: Edema present.   Skin:     General: Skin is warm and dry.   Neurological:      Mental Status: He is alert.   Psychiatric:         Thought Content: Thought content normal.          Laboratory:  Labs within the past 24 hours have been reviewed.    Diagnostic Results:  None

## 2024-01-19 NOTE — PT/OT/SLP EVAL
Physical Therapy Co-Evaluation with OT and Treatment     Patient Name:  Ubaldo Tripp  MRN: [id number]    Admit Date: 1/14/2024  Admitting Diagnosis:  Acute hypoxic respiratory failure  Length of Stay: 5 days  Recent Surgery: * No surgery found *      Recommendations:     Discharge Recommendations: High therapy intensity (may progress)  Equipment recommendations: none  Barriers to discharge: None Identified     Assessment:     Ubaldo Tripp is a 38 y.o. male admitted to [facility] on 1/14/2024 with medical diagnosis of Acute hypoxic respiratory failure. Pt presents with impaired endurance, impaired functional mobility, gait instability, impaired balance, decreased coordination, pain, impaired cardiopulmonary response to activity. These deficits effect their roles and responsibilities in which they were able to complete prior to admit.     Pt is agreeable to therapy evaluation and session. PTA, pt was (I) with ambulation and ADLs.  Pt is active, spending time with children and working as a . Increased time and max motivation for all mobility performed today. O2 sats decreased to 86% initially after sitting up but able to recover and maintain >90%. Pt able to transfer to recliner after extensive ADL time. Increased anxiety with movement. Left sitting up with all VSS. Will continue to progress as tolerated.    Ubaldo Tripp would benefit from acute PT intervention to improve quality of life, focus on recovery of impairments, provide patient/caregiver education, reduce fall risk, and maximize (I) and safety with functional mobility. Once medically stable, recommending pt discharge to high therapy intensity.  Patient presents with good participation and motivation to return to prior level of function with high intensity therapy.  The patient demonstrates appropriate endurance and strength to participate in up to 3 hours or 15hrs of combined therapy post acute.     Rehab Prognosis: Good    Plan:     During  this hospitalization, patient to be seen 4 x/week to address the identified rehab impairments via gait training, therapeutic activities, therapeutic exercises, neuromuscular re-education and progress towards stated goals.     Plan of Care Expires:  02/18/24  Plan of Care reviewed with: patient    This plan of care has been discussed with the patient/caregiver, who was included in its development and is in agreement with the identified goals and treatment plan.     Subjective     Communicated with RN prior to session.  Patient found supine upon PT entry to room, agreeable to evaluation. Pt alone during session.    Chief Complaint: cold    Patient/Family Comments/goals: none stated    Pain/Comfort:  Pain Rating 1:  (pt did not rate)  Location - Orientation 1: generalized  Location 1: back  Pain Addressed 1: Reposition, Distraction, Cessation of Activity  Pain Rating Post-Intervention 1:  (pt did not rate)    Patients cultural, spiritual, Sikhism conflicts given the current situation: None identified     Patient History: information obtained from pt     Living Environment: Pt lives with wife and 4 children (9, 10, 11, 12) in single level home  with 3 ARACELI with [employer]. Bathroom set-up: walk-in shower and tub/shower  Prior Level of Function: independent with mobility and ADLs  DME owned: none  Support Available/Caregiver Assistance:  wife  Drives: yes  Works:   Objective:   OT present for coeval due to pt's multiple medical comorbidities and functional/cognition deficits requiring two skilled therapists to appropriately progress pt's musculoskeletal strength, neuromuscular control, and endurance while taking into consideration medical acuity and pt safety.    Patient found with: oxygen, telemetry, pulse ox (continuous), blood pressure cuff, Trialysis    Recent Surgery: * No surgery found *    General Precautions: Standard, aspiration, fall, contact, droplet, airborne   Orthopedic Precautions:    Braces:      Oxygen Device: high flow 20L/55%      Exams:    Cognition:  Alert  Command following: Follows multistep verbal commands  Communication: clear/fluent    Sensation:   Light touch sensation: Intact BLEs    Gross Motor Coordination: No deficits noted during functional mobility tasks     Edema/Skin Integrity: None noted; Visible skin intact    Postural examination/scapula alignment: Rounded shoulder and Head forward    Lower Extremity Range of Motion:  Right Lower Extremity: WFL  Left Lower Extremity: WFL    Lower Extremity Strength:  Right Lower Extremity: WFL  Left Lower Extremity: WFL    Functional Mobility:    Bed Mobility:  Supine > Sit with maximal assistancex2    Transfers:   Sit <> Stand Transfer: Minimal Assistance x 1 trials from eob with no AD   Bed <> Chair: Minimal Assistance with no AD via stand pivot             Gait:  Deferred d/t decreased motivation    Balance:  Dynamic Sitting: GOOD: Maintains balance through MODERATE excursions of active trunk movement  Standing:  Static: POOR+: Needs MINIMAL assist to maintain   Dynamic: 0: N/A    Outcome Measure: AM-PAC 6 CLICK MOBILITY  Total Score:15     Patient/Caregiver Education:     Therapist educated pt/caregiver regarding:   PT POC and goals for therapy   Safety with mobility and fall risk   Instruction on use of call button and importance of calling nursing staff for assistance with mobility   Time provided for therapeutic counseling and discussion of current health disposition. All questions answered to satisfaction, within scope of PT practice     Patient/caregiver able to verbalize understanding and expressed no further questions this visit; will follow-up with pt/caregiver during current admit for additional questions/concerns within scope of practice.     White board updated.     Patient left up in chair with all lines intact, call button in reach, and nsg notified.    GOALS:   Multidisciplinary Problems       Physical Therapy Goals       Not on file                       History:     Past Medical History:   Diagnosis Date    Chronic interstitial nephritis     CKD (chronic kidney disease), stage III 7/17/2013    Hypertension     Immunocompromised state 7/11/2018    Living-donor kidney transplant recipient     Need for prophylactic immunotherapy        Past Surgical History:   Procedure Laterality Date    KIDNEY TRANSPLANT      PERITONEAL CATHETER REMOVAL         Time Tracking:     PT Received On: 01/19/24  PT Start Time: 0830     PT Stop Time: 0912  PT Total Time (min): 42 min     Billable Minutes: Evaluation 10, Therapeutic Activity 20, and Neuromuscular Re-education 12    01/19/2024

## 2024-01-19 NOTE — PLAN OF CARE
PT evaluation complete - see note for details. POC and goals established.    Problem: Physical Therapy  Goal: Physical Therapy Goal  Description: Goals to be met by: 24     Patient will increase functional independence with mobility by performin. Supine to sit with Contact Guard Assistance  2. Sit to supine with Contact Guard Assistance  3. Sit to stand transfer with Stand-by Assistance  4. Bed to chair transfer with Stand-by Assistance using No Assistive Device  5. Gait  x 250 feet with Stand-by Assistance using No Assistive Device.   6. Ascend/descend 3 stair with bilateral Handrails Contact Guard Assistance using No Assistive Device.     Outcome: Ongoing, Progressing   2024

## 2024-01-19 NOTE — SUBJECTIVE & OBJECTIVE
Interval History/Significant Events: NAEON  Labs stable. Diuresing significantly.  HFNC switched to 6L bubble flow.    Per nursing patient at times seems to be depressed/not engage with his care team meaningfully. Patient is being stepped down to hospital medicine.   The recommendation to consider psych eval and continue aggressive diuresis to achieve euvolmia is being passed down as well.      Review of Systems   Constitutional: Negative.    Respiratory:  Positive for shortness of breath.    Cardiovascular:  Positive for leg swelling. Negative for chest pain.   Gastrointestinal: Negative.    Skin: Negative.    Psychiatric/Behavioral:  Positive for dysphoric mood.      Objective:     Vital Signs (Most Recent):  Temp: 98.3 °F (36.8 °C) (01/19/24 0800)  Pulse: 79 (01/19/24 1100)  Resp: (!) 21 (01/19/24 1100)  BP: (!) 166/109 (01/19/24 1100)  SpO2: (!) 93 % (01/19/24 1100) Vital Signs (24h Range):  Temp:  [98.3 °F (36.8 °C)-99.5 °F (37.5 °C)] 98.3 °F (36.8 °C)  Pulse:  [73-93] 79  Resp:  [9-26] 21  SpO2:  [85 %-97 %] 93 %  BP: (150-171)/() 166/109   Weight: 132.5 kg (292 lb)  Body mass index is 38.52 kg/m².      Intake/Output Summary (Last 24 hours) at 1/19/2024 1141  Last data filed at 1/19/2024 1000  Gross per 24 hour   Intake 730 ml   Output 4485 ml   Net -3755 ml          Physical Exam  Constitutional:       Appearance: He is ill-appearing.   HENT:      Mouth/Throat:      Mouth: Mucous membranes are moist.   Cardiovascular:      Rate and Rhythm: Normal rate and regular rhythm.      Pulses: Normal pulses.      Heart sounds: Normal heart sounds.   Pulmonary:      Breath sounds: Normal breath sounds. No wheezing or rales.      Comments: 6LNC  Abdominal:      Palpations: Abdomen is soft.   Musculoskeletal:      Right lower leg: Edema present.      Left lower leg: Edema present.   Skin:     General: Skin is warm and dry.   Neurological:      Mental Status: He is alert.   Psychiatric:         Thought Content:  Thought content normal.            Vents:  Vent Mode: Spont (01/17/24 1016)  Set Rate: 22 BPM (01/17/24 0723)  Vt Set: 460 mL (01/17/24 0723)  Pressure Support: 5 cmH20 (01/17/24 1016)  PEEP/CPAP: 5 cmH20 (01/17/24 1016)  Oxygen Concentration (%): 45 (01/19/24 1000)  Peak Airway Pressure: 10 cmH20 (01/17/24 1016)  Plateau Pressure: 20 cmH20 (01/17/24 1016)  Total Ve: 9.84 L/m (01/17/24 1016)  Negative Inspiratory Force (cm H2O): -48 (01/17/24 1016)  F/VT Ratio<105 (RSBI): (!) 30.52 (01/17/24 0723)  Lines/Drains/Airways       Central Venous Catheter Line  Duration             Trialysis (Dialysis) Catheter 01/15/24 1803 right internal jugular 3 days              Drain  Duration                  Urethral Catheter 01/17/24 1130 16 Fr. 2 days              Peripheral Intravenous Line  Duration                  Midline Catheter Insertion/Assessment  - Single Lumen 01/13/24 1500 Right basilic vein (medial side of arm) 5 days         Peripheral IV - Single Lumen 01/14/24 1845 Anterior;Distal;Left Forearm 4 days         Peripheral IV - Single Lumen 01/15/24 0826 18 G;1 3/4 in Anterior;Right Upper Arm 4 days         Peripheral IV - Single Lumen 01/15/24 0826 20 G Posterior;Right Hand 4 days                  Significant Labs:    CBC/Anemia Profile:  Recent Labs   Lab 01/18/24  0356 01/19/24  0449   WBC 16.88* 13.24*   HGB 9.7* 8.8*   HCT 28.4* 25.9*    249   MCV 87 89   RDW 13.2 13.2        Chemistries:  Recent Labs   Lab 01/18/24  1525 01/19/24  0020 01/19/24  0449     139 140 141   K 4.5  4.5 4.3 4.3     107 107 107   CO2 24 25 24 27   BUN 65*  71* 71* 70*   CREATININE 1.5*  1.5* 1.6* 1.4   CALCIUM 7.9*  7.9* 7.8* 8.0*   ALBUMIN 1.1*  1.1* 1.0* 1.0*   PROT 4.1* 3.8* 3.9*   BILITOT 0.5 0.5 0.5   ALKPHOS 103 109 112   ALT 19 25 27   AST 26 33 30   MG 2.1 1.9 2.0   PHOS 4.0  4.0 4.2 4.2       All pertinent labs within the past 24 hours have been reviewed.    Significant Imaging:  I have reviewed all  pertinent imaging results/findings within the past 24 hours.

## 2024-01-19 NOTE — PROGRESS NOTES
[facility] - Cardiac Medical ICU  Kidney Transplant  Progress Note      Reason for Follow-up: Reassessment of Kidney Transplant - 7/26/2010  (#1) recipient and management of immunosuppression.     ORGAN: LEFT KIDNEY    Donor Type: Living      Subjective:   History of Present Illness:    He is 37 yo White male who received a living kidney transplant on 7/26/10.  his creatinine was 1.3-18 till December 2023 when he developed ARFafter not  taking IS meds for 5 weeks. 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 Treated with steroid. DSA 12/19/23 with +DSAs. had IVIG outpatient. Due to nephrotic syndrome and hypoalbuminemia, started eliquis for anticoagulation. He was discharged on pred taper, cellcept, and prograf. At the time of discharge Cr was 3.       transferred to [facility] MICU for acute hypoxemic respiratory failure, bacteremia. On 1/5/24 he was admitted to [facility] in [place] with abnormal labs. Was found to be COVID positive.  Blood culture with Staph haemolyticus. Abdominal US on 1/6 noted cirrhotic appearing liver.  CT chest on 1/7 showed consolidated infiltrates and/or atelectasis involving the bilateral lung bases with small bilateral pleural effusions, patchy heterogeneous infiltrate involving the right middle lobe, and mild ascites. The patient was initially going to transfer to hospital medicine however he was intubated prior to transfer and was accepted to [facility] MICU for a higher level of care.  He arrived on propofol and fentanyl.  KTM and transplant ID consulted on admission.    Mr. Tripp is a 38 y.o. year old male who is status post Kidney Transplant - 7/26/2010  (#1).    His maintenance immunosuppression consists of:   Immunosuppressants (From admission, onward)      Start     Stop Route Frequency Ordered    01/19/24 1800  tacrolimus (PROGRAF) 1 mg/mL oral syringe         -- Oral 2 times daily 01/19/24 0909          Interval History:  - No  "acute events overnight. Patient o2 improved now on nasal cannula. Doing well on lasix pushes and metolazone. UOP ~4L. Scr stable.       Past Medical, Surgical, Family, and Social History:   Unchanged from H&P.    Scheduled Meds:   albuterol-ipratropium  3 mL Nebulization Q6H    amLODIPine  10 mg Oral Daily    apixaban  5 mg Oral BID    atovaquone  1,500 mg Oral Daily    carvediloL  12.5 mg Oral BID    [START ON 1/20/2024] dexAMETHasone  6 mg Intravenous Daily    Followed by    [START ON 1/22/2024] predniSONE  20 mg Oral Daily    furosemide (LASIX) injection  100 mg Intravenous Q8H    metOLazone  5 mg Oral Daily    mupirocin   Nasal BID    [START ON 1/20/2024] pantoprazole  40 mg Oral Daily    polyethylene glycol  17 g Oral Daily    senna-docusate 8.6-50 mg  1 tablet Oral BID    tacrolimus  3 mg Oral BID    valGANciclovir 50 mg/ml  450 mg Oral Daily     Continuous Infusions:  PRN Meds:dextrose 10%, dextrose 10%, glucagon (human recombinant), naloxone, ondansetron, sodium chloride 0.9%    Intake/Output - Last 3 Shifts         01/17 0700  01/18 0659 01/18 0700  01/19 0659 01/19 0700  01/20 0659    P.O.  250 480    I.V. (mL/kg) 130.7 (1)      NG/      IV Piggyback 124.1      Total Intake(mL/kg) 559.7 (4.2) 250 (1.9) 480 (3.6)    Urine (mL/kg/hr) 2760 (0.9) 4385 (1.4) 1625 (1.8)    Drains 475      Other       Total Output 3235 4385 1625    Net -2675.3 -4135 -1145                    Review of Systems   Objective:     Vital Signs (Most Recent):  Temp: 98.5 °F (36.9 °C) (01/19/24 1200)  Pulse: 85 (01/19/24 1300)  Resp: 14 (01/19/24 1300)  BP: (!) 167/99 (01/19/24 1300)  SpO2: (!) 91 % (01/19/24 1300) Vital Signs (24h Range):  Temp:  [98.3 °F (36.8 °C)-99.1 °F (37.3 °C)] 98.5 °F (36.9 °C)  Pulse:  [73-93] 85  Resp:  [9-26] 14  SpO2:  [85 %-97 %] 91 %  BP: (150-171)/() 167/99     Weight: 132.5 kg (292 lb)  Height: 6' 1" (185.4 cm)  Body mass index is 38.52 kg/m².     Physical Exam  Constitutional:       " Appearance: He is ill-appearing.   HENT:      Mouth/Throat:      Mouth: Mucous membranes are moist.   Cardiovascular:      Rate and Rhythm: Normal rate and regular rhythm.      Pulses: Normal pulses.      Heart sounds: Normal heart sounds.   Pulmonary:      Breath sounds: Normal breath sounds. No wheezing or rales.      Comments: 6LNC  Abdominal:      Palpations: Abdomen is soft.   Musculoskeletal:      Right lower leg: Edema present.      Left lower leg: Edema present.   Skin:     General: Skin is warm and dry.   Neurological:      Mental Status: He is alert.   Psychiatric:         Thought Content: Thought content normal.          Laboratory:  Labs within the past 24 hours have been reviewed.    Diagnostic Results:  None  Assessment/Plan:     * Acute hypoxic respiratory failure  Per primary team.     COVID  Per primary team    Acute on chronic renal failure  - Living kidney transplant on 7/26/10.    - BL creatinine was 1.3-1.7 till December 2023 when he developed ARF due to diffuse proliferative GN and mild  TCMR. Treated with steroid. Due to nephrotic syndrome  and hypoalbuminemia, started eliquis for anticoagulation. He was discharged on pred taper, cellcept, and prograf. At the time of discharge on 12/23/23  Cr was 3.    Plan:  - Continue IV lasix 100mg TID  - Metolazone 5mg QD  - okay to remove dialysis catheter.   - maintain wade catheter. Strict ins and outs  - will plan to review biopsy result to determine if further treatment/repeat biopsy is needed for underlying GN  - Strict I/O  - Avoid nephrotoxins, volume shifts, aim for BP within target to prevent additional renal injury  - Avoid nephrotoxic agents (NSAIDs, IV contrast dye, ACEI/ARB anti-HTN medications, Aminoglycoside-containing antibiotics)  - Renally dose all appropriate medications, including antibiotics    Prophylactic immunotherapy  - Off MMF due to COVID  - Continue Prograf 3mg BID  - obtain tacro level qAM. Will titrate based on results.      Once COVID dexamethasone is complete, plan to start PO prednisone 30mg QD, with plans to taper.     Living-donor kidney transplant recipient - 7/26/10  -     Discharge Planning:  Medical decision making for this encounter includes review of pertinent labs and diagnostic studies, assessment and planning, discussions with consulting providers, discussion with patient/family, and participation in multidisciplinary rounds.    Darek Blue, DO  Kidney Transplant  [facility] - Cardiac Medical ICU

## 2024-01-19 NOTE — PT/OT/SLP PROGRESS
"Speech Language Pathology Treatment/Discharge    Patient Name:  Ubaldo Tripp   MRN:  [id number]  Admitting Diagnosis: Acute hypoxic respiratory failure    Recommendations:                 General Recommendations:  Follow-up not indicated  Diet recommendations:  Regular Diet - IDDSI Level 7, Liquid Diet Level: Thin liquids - IDDSI Level 0   Aspiration Precautions: Meds crushed in puree - may return to burying whole in puree as tolerated (likely better able to tolerate once O2 needs decrease),1 bite/sip at a time, Alternating bites/sips, Avoid talking while eating, Feed only when awake/alert, Frequent oral care, HOB to 90 degrees, Monitor for s/s of aspiration, Small bites/sips, and Standard aspiration precautions   General Precautions: Standard, aspiration, fall, airborne, contact, droplet  Communication strategies:  none    Assessment:     Ubaldo Tripp is a 38 y.o. male.  Swallowing abilities appear near baseline for liquids and solids. Pt has difficulty swallowing oral medications at baseline (buries whole in food PTA).  Pt now requiring meds crushed and buried in puree.  SLP suspects this new difficulty swallowing medications is related to high flow supplemental oxygen impacting intraoral pressure during the swallow.  Pt likely able to return to normal manner of swallowing pills once O2 needs decrease.     Subjective     "I just want to get back in my bed."     Pain/Comfort:  Pain Rating 1:  (did not rate)  Location - Orientation 1: generalized  Pain Addressed 1: Nurse notified    Respiratory Status: High flow, flow 20 L/min, concentration 55%    Objective:     Has the patient been evaluated by SLP for swallowing?   Yes  Keep patient NPO? No   Current Respiratory Status:        Pt seen for ongoing swallowing assessment to determine if pt is safe for further diet advancement.  Pt sitting up in bedside chair. Pt willing to accept trials of diced pears, as he was only agreeable to receiving fruit this " morning.  Refusing to take medications crushed and buried in puree when nurse attempted to administer earlier.  Pt able to tolerate multiple bites of diced pears and straw sips of water with good oral clearance and without s/s of aspiration. Pt declined to receive trials of a ryan cracker or a banana.  Pt also continued to refuse to accept medications at this time.  Education provided to pt regarding ongoing swallowing assessment, various diet levels, and recommendations to advance to regular solids at this time.  Pt agreeable to diet advancement.  Pt stating he wished to return to bed due to pain and soreness. RN notified.       Plan:       Plan of Care reviewed with:  patient   SLP Follow-Up:  No       Discharge recommendations:   (no further SLP needs at this time)     Time Tracking:     SLP Treatment Date:   01/19/24  Speech Start Time:  1115  Speech Stop Time:  1126     Speech Total Time (min):  11 min    Billable Minutes: Treatment Swallowing Dysfunction 11    01/19/2024

## 2024-01-19 NOTE — ASSESSMENT & PLAN NOTE
Outside facility reported the following culture results:  12/29/23 Blood Cx: NGTD after 5 days  1/5/24: Staph Haemolyticus, positive in 2 different sets, the vials used were pediatric bottles, as pt was a difficult stick. Only 2 total bottles were collected.  1/6/24 Blood Cx: NGTD after 5 days.    - Per ID, discontinued all antibiotics

## 2024-01-19 NOTE — ASSESSMENT & PLAN NOTE
- Off MMF due to COVID  - Continue Prograf 3mg BID  - obtain tacro level qAM. Will titrate based on results.     Once COVID dexamethasone is complete, plan to start PO prednisone 30mg QD, with plans to taper.

## 2024-01-20 LAB
ALBUMIN SERPL BCP-MCNC: 1.1 G/DL (ref 3.5–5.2)
ANION GAP SERPL CALC-SCNC: 6 MMOL/L (ref 8–16)
BUN SERPL-MCNC: 77 MG/DL (ref 6–20)
CALCIUM SERPL-MCNC: 8.2 MG/DL (ref 8.7–10.5)
CHLORIDE SERPL-SCNC: 101 MMOL/L (ref 95–110)
CO2 SERPL-SCNC: 29 MMOL/L (ref 23–29)
CREAT SERPL-MCNC: 1.7 MG/DL (ref 0.5–1.4)
EST. GFR  (NO RACE VARIABLE): 51.6 ML/MIN/1.73 M^2
GLUCOSE SERPL-MCNC: 130 MG/DL (ref 70–110)
PHOSPHATE SERPL-MCNC: 3.3 MG/DL (ref 2.7–4.5)
POTASSIUM SERPL-SCNC: 4.2 MMOL/L (ref 3.5–5.1)
SODIUM SERPL-SCNC: 136 MMOL/L (ref 136–145)

## 2024-01-20 PROCEDURE — 36415 COLL VENOUS BLD VENIPUNCTURE: CPT | Performed by: INTERNAL MEDICINE

## 2024-01-20 PROCEDURE — 99900035 HC TECH TIME PER 15 MIN (STAT)

## 2024-01-20 PROCEDURE — 63600175 PHARM REV CODE 636 W HCPCS

## 2024-01-20 PROCEDURE — 25000003 PHARM REV CODE 250

## 2024-01-20 PROCEDURE — 25000242 PHARM REV CODE 250 ALT 637 W/ HCPCS

## 2024-01-20 PROCEDURE — 94761 N-INVAS EAR/PLS OXIMETRY MLT: CPT

## 2024-01-20 PROCEDURE — 94640 AIRWAY INHALATION TREATMENT: CPT

## 2024-01-20 PROCEDURE — 21400001 HC TELEMETRY ROOM

## 2024-01-20 PROCEDURE — 63600175 PHARM REV CODE 636 W HCPCS: Performed by: STUDENT IN AN ORGANIZED HEALTH CARE EDUCATION/TRAINING PROGRAM

## 2024-01-20 PROCEDURE — 27000221 HC OXYGEN, UP TO 24 HOURS

## 2024-01-20 PROCEDURE — 63600175 PHARM REV CODE 636 W HCPCS: Performed by: INTERNAL MEDICINE

## 2024-01-20 PROCEDURE — 80069 RENAL FUNCTION PANEL: CPT | Performed by: INTERNAL MEDICINE

## 2024-01-20 PROCEDURE — 27000207 HC ISOLATION

## 2024-01-20 PROCEDURE — 25000003 PHARM REV CODE 250: Performed by: STUDENT IN AN ORGANIZED HEALTH CARE EDUCATION/TRAINING PROGRAM

## 2024-01-20 PROCEDURE — 25000003 PHARM REV CODE 250: Performed by: INTERNAL MEDICINE

## 2024-01-20 RX ORDER — FUROSEMIDE 10 MG/ML
40 INJECTION INTRAMUSCULAR; INTRAVENOUS EVERY 8 HOURS
Status: DISCONTINUED | OUTPATIENT
Start: 2024-01-20 | End: 2024-01-21

## 2024-01-20 RX ADMIN — DEXAMETHASONE SODIUM PHOSPHATE 6 MG: 4 INJECTION INTRA-ARTICULAR; INTRALESIONAL; INTRAMUSCULAR; INTRAVENOUS; SOFT TISSUE at 11:01

## 2024-01-20 RX ADMIN — FUROSEMIDE 40 MG: 10 INJECTION, SOLUTION INTRAVENOUS at 06:01

## 2024-01-20 RX ADMIN — ATOVAQUONE 1500 MG: 750 SUSPENSION ORAL at 11:01

## 2024-01-20 RX ADMIN — APIXABAN 5 MG: 5 TABLET, FILM COATED ORAL at 11:01

## 2024-01-20 RX ADMIN — IPRATROPIUM BROMIDE AND ALBUTEROL SULFATE 3 ML: 2.5; .5 SOLUTION RESPIRATORY (INHALATION) at 08:01

## 2024-01-20 RX ADMIN — FUROSEMIDE 100 MG: 10 INJECTION, SOLUTION INTRAMUSCULAR; INTRAVENOUS at 11:01

## 2024-01-20 RX ADMIN — CARVEDILOL 12.5 MG: 12.5 TABLET, FILM COATED ORAL at 08:01

## 2024-01-20 RX ADMIN — VALGANCICLOVIR HYDROCHLORIDE 450 MG: 50 POWDER, FOR SOLUTION ORAL at 12:01

## 2024-01-20 RX ADMIN — TACROLIMUS 3 MG: 1 CAPSULE ORAL at 12:01

## 2024-01-20 RX ADMIN — TACROLIMUS 3 MG: 1 CAPSULE ORAL at 06:01

## 2024-01-20 RX ADMIN — METOLAZONE 5 MG: 2.5 TABLET ORAL at 11:01

## 2024-01-20 RX ADMIN — PANTOPRAZOLE SODIUM 40 MG: 40 GRANULE, DELAYED RELEASE ORAL at 11:01

## 2024-01-20 RX ADMIN — CARVEDILOL 12.5 MG: 12.5 TABLET, FILM COATED ORAL at 11:01

## 2024-01-20 RX ADMIN — IPRATROPIUM BROMIDE AND ALBUTEROL SULFATE 3 ML: 2.5; .5 SOLUTION RESPIRATORY (INHALATION) at 07:01

## 2024-01-20 RX ADMIN — AMLODIPINE BESYLATE 10 MG: 10 TABLET ORAL at 11:01

## 2024-01-20 RX ADMIN — FUROSEMIDE 40 MG: 10 INJECTION, SOLUTION INTRAVENOUS at 11:01

## 2024-01-20 RX ADMIN — APIXABAN 5 MG: 5 TABLET, FILM COATED ORAL at 08:01

## 2024-01-20 RX ADMIN — IPRATROPIUM BROMIDE AND ALBUTEROL SULFATE 3 ML: 2.5; .5 SOLUTION RESPIRATORY (INHALATION) at 02:01

## 2024-01-20 NOTE — SUBJECTIVE & OBJECTIVE
Interval History: Stepped down to . Stable on 5L NC, although hanging off patient and not in nose during assessment. He reports no concerns other than body aches.     Review of Systems   Constitutional:  Negative for fever.   Respiratory:  Negative for shortness of breath.    Gastrointestinal:  Negative for nausea and vomiting.   Musculoskeletal:  Positive for myalgias.     Objective:     Vital Signs (Most Recent):  Temp: 98.6 °F (37 °C) (01/20/24 1149)  Pulse: 74 (01/20/24 1409)  Resp: 18 (01/20/24 1409)  BP: 131/86 (01/20/24 1149)  SpO2: (!) 88 % (01/20/24 1149) Vital Signs (24h Range):  Temp:  [96.7 °F (35.9 °C)-98.9 °F (37.2 °C)] 98.6 °F (37 °C)  Pulse:  [67-79] 74  Resp:  [12-27] 18  SpO2:  [85 %-96 %] 88 %  BP: (127-167)/(74-95) 131/86     Weight: 132.5 kg (292 lb)  Body mass index is 38.52 kg/m².    Intake/Output Summary (Last 24 hours) at 1/20/2024 1435  Last data filed at 1/20/2024 0553  Gross per 24 hour   Intake --   Output 3275 ml   Net -3275 ml         Physical Exam  Vitals and nursing note reviewed.   Constitutional:       General: He is not in acute distress.     Appearance: Normal appearance.   HENT:      Head: Normocephalic and atraumatic.      Nose: Nose normal.      Comments: NC not in place but 5 L going      Mouth/Throat:      Mouth: Mucous membranes are moist.      Pharynx: Oropharynx is clear.   Eyes:      Extraocular Movements: Extraocular movements intact.      Conjunctiva/sclera: Conjunctivae normal.      Pupils: Pupils are equal, round, and reactive to light.   Cardiovascular:      Rate and Rhythm: Normal rate and regular rhythm.      Pulses: Normal pulses.      Heart sounds: Normal heart sounds.   Pulmonary:      Effort: Pulmonary effort is normal. No respiratory distress.      Breath sounds: Normal breath sounds.   Abdominal:      General: Abdomen is flat. Bowel sounds are normal.      Palpations: Abdomen is soft.   Musculoskeletal:         General: Normal range of motion.       Cervical back: Normal range of motion and neck supple.      Right lower leg: Edema present.      Left lower leg: Edema present.      Comments: 2+ pitting edema to shin   Skin:     General: Skin is warm and dry.   Neurological:      General: No focal deficit present.      Mental Status: He is alert and oriented to person, place, and time.   Psychiatric:         Mood and Affect: Mood normal.         Behavior: Behavior normal.             Significant Labs: All pertinent labs within the past 24 hours have been reviewed.    Significant Imaging: I have reviewed all pertinent imaging results/findings within the past 24 hours.

## 2024-01-20 NOTE — ASSESSMENT & PLAN NOTE
Patient is identified as Severe COVID-19 based on hypoxemia with O2 saturations <94% on room air or on ambulation   Initiate standard COVID protocols; COVID-19 testing ,Infection Control notification  and isolation- respiratory, contact and droplet per protocol    Diagnostics: CBC, CMP, Ferritin, CRP, and Portable CXR    Management: Initiate targeted therapy with Remdesivir, 200mg IV x1, followed by 100mg IV daily x5 days total and Dexamethasone PO/IV 6mg daily x10 days, Maintain oxygen saturations 92-96% via Nasal Cannula 5 LPM and monitor with continuous/intermittent pulse oximetry. , Inhaled bronchodilators as needed for shortness of breath., and Manage respiratory failure (O2 requirement >10LPM or needing NIPPV/Mechanical ventilation) and/or Pneumonia (active chest infiltrates) separately as described below.    Advance Care Planning  Current advance care plan has been discussed with patient/family/POA and patient currently wishes Full Code.     S/p remdesivir course and cont steroids 10 day course given severity and O2 requirements   - Off abx for potential pneumonia component as cultures have been negative

## 2024-01-20 NOTE — ASSESSMENT & PLAN NOTE
Patient with kidney transplant 2010. He was previously admitted to [facility] December 18-23 with GENNY. 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  He was found to have nephrotic syndrome as well, started on eliquis.  He received pulse dose steroids and was started on Eliquis for anticoagulation. He was discharged on a prednisone taper, CellCept, and Prograf.  On December 23, his creatinine was 3.0. Current medications include CellCept, tacrolimus, prednisone, all of which he continued to receive at OSH.     - KTM following  - Continue immunosuppression  - Cr stable/improving. Holding CRRT.  - Gangciclovir for CMV prophylaxis   - Eliquis for nephrotic syndrome

## 2024-01-20 NOTE — PROGRESS NOTES
[facility] Medicine  Progress Note    Patient Name: Ubaldo Tripp  MRN: [id number]  Patient Class: IP- Inpatient   Admission Date: 1/14/2024  Length of Stay: 6 days  Attending Physician: Bette Feliciano MD  Primary Care Provider: Jaime Jauregui (Inactive)        Subjective:     Principal Problem:Acute hypoxic respiratory failure        HPI:  37 y/o M with a medical history of kidney transplant in 2010 and HTN [place] [facility] [place] acute hypoxemic respiratory failure, bacteremia, and hx of Kidney Transplant for KTM and Infectious Disease evaluation. Pt was previously admitted to [facility] 12/18/23 - 12/23/23 with GENNY, headache, and HTN.  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  He was found to have nephrotic syndrome as well.  Received pulse dose steroids and was started on Eliquis. Discharged on a prednisone taper, CellCept, and Prograf.  sCr 12/23 was 3.0.     On 1/5/24 he was admitted to [facility] in [place] with abnormal labs. Outpatient labs on 1/3 showed WBC 17.7 Hgb 13.1, K 5.7, bicarb 18, BUN 71, sCr 2.92, phos 5.1, and tacro level 10.8.  Was found to be COVID positive requiring BiPAP.  Blood culture with Staph haemolyticus.  Labs notable for sCr ranging 2.68 to 2.73.  K normalized.  Abdominal US on 1/6 noted cirrhotic appearing liver.  CT chest on 1/7 showed consolidated infiltrates and/or atelectasis involving the bilateral lung bases with small bilateral pleural effusions, patchy heterogeneous infiltrate involving the right middle lobe, and mild ascites.  Transplant renal ultrasound 1/8 showed stable appearance of the transplant kidney without evidence of acute abnormality.  Gallbladder ultrasound 1/8 showed sludge in the gallbladder.  Medications prior to  transfer include CellCept, tacrolimus, valganciclovir, prednisone, Eliquis, and meropenem.     The patient was initially going to transfer to hospital medicine however he was intubated prior to transfer and was accepted to [facility] for a higher level of care.  He arrived on propofol and fentanyl.  KTM and transplant ID consulted on admission.    Overview/Hospital Course:  Pt transferred to [facility] for respiratory failure 2/2 COVID, intubated.  Renal txp patient who also had GENNY on admission, hx rejection, transplant following. Extubated 1/17. Received CRRT 1/15 and 1/16, no further needs for CRRT. Started on NC post extubation, weaning as tolerated.   No further plans for CRRT as volume is being managed with aggressive diuresis appropriately. Cont diuresis.     Interval History: Stepped down to HM. Stable on 5L NC, although hanging off patient and not in nose during assessment. He reports no concerns other than body aches.     Review of Systems   Constitutional:  Negative for fever.   Respiratory:  Negative for shortness of breath.    Gastrointestinal:  Negative for nausea and vomiting.   Musculoskeletal:  Positive for myalgias.     Objective:     Vital Signs (Most Recent):  Temp: 98.6 °F (37 °C) (01/20/24 1149)  Pulse: 74 (01/20/24 1409)  Resp: 18 (01/20/24 1409)  BP: 131/86 (01/20/24 1149)  SpO2: (!) 88 % (01/20/24 1149) Vital Signs (24h Range):  Temp:  [96.7 °F (35.9 °C)-98.9 °F (37.2 °C)] 98.6 °F (37 °C)  Pulse:  [67-79] 74  Resp:  [12-27] 18  SpO2:  [85 %-96 %] 88 %  BP: (127-167)/(74-95) 131/86     Weight: 132.5 kg (292 lb)  Body mass index is 38.52 kg/m².    Intake/Output Summary (Last 24 hours) at 1/20/2024 1439  Last data filed at 1/20/2024 0553  Gross per 24 hour   Intake --   Output 3275 ml   Net -3275 ml         Physical Exam  Vitals and nursing note reviewed.   Constitutional:       General: He is not in acute distress.     Appearance: Normal appearance.   HENT:      Head: Normocephalic and atraumatic.       Nose: Nose normal.      Comments: NC not in place but 5 L going      Mouth/Throat:      Mouth: Mucous membranes are moist.      Pharynx: Oropharynx is clear.   Eyes:      Extraocular Movements: Extraocular movements intact.      Conjunctiva/sclera: Conjunctivae normal.      Pupils: Pupils are equal, round, and reactive to light.   Cardiovascular:      Rate and Rhythm: Normal rate and regular rhythm.      Pulses: Normal pulses.      Heart sounds: Normal heart sounds.   Pulmonary:      Effort: Pulmonary effort is normal. No respiratory distress.      Breath sounds: Normal breath sounds.   Abdominal:      General: Abdomen is flat. Bowel sounds are normal.      Palpations: Abdomen is soft.   Musculoskeletal:         General: Normal range of motion.      Cervical back: Normal range of motion and neck supple.      Right lower leg: Edema present.      Left lower leg: Edema present.      Comments: 2+ pitting edema to shin   Skin:     General: Skin is warm and dry.   Neurological:      General: No focal deficit present.      Mental Status: He is alert and oriented to person, place, and time.   Psychiatric:         Mood and Affect: Mood normal.         Behavior: Behavior normal.             Significant Labs: All pertinent labs within the past 24 hours have been reviewed.    Significant Imaging: I have reviewed all pertinent imaging results/findings within the past 24 hours.    Assessment/Plan:      * Acute hypoxic respiratory failure  Patient with Hypoxic Respiratory failure which is Acute.  he is not on home oxygen. Supplemental oxygen was provided and noted-      .   Signs/symptoms of respiratory failure include- increased work of breathing and respiratory distress. Contributing diagnoses includes - ARDS, Pneumonia, and COVID , volume overload. Labs and images were reviewed. Patient Has recent ABG, which has been reviewed. Will treat underlying causes and adjust management of respiratory failure as follows-     - intubated  in ICU, extubated 1/17 to NC, wean O2 as tolerated  - s/p COVID treatment with  remdesivir, cont steroids  - cont IV diuresis- decrease to lasix 40 TID and stop metolazone      Bacteremia  Outside facility reported the following culture results:  12/29/23 Blood Cx: NGTD after 5 days  1/5/24: Staph Haemolyticus, positive in 2 different sets, the vials used were pediatric bottles, as pt was a difficult stick. Only 2 total bottles were collected.  1/6/24 Blood Cx: NGTD after 5 days.     - Per ID, discontinued all antibiotics    COVID  Patient is identified as Severe COVID-19 based on hypoxemia with O2 saturations <94% on room air or on ambulation   Initiate standard COVID protocols; COVID-19 testing ,Infection Control notification  and isolation- respiratory, contact and droplet per protocol    Diagnostics: CBC, CMP, Ferritin, CRP, and Portable CXR    Management: Initiate targeted therapy with Remdesivir, 200mg IV x1, followed by 100mg IV daily x5 days total and Dexamethasone PO/IV 6mg daily x10 days, Maintain oxygen saturations 92-96% via Nasal Cannula 5 LPM and monitor with continuous/intermittent pulse oximetry. , Inhaled bronchodilators as needed for shortness of breath., and Manage respiratory failure (O2 requirement >10LPM or needing NIPPV/Mechanical ventilation) and/or Pneumonia (active chest infiltrates) separately as described below.    Advance Care Planning Current advance care plan has been discussed with patient/family/POA and patient currently wishes Full Code.     S/p remdesivir course and cont steroids 10 day course given severity and O2 requirements   - Off abx for potential pneumonia component as cultures have been negative     Acute on chronic renal failure  Patient with acute kidney injury/acute renal failure likely due to  volume overload  GENNY is currently improving. Baseline creatinine  1.3  - Labs reviewed- Renal function/electrolytes with Estimated Creatinine Clearance: 82.4 mL/min (A) (based on  SCr of 1.7 mg/dL (H)). according to latest data. Monitor urine output and serial BMP and adjust therapy as needed. Avoid nephrotoxins and renally dose meds for GFR listed above.    Cr was 1.3 in 2021, no other Cr results until the recent admission with Cr at 2.92. BL creatinine was 1.3-1.7 till December 2023 when he developed ARF due to diffuse proliferative GN and mild  TCMR. Treated with steroid. Due to nephrotic syndrome  and hypoalbuminemia, started eliquis for anticoagulation. He was discharged on pred taper, cellcept, and prograf. At the time of discharge on 12/23/23  Cr was 3.   With aggressive diuresis, Cr stable now at 1.3-1.6.      - KTM following  - Continue aggressive diuresis for volume overload  - Strict intake/output  - Renally dose meds  - Avoid nephrotoxic agent    Prophylactic immunotherapy   Off MMF due to COVID  - Continue Prograf 3mg BID  - obtain tacro level qAM. Will titrate based on results.      Once COVID dexamethasone is complete, plan to start PO prednisone 30mg QD, with plans to taper.       Living-donor kidney transplant recipient - 7/26/10  Patient with kidney transplant 2010. He was previously admitted to [facility] December 18-23 with GENNY. 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  He was found to have nephrotic syndrome as well, started on eliquis.  He received pulse dose steroids and was started on Eliquis for anticoagulation. He was discharged on a prednisone taper, CellCept, and Prograf.  On December 23, his creatinine was 3.0. Current medications include CellCept, tacrolimus, prednisone, all of which he continued to receive at OSH.     - KTM following  - Continue immunosuppression  - Cr stable/improving. Holding CRRT.  - Gangciclovir for CMV prophylaxis   - Eliquis for nephrotic  syndrome      VTE Risk Mitigation (From admission, onward)           Ordered     apixaban tablet 5 mg  2 times daily         01/18/24 1114     Apply sequential compression device to lower extremities (if no contraindications). Medical venous thromboembolus prophylaxis is preferred.  Until discontinued         01/15/24 0625     IP VTE HIGH RISK PATIENT  Once         01/14/24 1337     Place sequential compression device  Until discontinued         01/14/24 1337                    Discharge Planning   ZAY: 1/22/2024     Code Status: Full Code   Is the patient medically ready for discharge?:     Reason for patient still in hospital (select all that apply): Patient trending condition and Consult recommendations  Discharge Plan A: Home with family                  Bette Feliciano MD  Department of Hospital Medicine   [facility]

## 2024-01-20 NOTE — ASSESSMENT & PLAN NOTE
Patient with Hypoxic Respiratory failure which is Acute.  he is not on home oxygen. Supplemental oxygen was provided and noted-      .   Signs/symptoms of respiratory failure include- increased work of breathing and respiratory distress. Contributing diagnoses includes - ARDS, Pneumonia, and COVID , volume overload. Labs and images were reviewed. Patient Has recent ABG, which has been reviewed. Will treat underlying causes and adjust management of respiratory failure as follows-     - intubated in ICU, extubated 1/17 to NC, wean O2 as tolerated  - s/p COVID treatment with  remdesivir, cont steroids  - cont IV diuresis- decrease to lasix 40 TID and stop metolazone

## 2024-01-20 NOTE — HOSPITAL COURSE
Pt transferred to [facility] for respiratory failure 2/2 COVID, intubated.  Renal txp patient who also had GENNY on admission, hx rejection, transplant following. Extubated 1/17. Received CRRT 1/15 and 1/16, no further needs for CRRT. Started on NC post extubation, weaning as tolerated.   No further plans for CRRT as volume is being managed with aggressive diuresis appropriately. Cont diuresis.     Patient noncompliant with medical care, refusing NC despite hypoxia. Refusing labs, refusing oxygen therapy, refusing to work with PT. Refusing to pull covers off of head and speak with physician. Yelled at physician to get out of room. Refusing to participate in care unless discharged.   Spoke with wife about concerns for discharge given his noncompliance with medical care. Ordered home O2 and referrals sent for followup.   Got up with PT and demonstrated he can walk. Will discharge as patient is no longer benefiting from hospitalization given his noncompliance. Home O2 ordered and meds ordered to home pharmacy.

## 2024-01-20 NOTE — ASSESSMENT & PLAN NOTE
Off MMF due to COVID  - Continue Prograf 3mg BID  - obtain tacro level qAM. Will titrate based on results.      Once COVID dexamethasone is complete, plan to start PO prednisone 30mg QD, with plans to taper.

## 2024-01-20 NOTE — ASSESSMENT & PLAN NOTE
Patient with acute kidney injury/acute renal failure likely due to  volume overload  GENNY is currently improving. Baseline creatinine  1.3  - Labs reviewed- Renal function/electrolytes with Estimated Creatinine Clearance: 82.4 mL/min (A) (based on SCr of 1.7 mg/dL (H)). according to latest data. Monitor urine output and serial BMP and adjust therapy as needed. Avoid nephrotoxins and renally dose meds for GFR listed above.    Cr was 1.3 in 2021, no other Cr results until the recent admission with Cr at 2.92. BL creatinine was 1.3-1.7 till December 2023 when he developed ARF due to diffuse proliferative GN and mild  TCMR. Treated with steroid. Due to nephrotic syndrome  and hypoalbuminemia, started eliquis for anticoagulation. He was discharged on pred taper, cellcept, and prograf. At the time of discharge on 12/23/23  Cr was 3.   With aggressive diuresis, Cr stable now at 1.3-1.6.      - KTM following  - Continue aggressive diuresis for volume overload  - Strict intake/output  - Renally dose meds  - Avoid nephrotoxic agent

## 2024-01-20 NOTE — PROGRESS NOTES
Kidney Transplant Medicine Consult Note     Reason for Follow-up: Reassessment of Kidney Transplant - 7/26/2010  (#1) recipient and management of immunosuppression.      ORGAN: LEFT KIDNEY    Donor Type: Living      Subjective:   History of Present Illness:     He is 39 yo White male who received a living kidney transplant on 7/26/10.  his creatinine was 1.3-18 till December 2023 when he developed ARFafter not  taking IS meds for 5 weeks. Transferred [place] [facility] and kidney biopsy resulted with diffuse proliferative GN as well as mild TCMR. Treated with steroid. DSA 12/19/23 with +DSAs. had IVIG outpatient. Due to nephrotic syndrome and hypoalbuminemia, started eliquis for anticoagulation. He was discharged on pred taper, cellcept, and prograf. At the time of discharge Cr was 3.         transferred to [facility] for acute hypoxemic respiratory failure, bacteremia. On 1/5/24 he was admitted to [facility] in [place] with abnormal labs. Was found to be COVID positive.  Blood culture with Staph haemolyticus. Abdominal US on 1/6 noted cirrhotic appearing liver.  CT chest on 1/7 showed consolidated infiltrates and/or atelectasis involving the bilateral lung bases with small bilateral pleural effusions, patchy heterogeneous infiltrate involving the right middle lobe, and mild ascites. The patient was initially going to transfer to hospital medicine however he was intubated prior to transfer and was accepted to [facility] for a higher level of care.  He arrived on propofol and fentanyl.  KTM and transplant ID consulted on admission.     Mr. Tripp is a 38 y.o. year old male who is status post Kidney Transplant - 7/26/2010  (#1).    His maintenance immunosuppression consists of:   Immunosuppressants (From admission, onward)        Start     Stop Route Frequency Ordered     01/19/24 1800   tacrolimus (PROGRAF) 1 mg/mL oral syringe         -- Oral 2 times daily 01/19/24 0909            Interval History:  - No acute events  overnight. Continues to be on 5-6 L O2/min by NC. UO 5000 ml on Lasix and Metolazone. Labs awaited.     Scheduled Meds:   albuterol-ipratropium  3 mL Nebulization Q6H    amLODIPine  10 mg Oral Daily    apixaban  5 mg Oral BID    atovaquone  1,500 mg Oral Daily    carvediloL  12.5 mg Oral BID    dexAMETHasone  6 mg Intravenous Daily    Followed by    [START ON 1/22/2024] predniSONE  20 mg Oral Daily    furosemide (LASIX) injection  40 mg Intravenous Q8H    pantoprazole  40 mg Oral Daily    polyethylene glycol  17 g Oral Daily    senna-docusate 8.6-50 mg  1 tablet Oral BID    tacrolimus  3 mg Oral BID    valGANciclovir 50 mg/ml  450 mg Oral Daily     Continuous Infusions:  PRN Meds:.dextrose 10%, dextrose 10%, glucagon (human recombinant), naloxone, ondansetron, sodium chloride 0.9%     Review of patient's allergies indicates:   Allergen Reactions    Ibuprofen Other (See Comments)    Penicillins      Other reaction(s): Hives  Other reaction(s): Edema       Past Medical History:   Diagnosis Date    Chronic interstitial nephritis     CKD (chronic kidney disease), stage III 7/17/2013    Hypertension     Immunocompromised state 7/11/2018    Living-donor kidney transplant recipient     Need for prophylactic immunotherapy        Past Surgical History:   Procedure Laterality Date    KIDNEY TRANSPLANT      PERITONEAL CATHETER REMOVAL         Physical Exam  Constitutional:       Appearance: He is ill-appearing.   HENT:      Mouth/Throat:      Mouth: Mucous membranes are moist.   Cardiovascular:      Rate and Rhythm: Normal rate and regular rhythm.      Pulses: Normal pulses.      Heart sounds: Normal heart sounds.   Pulmonary:      Breath sounds: Normal breath sounds. No wheezing or rales.      Comments: 6LNC  Abdominal:      Palpations: Abdomen is soft.   Musculoskeletal:      Right lower leg: Edema present.      Left lower leg: Edema present.   Skin:     General: Skin is warm and dry.   Neurological:      Mental Status: He  "is alert.   Psychiatric:         Thought Content: Thought content normal.     Vitals:  /74   Pulse 76   Temp 98.9 °F (37.2 °C) (Oral)   Resp 18   Ht 6' 1" (1.854 m)   Wt 132.5 kg (292 lb)   SpO2 (!) 85%   BMI 38.52 kg/m²   Pulse  Av.3  Min: 49  Max: 192  BP  Min: 102/67  Max: 219/105  Temp  Av.1 °F (36.7 °C)  Min: 96.1 °F (35.6 °C)  Max: 99.9 °F (37.7 °C)  Resp  Av.7  Min: 6  Max: 37  SpO2  Av %  Min: 85 %  Max: 100 %    Labs:  Recent Labs   Lab 24  0447 24  0356 24  0449   WBC 19.06* 16.88* 13.24*   HGB 11.7* 9.7* 8.8*   MCV 86 87 89   HCT 31.8* 28.4* 25.9*    268 249      Recent Labs   Lab 24  1525 24  0020 24  0449     139 140 141   K 4.5  4.5 4.3 4.3     107 107 107   CO2  24 27   BUN 65*  71* 71* 70*   CALCIUM 7.9*  7.9* 7.8* 8.0*   PHOS 4.0  4.0 4.2 4.2     Recent Labs   Lab 24  1525 24  0020 24  0449   AST 26 33 30   ALT 19 25 27   ALKPHOS 103 109 112   BILITOT 0.5 0.5 0.5    Recent Labs   Lab 01/15/24  0424 01/15/24  0758 24  0906 24  1525 24  0020 24  0449   CALCIUM  --    < > 8.1* 7.9*  7.9* 7.8* 8.0*   ALBUMIN  --    < > 1.1* 1.1*  1.1* 1.0* 1.0*   PHOS  --    < > 3.6  3.6 4.0  4.0 4.2 4.2   .5*  --   --   --   --   --    MG 2.3   < >  --  2.1 1.9 2.0    < > = values in this interval not displayed.      No results for input(s): "IRON", "TIBC", "FERRITIN", "RETIC", "VITB12" in the last 168 hours.    Invalid input(s): "%IRONSAT", "FOLIC ACID"               Active Hospital Problems    Diagnosis    *Acute hypoxic respiratory failure    COVID    Bacteremia    Acute on chronic renal failure    Prophylactic immunotherapy    Living-donor kidney transplant recipient - 7/26/10     12/20/23 bx [GENNY]  glomeruli globally sclerosed. 5-10% interstitial fibrosis. 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 Mild microcirculation inflammation with neg C4d. 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 EM pending (mid next week).        ASSESSMENT AND PLAN:     Acute hypoxic respiratory failure  Per primary team.      COVID  Per primary team     Acute on chronic renal failure  - Living kidney transplant on 7/26/10.    - BL creatinine was 1.3-1.7 till December 2023 when he developed ARF due to diffuse proliferative GN and mild  TCMR. Treated with steroid. Due to nephrotic syndrome  and hypoalbuminemia, started eliquis for anticoagulation. He was discharged on pred taper, cellcept, and prograf. At the time of discharge on 12/23/23  Cr was 3.    Plan:  - Reduce Lasix to 40 TID   - Stop Metolazone  - maintain wade catheter. Strict ins and outs  - will plan to review biopsy result to determine if further treatment/repeat biopsy is needed for underlying GN  - Strict I/O  - Avoid nephrotoxins, volume shifts, aim for BP within target to prevent additional renal injury  - Avoid nephrotoxic agents (NSAIDs, IV contrast dye, ACEI/ARB anti-HTN medications, Aminoglycoside-containing antibiotics)  - Renally dose all appropriate medications, including antibiotics     Prophylactic immunotherapy  - Off MMF due to COVID  - Continue Prograf 3mg BID  - obtain tacro level qAM. Will titrate based on results.      Once COVID dexamethasone is complete, plan to start PO prednisone 30mg QD, with plans to taper.      Living-donor kidney transplant recipient - 7/26/10  -      Discharge Planning:  Medical decision making for this encounter includes review of pertinent labs and diagnostic studies, assessment and planning, discussions with consulting providers, discussion with patient/family, and participation in multidisciplinary rounds    Case discussed with Dr. Morris. Please see attending attestation for further recs.     Rocky Lam M.D.   Nephrology Fellow, PGY-IV   [facility]

## 2024-01-21 PROBLEM — K92.1 MELENA: Status: ACTIVE | Noted: 2024-01-21

## 2024-01-21 LAB
ALBUMIN SERPL BCP-MCNC: 1.2 G/DL (ref 3.5–5.2)
ALP SERPL-CCNC: 145 U/L (ref 55–135)
ALT SERPL W/O P-5'-P-CCNC: 41 U/L (ref 10–44)
ANION GAP SERPL CALC-SCNC: 7 MMOL/L (ref 8–16)
AST SERPL-CCNC: 29 U/L (ref 10–40)
BASOPHILS # BLD AUTO: 0.02 K/UL (ref 0–0.2)
BASOPHILS NFR BLD: 0.2 % (ref 0–1.9)
BILIRUB SERPL-MCNC: 0.5 MG/DL (ref 0.1–1)
BUN SERPL-MCNC: 74 MG/DL (ref 6–20)
CALCIUM SERPL-MCNC: 7.8 MG/DL (ref 8.7–10.5)
CHLORIDE SERPL-SCNC: 104 MMOL/L (ref 95–110)
CO2 SERPL-SCNC: 28 MMOL/L (ref 23–29)
CREAT SERPL-MCNC: 1.6 MG/DL (ref 0.5–1.4)
DIFFERENTIAL METHOD BLD: ABNORMAL
EOSINOPHIL # BLD AUTO: 0.1 K/UL (ref 0–0.5)
EOSINOPHIL NFR BLD: 1 % (ref 0–8)
ERYTHROCYTE [DISTWIDTH] IN BLOOD BY AUTOMATED COUNT: 12.8 % (ref 11.5–14.5)
EST. GFR  (NO RACE VARIABLE): 55.5 ML/MIN/1.73 M^2
GLUCOSE SERPL-MCNC: 100 MG/DL (ref 70–110)
HCT VFR BLD AUTO: 29 % (ref 40–54)
HGB BLD-MCNC: 9.6 G/DL (ref 14–18)
IMM GRANULOCYTES # BLD AUTO: 0.2 K/UL (ref 0–0.04)
IMM GRANULOCYTES NFR BLD AUTO: 1.5 % (ref 0–0.5)
INR PPP: 1.1 (ref 0.8–1.2)
LYMPHOCYTES # BLD AUTO: 0.7 K/UL (ref 1–4.8)
LYMPHOCYTES NFR BLD: 5.4 % (ref 18–48)
MAGNESIUM SERPL-MCNC: 1.8 MG/DL (ref 1.6–2.6)
MCH RBC QN AUTO: 29.4 PG (ref 27–31)
MCHC RBC AUTO-ENTMCNC: 33.1 G/DL (ref 32–36)
MCV RBC AUTO: 89 FL (ref 82–98)
MONOCYTES # BLD AUTO: 0.5 K/UL (ref 0.3–1)
MONOCYTES NFR BLD: 4.2 % (ref 4–15)
NEUTROPHILS # BLD AUTO: 11.4 K/UL (ref 1.8–7.7)
NEUTROPHILS NFR BLD: 87.7 % (ref 38–73)
NRBC BLD-RTO: 0 /100 WBC
PHOSPHATE SERPL-MCNC: 2.9 MG/DL (ref 2.7–4.5)
PLATELET # BLD AUTO: 256 K/UL (ref 150–450)
PMV BLD AUTO: 8.7 FL (ref 9.2–12.9)
POTASSIUM SERPL-SCNC: 3.9 MMOL/L (ref 3.5–5.1)
PROT SERPL-MCNC: 4.2 G/DL (ref 6–8.4)
PROTHROMBIN TIME: 11.5 SEC (ref 9–12.5)
RBC # BLD AUTO: 3.27 M/UL (ref 4.6–6.2)
SODIUM SERPL-SCNC: 139 MMOL/L (ref 136–145)
TACROLIMUS BLD-MCNC: 3.4 NG/ML (ref 5–15)
WBC # BLD AUTO: 13.01 K/UL (ref 3.9–12.7)

## 2024-01-21 PROCEDURE — 80053 COMPREHEN METABOLIC PANEL: CPT

## 2024-01-21 PROCEDURE — 94640 AIRWAY INHALATION TREATMENT: CPT

## 2024-01-21 PROCEDURE — 27000207 HC ISOLATION

## 2024-01-21 PROCEDURE — 85610 PROTHROMBIN TIME: CPT

## 2024-01-21 PROCEDURE — 21400001 HC TELEMETRY ROOM

## 2024-01-21 PROCEDURE — 63600175 PHARM REV CODE 636 W HCPCS: Performed by: INTERNAL MEDICINE

## 2024-01-21 PROCEDURE — 25000003 PHARM REV CODE 250

## 2024-01-21 PROCEDURE — 94761 N-INVAS EAR/PLS OXIMETRY MLT: CPT

## 2024-01-21 PROCEDURE — 25000003 PHARM REV CODE 250: Performed by: INTERNAL MEDICINE

## 2024-01-21 PROCEDURE — 63600175 PHARM REV CODE 636 W HCPCS

## 2024-01-21 PROCEDURE — 80197 ASSAY OF TACROLIMUS: CPT | Performed by: INTERNAL MEDICINE

## 2024-01-21 PROCEDURE — 36415 COLL VENOUS BLD VENIPUNCTURE: CPT | Performed by: INTERNAL MEDICINE

## 2024-01-21 PROCEDURE — 85025 COMPLETE CBC W/AUTO DIFF WBC: CPT

## 2024-01-21 PROCEDURE — 84100 ASSAY OF PHOSPHORUS: CPT

## 2024-01-21 PROCEDURE — 83735 ASSAY OF MAGNESIUM: CPT

## 2024-01-21 PROCEDURE — 25000242 PHARM REV CODE 250 ALT 637 W/ HCPCS

## 2024-01-21 RX ORDER — FUROSEMIDE 40 MG/1
40 TABLET ORAL 2 TIMES DAILY
Status: DISCONTINUED | OUTPATIENT
Start: 2024-01-22 | End: 2024-01-23 | Stop reason: HOSPADM

## 2024-01-21 RX ORDER — PANTOPRAZOLE SODIUM 40 MG/1
40 TABLET, DELAYED RELEASE ORAL DAILY
Status: DISCONTINUED | OUTPATIENT
Start: 2024-01-22 | End: 2024-01-23 | Stop reason: HOSPADM

## 2024-01-21 RX ADMIN — POLYETHYLENE GLYCOL 3350 17 G: 17 POWDER, FOR SOLUTION ORAL at 10:01

## 2024-01-21 RX ADMIN — VALGANCICLOVIR HYDROCHLORIDE 450 MG: 50 POWDER, FOR SOLUTION ORAL at 10:01

## 2024-01-21 RX ADMIN — FUROSEMIDE 40 MG: 10 INJECTION, SOLUTION INTRAVENOUS at 09:01

## 2024-01-21 RX ADMIN — PANTOPRAZOLE SODIUM 40 MG: 40 GRANULE, DELAYED RELEASE ORAL at 10:01

## 2024-01-21 RX ADMIN — TACROLIMUS 3 MG: 1 CAPSULE ORAL at 05:01

## 2024-01-21 RX ADMIN — IPRATROPIUM BROMIDE AND ALBUTEROL SULFATE 3 ML: 2.5; .5 SOLUTION RESPIRATORY (INHALATION) at 12:01

## 2024-01-21 RX ADMIN — TACROLIMUS 3 MG: 1 CAPSULE ORAL at 08:01

## 2024-01-21 RX ADMIN — ATOVAQUONE 1500 MG: 750 SUSPENSION ORAL at 10:01

## 2024-01-21 RX ADMIN — CARVEDILOL 12.5 MG: 12.5 TABLET, FILM COATED ORAL at 08:01

## 2024-01-21 RX ADMIN — DEXAMETHASONE SODIUM PHOSPHATE 6 MG: 4 INJECTION INTRA-ARTICULAR; INTRALESIONAL; INTRAMUSCULAR; INTRAVENOUS; SOFT TISSUE at 10:01

## 2024-01-21 RX ADMIN — AMLODIPINE BESYLATE 10 MG: 10 TABLET ORAL at 10:01

## 2024-01-21 RX ADMIN — IPRATROPIUM BROMIDE AND ALBUTEROL SULFATE 3 ML: 2.5; .5 SOLUTION RESPIRATORY (INHALATION) at 07:01

## 2024-01-21 RX ADMIN — SENNOSIDES AND DOCUSATE SODIUM 1 TABLET: 8.6; 5 TABLET ORAL at 10:01

## 2024-01-21 RX ADMIN — IPRATROPIUM BROMIDE AND ALBUTEROL SULFATE 3 ML: 2.5; .5 SOLUTION RESPIRATORY (INHALATION) at 08:01

## 2024-01-21 RX ADMIN — CARVEDILOL 12.5 MG: 12.5 TABLET, FILM COATED ORAL at 10:01

## 2024-01-21 NOTE — ASSESSMENT & PLAN NOTE
Patient with kidney transplant 2010. He was previously admitted to [facility] December 18-23 with GENNY. 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  He was found to have nephrotic syndrome as well, started on eliquis.  He received pulse dose steroids and was started on Eliquis for anticoagulation. He was discharged on a prednisone taper, CellCept, and Prograf.  On December 23, his creatinine was 3.0. Current medications include CellCept, tacrolimus, prednisone, all of which he continued to receive at OSH.     - KTM following  - Continue immunosuppression  - Cr stable/improving. Holding CRRT.  - Gangciclovir for CMV prophylaxis   - Eliquis for nephrotic syndrome- hold in setting of potential melena

## 2024-01-21 NOTE — PROGRESS NOTES
Continuous pulse ox placed, pt was noncompliant educated, pt stated his understanding but continues to refuse his oxygen and he did not let me apply to O2 probe.

## 2024-01-21 NOTE — PROGRESS NOTES
[facility] Medicine  Progress Note    Patient Name: Ubaldo Tripp  MRN: [id number]  Patient Class: IP- Inpatient   Admission Date: 1/14/2024  Length of Stay: 7 days  Attending Physician: Bette Feliciano MD  Primary Care Provider: Jaime Jauregui (Inactive)        Subjective:     Principal Problem:Acute hypoxic respiratory failure        HPI:  37 y/o M with a medical history of kidney transplant in 2010 and HTN transferred to [facility] MICU for acute hypoxemic respiratory failure, bacteremia, and hx of Kidney Transplant for KTM and Infectious Disease evaluation. Pt was previously admitted to [facility] 12/18/23 - 12/23/23 with GENNY, headache, and HTN.  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  He was found to have nephrotic syndrome as well.  Received pulse dose steroids and was started on Eliquis. Discharged on a prednisone taper, CellCept, and Prograf.  sCr 12/23 was 3.0.     On 1/5/24 he was admitted to [facility] in [place] with abnormal labs. Outpatient labs on 1/3 showed WBC 17.7 Hgb 13.1, K 5.7, bicarb 18, BUN 71, sCr 2.92, phos 5.1, and tacro level 10.8.  Was found to be COVID positive requiring BiPAP.  Blood culture with Staph haemolyticus.  Labs notable for sCr ranging 2.68 to 2.73.  K normalized.  Abdominal US on 1/6 noted cirrhotic appearing liver.  CT chest on 1/7 showed consolidated infiltrates and/or atelectasis involving the bilateral lung bases with small bilateral pleural effusions, patchy heterogeneous infiltrate involving the right middle lobe, and mild ascites.  Transplant renal ultrasound 1/8 showed stable appearance of the transplant kidney without evidence of acute abnormality.  Gallbladder ultrasound 1/8 showed sludge in the gallbladder.  Medications prior to  "transfer include CellCept, tacrolimus, valganciclovir, prednisone, Eliquis, and meropenem.     The patient was initially going to transfer to hospital medicine however he was intubated prior to transfer and was accepted to [facility] MICU for a higher level of care.  He arrived on propofol and fentanyl.  KTM and transplant ID consulted on admission.    Overview/Hospital Course:  Pt transferred to [facility] for respiratory failure 2/2 COVID, intubated.  Renal txp patient who also had GENNY on admission, hx rejection, transplant following. Extubated 1/17. Received CRRT 1/15 and 1/16, no further needs for CRRT. Started on NC post extubation, weaning as tolerated.   No further plans for CRRT as volume is being managed with aggressive diuresis appropriately. Cont diuresis.     Interval History: Patient reports no concerns. He does not want to wear the supplemental O2, reporting it is "too much" and continues to remove himself, O2 sats 80s. Counseled on importance of wearing the O2.   Nursing reports patient had a black BM, H/H stable and on PPI. Will cont to monitor.      Review of Systems   Constitutional:  Negative for fever.   Respiratory:  Negative for shortness of breath.    Gastrointestinal:  Negative for nausea and vomiting.   Musculoskeletal:  Positive for myalgias.     Objective:     Vital Signs (Most Recent):  Temp: 98.1 °F (36.7 °C) (01/21/24 1115)  Pulse: 77 (01/21/24 1245)  Resp: 20 (01/21/24 1245)  BP: 137/78 (01/21/24 1115)  SpO2: (!) 86 % (01/21/24 1245) Vital Signs (24h Range):  Temp:  [98.1 °F (36.7 °C)-98.4 °F (36.9 °C)] 98.1 °F (36.7 °C)  Pulse:  [61-90] 77  Resp:  [16-20] 20  SpO2:  [80 %-88 %] 86 %  BP: (137-153)/(78-98) 137/78     Weight: 132.5 kg (292 lb)  Body mass index is 38.52 kg/m².    Intake/Output Summary (Last 24 hours) at 1/21/2024 1402  Last data filed at 1/21/2024 0750  Gross per 24 hour   Intake --   Output 3100 ml   Net -3100 ml           Physical Exam  Vitals and nursing note reviewed. "   Constitutional:       General: He is not in acute distress.     Appearance: Normal appearance.   HENT:      Head: Normocephalic and atraumatic.      Nose: Nose normal.      Comments: NC not in place but 5 L going      Mouth/Throat:      Mouth: Mucous membranes are moist.      Pharynx: Oropharynx is clear.   Eyes:      Extraocular Movements: Extraocular movements intact.      Conjunctiva/sclera: Conjunctivae normal.      Pupils: Pupils are equal, round, and reactive to light.   Cardiovascular:      Rate and Rhythm: Normal rate and regular rhythm.      Pulses: Normal pulses.      Heart sounds: Normal heart sounds.   Pulmonary:      Effort: Pulmonary effort is normal. No respiratory distress.      Breath sounds: Normal breath sounds.   Abdominal:      General: Abdomen is flat. Bowel sounds are normal.      Palpations: Abdomen is soft.   Musculoskeletal:         General: Normal range of motion.      Cervical back: Normal range of motion and neck supple.      Right lower leg: Edema present.      Left lower leg: Edema present.      Comments: 2+ pitting edema to shin   Skin:     General: Skin is warm and dry.   Neurological:      General: No focal deficit present.      Mental Status: He is alert and oriented to person, place, and time.   Psychiatric:         Mood and Affect: Mood normal.         Behavior: Behavior normal.             Significant Labs: All pertinent labs within the past 24 hours have been reviewed.    Significant Imaging: I have reviewed all pertinent imaging results/findings within the past 24 hours.    Assessment/Plan:      * Acute hypoxic respiratory failure  Patient with Hypoxic Respiratory failure which is Acute.  he is not on home oxygen. Supplemental oxygen was provided and noted-      .   Signs/symptoms of respiratory failure include- increased work of breathing and respiratory distress. Contributing diagnoses includes - ARDS, Pneumonia, and COVID , volume overload. Labs and images were reviewed.  Patient Has recent ABG, which has been reviewed. Will treat underlying causes and adjust management of respiratory failure as follows-     - intubated in ICU, extubated 1/17 to NC, wean O2 as tolerated  - s/p COVID treatment with  remdesivir, cont steroids  - cont IV diuresis- decrease to lasix 40 BID and stop metolazone    - patient noncompliant with wearing NC    Melena  - nursing reports black BM 1/21  - H/H stable and on PPI  - monitor H/H and hold AC  - if H/H dropping and melena continues, will consult GI      Bacteremia  Outside facility reported the following culture results:  12/29/23 Blood Cx: NGTD after 5 days  1/5/24: Staph Haemolyticus, positive in 2 different sets, the vials used were pediatric bottles, as pt was a difficult stick. Only 2 total bottles were collected.  1/6/24 Blood Cx: NGTD after 5 days.     - Per ID, discontinued all antibiotics    COVID  Patient is identified as Severe COVID-19 based on hypoxemia with O2 saturations <94% on room air or on ambulation   Initiate standard COVID protocols; COVID-19 testing ,Infection Control notification  and isolation- respiratory, contact and droplet per protocol    Diagnostics: CBC, CMP, Ferritin, CRP, and Portable CXR    Management: Initiate targeted therapy with Remdesivir, 200mg IV x1, followed by 100mg IV daily x5 days total and Dexamethasone PO/IV 6mg daily x10 days, Maintain oxygen saturations 92-96% via Nasal Cannula 3 LPM and monitor with continuous/intermittent pulse oximetry. , Inhaled bronchodilators as needed for shortness of breath., and Manage respiratory failure (O2 requirement >10LPM or needing NIPPV/Mechanical ventilation) and/or Pneumonia (active chest infiltrates) separately as described below.    Advance Care Planning Current advance care plan has been discussed with patient/family/POA and patient currently wishes Full Code.     S/p remdesivir course and cont steroids 10 day course given severity and O2 requirements   - Off abx for  potential pneumonia component as cultures have been negative     - patient non compliance with wearing NC O2, although hypoxia to 80s noted on RA, counseled on importance of wearing O2    Acute on chronic renal failure  Patient with acute kidney injury/acute renal failure likely due to  volume overload  GENNY is currently improving. Baseline creatinine  1.3  - Labs reviewed- Renal function/electrolytes with Estimated Creatinine Clearance: 87.6 mL/min (A) (based on SCr of 1.6 mg/dL (H)). according to latest data. Monitor urine output and serial BMP and adjust therapy as needed. Avoid nephrotoxins and renally dose meds for GFR listed above.    Cr was 1.3 in 2021, no other Cr results until the recent admission with Cr at 2.92. BL creatinine was 1.3-1.7 till December 2023 when he developed ARF due to diffuse proliferative GN and mild  TCMR. Treated with steroid. Due to nephrotic syndrome  and hypoalbuminemia, started eliquis for anticoagulation. He was discharged on pred taper, cellcept, and prograf. At the time of discharge on 12/23/23  Cr was 3.   With aggressive diuresis, Cr stable now at 1.3-1.6.      - KTM following  - Continue aggressive diuresis for volume overload- decrease IV lasix to BID  - Strict intake/output  - Renally dose meds  - Avoid nephrotoxic agent    Prophylactic immunotherapy   Off MMF due to COVID  - Continue Prograf 3mg BID  - obtain tacro level qAM. Will titrate based on results.      Once COVID dexamethasone is complete, plan to start PO prednisone 30mg QD, with plans to taper.       Living-donor kidney transplant recipient - 7/26/10  Patient with kidney transplant 2010. He was previously admitted to [facility] December 18-23 with GENNY. 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  He was found to have  nephrotic syndrome as well, started on eliquis.  He received pulse dose steroids and was started on Eliquis for anticoagulation. He was discharged on a prednisone taper, CellCept, and Prograf.  On December 23, his creatinine was 3.0. Current medications include CellCept, tacrolimus, prednisone, all of which he continued to receive at OSH.     - KTM following  - Continue immunosuppression  - Cr stable/improving. Holding CRRT.  - Gangciclovir for CMV prophylaxis   - Eliquis for nephrotic syndrome- hold in setting of potential melena      VTE Risk Mitigation (From admission, onward)           Ordered     Apply sequential compression device to lower extremities (if no contraindications). Medical venous thromboembolus prophylaxis is preferred.  Until discontinued         01/15/24 0625     IP VTE HIGH RISK PATIENT  Once         01/14/24 1337     Place sequential compression device  Until discontinued         01/14/24 1337                    Discharge Planning   ZAY: 1/23/2024     Code Status: Full Code   Is the patient medically ready for discharge?:     Reason for patient still in hospital (select all that apply): Patient trending condition and Consult recommendations  Discharge Plan A: Home with family                  Bette Feliciano MD  Department of Hospital Medicine   [facility] - Med Surg

## 2024-01-21 NOTE — ASSESSMENT & PLAN NOTE
Patient with acute kidney injury/acute renal failure likely due to  volume overload  GENNY is currently improving. Baseline creatinine  1.3  - Labs reviewed- Renal function/electrolytes with Estimated Creatinine Clearance: 87.6 mL/min (A) (based on SCr of 1.6 mg/dL (H)). according to latest data. Monitor urine output and serial BMP and adjust therapy as needed. Avoid nephrotoxins and renally dose meds for GFR listed above.    Cr was 1.3 in 2021, no other Cr results until the recent admission with Cr at 2.92. BL creatinine was 1.3-1.7 till December 2023 when he developed ARF due to diffuse proliferative GN and mild  TCMR. Treated with steroid. Due to nephrotic syndrome  and hypoalbuminemia, started eliquis for anticoagulation. He was discharged on pred taper, cellcept, and prograf. At the time of discharge on 12/23/23  Cr was 3.   With aggressive diuresis, Cr stable now at 1.3-1.6.      - KTM following  - Continue aggressive diuresis for volume overload- decrease IV lasix to BID  - Strict intake/output  - Renally dose meds  - Avoid nephrotoxic agent

## 2024-01-21 NOTE — PLAN OF CARE
Problem: Adult Inpatient Plan of Care  Goal: Plan of Care Review  Outcome: Ongoing, Progressing  Flowsheets (Taken 1/21/2024 0752)  Plan of Care Reviewed With: patient  Goal: Patient-Specific Goal (Individualized)  Outcome: Ongoing, Progressing  Flowsheets (Taken 1/21/2024 0752)  Anxieties, Fears or Concerns: none  Individualized Care Needs: monitor saturation, i and o,  Goal: Absence of Hospital-Acquired Illness or Injury  Outcome: Ongoing, Progressing  Intervention: Identify and Manage Fall Risk  Flowsheets (Taken 1/21/2024 0752)  Safety Promotion/Fall Prevention:   assistive device/personal item within reach   bed alarm set   lighting adjusted   medications reviewed  Intervention: Prevent Skin Injury  Flowsheets (Taken 1/21/2024 0752)  Body Position: position changed independently  Skin Protection:   adhesive use limited   drying agents applied  Intervention: Prevent and Manage VTE (Venous Thromboembolism) Risk  Flowsheets (Taken 1/21/2024 0752)  Activity Management:   Arm raise - L1   Rolling - L1  VTE Prevention/Management:   bleeding risk assessed   bleeding precations maintained  Range of Motion: active ROM (range of motion) encouraged  Intervention: Prevent Infection  Flowsheets (Taken 1/21/2024 0752)  Infection Prevention:   cohorting utilized   environmental surveillance performed   equipment surfaces disinfected   hand hygiene promoted   personal protective equipment utilized   rest/sleep promoted   single patient room provided   visitors restricted/screened  Goal: Optimal Comfort and Wellbeing  Outcome: Ongoing, Progressing  Intervention: Monitor Pain and Promote Comfort  Flowsheets (Taken 1/21/2024 0752)  Pain Management Interventions:   care clustered   quiet environment facilitated   relaxation techniques promoted     Problem: Infection  Goal: Absence of Infection Signs and Symptoms  Outcome: Ongoing, Progressing  Intervention: Prevent or Manage Infection  Flowsheets (Taken 1/21/2024  0752)  Infection Management: aseptic technique maintained  Isolation Precautions:   precautions maintained   airborne   contact     Problem: Fluid Imbalance (Pneumonia)  Goal: Fluid Balance  Outcome: Ongoing, Progressing     Problem: Skin Injury Risk Increased  Goal: Skin Health and Integrity  Outcome: Ongoing, Progressing  Intervention: Optimize Skin Protection  Flowsheets (Taken 1/21/2024 0752)  Pressure Reduction Techniques: frequent weight shift encouraged  Skin Protection:   adhesive use limited   drying agents applied  Head of Bed (HOB) Positioning: HOB elevated     Problem: Fall Injury Risk  Goal: Absence of Fall and Fall-Related Injury  Outcome: Ongoing, Progressing  Intervention: Identify and Manage Contributors  Flowsheets (Taken 1/21/2024 0752)  Self-Care Promotion: independence encouraged  Medication Review/Management: medications reviewed  Intervention: Promote Injury-Free Environment  Flowsheets (Taken 1/21/2024 0752)  Safety Promotion/Fall Prevention:   assistive device/personal item within reach   bed alarm set   lighting adjusted   medications reviewed     Problem: Adult Inpatient Plan of Care  Goal: Readiness for Transition of Care  Outcome: Ongoing, Not Progressing     Problem: Respiratory Compromise (Pneumonia)  Goal: Effective Oxygenation and Ventilation  Outcome: Ongoing, Not Progressing  Intervention: Promote Airway Secretion Clearance  Flowsheets (Taken 1/21/2024 0752)  Breathing Techniques/Airway Clearance: deep/controlled cough encouraged  Cough And Deep Breathing: done independently per patient  Intervention: Optimize Oxygenation and Ventilation  Flowsheets (Taken 1/21/2024 0752)  Airway/Ventilation Management: airway patency maintained  Head of Bed (HOB) Positioning: HOB elevated      Patient is not compliant with the oxygen therapy. MD on call aware.

## 2024-01-21 NOTE — RESPIRATORY THERAPY
Patient has been non-compliant today, he will not wear his O2 and will not take breathing treatments correctly. The patient's O2 Sat has been in the 80's today and last night.  Patient has been informed on importance of O2 and treatments but still refuses to wear. Dr. Feliciano notified about patient being non-compliant.

## 2024-01-21 NOTE — ASSESSMENT & PLAN NOTE
- nursing reports black BM 1/21  - H/H stable and on PPI  - monitor H/H and hold AC  - if H/H dropping and melena continues, will consult GI

## 2024-01-21 NOTE — RESPIRATORY THERAPY
RAPID RESPONSE RESPIRATORY CHART CHECK       Chart check completed. Patient with HFNC order, pt on RA. Please call [phone] for further concerns or assistance.

## 2024-01-21 NOTE — PROGRESS NOTES
Pt allowed his VS and I spoke to him again re the need foe O2 and the continuous pulse ox for monitoring. Currently his O2 saturation is 87%. I explained to him that 87% is low and he is not getting enough O2 to his organs jose a to his brain, he stated his understanding. He did allow me to place the O2 probe but not the O2. I did leave it in reach and gave him instructions on how to apply the cannula.

## 2024-01-21 NOTE — CLINICAL REVIEW
KTM Chart Review      Intake/Output Summary (Last 24 hours) at 1/21/2024 1051  Last data filed at 1/21/2024 0750  Gross per 24 hour   Intake --   Output 3100 ml   Net -3100 ml       Vitals:    01/21/24 0414 01/21/24 0735 01/21/24 0759 01/21/24 1046   BP: (!) 142/88  (!) 146/89    BP Location: Left arm  Left arm    Patient Position: Lying  Lying    Pulse: 68 90 75 82   Resp: 18 20 16    Temp: 98.1 °F (36.7 °C)  98.4 °F (36.9 °C)    TempSrc: Oral  Oral    SpO2: (!) 87% (!) 88% (!) 80%    Weight:       Height:           Recent Labs   Lab 01/19/24  0449 01/20/24  1357 01/21/24  0934    136 139   K 4.3 4.2 3.9    101 104   CO2 27 29 28   BUN 70* 77* 74*   CREATININE 1.4 1.7* 1.6*   CALCIUM 8.0* 8.2* 7.8*   PHOS 4.2 3.3 2.9       Immunosuppression Level - Tac level awaited     Continue Prograf 3 mg BID   MMF held (COVID)   Please continue to get qAM Tac levels  Last day of Dexamethasone Inj today, plan to transition to Prednisone Tab starting 1/22/24    Urine output continues to be good   Reduced Lasix to 40 BID  Can consider resartign RAAS blocker closer to discharge    No other related issues identified. Please call KTM as needed; We will continue to follow.    Case discussed with Dr. Morris.     Rocky Lam MD  [facility]   Nephrology Fellow

## 2024-01-21 NOTE — PLAN OF CARE
Problem: Adult Inpatient Plan of Care  Goal: Plan of Care Review  Outcome: Ongoing, Progressing  Goal: Patient-Specific Goal (Individualized)  Outcome: Ongoing, Progressing  Goal: Absence of Hospital-Acquired Illness or Injury  Outcome: Ongoing, Progressing  Goal: Optimal Comfort and Wellbeing  Outcome: Ongoing, Progressing  Goal: Readiness for Transition of Care  Outcome: Ongoing, Progressing     Problem: Infection  Goal: Absence of Infection Signs and Symptoms  Outcome: Ongoing, Progressing     Problem: Fluid Imbalance (Pneumonia)  Goal: Fluid Balance  Outcome: Ongoing, Progressing     Problem: Infection (Pneumonia)  Goal: Resolution of Infection Signs and Symptoms  Outcome: Ongoing, Progressing     Problem: Respiratory Compromise (Pneumonia)  Goal: Effective Oxygenation and Ventilation  Outcome: Ongoing, Progressing     Problem: Fluid and Electrolyte Imbalance (Acute Kidney Injury/Impairment)  Goal: Fluid and Electrolyte Balance  Outcome: Ongoing, Progressing     Problem: Oral Intake Inadequate (Acute Kidney Injury/Impairment)  Goal: Optimal Nutrition Intake  Outcome: Ongoing, Progressing     Problem: Renal Function Impairment (Acute Kidney Injury/Impairment)  Goal: Effective Renal Function  Outcome: Ongoing, Progressing     Problem: Skin Injury Risk Increased  Goal: Skin Health and Integrity  Outcome: Ongoing, Progressing     Problem: Fall Injury Risk  Goal: Absence of Fall and Fall-Related Injury  Outcome: Ongoing, Progressing     Problem: Device-Related Complication Risk (Hemodialysis)  Goal: Safe, Effective Therapy Delivery  Outcome: Ongoing, Progressing     Problem: Hemodynamic Instability (Hemodialysis)  Goal: Effective Tissue Perfusion  Outcome: Ongoing, Progressing     Problem: Infection (Hemodialysis)  Goal: Absence of Infection Signs and Symptoms  Outcome: Ongoing, Progressing

## 2024-01-21 NOTE — SUBJECTIVE & OBJECTIVE
"Interval History: Patient reports no concerns. He does not want to wear the supplemental O2, reporting it is "too much" and continues to remove himself, O2 sats 80s. Counseled on importance of wearing the O2.   Nursing reports patient had a black BM, H/H stable and on PPI. Will cont to monitor.      Review of Systems   Constitutional:  Negative for fever.   Respiratory:  Negative for shortness of breath.    Gastrointestinal:  Negative for nausea and vomiting.   Musculoskeletal:  Positive for myalgias.     Objective:     Vital Signs (Most Recent):  Temp: 98.1 °F (36.7 °C) (01/21/24 1115)  Pulse: 77 (01/21/24 1245)  Resp: 20 (01/21/24 1245)  BP: 137/78 (01/21/24 1115)  SpO2: (!) 86 % (01/21/24 1245) Vital Signs (24h Range):  Temp:  [98.1 °F (36.7 °C)-98.4 °F (36.9 °C)] 98.1 °F (36.7 °C)  Pulse:  [61-90] 77  Resp:  [16-20] 20  SpO2:  [80 %-88 %] 86 %  BP: (137-153)/(78-98) 137/78     Weight: 132.5 kg (292 lb)  Body mass index is 38.52 kg/m².    Intake/Output Summary (Last 24 hours) at 1/21/2024 1408  Last data filed at 1/21/2024 0750  Gross per 24 hour   Intake --   Output 3100 ml   Net -3100 ml           Physical Exam  Vitals and nursing note reviewed.   Constitutional:       General: He is not in acute distress.     Appearance: Normal appearance.   HENT:      Head: Normocephalic and atraumatic.      Nose: Nose normal.      Comments: NC not in place but 5 L going      Mouth/Throat:      Mouth: Mucous membranes are moist.      Pharynx: Oropharynx is clear.   Eyes:      Extraocular Movements: Extraocular movements intact.      Conjunctiva/sclera: Conjunctivae normal.      Pupils: Pupils are equal, round, and reactive to light.   Cardiovascular:      Rate and Rhythm: Normal rate and regular rhythm.      Pulses: Normal pulses.      Heart sounds: Normal heart sounds.   Pulmonary:      Effort: Pulmonary effort is normal. No respiratory distress.      Breath sounds: Normal breath sounds.   Abdominal:      General: Abdomen is " flat. Bowel sounds are normal.      Palpations: Abdomen is soft.   Musculoskeletal:         General: Normal range of motion.      Cervical back: Normal range of motion and neck supple.      Right lower leg: Edema present.      Left lower leg: Edema present.      Comments: 2+ pitting edema to shin   Skin:     General: Skin is warm and dry.   Neurological:      General: No focal deficit present.      Mental Status: He is alert and oriented to person, place, and time.   Psychiatric:         Mood and Affect: Mood normal.         Behavior: Behavior normal.             Significant Labs: All pertinent labs within the past 24 hours have been reviewed.    Significant Imaging: I have reviewed all pertinent imaging results/findings within the past 24 hours.

## 2024-01-21 NOTE — PROGRESS NOTES
Pt refused both his oxygen and lab this morning. I educated him about the effects of low oxygen saturation and he said he understood but still refused to let me put the oxygen back on. I did leave it where he could reach it, MD aware.

## 2024-01-21 NOTE — ASSESSMENT & PLAN NOTE
Patient is identified as Severe COVID-19 based on hypoxemia with O2 saturations <94% on room air or on ambulation   Initiate standard COVID protocols; COVID-19 testing ,Infection Control notification  and isolation- respiratory, contact and droplet per protocol    Diagnostics: CBC, CMP, Ferritin, CRP, and Portable CXR    Management: Initiate targeted therapy with Remdesivir, 200mg IV x1, followed by 100mg IV daily x5 days total and Dexamethasone PO/IV 6mg daily x10 days, Maintain oxygen saturations 92-96% via Nasal Cannula 3 LPM and monitor with continuous/intermittent pulse oximetry. , Inhaled bronchodilators as needed for shortness of breath., and Manage respiratory failure (O2 requirement >10LPM or needing NIPPV/Mechanical ventilation) and/or Pneumonia (active chest infiltrates) separately as described below.    Advance Care Planning  Current advance care plan has been discussed with patient/family/POA and patient currently wishes Full Code.     S/p remdesivir course and cont steroids 10 day course given severity and O2 requirements   - Off abx for potential pneumonia component as cultures have been negative     - patient non compliance with wearing NC O2, although hypoxia to 80s noted on RA, counseled on importance of wearing O2

## 2024-01-21 NOTE — PROGRESS NOTES
X ray tech came to the room to do the chest x ray I had to assist with turning him pt said he could not help so I pulled him over using the draw sheet, the x ray was done. Now pt has positioned himself in the bed on his hands and knees as if he was crawling and made a BM in the bed and all over. I asked him why he did that and he said I called 2 hours ago and no one came. I have been at the bedside twice this morning and he never said he had to BM. Pt stool is stool is black, reported to the med team.

## 2024-01-21 NOTE — NURSING
Patient refused to put on his oxygen despite thorough explanation of its importance. Saturation was 88%. Informed on call doctor.

## 2024-01-21 NOTE — ASSESSMENT & PLAN NOTE
Patient with Hypoxic Respiratory failure which is Acute.  he is not on home oxygen. Supplemental oxygen was provided and noted-      .   Signs/symptoms of respiratory failure include- increased work of breathing and respiratory distress. Contributing diagnoses includes - ARDS, Pneumonia, and COVID , volume overload. Labs and images were reviewed. Patient Has recent ABG, which has been reviewed. Will treat underlying causes and adjust management of respiratory failure as follows-     - intubated in ICU, extubated 1/17 to NC, wean O2 as tolerated  - s/p COVID treatment with  remdesivir, cont steroids  - cont IV diuresis- decrease to lasix 40 BID and stop metolazone    - patient noncompliant with wearing NC

## 2024-01-21 NOTE — RESPIRATORY THERAPY
RAPID RESPONSE RESPIRATORY THERAPY FOLLOW-UP NOTE       Followed up with patient for proactive rounding. Patient refusing to wear O2 despite O2 SAT's being in the low to mid 80's. Dr. Feliciano notified. Suggested for continuous Pulse Ox to be ordered.  Please call [facility] RT, Lisseth Alfaro, RRT at [phone] with any questions or concerns.

## 2024-01-22 LAB
ALBUMIN SERPL BCP-MCNC: 1.2 G/DL (ref 3.5–5.2)
ALP SERPL-CCNC: 154 U/L (ref 55–135)
ALT SERPL W/O P-5'-P-CCNC: 51 U/L (ref 10–44)
ANION GAP SERPL CALC-SCNC: 5 MMOL/L (ref 8–16)
AST SERPL-CCNC: 32 U/L (ref 10–40)
BASOPHILS # BLD AUTO: 0.02 K/UL (ref 0–0.2)
BASOPHILS NFR BLD: 0.2 % (ref 0–1.9)
BILIRUB SERPL-MCNC: 0.4 MG/DL (ref 0.1–1)
BKV DNA SERPL NAA+PROBE-ACNC: <125 COPIES/ML
BKV DNA SERPL NAA+PROBE-LOG#: <2.1 LOG (10) COPIES/ML
BKV DNA SERPL QL NAA+PROBE: NOT DETECTED
BUN SERPL-MCNC: 70 MG/DL (ref 6–20)
CALCIUM SERPL-MCNC: 7.6 MG/DL (ref 8.7–10.5)
CHLORIDE SERPL-SCNC: 103 MMOL/L (ref 95–110)
CO2 SERPL-SCNC: 28 MMOL/L (ref 23–29)
CREAT SERPL-MCNC: 1.9 MG/DL (ref 0.5–1.4)
DIFFERENTIAL METHOD BLD: ABNORMAL
EOSINOPHIL # BLD AUTO: 0.1 K/UL (ref 0–0.5)
EOSINOPHIL NFR BLD: 0.6 % (ref 0–8)
ERYTHROCYTE [DISTWIDTH] IN BLOOD BY AUTOMATED COUNT: 12.9 % (ref 11.5–14.5)
EST. GFR  (NO RACE VARIABLE): 45.2 ML/MIN/1.73 M^2
GLUCOSE SERPL-MCNC: 135 MG/DL (ref 70–110)
HCT VFR BLD AUTO: 27.2 % (ref 40–54)
HGB BLD-MCNC: 9 G/DL (ref 14–18)
IMM GRANULOCYTES # BLD AUTO: 0.19 K/UL (ref 0–0.04)
IMM GRANULOCYTES NFR BLD AUTO: 1.5 % (ref 0–0.5)
INR PPP: 1 (ref 0.8–1.2)
LYMPHOCYTES # BLD AUTO: 0.3 K/UL (ref 1–4.8)
LYMPHOCYTES NFR BLD: 2.2 % (ref 18–48)
MAGNESIUM SERPL-MCNC: 1.9 MG/DL (ref 1.6–2.6)
MCH RBC QN AUTO: 29.2 PG (ref 27–31)
MCHC RBC AUTO-ENTMCNC: 33.1 G/DL (ref 32–36)
MCV RBC AUTO: 88 FL (ref 82–98)
MONOCYTES # BLD AUTO: 0.4 K/UL (ref 0.3–1)
MONOCYTES NFR BLD: 3.4 % (ref 4–15)
NEUTROPHILS # BLD AUTO: 12 K/UL (ref 1.8–7.7)
NEUTROPHILS NFR BLD: 92.1 % (ref 38–73)
NRBC BLD-RTO: 0 /100 WBC
PHOSPHATE SERPL-MCNC: 2.7 MG/DL (ref 2.7–4.5)
PLATELET # BLD AUTO: 239 K/UL (ref 150–450)
PMV BLD AUTO: 8.8 FL (ref 9.2–12.9)
POTASSIUM SERPL-SCNC: 3.7 MMOL/L (ref 3.5–5.1)
PROT SERPL-MCNC: 4.2 G/DL (ref 6–8.4)
PROTHROMBIN TIME: 11 SEC (ref 9–12.5)
RBC # BLD AUTO: 3.08 M/UL (ref 4.6–6.2)
SODIUM SERPL-SCNC: 136 MMOL/L (ref 136–145)
WBC # BLD AUTO: 13.02 K/UL (ref 3.9–12.7)

## 2024-01-22 PROCEDURE — 36415 COLL VENOUS BLD VENIPUNCTURE: CPT | Performed by: INTERNAL MEDICINE

## 2024-01-22 PROCEDURE — 85610 PROTHROMBIN TIME: CPT

## 2024-01-22 PROCEDURE — 25000003 PHARM REV CODE 250

## 2024-01-22 PROCEDURE — 25000003 PHARM REV CODE 250: Performed by: INTERNAL MEDICINE

## 2024-01-22 PROCEDURE — 94761 N-INVAS EAR/PLS OXIMETRY MLT: CPT

## 2024-01-22 PROCEDURE — 25000003 PHARM REV CODE 250: Performed by: STUDENT IN AN ORGANIZED HEALTH CARE EDUCATION/TRAINING PROGRAM

## 2024-01-22 PROCEDURE — 80197 ASSAY OF TACROLIMUS: CPT | Performed by: INTERNAL MEDICINE

## 2024-01-22 PROCEDURE — 85025 COMPLETE CBC W/AUTO DIFF WBC: CPT

## 2024-01-22 PROCEDURE — 27000207 HC ISOLATION

## 2024-01-22 PROCEDURE — 25000242 PHARM REV CODE 250 ALT 637 W/ HCPCS

## 2024-01-22 PROCEDURE — 99900035 HC TECH TIME PER 15 MIN (STAT)

## 2024-01-22 PROCEDURE — 94640 AIRWAY INHALATION TREATMENT: CPT

## 2024-01-22 PROCEDURE — 80053 COMPREHEN METABOLIC PANEL: CPT

## 2024-01-22 PROCEDURE — 21400001 HC TELEMETRY ROOM

## 2024-01-22 PROCEDURE — 99233 SBSQ HOSP IP/OBS HIGH 50: CPT | Mod: ,,, | Performed by: INTERNAL MEDICINE

## 2024-01-22 PROCEDURE — 83735 ASSAY OF MAGNESIUM: CPT

## 2024-01-22 PROCEDURE — 84100 ASSAY OF PHOSPHORUS: CPT

## 2024-01-22 PROCEDURE — 63600175 PHARM REV CODE 636 W HCPCS: Performed by: STUDENT IN AN ORGANIZED HEALTH CARE EDUCATION/TRAINING PROGRAM

## 2024-01-22 RX ADMIN — TACROLIMUS 3 MG: 1 CAPSULE ORAL at 06:01

## 2024-01-22 RX ADMIN — CARVEDILOL 12.5 MG: 12.5 TABLET, FILM COATED ORAL at 09:01

## 2024-01-22 RX ADMIN — IPRATROPIUM BROMIDE AND ALBUTEROL SULFATE 3 ML: 2.5; .5 SOLUTION RESPIRATORY (INHALATION) at 07:01

## 2024-01-22 RX ADMIN — PANTOPRAZOLE SODIUM 40 MG: 40 TABLET, DELAYED RELEASE ORAL at 09:01

## 2024-01-22 RX ADMIN — FUROSEMIDE 40 MG: 40 TABLET ORAL at 09:01

## 2024-01-22 RX ADMIN — TACROLIMUS 3 MG: 1 CAPSULE ORAL at 08:01

## 2024-01-22 RX ADMIN — CARVEDILOL 12.5 MG: 12.5 TABLET, FILM COATED ORAL at 08:01

## 2024-01-22 RX ADMIN — IPRATROPIUM BROMIDE AND ALBUTEROL SULFATE 3 ML: 2.5; .5 SOLUTION RESPIRATORY (INHALATION) at 08:01

## 2024-01-22 RX ADMIN — PREDNISONE 30 MG: 20 TABLET ORAL at 09:01

## 2024-01-22 RX ADMIN — AMLODIPINE BESYLATE 10 MG: 10 TABLET ORAL at 09:01

## 2024-01-22 RX ADMIN — ATOVAQUONE 1500 MG: 750 SUSPENSION ORAL at 09:01

## 2024-01-22 RX ADMIN — FUROSEMIDE 40 MG: 40 TABLET ORAL at 05:01

## 2024-01-22 NOTE — SUBJECTIVE & OBJECTIVE
Interval History: Patient refusing labs and refusing to wear o2 despite hypoxia and counseling. Wanting to be discharged but cannot get out of bed refusing to work with pt.     Unsafe discharge until patient can demonstrate he can get out of bed.   Home O2 ordered  Called wife to update.     Nursing reports patient had a black BM, H/H stable and on PPI. Will cont to monitor.      Review of Systems   Constitutional:  Negative for fever.   Respiratory:  Negative for shortness of breath.    Gastrointestinal:  Negative for nausea and vomiting.   Musculoskeletal:  Positive for myalgias.     Objective:     Vital Signs (Most Recent):  Temp: 98.3 °F (36.8 °C) (01/22/24 1659)  Pulse: 64 (01/22/24 1659)  Resp: 16 (01/22/24 1659)  BP: 134/87 (01/22/24 0838)  SpO2: (!) 88 % (01/22/24 1659) Vital Signs (24h Range):  Temp:  [98.3 °F (36.8 °C)-98.9 °F (37.2 °C)] 98.3 °F (36.8 °C)  Pulse:  [58-94] 64  Resp:  [16-18] 16  SpO2:  [83 %-93 %] 88 %  BP: (132-145)/(86-96) 134/87     Weight: 132.5 kg (292 lb)  Body mass index is 38.52 kg/m².    Intake/Output Summary (Last 24 hours) at 1/22/2024 1700  Last data filed at 1/22/2024 0944  Gross per 24 hour   Intake 480 ml   Output --   Net 480 ml           Physical Exam  Vitals and nursing note reviewed.   Constitutional:       General: He is not in acute distress.     Appearance: Normal appearance.   HENT:      Head: Normocephalic and atraumatic.      Nose: Nose normal.      Comments: NC not in place but 5 L going      Mouth/Throat:      Mouth: Mucous membranes are moist.      Pharynx: Oropharynx is clear.   Eyes:      Extraocular Movements: Extraocular movements intact.      Conjunctiva/sclera: Conjunctivae normal.      Pupils: Pupils are equal, round, and reactive to light.   Cardiovascular:      Rate and Rhythm: Normal rate and regular rhythm.      Pulses: Normal pulses.      Heart sounds: Normal heart sounds.   Pulmonary:      Effort: Pulmonary effort is normal. No respiratory distress.       Breath sounds: Normal breath sounds.   Abdominal:      General: Abdomen is flat. Bowel sounds are normal.      Palpations: Abdomen is soft.   Musculoskeletal:         General: Normal range of motion.      Cervical back: Normal range of motion and neck supple.      Right lower leg: Edema present.      Left lower leg: Edema present.      Comments: 2+ pitting edema to shin   Skin:     General: Skin is warm and dry.   Neurological:      General: No focal deficit present.      Mental Status: He is alert and oriented to person, place, and time.   Psychiatric:         Mood and Affect: Mood normal.         Behavior: Behavior normal.             Significant Labs: All pertinent labs within the past 24 hours have been reviewed.    Significant Imaging: I have reviewed all pertinent imaging results/findings within the past 24 hours.

## 2024-01-22 NOTE — PROGRESS NOTES
Pt has remained with out oxygen though his oxygen saturation has been 85-88% on room air. I educated him numerous times this morning about the effects of he body not receiving enough oxygen. He continued to refuse again after numerous attempts.

## 2024-01-22 NOTE — PLAN OF CARE
Recommendations    1. Continue Renal Diet. If PO intake does not improve recommend liberalizing diet.   2. Recommend Novasource Renal with all meals. (RD ordered)   3. Consider renal friendly MVI.   4. Consider appetite stimulant.   5. RD to monitor and follow-up.    Goals: Meet % EEN, EPN by RD f/u date  Nutrition Goal Status: new  Communication of RD Recs: other comment ((POC))

## 2024-01-22 NOTE — PROGRESS NOTES
I attempted to have pt stand at the bedside to do an evaluation of his physical ability. He was under the covers with no clothes on. I asked him to allow me to help him put the gown on he refused, since the catheter was removed he has been incontinent  of urine and wants me to put on his underwear. He continues to refuse to stand or even sit at the bedside.

## 2024-01-22 NOTE — ASSESSMENT & PLAN NOTE
Patient is identified as Severe COVID-19 based on hypoxemia with O2 saturations <94% on room air or on ambulation   Initiate standard COVID protocols; COVID-19 testing ,Infection Control notification  and isolation- respiratory, contact and droplet per protocol    Diagnostics: CBC, CMP, Ferritin, CRP, and Portable CXR    Management: Initiate targeted therapy with Remdesivir, 200mg IV x1, followed by 100mg IV daily x5 days total and Dexamethasone PO/IV 6mg daily x10 days, Maintain oxygen saturations 92-96% via Nasal Cannula 3 LPM and monitor with continuous/intermittent pulse oximetry. , Inhaled bronchodilators as needed for shortness of breath., and Manage respiratory failure (O2 requirement >10LPM or needing NIPPV/Mechanical ventilation) and/or Pneumonia (active chest infiltrates) separately as described below.    Advance Care Planning  Current advance care plan has been discussed with patient/family/POA and patient currently wishes Full Code.     S/p remdesivir course and cont steroids 10 day course given severity and O2 requirements   - Off abx for potential pneumonia component as cultures have been negative     - patient non compliance with wearing NC O2, although hypoxia to 80s noted on RA, counseled on importance of wearing O2  - refusing to wear O2, home O2 ordered

## 2024-01-22 NOTE — ASSESSMENT & PLAN NOTE
Patient with kidney transplant 2010. He was previously admitted to [facility] December 18-23 with GENNY. 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  He was found to have nephrotic syndrome as well, started on eliquis.  He received pulse dose steroids and was started on Eliquis for anticoagulation. He was discharged on a prednisone taper, CellCept, and Prograf.  On December 23, his creatinine was 3.0. Current medications include CellCept, tacrolimus, prednisone, all of which he continued to receive at OSH.     - KTM following  - Continue immunosuppression  - Cr stable/improving. Holding CRRT.  - Gangciclovir for CMV prophylaxis   - Eliquis for nephrotic syndrome- hold in setting of potential melena

## 2024-01-22 NOTE — SUBJECTIVE & OBJECTIVE
Interval History:     No acute events overnight. Patient eager for discharge. Scr stable at 1.6 today. UOP stable with Lasix 40mg BID. Peripheral edema improved but still significant.     Notable that patient displays bizarre affect. Noted by nursing staff to lay in feces and refuse to get up out of bed. Furthermore noted to refuse O2 via nasal cannula. Unclear regarding if patient will be able to take care of himself if discharged.     Review of patient's allergies indicates:   Allergen Reactions    Ibuprofen Other (See Comments)    Penicillins      Other reaction(s): Hives  Other reaction(s): Edema     Current Facility-Administered Medications   Medication Frequency    albuterol-ipratropium 2.5 mg-0.5 mg/3 mL nebulizer solution 3 mL Q6H    amLODIPine tablet 10 mg Daily    atovaquone 750 mg/5 mL oral liquid 1,500 mg Daily    carvediloL tablet 12.5 mg BID    dextrose 10% bolus 125 mL 125 mL PRN    dextrose 10% bolus 250 mL 250 mL PRN    furosemide tablet 40 mg BID    glucagon (human recombinant) injection 1 mg PRN    naloxone 0.4 mg/mL injection 0.02 mg PRN    ondansetron injection 4 mg Q6H PRN    pantoprazole EC tablet 40 mg Daily    polyethylene glycol packet 17 g Daily    predniSONE tablet 30 mg Daily    senna-docusate 8.6-50 mg per tablet 1 tablet BID    sodium chloride 0.9% flush 10 mL Q12H PRN    tacrolimus (PROGRAF) 1 mg/mL oral syringe BID    valGANciclovir 50 mg/ml oral solution 450 mg Daily       Objective:     Vital Signs (Most Recent):  Temp: 98.5 °F (36.9 °C) (01/22/24 0838)  Pulse: 72 (01/22/24 0838)  Resp: 18 (01/22/24 0838)  BP: 134/87 (01/22/24 0838)  SpO2: (!) 83 % (01/22/24 0838) Vital Signs (24h Range):  Temp:  [98.1 °F (36.7 °C)-98.9 °F (37.2 °C)] 98.5 °F (36.9 °C)  Pulse:  [58-82] 72  Resp:  [16-20] 18  SpO2:  [83 %-92 %] 83 %  BP: (125-145)/(78-96) 134/87     Weight: 132.5 kg (292 lb) (01/16/24 0906)  Body mass index is 38.52 kg/m².  Body surface area is 2.61 meters squared.    I/O last 3  completed shifts:  In: 1320 [P.O.:1320]  Out: 4450 [Urine:4450]     Physical Exam  Vitals and nursing note reviewed.   Constitutional:       General: He is not in acute distress.     Appearance: Normal appearance.   HENT:      Head: Normocephalic and atraumatic.      Nose: Nose normal.      Mouth/Throat:      Mouth: Mucous membranes are moist.      Pharynx: Oropharynx is clear.   Eyes:      Extraocular Movements: Extraocular movements intact.      Conjunctiva/sclera: Conjunctivae normal.      Pupils: Pupils are equal, round, and reactive to light.   Cardiovascular:      Rate and Rhythm: Normal rate and regular rhythm.      Pulses: Normal pulses.      Heart sounds: Normal heart sounds.   Pulmonary:      Effort: Pulmonary effort is normal. No respiratory distress.      Breath sounds: Normal breath sounds.   Abdominal:      General: Abdomen is flat. Bowel sounds are normal.      Palpations: Abdomen is soft.   Musculoskeletal:         General: Normal range of motion.      Cervical back: Normal range of motion and neck supple.      Right lower leg: Edema present.      Left lower leg: Edema present.   Skin:     General: Skin is warm and dry.   Neurological:      General: No focal deficit present.      Mental Status: He is alert and oriented to person, place, and time.   Psychiatric:         Mood and Affect: Mood normal.         Behavior: Behavior normal.          Significant Labs:  All labs within the past 24 hours have been reviewed.     Significant Imaging:  Labs: Reviewed

## 2024-01-22 NOTE — RESPIRATORY THERAPY
RAPID RESPONSE RESPIRATORY CHART CHECK       Chart check completed. Patient still refusing O2 despite SAT's being in the 80's. Team aware.  Please call [phone] for further concerns or assistance.

## 2024-01-22 NOTE — ASSESSMENT & PLAN NOTE
"living kidney transplant on 7/26/10 from brother  S/p Renal transplant biopsy with : "12/20/23 bx [GENNY] 1/11 glomeruli globally sclerosed. 5-10% interstitial fibrosis. 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 Mild microcirculation inflammation with neg C4d. 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

## 2024-01-22 NOTE — ASSESSMENT & PLAN NOTE
- Resume home dose of MMF 1g BID  - Continue Prograf 3mg BID  - Prednisone 30mg QD with close outpatient nephrology follow up.   - obtain tacro level qAM. Will titrate based on results.      Once COVID dexamethasone is complete, plan to start PO prednisone 30mg QD, with plans to taper.

## 2024-01-22 NOTE — PT/OT/SLP PROGRESS
"Physical Therapy      Patient Name:  Ubaldo Tripp   MRN:  [id number]    Patient not seen today secondary to Patient unwilling to participate. Pt at first agreeable to session and requested clothing. PTA provided backpack to pt and pt given privacy to don clothing. Pt stated "give 10 mins and I will be ready". Pt educated on PTA not leaving room but giving privacy to dress. After 7 mins, pt remained covered and unclothed. PTA exited room after salutations to patient.   Upon leaving, nurse informed PTA that MD will D/C with proper ambulation. PTA returned to room to inform patient of received news. Pt again agreeable to mobility. PTA informed pt that 7+ mins would not be given to get dressed as pt did not begin or attempt previously. Pt urged to begin mobility while PTA in room. Pt became angry and asked PTA to exit room.   Will follow-up per POC.    "

## 2024-01-22 NOTE — ASSESSMENT & PLAN NOTE
Patient with acute kidney injury/acute renal failure likely due to  volume overload  GENNY is currently improving. Baseline creatinine  1.3  - Labs reviewed- Renal function/electrolytes with Estimated Creatinine Clearance: 73.8 mL/min (A) (based on SCr of 1.9 mg/dL (H)). according to latest data. Monitor urine output and serial BMP and adjust therapy as needed. Avoid nephrotoxins and renally dose meds for GFR listed above.    Cr was 1.3 in 2021, no other Cr results until the recent admission with Cr at 2.92. BL creatinine was 1.3-1.7 till December 2023 when he developed ARF due to diffuse proliferative GN and mild  TCMR. Treated with steroid. Due to nephrotic syndrome  and hypoalbuminemia, started eliquis for anticoagulation. He was discharged on pred taper, cellcept, and prograf. At the time of discharge on 12/23/23  Cr was 3.   With aggressive diuresis, Cr stable now at 1.3-1.6.      - KTM following  - Continue aggressive diuresis for volume overload- decrease IV lasix to BID- transition to PO  - Strict intake/output  - Renally dose meds  - Avoid nephrotoxic agent

## 2024-01-22 NOTE — HPI
He is 37 yo White male who received a living kidney transplant on 7/26/10.  his creatinine was 1.3-18 till December 2023 when he developed ARFafter not  taking IS meds for 5 weeks. Transferred to [facility] and kidney biopsy resulted with diffuse proliferative GN as well as mild TCMR. Treated with steroid. DSA 12/19/23 with +DSAs. had IVIG outpatient. Due to nephrotic syndrome and hypoalbuminemia, started eliquis for anticoagulation. He was discharged on pred taper, cellcept, and prograf. At the time of discharge Cr was 3.         transferred to [facility] for acute hypoxemic respiratory failure, bacteremia. On 1/5/24 he was admitted to [facility] in [place] with abnormal labs. Was found to be COVID positive.  Blood culture with Staph haemolyticus. Abdominal US on 1/6 noted cirrhotic appearing liver.  CT chest on 1/7 showed consolidated infiltrates and/or atelectasis involving the bilateral lung bases with small bilateral pleural effusions, patchy heterogeneous infiltrate involving the right middle lobe, and mild ascites. The patient was initially going to transfer to hospital medicine however he was intubated prior to transfer and was accepted to [facility] for a higher level of care.  He arrived on propofol and fentanyl.  KTM and transplant ID consulted on admission.

## 2024-01-22 NOTE — PROGRESS NOTES
Home Oxygen Evaluation    Date Performed: 1/22/2024    1) Patient's Home O2 Sat on room air, while at rest: 85        If O2 sats on room air at rest are 88% or below, patient qualifies. No additional testing needed. Document N/A in steps 2 and 3. If 89% or above, complete steps 2.      2) Patient's O2 Sat on room air while exercising: na        If O2 sats on room air while exercising remain 89% or above patient does not qualify, no further testing needed Document N/A in step 3. If O2 sats on room air while exercising are 88% or below, continue to step 3.      3) Patient's O2 Sat while exercising on O2: na at na LPM         (Must show improvement from #2 for patients to qualify)    If O2 sats improve on oxygen, patient qualifies for portable oxygen. If not, the patient does not qualify.

## 2024-01-22 NOTE — PROGRESS NOTES
[facility] Medicine  Progress Note    Patient Name: Ubaldo Tripp  MRN: [id number]  Patient Class: IP- Inpatient   Admission Date: 1/14/2024  Length of Stay: 8 days  Attending Physician: Bette Feliciano MD  Primary Care Provider: Jaime Jauregui (Inactive)        Subjective:     Principal Problem:Acute hypoxic respiratory failure        HPI:  39 y/o M with a medical history of kidney transplant in 2010 and HTN transferred to [facility] MICU for acute hypoxemic respiratory failure, bacteremia, and hx of Kidney Transplant for KTM and Infectious Disease evaluation. Pt was previously admitted to [facility] 12/18/23 - 12/23/23 with GENNY, headache, and HTN.  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  He was found to have nephrotic syndrome as well.  Received pulse dose steroids and was started on Eliquis. Discharged on a prednisone taper, CellCept, and Prograf.  sCr 12/23 was 3.0.     On 1/5/24 he was admitted to [facility] in [place] with abnormal labs. Outpatient labs on 1/3 showed WBC 17.7 Hgb 13.1, K 5.7, bicarb 18, BUN 71, sCr 2.92, phos 5.1, and tacro level 10.8.  Was found to be COVID positive requiring BiPAP.  Blood culture with Staph haemolyticus.  Labs notable for sCr ranging 2.68 to 2.73.  K normalized.  Abdominal US on 1/6 noted cirrhotic appearing liver.  CT chest on 1/7 showed consolidated infiltrates and/or atelectasis involving the bilateral lung bases with small bilateral pleural effusions, patchy heterogeneous infiltrate involving the right middle lobe, and mild ascites.  Transplant renal ultrasound 1/8 showed stable appearance of the transplant kidney without evidence of acute abnormality.  Gallbladder ultrasound 1/8 showed sludge in the gallbladder.  Medications prior to  transfer include CellCept, tacrolimus, valganciclovir, prednisone, Eliquis, and meropenem.     The patient was initially going to transfer to hospital medicine however he was intubated prior to transfer and was accepted to [facility] MICU for a higher level of care.  He arrived on propofol and fentanyl.  KTM and transplant ID consulted on admission.    Overview/Hospital Course:  Pt transferred to [facility] for respiratory failure 2/2 COVID, intubated.  Renal txp patient who also had GENNY on admission, hx rejection, transplant following. Extubated 1/17. Received CRRT 1/15 and 1/16, no further needs for CRRT. Started on NC post extubation, weaning as tolerated.   No further plans for CRRT as volume is being managed with aggressive diuresis appropriately. Cont diuresis.     Interval History: Patient refusing labs and refusing to wear o2 despite hypoxia and counseling. Wanting to be discharged but cannot get out of bed refusing to work with pt.     Unsafe discharge until patient can demonstrate he can get out of bed.   Home O2 ordered  Called wife to update.     Nursing reports patient had a black BM, H/H stable and on PPI. Will cont to monitor.      Review of Systems   Constitutional:  Negative for fever.   Respiratory:  Negative for shortness of breath.    Gastrointestinal:  Negative for nausea and vomiting.   Musculoskeletal:  Positive for myalgias.     Objective:     Vital Signs (Most Recent):  Temp: 98.3 °F (36.8 °C) (01/22/24 1659)  Pulse: 64 (01/22/24 1659)  Resp: 16 (01/22/24 1659)  BP: 134/87 (01/22/24 0838)  SpO2: (!) 88 % (01/22/24 1659) Vital Signs (24h Range):  Temp:  [98.3 °F (36.8 °C)-98.9 °F (37.2 °C)] 98.3 °F (36.8 °C)  Pulse:  [58-94] 64  Resp:  [16-18] 16  SpO2:  [83 %-93 %] 88 %  BP: (132-145)/(86-96) 134/87     Weight: 132.5 kg (292 lb)  Body mass index is 38.52 kg/m².    Intake/Output Summary (Last 24 hours) at 1/22/2024 1700  Last data filed at 1/22/2024 8503  Gross per 24 hour   Intake 480 ml   Output --   Net  480 ml           Physical Exam  Vitals and nursing note reviewed.   Constitutional:       General: He is not in acute distress.     Appearance: Normal appearance.   HENT:      Head: Normocephalic and atraumatic.      Nose: Nose normal.      Comments: NC not in place but 5 L going      Mouth/Throat:      Mouth: Mucous membranes are moist.      Pharynx: Oropharynx is clear.   Eyes:      Extraocular Movements: Extraocular movements intact.      Conjunctiva/sclera: Conjunctivae normal.      Pupils: Pupils are equal, round, and reactive to light.   Cardiovascular:      Rate and Rhythm: Normal rate and regular rhythm.      Pulses: Normal pulses.      Heart sounds: Normal heart sounds.   Pulmonary:      Effort: Pulmonary effort is normal. No respiratory distress.      Breath sounds: Normal breath sounds.   Abdominal:      General: Abdomen is flat. Bowel sounds are normal.      Palpations: Abdomen is soft.   Musculoskeletal:         General: Normal range of motion.      Cervical back: Normal range of motion and neck supple.      Right lower leg: Edema present.      Left lower leg: Edema present.      Comments: 2+ pitting edema to shin   Skin:     General: Skin is warm and dry.   Neurological:      General: No focal deficit present.      Mental Status: He is alert and oriented to person, place, and time.   Psychiatric:         Mood and Affect: Mood normal.         Behavior: Behavior normal.             Significant Labs: All pertinent labs within the past 24 hours have been reviewed.    Significant Imaging: I have reviewed all pertinent imaging results/findings within the past 24 hours.    Assessment/Plan:      * Acute hypoxic respiratory failure  Patient with Hypoxic Respiratory failure which is Acute.  he is not on home oxygen. Supplemental oxygen was provided and noted-      .   Signs/symptoms of respiratory failure include- increased work of breathing and respiratory distress. Contributing diagnoses includes - ARDS,  Pneumonia, and COVID , volume overload. Labs and images were reviewed. Patient Has recent ABG, which has been reviewed. Will treat underlying causes and adjust management of respiratory failure as follows-     - intubated in ICU, extubated 1/17 to NC, wean O2 as tolerated  - s/p COVID treatment with  remdesivir, cont steroids  - cont IV diuresis- decrease to lasix 40 BID and stop metolazone    - patient noncompliant with wearing NC- refusing to wear O2, home o2 ordered    Melena  - nursing reports black BM 1/21  - H/H stable and on PPI  - monitor H/H and hold AC  - if H/H dropping and melena continues, will consult GI    - reporting normal BM no melena noted      Bacteremia  Outside facility reported the following culture results:  12/29/23 Blood Cx: NGTD after 5 days  1/5/24: Staph Haemolyticus, positive in 2 different sets, the vials used were pediatric bottles, as pt was a difficult stick. Only 2 total bottles were collected.  1/6/24 Blood Cx: NGTD after 5 days.     - Per ID, discontinued all antibiotics    COVID  Patient is identified as Severe COVID-19 based on hypoxemia with O2 saturations <94% on room air or on ambulation   Initiate standard COVID protocols; COVID-19 testing ,Infection Control notification  and isolation- respiratory, contact and droplet per protocol    Diagnostics: CBC, CMP, Ferritin, CRP, and Portable CXR    Management: Initiate targeted therapy with Remdesivir, 200mg IV x1, followed by 100mg IV daily x5 days total and Dexamethasone PO/IV 6mg daily x10 days, Maintain oxygen saturations 92-96% via Nasal Cannula 3 LPM and monitor with continuous/intermittent pulse oximetry. , Inhaled bronchodilators as needed for shortness of breath., and Manage respiratory failure (O2 requirement >10LPM or needing NIPPV/Mechanical ventilation) and/or Pneumonia (active chest infiltrates) separately as described below.    Advance Care Planning Current advance care plan has been discussed with  patient/family/POA and patient currently wishes Full Code.     S/p remdesivir course and cont steroids 10 day course given severity and O2 requirements   - Off abx for potential pneumonia component as cultures have been negative     - patient non compliance with wearing NC O2, although hypoxia to 80s noted on RA, counseled on importance of wearing O2  - refusing to wear O2, home O2 ordered    Long-term use of immunosuppressant medication        Acute on chronic renal failure  Patient with acute kidney injury/acute renal failure likely due to  volume overload  GENNY is currently improving. Baseline creatinine  1.3  - Labs reviewed- Renal function/electrolytes with Estimated Creatinine Clearance: 73.8 mL/min (A) (based on SCr of 1.9 mg/dL (H)). according to latest data. Monitor urine output and serial BMP and adjust therapy as needed. Avoid nephrotoxins and renally dose meds for GFR listed above.    Cr was 1.3 in 2021, no other Cr results until the recent admission with Cr at 2.92. BL creatinine was 1.3-1.7 till December 2023 when he developed ARF due to diffuse proliferative GN and mild  TCMR. Treated with steroid. Due to nephrotic syndrome  and hypoalbuminemia, started eliquis for anticoagulation. He was discharged on pred taper, cellcept, and prograf. At the time of discharge on 12/23/23  Cr was 3.   With aggressive diuresis, Cr stable now at 1.3-1.6.      - KTM following  - Continue aggressive diuresis for volume overload- decrease IV lasix to BID- transition to PO  - Strict intake/output  - Renally dose meds  - Avoid nephrotoxic agent    Prophylactic immunotherapy   Off MMF due to COVID  - Continue Prograf 3mg BID  - obtain tacro level qAM. Will titrate based on results.      Once COVID dexamethasone is complete, plan to start PO prednisone 30mg QD, with plans to taper.       Living-donor kidney transplant recipient - 7/26/10  Patient with kidney transplant 2010. He was previously admitted to [facility]  December 18-23 with GENNY. 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  He was found to have nephrotic syndrome as well, started on eliquis.  He received pulse dose steroids and was started on Eliquis for anticoagulation. He was discharged on a prednisone taper, CellCept, and Prograf.  On December 23, his creatinine was 3.0. Current medications include CellCept, tacrolimus, prednisone, all of which he continued to receive at OSH.     - KTM following  - Continue immunosuppression  - Cr stable/improving. Holding CRRT.  - Gangciclovir for CMV prophylaxis   - Eliquis for nephrotic syndrome- hold in setting of potential melena      VTE Risk Mitigation (From admission, onward)           Ordered     Apply sequential compression device to lower extremities (if no contraindications). Medical venous thromboembolus prophylaxis is preferred.  Until discontinued         01/15/24 0625     IP VTE HIGH RISK PATIENT  Once         01/14/24 1337     Place sequential compression device  Until discontinued         01/14/24 1337                    Discharge Planning   ZAY: 1/23/2024     Code Status: Full Code   Is the patient medically ready for discharge?:     Reason for patient still in hospital (select all that apply): Patient trending condition  Discharge Plan A: Home with family   Discharge Delays: None known at this time              Bette Feliciano MD  Department of Hospital Medicine   [facility] Surg

## 2024-01-22 NOTE — PLAN OF CARE
[facility] Med Surg  Discharge Final Note    Primary Care Provider: Jaime Jauregui (Inactive)    Expected Discharge Date: 1/23/2024    Final Discharge Note (most recent)       Final Note - 01/22/24 1458          Final Note    Assessment Type Final Discharge Note     Anticipated Discharge Disposition Home-Health Care [facility]     Hospital Resources/Appts/Education Provided Post-Acute resouces added to AVS        Post-Acute Status    Post-Acute Authorization HME     HME Status Referrals Sent     Discharge Delays None known at this time                     Important Message from Medicare

## 2024-01-22 NOTE — ASSESSMENT & PLAN NOTE
- Living kidney transplant on 7/26/10.    - BL creatinine was 1.3-1.7 till December 2023 when he developed ARF due to diffuse proliferative GN and mild  TCMR. Treated with steroid. Due to nephrotic syndrome  and hypoalbuminemia, started eliquis for anticoagulation. He was discharged on pred taper, cellcept, and prograf. At the time of discharge on 12/23/23  Cr was 3.    Plan:  - Okay for Lasix PO 40mg BID  - Prednisone 30mg For Diffuse proliferative GN. Will need close outpatient follow up with Nephrology and transplant nephrology.   - Remove wade cath. Obtain post void residual. If signs of retention will need wade placed again and urology consult for outpatient follow up.   -- Avoid nephrotoxins, volume shifts, aim for BP within target to prevent additional renal injury  - Avoid nephrotoxic agents (NSAIDs, IV contrast dye, ACEI/ARB anti-HTN medications, Aminoglycoside-containing antibiotics)  - Renally dose all appropriate medications, including antibiotics

## 2024-01-22 NOTE — ASSESSMENT & PLAN NOTE
- nursing reports black BM 1/21  - H/H stable and on PPI  - monitor H/H and hold AC  - if H/H dropping and melena continues, will consult GI    - reporting normal BM no melena noted

## 2024-01-22 NOTE — PROGRESS NOTES
[facility] Med S[facility]  Nephrology  Progress Note    Patient Name: Ubaldo Tripp  MRN: [id number]  Admission Date: 1/14/2024  Hospital Length of Stay: 8 days  Attending Provider: Bette Feliciano MD   Primary Care Physician: Jaime Jauregui (Inactive)  Principal Problem:Acute hypoxic respiratory failure    Subjective:     HPI: He is 39 yo White male who received a living kidney transplant on 7/26/10.  his creatinine was 1.3-18 till December 2023 when he developed ARFafter not  taking IS meds for 5 weeks. Transferred to [facility] and kidney biopsy resulted with diffuse proliferative GN as well as mild TCMR. Treated with steroid. DSA 12/19/23 with +DSAs. had IVIG outpatient. Due to nephrotic syndrome and hypoalbuminemia, started eliquis for anticoagulation. He was discharged on pred taper, cellcept, and prograf. At the time of discharge Cr was 3.         transferred to [facility] MICU for acute hypoxemic respiratory failure, bacteremia. On 1/5/24 he was admitted to [facility] in [place] with abnormal labs. Was found to be COVID positive.  Blood culture with Staph haemolyticus. Abdominal US on 1/6 noted cirrhotic appearing liver.  CT chest on 1/7 showed consolidated infiltrates and/or atelectasis involving the bilateral lung bases with small bilateral pleural effusions, patchy heterogeneous infiltrate involving the right middle lobe, and mild ascites. The patient was initially going to transfer to hospital medicine however he was intubated prior to transfer and was accepted to [facility] MICU for a higher level of care.  He arrived on propofol and fentanyl.  KTM and transplant ID consulted on admission.    Interval History:     No acute events overnight. Patient eager for discharge. Scr stable at 1.6 today. UOP stable with Lasix 40mg BID. Peripheral edema improved but still significant.     Notable that patient displays bizarre affect. Noted by nursing staff to lay in feces and refuse to get up out of bed. Furthermore noted  to refuse O2 via nasal cannula. Unclear regarding if patient will be able to take care of himself if discharged.     Review of patient's allergies indicates:   Allergen Reactions    Ibuprofen Other (See Comments)    Penicillins      Other reaction(s): Hives  Other reaction(s): Edema     Current Facility-Administered Medications   Medication Frequency    albuterol-ipratropium 2.5 mg-0.5 mg/3 mL nebulizer solution 3 mL Q6H    amLODIPine tablet 10 mg Daily    atovaquone 750 mg/5 mL oral liquid 1,500 mg Daily    carvediloL tablet 12.5 mg BID    dextrose 10% bolus 125 mL 125 mL PRN    dextrose 10% bolus 250 mL 250 mL PRN    furosemide tablet 40 mg BID    glucagon (human recombinant) injection 1 mg PRN    naloxone 0.4 mg/mL injection 0.02 mg PRN    ondansetron injection 4 mg Q6H PRN    pantoprazole EC tablet 40 mg Daily    polyethylene glycol packet 17 g Daily    predniSONE tablet 30 mg Daily    senna-docusate 8.6-50 mg per tablet 1 tablet BID    sodium chloride 0.9% flush 10 mL Q12H PRN    tacrolimus (PROGRAF) 1 mg/mL oral syringe BID    valGANciclovir 50 mg/ml oral solution 450 mg Daily       Objective:     Vital Signs (Most Recent):  Temp: 98.5 °F (36.9 °C) (01/22/24 0838)  Pulse: 72 (01/22/24 0838)  Resp: 18 (01/22/24 0838)  BP: 134/87 (01/22/24 0838)  SpO2: (!) 83 % (01/22/24 0838) Vital Signs (24h Range):  Temp:  [98.1 °F (36.7 °C)-98.9 °F (37.2 °C)] 98.5 °F (36.9 °C)  Pulse:  [58-82] 72  Resp:  [16-20] 18  SpO2:  [83 %-92 %] 83 %  BP: (125-145)/(78-96) 134/87     Weight: 132.5 kg (292 lb) (01/16/24 0906)  Body mass index is 38.52 kg/m².  Body surface area is 2.61 meters squared.    I/O last 3 completed shifts:  In: 1320 [P.O.:1320]  Out: 4450 [Urine:4450]     Physical Exam  Vitals and nursing note reviewed.   Constitutional:       General: He is not in acute distress.     Appearance: Normal appearance.   HENT:      Head: Normocephalic and atraumatic.      Nose: Nose normal.      Mouth/Throat:      Mouth: Mucous  membranes are moist.      Pharynx: Oropharynx is clear.   Eyes:      Extraocular Movements: Extraocular movements intact.      Conjunctiva/sclera: Conjunctivae normal.      Pupils: Pupils are equal, round, and reactive to light.   Cardiovascular:      Rate and Rhythm: Normal rate and regular rhythm.      Pulses: Normal pulses.      Heart sounds: Normal heart sounds.   Pulmonary:      Effort: Pulmonary effort is normal. No respiratory distress.      Breath sounds: Normal breath sounds.   Abdominal:      General: Abdomen is flat. Bowel sounds are normal.      Palpations: Abdomen is soft.   Musculoskeletal:         General: Normal range of motion.      Cervical back: Normal range of motion and neck supple.      Right lower leg: Edema present.      Left lower leg: Edema present.   Skin:     General: Skin is warm and dry.   Neurological:      General: No focal deficit present.      Mental Status: He is alert and oriented to person, place, and time.   Psychiatric:         Mood and Affect: Mood normal.         Behavior: Behavior normal.          Significant Labs:  All labs within the past 24 hours have been reviewed.     Significant Imaging:  Labs: Reviewed  Assessment/Plan:     Renal/  Acute on chronic renal failure  - Living kidney transplant on 7/26/10.    - BL creatinine was 1.3-1.7 till December 2023 when he developed ARF due to diffuse proliferative GN and mild  TCMR. Treated with steroid. Due to nephrotic syndrome  and hypoalbuminemia, started eliquis for anticoagulation. He was discharged on pred taper, cellcept, and prograf. At the time of discharge on 12/23/23  Cr was 3.    Plan:  - Okay for Lasix PO 40mg BID  - Prednisone 30mg For Diffuse proliferative GN. Will need close outpatient follow up with Nephrology and transplant nephrology.   - Remove wade cath. Obtain post void residual. If signs of retention will need wade placed again and urology consult for outpatient follow up.   -- Avoid nephrotoxins, volume  "shifts, aim for BP within target to prevent additional renal injury  - Avoid nephrotoxic agents (NSAIDs, IV contrast dye, ACEI/ARB anti-HTN medications, Aminoglycoside-containing antibiotics)  - Renally dose all appropriate medications, including antibiotics    Living-donor kidney transplant recipient - 7/26/10  living kidney transplant on 7/26/10 from brother  S/p Renal transplant biopsy with : "12/20/23 bx [GENNY] 1/11 glomeruli globally sclerosed. 5-10% interstitial fibrosis. 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 Mild microcirculation inflammation with neg C4d. 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     Palliative Care  Long-term use of immunosuppressant medication   - Resume home dose of MMF 1g BID  - Continue Prograf 3mg BID  - Prednisone 30mg QD with close outpatient nephrology follow up.   - obtain tacro level qAM. Will titrate based on results.      Once COVID dexamethasone is complete, plan to start PO prednisone 30mg QD, with plans to taper.     Other  * Acute hypoxic respiratory failure  Per primary team  Noted to refuse O2  Lasix 40mg BID        Thank you for your consult. I will follow-up with patient. Please contact us if you have any additional questions.    Darek Blue, DO  Nephrology  [facility] - Med Surg  "

## 2024-01-22 NOTE — PLAN OF CARE
Home o2 portable tank brought to the room by Ed Geronimo placed call to spouse at [phone] asking if she will have a ride for Pt but not to mobile [place]. Ed informed via VM that he would provide transport locally if needed and asked wife to call the nurses station and ask for nurse. Nursing updated and instructed them to contact on call CM/ED for assistance.

## 2024-01-22 NOTE — PLAN OF CARE
Problem: Adult Inpatient Plan of Care  Goal: Plan of Care Review  Outcome: Ongoing, Progressing  Flowsheets (Taken 1/22/2024 0448)  Plan of Care Reviewed With: patient  Goal: Patient-Specific Goal (Individualized)  Outcome: Ongoing, Progressing  Flowsheets (Taken 1/22/2024 0448)  Anxieties, Fears or Concerns: NONE  Individualized Care Needs: monitor saturation, strict i and o, Mayen's care  Goal: Absence of Hospital-Acquired Illness or Injury  Outcome: Ongoing, Progressing  Intervention: Identify and Manage Fall Risk  Flowsheets (Taken 1/22/2024 0448)  Safety Promotion/Fall Prevention:   assistive device/personal item within reach   bed alarm set   lighting adjusted   medications reviewed  Intervention: Prevent Skin Injury  Flowsheets (Taken 1/22/2024 0448)  Body Position: position changed independently  Skin Protection: adhesive use limited  Intervention: Prevent and Manage VTE (Venous Thromboembolism) Risk  Flowsheets (Taken 1/22/2024 0448)  Activity Management:   Arm raise - L1   Rolling - L1  VTE Prevention/Management: bleeding risk assessed  Range of Motion: ROM (range of motion) performed  Intervention: Prevent Infection  Flowsheets (Taken 1/22/2024 0448)  Infection Prevention:   cohorting utilized   environmental surveillance performed   equipment surfaces disinfected   hand hygiene promoted   personal protective equipment utilized   rest/sleep promoted   single patient room provided   visitors restricted/screened  Goal: Optimal Comfort and Wellbeing  Outcome: Ongoing, Progressing  Intervention: Monitor Pain and Promote Comfort  Flowsheets (Taken 1/22/2024 0448)  Pain Management Interventions:   care clustered   quiet environment facilitated   relaxation techniques promoted     Problem: Infection  Goal: Absence of Infection Signs and Symptoms  Outcome: Ongoing, Progressing  Intervention: Prevent or Manage Infection  Flowsheets (Taken 1/22/2024 0448)  Fever Reduction/Comfort Measures: lightweight  clothing  Infection Management: aseptic technique maintained  Isolation Precautions:   precautions maintained   airborne   contact     Problem: Fluid Imbalance (Pneumonia)  Goal: Fluid Balance  Outcome: Ongoing, Progressing     Problem: Infection (Pneumonia)  Goal: Resolution of Infection Signs and Symptoms  Outcome: Ongoing, Progressing  Intervention: Prevent Infection Progression  Flowsheets (Taken 1/22/2024 0448)  Fever Reduction/Comfort Measures: lightweight clothing  Infection Management: aseptic technique maintained  Isolation Precautions:   precautions maintained   airborne   contact     Problem: Fall Injury Risk  Goal: Absence of Fall and Fall-Related Injury  Outcome: Ongoing, Progressing  Intervention: Identify and Manage Contributors  Flowsheets (Taken 1/22/2024 0448)  Self-Care Promotion:   independence encouraged   BADL personal objects within reach  Medication Review/Management: medications reviewed  Intervention: Promote Injury-Free Environment  Flowsheets (Taken 1/22/2024 0448)  Safety Promotion/Fall Prevention:   assistive device/personal item within reach   bed alarm set   lighting adjusted   medications reviewed     Problem: Adult Inpatient Plan of Care  Goal: Readiness for Transition of Care  Outcome: Ongoing, Not Progressing     Problem: Respiratory Compromise (Pneumonia)  Goal: Effective Oxygenation and Ventilation  Outcome: Ongoing, Not Progressing  Intervention: Promote Airway Secretion Clearance  Flowsheets (Taken 1/22/2024 0448)  Breathing Techniques/Airway Clearance: deep/controlled cough encouraged  Cough And Deep Breathing: done independently per patient  Intervention: Optimize Oxygenation and Ventilation  Flowsheets (Taken 1/22/2024 0448)  Airway/Ventilation Management: airway patency maintained  Head of Bed (HOB) Positioning: HOB elevated       Patient still saturating between 87-88% on room air. Not compliant with oxygen therapy. Reminded to put oxygen on once he's running out of  breath and to not remove the continuous pulse ox monitoring.

## 2024-01-22 NOTE — PLAN OF CARE
Pt will need home o2 and HH, Pt from [place], HH will have to be arranged by his PCP back home. Ed called [facility] at  to see about home o2 as [facility] can not service the o2 due to out of state Pt. Ed spoke with Celena at [facility] at , faxed face sheet, h & p, walk test and home o2 orders to . They will contact Pt and/or spouse. Nursing updated.

## 2024-01-22 NOTE — ASSESSMENT & PLAN NOTE
Patient with Hypoxic Respiratory failure which is Acute.  he is not on home oxygen. Supplemental oxygen was provided and noted-      .   Signs/symptoms of respiratory failure include- increased work of breathing and respiratory distress. Contributing diagnoses includes - ARDS, Pneumonia, and COVID , volume overload. Labs and images were reviewed. Patient Has recent ABG, which has been reviewed. Will treat underlying causes and adjust management of respiratory failure as follows-     - intubated in ICU, extubated 1/17 to NC, wean O2 as tolerated  - s/p COVID treatment with  remdesivir, cont steroids  - cont IV diuresis- decrease to lasix 40 BID and stop metolazone    - patient noncompliant with wearing NC- refusing to wear O2, home o2 ordered

## 2024-01-22 NOTE — PROGRESS NOTES
"[facility] - Med Surg  Adult Nutrition  Progress Note    SUMMARY       Recommendations    1. Continue Renal Diet. If PO intake does not improve recommend liberalizing diet.   2. Recommend Novasource Renal with all meals. (RD ordered)   3. Consider renal friendly MVI.   4. Consider appetite stimulant.   5. RD to monitor and follow-up.    Goals: Meet % EEN, EPN by RD f/u date  Nutrition Goal Status: new  Communication of RD Recs: other comment ((POC))    Assessment and Plan    Nutrition Problem  Inadequate oral intake    Related to (etiology):   Inability to consume sufficient energy    Signs and Symptoms (as evidenced by):   PO intake at meals ~0-25%      Interventions/Recommendations (treatment strategy):  Collaboration of nutrition care with other providers    Nutrition Diagnosis Status:   New      Reason for Assessment    Reason For Assessment: RD follow-up  Diagnosis: other (see comments) (Resp. fx)  Relevant Medical History: Kidney tx, HTN  Interdisciplinary Rounds: attended  General Information Comments: RD follow-up. Contact isolation. RD spoke with RN. TF order but patient is not receiving TF. PO intake 0-25% at meals. Renal Diet. RD added ONS- Novasource Renal TID. Patient is at risk for malnutrition.  Nutrition Discharge Planning: Pending clinical course    Nutrition Risk Screen    Nutrition Risk Screen: no indicators present    Nutrition/Diet History    Spiritual, Cultural Beliefs, Mormon Practices, Values that Affect Care: no  Factors Affecting Nutritional Intake: decreased appetite    Anthropometrics    Temp: 98.5 °F (36.9 °C)  Height: 6' 1" (185.4 cm)  Height (inches): 73 in  Weight Method: Bed Scale  Weight: 132.5 kg (292 lb)  Weight (lb): 292 lb  Ideal Body Weight (IBW), Male: 184 lb  % Ideal Body Weight, Male (lb): 158.7 %  BMI (Calculated): 38.5  BMI Grade: 35 - 39.9 - obesity - grade II       Lab/Procedures/Meds    Pertinent Labs Reviewed: reviewed  Pertinent Labs Comments: H/H: 9.6/29.0, " BUN 74, Creat 1.6, GFR 55.5, Ca 7.8,   Pertinent Medications Reviewed: reviewed  Pertinent Medications Comments: lasix, pantoprazole, amlodipine, carvedilol, prednisone, senna-docusate, tacrolimus    Estimated/Assessed Needs    Weight Used For Calorie Calculations: 132.8 kg (292 lb 12.3 oz)  Energy Calorie Requirements (kcal): 2448 kcal/d  Energy Need Method: [facility]  Protein Requirements: 122-162 g/d (1.5-2 g/kg IBW)  Weight Used For Protein Calculations: 81 kg (178 lb 9.2 oz)     Estimated Fluid Requirement Method: other (see comments) (Per MD)  RDA Method (mL): 2448         Nutrition Prescription Ordered    Current Diet Order: Renal Diet    Evaluation of Received Nutrient/Fluid Intake    Other Calories (kcal): 1051 (Propofol)  I/O: -1.43 L  Comments: LBM: 1/21  % Intake of Estimated Energy Needs: 75 - 100 %  % Meal Intake: 0 - 25 %    Nutrition Risk    Level of Risk/Frequency of Follow-up:  (1x/week)     Monitor and Evaluation    Food and Nutrient Intake: enteral nutrition intake, food and beverage intake, energy intake  Food and Nutrient Adminstration: diet order, enteral and parenteral nutrition administration  Physical Activity and Function: nutrition-related ADLs and IADLs  Anthropometric Measurements: weight, weight change  Biochemical Data, Medical Tests and Procedures: inflammatory profile, lipid profile, glucose/endocrine profile, gastrointestinal profile, electrolyte and renal panel  Nutrition-Focused Physical Findings: overall appearance     Nutrition Follow-Up    RD Follow-up?: Yes    Layo Yi Registration Eligible, Provisional LDN

## 2024-01-23 VITALS
DIASTOLIC BLOOD PRESSURE: 85 MMHG | WEIGHT: 292 LBS | HEIGHT: 73 IN | SYSTOLIC BLOOD PRESSURE: 138 MMHG | RESPIRATION RATE: 16 BRPM | HEART RATE: 75 BPM | TEMPERATURE: 98 F | BODY MASS INDEX: 38.7 KG/M2 | OXYGEN SATURATION: 91 %

## 2024-01-23 PROBLEM — Z53.29 REFUSAL OF CARE BY PATIENT: Status: ACTIVE | Noted: 2024-01-23

## 2024-01-23 LAB — TACROLIMUS BLD-MCNC: 8.9 NG/ML (ref 5–15)

## 2024-01-23 PROCEDURE — 97535 SELF CARE MNGMENT TRAINING: CPT

## 2024-01-23 PROCEDURE — 25000003 PHARM REV CODE 250

## 2024-01-23 PROCEDURE — 94761 N-INVAS EAR/PLS OXIMETRY MLT: CPT

## 2024-01-23 PROCEDURE — 25000003 PHARM REV CODE 250: Performed by: INTERNAL MEDICINE

## 2024-01-23 PROCEDURE — 97116 GAIT TRAINING THERAPY: CPT

## 2024-01-23 PROCEDURE — 25000003 PHARM REV CODE 250: Performed by: STUDENT IN AN ORGANIZED HEALTH CARE EDUCATION/TRAINING PROGRAM

## 2024-01-23 PROCEDURE — 99900035 HC TECH TIME PER 15 MIN (STAT)

## 2024-01-23 PROCEDURE — 25000242 PHARM REV CODE 250 ALT 637 W/ HCPCS

## 2024-01-23 PROCEDURE — 63600175 PHARM REV CODE 636 W HCPCS: Performed by: STUDENT IN AN ORGANIZED HEALTH CARE EDUCATION/TRAINING PROGRAM

## 2024-01-23 PROCEDURE — 94640 AIRWAY INHALATION TREATMENT: CPT

## 2024-01-23 PROCEDURE — 97530 THERAPEUTIC ACTIVITIES: CPT

## 2024-01-23 RX ORDER — FUROSEMIDE 20 MG/1
40 TABLET ORAL 2 TIMES DAILY
Qty: 120 TABLET | Refills: 11 | Status: SHIPPED | OUTPATIENT
Start: 2024-01-23 | End: 2024-01-23

## 2024-01-23 RX ORDER — CALCIUM CARBONATE 200(500)MG
1000 TABLET,CHEWABLE ORAL DAILY
Qty: 60 TABLET | Refills: 11 | Status: SHIPPED | OUTPATIENT
Start: 2024-01-24 | End: 2024-01-23

## 2024-01-23 RX ORDER — FUROSEMIDE 20 MG/1
40 TABLET ORAL 2 TIMES DAILY
Qty: 120 TABLET | Refills: 11 | Status: SHIPPED | OUTPATIENT
Start: 2024-01-23 | End: 2024-02-01 | Stop reason: DRUGHIGH

## 2024-01-23 RX ORDER — CALCIUM CARBONATE 200(500)MG
1000 TABLET,CHEWABLE ORAL DAILY
Status: DISCONTINUED | OUTPATIENT
Start: 2024-01-23 | End: 2024-01-23 | Stop reason: HOSPADM

## 2024-01-23 RX ORDER — PANTOPRAZOLE SODIUM 40 MG/1
40 TABLET, DELAYED RELEASE ORAL DAILY
Qty: 30 TABLET | Refills: 11 | Status: SHIPPED | OUTPATIENT
Start: 2024-01-24 | End: 2024-02-01 | Stop reason: SDUPTHER

## 2024-01-23 RX ORDER — MYCOPHENOLATE MOFETIL 250 MG/1
1000 CAPSULE ORAL 2 TIMES DAILY
Qty: 240 CAPSULE | Refills: 11 | Status: SHIPPED | OUTPATIENT
Start: 2024-01-23 | End: 2024-01-23

## 2024-01-23 RX ORDER — PANTOPRAZOLE SODIUM 40 MG/1
40 TABLET, DELAYED RELEASE ORAL DAILY
Qty: 30 TABLET | Refills: 11 | Status: SHIPPED | OUTPATIENT
Start: 2024-01-24 | End: 2024-01-23

## 2024-01-23 RX ORDER — SEVELAMER CARBONATE 800 MG/1
800 TABLET, FILM COATED ORAL
Qty: 90 TABLET | Refills: 11 | Status: SHIPPED | OUTPATIENT
Start: 2024-01-23 | End: 2024-02-01 | Stop reason: ALTCHOICE

## 2024-01-23 RX ORDER — VALGANCICLOVIR 450 MG/1
450 TABLET, FILM COATED ORAL DAILY
Qty: 30 TABLET | Refills: 5 | Status: SHIPPED | OUTPATIENT
Start: 2024-01-23 | End: 2024-02-01 | Stop reason: DRUGHIGH

## 2024-01-23 RX ORDER — TACROLIMUS 1 MG/1
3 CAPSULE ORAL EVERY 12 HOURS
Qty: 240 CAPSULE | Refills: 11 | Status: SHIPPED | OUTPATIENT
Start: 2024-01-23 | End: 2024-02-01 | Stop reason: DRUGHIGH

## 2024-01-23 RX ORDER — MYCOPHENOLATE MOFETIL 250 MG/1
1000 CAPSULE ORAL 2 TIMES DAILY
Qty: 240 CAPSULE | Refills: 11 | Status: SHIPPED | OUTPATIENT
Start: 2024-01-23 | End: 2024-03-04

## 2024-01-23 RX ORDER — SODIUM BICARBONATE 650 MG/1
1300 TABLET ORAL 3 TIMES DAILY
Qty: 120 TABLET | Refills: 5 | Status: SHIPPED | OUTPATIENT
Start: 2024-01-23 | End: 2024-02-01 | Stop reason: ALTCHOICE

## 2024-01-23 RX ORDER — CALCIUM CARBONATE 200(500)MG
1000 TABLET,CHEWABLE ORAL DAILY
Qty: 60 TABLET | Refills: 11 | Status: SHIPPED | OUTPATIENT
Start: 2024-01-24 | End: 2025-01-23

## 2024-01-23 RX ORDER — PREDNISONE 20 MG/1
30 TABLET ORAL DAILY
Qty: 30 TABLET | Refills: 0 | Status: SHIPPED | OUTPATIENT
Start: 2024-01-23 | End: 2024-01-23

## 2024-01-23 RX ORDER — NIFEDIPINE 60 MG/1
60 TABLET, EXTENDED RELEASE ORAL 2 TIMES DAILY
Qty: 60 TABLET | Refills: 4 | Status: SHIPPED | OUTPATIENT
Start: 2024-01-23

## 2024-01-23 RX ORDER — ATOVAQUONE 750 MG/5ML
1500 SUSPENSION ORAL DAILY
Qty: 300 ML | Refills: 0 | Status: SHIPPED | OUTPATIENT
Start: 2024-01-24 | End: 2024-02-01 | Stop reason: ALTCHOICE

## 2024-01-23 RX ORDER — PREDNISONE 20 MG/1
30 TABLET ORAL DAILY
Qty: 30 TABLET | Refills: 0 | Status: SHIPPED | OUTPATIENT
Start: 2024-01-23 | End: 2024-02-01 | Stop reason: DRUGHIGH

## 2024-01-23 RX ORDER — TACROLIMUS 1 MG/1
3 CAPSULE ORAL EVERY 12 HOURS
Qty: 240 CAPSULE | Refills: 11 | Status: SHIPPED | OUTPATIENT
Start: 2024-01-23 | End: 2024-01-23

## 2024-01-23 RX ORDER — CARVEDILOL 12.5 MG/1
12.5 TABLET ORAL 2 TIMES DAILY
Qty: 60 TABLET | Refills: 5 | Status: SHIPPED | OUTPATIENT
Start: 2024-01-23

## 2024-01-23 RX ORDER — OLMESARTAN MEDOXOMIL 40 MG/1
40 TABLET ORAL DAILY
Qty: 90 TABLET | Refills: 3 | Status: SHIPPED | OUTPATIENT
Start: 2024-01-23

## 2024-01-23 RX ORDER — ATOVAQUONE 750 MG/5ML
1500 SUSPENSION ORAL DAILY
Qty: 300 ML | Refills: 0 | Status: SHIPPED | OUTPATIENT
Start: 2024-01-24 | End: 2024-01-23

## 2024-01-23 RX ADMIN — PANTOPRAZOLE SODIUM 40 MG: 40 TABLET, DELAYED RELEASE ORAL at 09:01

## 2024-01-23 RX ADMIN — ATOVAQUONE 1500 MG: 750 SUSPENSION ORAL at 09:01

## 2024-01-23 RX ADMIN — PREDNISONE 30 MG: 20 TABLET ORAL at 10:01

## 2024-01-23 RX ADMIN — FUROSEMIDE 40 MG: 40 TABLET ORAL at 09:01

## 2024-01-23 RX ADMIN — POLYETHYLENE GLYCOL 3350 17 G: 17 POWDER, FOR SOLUTION ORAL at 09:01

## 2024-01-23 RX ADMIN — AMLODIPINE BESYLATE 10 MG: 10 TABLET ORAL at 09:01

## 2024-01-23 RX ADMIN — SENNOSIDES AND DOCUSATE SODIUM 1 TABLET: 8.6; 5 TABLET ORAL at 09:01

## 2024-01-23 RX ADMIN — VALGANCICLOVIR HYDROCHLORIDE 450 MG: 50 POWDER, FOR SOLUTION ORAL at 09:01

## 2024-01-23 RX ADMIN — TACROLIMUS 3 MG: 1 CAPSULE ORAL at 08:01

## 2024-01-23 RX ADMIN — IPRATROPIUM BROMIDE AND ALBUTEROL SULFATE 3 ML: 2.5; .5 SOLUTION RESPIRATORY (INHALATION) at 02:01

## 2024-01-23 RX ADMIN — CARVEDILOL 12.5 MG: 12.5 TABLET, FILM COATED ORAL at 09:01

## 2024-01-23 NOTE — PROGRESS NOTES
[employer] for home O2, pt is to call [phone] when he is 45 min from his home so O2 can be delivered.Portable tank was delivered to the bedside last PM.

## 2024-01-23 NOTE — PLAN OF CARE
[facility] - Med Surg  Discharge Final Note    Primary Care Provider: Jaime Jauregui (Inactive)    Expected Discharge Date: 1/23/2024    Final Discharge Note (most recent)       Final Note - 01/23/24 0921          Final Note    Assessment Type Final Discharge Note     Anticipated Discharge Disposition Home or Self Care     Hospital Resources/Appts/Education Provided Post-Acute resouces added to AVS        Post-Acute Status    Post-Acute Authorization HME     E Status Set-up Complete/Auth obtained     Discharge Delays None known at this time                     Important Message from Medicare             Contact Info       Gladys   Specialty: DME Provider    [address]  [address]  [place] [address]   Phone: [phone]       Next Steps: Follow up today

## 2024-01-23 NOTE — PROGRESS NOTES
Pt being discharged today, contacted pts wife Nusrat with need for transport. Nusrat is calling to speak to pt and arrange a ride for him to get home. I asked she call me with information and ETA, she agreed.

## 2024-01-23 NOTE — CLINICAL REVIEW
KTM Chart Review      Intake/Output Summary (Last 24 hours) at 1/23/2024 1342  Last data filed at 1/23/2024 0745  Gross per 24 hour   Intake 0 ml   Output --   Net 0 ml       Vitals:    01/23/24 0528 01/23/24 0745 01/23/24 0900 01/23/24 1141   BP: (!) 153/89 129/80  129/87   BP Location:  Right arm     Patient Position:  Lying     Pulse: 69 70  85   Resp: 16 16  16   Temp: 98.3 °F (36.8 °C) 97.2 °F (36.2 °C)  98.4 °F (36.9 °C)   TempSrc: Oral Oral  Oral   SpO2: (!) 87% (!) 88% (!) 88% (!) 92%   Weight:       Height:           Recent Labs   Lab 01/20/24  1357 01/21/24  0934 01/22/24  1259    139 136   K 4.2 3.9 3.7    104 103   CO2 29 28 28   BUN 77* 74* 70*   CREATININE 1.7* 1.6* 1.9*   CALCIUM 8.2* 7.8* 7.6*   PHOS 3.3 2.9 2.7     Patient to be discharge today after able to walk with PT per primary team.     Patient informed to follow up with Dr. Garcia (transplant nephrology) on 01/31/24. Also informed to follow [facility].     Immunosuppression   - Resume home dose of MMF 1g BID  - Continue Prograf 3mg BID  - Prednisone 30mg QD with close outpatient nephrology follow up. Plan to taper after.   - Atovaquone PJP prophylaxis.   - PPI prophylaxis    No other related issues identified. Please call KTM as needed; We will continue to follow.    Darek Blue

## 2024-01-23 NOTE — PT/OT/SLP PROGRESS
"Physical Therapy Treatment/partial co treat with OT    Patient Name:  Ubaldo Tripp   MRN:  [id number]    Recommendations:     Discharge Recommendations: High Intensity Therapy (pt states he wants to go home)  Discharge Equipment Recommendations: none  Barriers to discharge: Inaccessible home 3 ARACELI    Assessment:     Ubaldo Tripp is a 38 y.o. male admitted with a medical diagnosis of Acute hypoxic respiratory failure.  He presents with the following impairments/functional limitations: weakness, impaired endurance, impaired functional mobility, impaired cardiopulmonary response to activity . Pt requires no assist for bed mobility, SBA for transfers, and CGA for gait due to pt reaching for the wall and furniture for support. Pt is motivated to progress with functional mobility.     Rehab Prognosis: Good; patient would benefit from acute skilled PT services to address these deficits and reach maximum level of function.    Recent Surgery: * No surgery found *      Plan:     During this hospitalization, patient to be seen 4 x/week to address the identified rehab impairments via gait training, therapeutic activities, therapeutic exercises and progress toward the following goals:    Plan of Care Expires:  02/18/24    Subjective   "I want this IV line out so I can put on my shirt" (PT spoke with the nurse who stated the line had to remain in until he was D/Bill; PT explained this to the pt and he agreed to walk in the room)    Pain/Comfort:  Pain Rating 1:  (pt c/o back pain due to being in bed so long; did not grade)  Location - Orientation 1: generalized  Location 1: back  Pain Addressed 1: Reposition, Cessation of Activity  Pain Rating Post-Intervention 1: 0/10 (pt had no pain complaints sitting on the EOB)      Objective:     Communicated with nurse prior to session.  Patient found HOB elevated with  (pt had lines in the room :oxygen, telemetry, and IV but none were connected upon PT arrival) upon PT entry to " room.     General Precautions: Standard, aspiration, airborne, fall, droplet  Orthopedic Precautions: N/A  Braces: N/A  Respiratory Status: Room air     Functional Mobility:  Bed Mobility:     Supine to Sit: modified independence  Transfers:     Sit to Stand:  stand by assistance with no AD  Gait: 20ft with no AD with CGA. Pt reaching for the walls and furniture for support during gait. No LOB noted.    AM-PAC 6 CLICK MOBILITY  Turning over in bed (including adjusting bedclothes, sheets and blankets)?: 4  Sitting down on and standing up from a chair with arms (e.g., wheelchair, bedside commode, etc.): 3  Moving from lying on back to sitting on the side of the bed?: 4  Moving to and from a bed to a chair (including a wheelchair)?: 3  Need to walk in hospital room?: 3  Climbing 3-5 steps with a railing?: 3  Basic Mobility Total Score: 20     Treatment & Education:  PT educated pt concerning the importance of OOB mobility. Pt just wanted PT to assist him with getting dressed. PT assisted pt with donning  socks then assisted pt with getting his LEs into his sweat pants. Pt able to bridge to don his sweat pants in supine.  Co-treat with OT due to complexity of pt and need for skilled hands for safe intervention.   Patient left sitting edge of bed with all lines intact, call button in reach, and nurse notified..    GOALS:   Multidisciplinary Problems       Physical Therapy Goals          Problem: Physical Therapy    Goal Priority Disciplines Outcome Goal Variances Interventions   Physical Therapy Goal     PT, PT/OT Ongoing, Progressing     Description: Goals to be met by: 24     Patient will increase functional independence with mobility by performin. Supine to sit with Contact Guard Assistance-met 24  2. Sit to supine with Contact Guard Assistance-met 24  3. Sit to stand transfer with Stand-by Assistance-met 24  4. Bed to chair transfer with Stand-by Assistance using No Assistive Device  5.  Gait  x 250 feet with Stand-by Assistance using No Assistive Device.   6. Ascend/descend 3 stair with bilateral Handrails Contact Guard Assistance using No Assistive Device.                          Time Tracking:     PT Received On: 01/23/24  PT Start Time: 1012     PT Stop Time: 1036 (PT returned to work with pt [id number] with OT)  PT Total Time (min): 24 min +10 min=34 min    Billable Minutes: Gait Training 10 and Therapeutic Activity 24    Treatment Type: Treatment  PT/PTA: PT     Number of PTA visits since last PT visit: 0     01/23/2024

## 2024-01-23 NOTE — PT/OT/SLP PROGRESS
Occupational Therapy   Treatment    Name: Ubaldo Tripp  MRN: [id number]  Admitting Diagnosis:  Acute hypoxic respiratory failure       Recommendations:     Discharge Recommendations: High Intensity Therapy  Discharge Equipment Recommendations:  none  Barriers to discharge:  Inaccessible home environment    Assessment:     Ubaldo Tripp is a 38 y.o. male with a medical diagnosis of Acute hypoxic respiratory failure.  He presents with c/o wanting his PIV however tolerated a dressing task and fxl mob. Performance deficits affecting function are weakness, impaired balance, impaired functional mobility, impaired endurance.     Rehab Prognosis:  Fair; patient would benefit from acute skilled OT services to address these deficits and reach maximum level of function.       Plan:     Patient to be seen 4 x/week to address the above listed problems via self-care/home management, therapeutic exercises, neuromuscular re-education, therapeutic activities  Plan of Care Expires: 02/19/24  Plan of Care Reviewed with: patient    Subjective     Patient/Family Comments/goals: Pt.reports he does not want to put his shirt on until his IV is out of his arm    Pain/Comfort:  Pain Rating 1:  (did not rate)  Location - Orientation 1: generalized  Location 1: back  Pain Addressed 1: Reposition, Distraction, Cessation of Activity  Pain Rating Post-Intervention 1:  (did not rate)    Objective:     Communicated with: Nurse prior to session.  Patient found HOB elevated with   upon OT entry to room.    General Precautions: Standard, aspiration, airborne, droplet, fall    Orthopedic Precautions:N/A  Braces: N/A  Respiratory Status: Room air     Occupational Performance:     Bed Mobility:    Patient completed Supine to Sit with modified independence     Functional Mobility/Transfers:  Patient completed Sit <> Stand Transfer with stand by assistance  with  no assistive device   Functional Mobility: Pt demonstrated functional mobility  training to simulate household distance at room level, no AD with contact guard assistance, occasionally reach for furniture during fxl mob and no LOB and fair visual search and navigation strategies.      Activities of Daily Living:  Upper Body Dressing: independence 1/2 way donned personal shirt seated at EOB   Lower Body Dressing: independence donned personal pants at bed level, per PT reports 1st visit  Simulated donning socks evident by touching toes at EOB  Decline all further ADLs    [facility] 6 Click ADL: 20    Treatment & Education:  Pt educated on role of occupational therapy, POC, and safety during ADLs and functional mobility. Pt and OT discussed importance of safe, continued mobility to optimize daily living skills. Pt verbalized understanding. Pt given instruction to call for medical staff/nurse for assistance.   Partial Co-treatment with PT for maximal pt participation, safety, and activity tolerance     Patient left sitting edge of bed with all lines intact, call button in reach, and nurse made aware/ notified    GOALS:   Multidisciplinary Problems       Occupational Therapy Goals          Problem: Occupational Therapy    Goal Priority Disciplines Outcome Interventions   Occupational Therapy Goal     OT, PT/OT Ongoing, Progressing    Description: Goals to be met by: 2/19/24 (1 month)      Patient will increase functional independence with ADLs by performing:    Grooming while standing at sink with Summers.  Toileting from toilet with Summers for hygiene and clothing management.   Rolling to Bilateral with Summers.   Supine to sit with Summers.  Step transfer with Summers  Toilet transfer to toilet with Summers.                         Time Tracking:     OT Date of Treatment: 01/23/24  OT Start Time: 1045  OT Stop Time: 1054  OT Total Time (min): 9 min    Billable Minutes:Self Care/Home Management 8    OT/RONI: OT          1/23/2024

## 2024-01-23 NOTE — PLAN OF CARE
Sw again informed nursing that Sw can not assist Pt with ride back to [place], Sw to follow as needed. Pt has home o2 portable at the bedside.    IV discontinued, cath removed intact

## 2024-01-23 NOTE — PLAN OF CARE
Problem: Physical Therapy  Goal: Physical Therapy Goal  Description: Goals to be met by: 24     Patient will increase functional independence with mobility by performin. Supine to sit with Contact Guard Assistance-met 24  2. Sit to supine with Contact Guard Assistance-met 24  3. Sit to stand transfer with Stand-by Assistance-met 24  4. Bed to chair transfer with Stand-by Assistance using No Assistive Device  5. Gait  x 250 feet with Stand-by Assistance using No Assistive Device.   6. Ascend/descend 3 stair with bilateral Handrails Contact Guard Assistance using No Assistive Device.     Outcome: Ongoing, Progressing   Pt's goals revised as appropriate and pt will continue to benefit from skilled PT services to work towards improved functional mobility including: up/down steps and gait.   2024

## 2024-01-23 NOTE — PROGRESS NOTES
"Pt has been noncompliant with his plan of care including oxygen use and physical activity. Numerous attempts made to reason with pot to no avail. He is currently in the bed with his underwear on stating he put them on himself and that his ride is on the way to get him I explained to him the Dr discontinued the order to discharge because he would not allow the Drs, the therapist or me to evaluate his physical abilities.and he replied "oh well".  "

## 2024-01-24 LAB
CLASS I ANTIBODY COMMENTS - LUMINEX: NORMAL
CLASS II ANTIBODIES - LUMINEX: NORMAL
CLASS II ANTIBODY COMMENTS - LUMINEX: NORMAL
DSA1 TESTING DATE: NORMAL
DSA12 TESTING DATE: NORMAL
DSA2 TESTING DATE: NORMAL
SERUM COLLECTION DT - LUMINEX CLASS I: NORMAL
SERUM COLLECTION DT - LUMINEX CLASS II: NORMAL

## 2024-01-24 NOTE — ASSESSMENT & PLAN NOTE
Patient with kidney transplant 2010. He was previously admitted to [facility] December 18-23 with GENNY. 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  He was found to have nephrotic syndrome as well, started on eliquis.  He received pulse dose steroids and was started on Eliquis for anticoagulation. He was discharged on a prednisone taper, CellCept, and Prograf.  On December 23, his creatinine was 3.0. Current medications include CellCept, tacrolimus, prednisone, all of which he continued to receive at OSH.     - KTM following  - Continue immunosuppression  - Cr stable/improving. Holding CRRT.  - Gangciclovir for CMV prophylaxis   - Eliquis for nephrotic syndrome- hold in setting of potential melena  - no further concerns for melena as BM appear normal. Patient refusing labs for H/H monitoring. But on last check was stable. Counseled to monitor BM and stop eliquis and seek medical care should he have black BM.

## 2024-01-24 NOTE — DISCHARGE SUMMARY
[facility] Medicine  Discharge Summary      Patient Name: Ubaldo Tripp  MRN: [id number]  KIMMIE: [id number]  Patient Class: IP- Inpatient  Admission Date: 1/14/2024  Hospital Length of Stay: 9 days  Discharge Date and Time: 1/23/2024  5:43 PM  Attending Physician: No att. providers found   Discharging Provider: Bette Feliciano MD  Primary Care Provider: Jaime Jauregui (Inactive)  Hospital Medicine Team: [facility] MED S Bette Feliciano MD  Primary Care Team: [facility] MED S    HPI:   37 y/o M with a medical history of kidney transplant in 2010 and HTN transferred to [facility] MICU for acute hypoxemic respiratory failure, bacteremia, and hx of Kidney Transplant for KTM and Infectious Disease evaluation. Pt was previously admitted to [facility] 12/18/23 - 12/23/23 with GENNY, headache, and HTN.  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  He was found to have nephrotic syndrome as well.  Received pulse dose steroids and was started on Eliquis. Discharged on a prednisone taper, CellCept, and Prograf.  sCr 12/23 was 3.0.     On 1/5/24 he was admitted to [facility] in [place] with abnormal labs. Outpatient labs on 1/3 showed WBC 17.7 Hgb 13.1, K 5.7, bicarb 18, BUN 71, sCr 2.92, phos 5.1, and tacro level 10.8.  Was found to be COVID positive requiring BiPAP.  Blood culture with Staph haemolyticus.  Labs notable for sCr ranging 2.68 to 2.73.  K normalized.  Abdominal US on 1/6 noted cirrhotic appearing liver.  CT chest on 1/7 showed consolidated infiltrates and/or atelectasis involving the bilateral lung bases with small bilateral pleural effusions, patchy heterogeneous infiltrate involving the right middle lobe, and mild ascites.  Transplant renal ultrasound 1/8 showed stable appearance of the  transplant kidney without evidence of acute abnormality.  Gallbladder ultrasound 1/8 showed sludge in the gallbladder.  Medications prior to transfer include CellCept, tacrolimus, valganciclovir, prednisone, Eliquis, and meropenem.     The patient was initially going to transfer to hospital medicine however he was intubated prior to transfer and was accepted to [facility] MICU for a higher level of care.  He arrived on propofol and fentanyl.  KTM and transplant ID consulted on admission.    * No surgery found *      Hospital Course:   Pt transferred to [facility] for respiratory failure 2/2 COVID, intubated.  Renal txp patient who also had GENNY on admission, hx rejection, transplant following. Extubated 1/17. Received CRRT 1/15 and 1/16, no further needs for CRRT. Started on NC post extubation, weaning as tolerated.   No further plans for CRRT as volume is being managed with aggressive diuresis appropriately. Cont diuresis.     Patient noncompliant with medical care, refusing NC despite hypoxia. Refusing labs, refusing oxygen therapy, refusing to work with PT. Refusing to pull covers off of head and speak with physician. Yelled at physician to get out of room. Refusing to participate in care unless discharged.   Spoke with wife about concerns for discharge given his noncompliance with medical care. Ordered home O2 and referrals sent for followup.   Got up with PT and demonstrated he can walk. Will discharge as patient is no longer benefiting from hospitalization given his noncompliance. Home O2 ordered and meds ordered to home pharmacy.      Goals of Care Treatment Preferences:  Code Status: Full Code      Consults:   Consults (From admission, onward)          Status Ordering Provider     Inpatient consult to Psychiatry  Once        Provider:  (Not yet assigned)    Completed MANUEL BELTRAN     Inpatient consult to Registered Dietitian/Nutritionist  Once        Provider:  (Not yet assigned)    Completed DAVID QUINONES     Inpatient  consult to Kidney/Pancreas Transplant Medicine  Once        Provider:  (Not yet assigned)    Completed DAVID QUINONES     Inpatient consult to Infectious Diseases  Once        Provider:  (Not yet assigned)    Completed DAVID QUINONES            Renal/  Acute on chronic renal failure  Patient with acute kidney injury/acute renal failure likely due to  volume overload  GENNY is currently improving. Baseline creatinine  1.3  - Labs reviewed- Renal function/electrolytes with Estimated Creatinine Clearance: 73.8 mL/min (A) (based on SCr of 1.9 mg/dL (H)). according to latest data. Monitor urine output and serial BMP and adjust therapy as needed. Avoid nephrotoxins and renally dose meds for GFR listed above.    Cr was 1.3 in 2021, no other Cr results until the recent admission with Cr at 2.92. BL creatinine was 1.3-1.7 till December 2023 when he developed ARF due to diffuse proliferative GN and mild  TCMR. Treated with steroid. Due to nephrotic syndrome  and hypoalbuminemia, started eliquis for anticoagulation. He was discharged on pred taper, cellcept, and prograf. At the time of discharge on 12/23/23  Cr was 3.   With aggressive diuresis, Cr stable now at 1.3-1.6.      - KTM following  - Continue aggressive diuresis for volume overload- decrease IV lasix to BID- transition to PO, cont at discharge   - Strict intake/output  - Renally dose meds  - Avoid nephrotoxic agent  - f/u with nephrology     Living-donor kidney transplant recipient - 7/26/10  Patient with kidney transplant 2010. He was previously admitted to [facility] December 18-23 with GENNY. 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  He was found to have nephrotic syndrome as well, started on eliquis.  He received pulse dose steroids and was started on Eliquis for anticoagulation.  He was discharged on a prednisone taper, CellCept, and Prograf.  On December 23, his creatinine was 3.0. Current medications include CellCept, tacrolimus, prednisone, all of which he continued to receive at OSH.     - KTM following  - Continue immunosuppression  - Cr stable/improving. Holding CRRT.  - Gangciclovir for CMV prophylaxis   - Eliquis for nephrotic syndrome- hold in setting of potential melena  - no further concerns for melena as BM appear normal. Patient refusing labs for H/H monitoring. But on last check was stable. Counseled to monitor BM and stop eliquis and seek medical care should he have black BM.     ID  Bacteremia  Outside facility reported the following culture results:  12/29/23 Blood Cx: NGTD after 5 days  1/5/24: Staph Haemolyticus, positive in 2 different sets, the vials used were pediatric bottles, as pt was a difficult stick. Only 2 total bottles were collected.  1/6/24 Blood Cx: NGTD after 5 days.     - Per ID, discontinued all antibiotics    COVID  Patient is identified as Severe COVID-19 based on hypoxemia with O2 saturations <94% on room air or on ambulation   Initiate standard COVID protocols; COVID-19 testing ,Infection Control notification  and isolation- respiratory, contact and droplet per protocol    Diagnostics: CBC, CMP, Ferritin, CRP, and Portable CXR    Management: Initiate targeted therapy with Remdesivir, 200mg IV x1, followed by 100mg IV daily x5 days total and Dexamethasone PO/IV 6mg daily x10 days, Maintain oxygen saturations 92-96% via Nasal Cannula 3 LPM and monitor with continuous/intermittent pulse oximetry. , Inhaled bronchodilators as needed for shortness of breath., and Manage respiratory failure (O2 requirement >10LPM or needing NIPPV/Mechanical ventilation) and/or Pneumonia (active chest infiltrates) separately as described below.    Advance Care Planning Current advance care plan has been discussed with patient/family/POA and patient currently wishes Full  Code.    S/p remdesivir course and cont steroids 10 day course given severity and O2 requirements   - Off abx for potential pneumonia component as cultures have been negative     - patient non compliance with wearing NC O2, although hypoxia to 80s noted on RA, counseled on importance of wearing O2  - refusing to wear O2, home O2 ordered    Immunology/Multi System  Prophylactic immunotherapy   Off MMF due to COVID- resume at discharge per transplant recs  - Continue Prograf 3mg BID  - obtain tacro level qAM. Will titrate based on results.      Once COVID dexamethasone is complete, plan to start PO prednisone 30mg QD, with plans to taper.   - cont prednisone 30 at discharge, f/u with nephrology and transplant in regards to taper.       GI  Melena  - nursing reports black BM 1/21  - H/H stable and on PPI  - monitor H/H and hold AC  - if H/H dropping and melena continues, will consult GI    - reporting normal BM no melena noted  - no further melena noted, H/H stable on last check, patient refusing labs day of discharge  - resume eliquis and counseled on monitoring BM and if black, stop eliquis and seek medical care      Palliative Care  Refusal of care by patient  - throughout hospital stay patient refusing to participate in medical care: refusing to wear NC oxygen despite hypoxia and counseling multiple times each day by physician and nursing staff. Refusing lab draws for renal function monitoring and H/H monitoring. Refusing to work with PT/OT to regain strength to be stable for discharge.   - Rude to staff and yelled at physician and nursing to leave room  - Despite physician expressing concerns about him caring for himself at home, patient continued to refuse medical care insisting on being discharged  - patient no longer benefiting from hospital stay as he continues to be noncompliant with medical care, discharge with followup ordered  - he finally was able to demonstrate he could get out of bed safely so discharge  placed  - Home O2 ordered and counseled on importance of compliance  - expressed to wife concerns about caring for himself at home and she was agreeable to discharge       Long-term use of immunosuppressant medication  - cont meds per renal recs as above      Other  * Acute hypoxic respiratory failure  Patient with Hypoxic Respiratory failure which is Acute.  he is not on home oxygen. Supplemental oxygen was provided and noted-      .   Signs/symptoms of respiratory failure include- increased work of breathing and respiratory distress. Contributing diagnoses includes - ARDS, Pneumonia, and COVID , volume overload. Labs and images were reviewed. Patient Has recent ABG, which has been reviewed. Will treat underlying causes and adjust management of respiratory failure as follows-     - intubated in ICU, extubated 1/17 to NC, wean O2 as tolerated  - s/p COVID treatment with  remdesivir, cont steroids  - cont IV diuresis- decrease to lasix 40 BID and stop metolazone  - discharge on lasix 40 PO BID  - patient noncompliant with wearing NC- refusing to wear O2, home o2 ordered  - counseled on importance of wearing O2 but patient still refusing, Home O2 ordered and spoke with wife about importance of him wearing it       Final Active Diagnoses:    Diagnosis Date Noted POA    PRINCIPAL PROBLEM:  Acute hypoxic respiratory failure [J96.01] 01/14/2024 Yes    Refusal of care by patient [Z53.29] 01/23/2024 Not Applicable    Melena [K92.1] 01/21/2024 No    COVID [U07.1] 01/14/2024 Yes    Bacteremia [R78.81] 01/14/2024 Yes    Long-term use of immunosuppressant medication [Z79.60] 12/23/2023 Not Applicable    Acute on chronic renal failure [N17.9, N18.9] 12/18/2023 Yes    Prophylactic immunotherapy [Z29.89]  Not Applicable    Living-donor kidney transplant recipient - 7/26/10 [Z94.0] 07/26/2010 Not Applicable     Chronic      Problems Resolved During this Admission:    Diagnosis Date Noted Date Resolved POA    Pneumonia [J18.9]  "01/14/2024 01/19/2024 Yes    Transaminitis [R74.01] 01/14/2024 01/19/2024 Yes    Immunocompromised state due to drug therapy [D84.821, Z79.899] 12/18/2023 01/19/2024 Not Applicable    CKD (chronic kidney disease) [N18.9] 07/17/2013 01/14/2024 Yes       Discharged Condition: stable    Disposition: Home or Self Care    Follow Up:   Follow-up Information       [facility] Follow up today.    Specialty: DME Provider  Contact information:  [address]  [address]  [place] [address]  [phone]               Jaime Jauregui. Schedule an appointment as soon as possible for a visit in 1 week(s).    Contact information:  [address] [facility] [address] [phone]                           Patient Instructions:      OXYGEN FOR HOME USE     Order Specific Question Answer Comments   Liter Flow 3    Duration Continuous    Qualifying Test Performed at: Rest    Oxygen saturation: 85    Portable mode: pulse dose acceptable    Mode: Portable concentrator    Route nasal cannula    Device: home concentrator with portable concentrator    Length of need (in months): 99 mos    Patient condition with qualifying saturation Other - List qualifying diagnosis and code    Select a diagnosis & list the code in the comments COVID [[id number]]    Height: 6' 1" (1.854 m)    Weight: 132.5 kg (292 lb)    Alternative treatment measures have been tried or considered and deemed clinically ineffective. Yes    Vendor: Other (use comments)    CRM Resolution Date: 1/22/2024      Ambulatory referral/consult to Nephrology   Standing Status: Future   Referral Priority: Urgent Referral Type: Consultation   Referral Reason: Specialty Services Required   Requested Specialty: Nephrology   Number of Visits Requested: 1     Ambulatory referral/consult to Transplant, Kidney   Standing Status: Future   Referral Priority: Urgent Referral Type: Transplants   Number of Visits Requested: 1     Diet renal     Notify your health care provider if you " experience any of the following:  temperature >100.4     Notify your health care provider if you experience any of the following:  redness, tenderness, or signs of infection (pain, swelling, redness, odor or green/yellow discharge around incision site)     Notify your health care provider if you experience any of the following:  difficulty breathing or increased cough     Activity as tolerated       Significant Diagnostic Studies: Labs: All labs within the past 24 hours have been reviewed    Pending Diagnostic Studies:       Procedure Component Value Units Date/Time    CBC with Automated Differential [[id number]]     Order Status: Sent Lab Status: No result     Specimen: Blood     Comprehensive Metabolic Panel (CMP) [[id number]]     Order Status: Sent Lab Status: No result     Specimen: Blood     HLA Donor Specific Antibodies [[id number]] Collected: 01/19/24 1601    Order Status: Sent Lab Status: In process Updated: 01/22/24 0825    Specimen: Blood     HLA Donor Specific Antibodies [[id number]] Collected: 01/15/24 0424    Order Status: Sent Lab Status: In process Updated: 01/16/24 0841    Specimen: Blood     Magnesium [[id number]]     Order Status: Sent Lab Status: No result     Specimen: Blood     Phosphorus [[id number]]     Order Status: Sent Lab Status: No result     Specimen: Blood     Protime-INR [[id number]]     Order Status: Sent Lab Status: No result     Specimen: Blood     Tacrolimus level [[id number]]     Order Status: Sent Lab Status: No result     Specimen: Blood            Medications:  Reconciled Home Medications:      Medication List        START taking these medications      atovaquone 750 mg/5 mL Susp oral liquid  Commonly known as: MEPRON  Take 10 mLs (1,500 mg total) by mouth once daily.  Start taking on: January 24, 2024     calcium carbonate 200 mg calcium (500 mg) chewable tablet  Commonly known as: TUMS  Take 2 tablets (1,000 mg total) by mouth once daily.  Start taking on: January 24, 2024      pantoprazole 40 MG tablet  Commonly known as: PROTONIX  Take 1 tablet (40 mg total) by mouth once daily.  Start taking on: January 24, 2024            CHANGE how you take these medications      furosemide 20 MG tablet  Commonly known as: LASIX  Take 2 tablets (40 mg total) by mouth 2 (two) times a day.  What changed: how much to take     mycophenolate 250 mg Cap  Commonly known as: CELLCEPT  Take 4 capsules (1,000 mg total) by mouth 2 (two) times daily.  What changed: how much to take     predniSONE 20 MG tablet  Commonly known as: DELTASONE  Take 1.5 tablets (30 mg total) by mouth once daily.  What changed:   how much to take  how to take this  when to take this  additional instructions     tacrolimus 1 MG Cap  Commonly known as: PROGRAF  Take 3 capsules (3 mg total) by mouth every 12 (twelve) hours.  What changed: how much to take            CONTINUE taking these medications      apixaban 5 mg Tab  Commonly known as: ELIQUIS  Take 1 tablet (5 mg total) by mouth 2 (two) times daily.     carvediloL 12.5 MG tablet  Commonly known as: COREG  Take 1 tablet (12.5 mg total) by mouth 2 (two) times daily.     NIFEdipine 60 MG (OSM) 24 hr tablet  Commonly known as: PROCARDIA-XL  Take 1 tablet (60 mg total) by mouth 2 (two) times a day.     olmesartan 40 MG tablet  Commonly known as: BENICAR  Take 1 tablet (40 mg total) by mouth once daily.     sevelamer carbonate 800 mg Tab  Commonly known as: RENVELA  Take 1 tablet (800 mg total) by mouth 3 (three) times daily with meals.     sodium bicarbonate 650 MG tablet  Take 2 tablets (1,300 mg total) by mouth 3 (three) times daily.     valGANciclovir 450 mg Tab  Commonly known as: VALCYTE  Take 1 tablet (450 mg total) by mouth once daily. Stop: 6/22/24              Indwelling Lines/Drains at time of discharge:   Lines/Drains/Airways       None                   Time spent on the discharge of patient: 40 minutes         Bette Feliciano MD  Department of Hospital Medicine  [facility]   Med S[facility]

## 2024-01-24 NOTE — ASSESSMENT & PLAN NOTE
- nursing reports black BM 1/21  - H/H stable and on PPI  - monitor H/H and hold AC  - if H/H dropping and melena continues, will consult GI    - reporting normal BM no melena noted  - no further melena noted, H/H stable on last check, patient refusing labs day of discharge  - resume eliquis and counseled on monitoring BM and if black, stop eliquis and seek medical care

## 2024-01-24 NOTE — ASSESSMENT & PLAN NOTE
Off MMF due to COVID- resume at discharge per transplant recs  - Continue Prograf 3mg BID  - obtain tacro level qAM. Will titrate based on results.      Once COVID dexamethasone is complete, plan to start PO prednisone 30mg QD, with plans to taper.   - cont prednisone 30 at discharge, f/u with nephrology and transplant in regards to taper.

## 2024-01-24 NOTE — ASSESSMENT & PLAN NOTE
Patient with acute kidney injury/acute renal failure likely due to  volume overload  GENNY is currently improving. Baseline creatinine  1.3  - Labs reviewed- Renal function/electrolytes with Estimated Creatinine Clearance: 73.8 mL/min (A) (based on SCr of 1.9 mg/dL (H)). according to latest data. Monitor urine output and serial BMP and adjust therapy as needed. Avoid nephrotoxins and renally dose meds for GFR listed above.    Cr was 1.3 in 2021, no other Cr results until the recent admission with Cr at 2.92. BL creatinine was 1.3-1.7 till December 2023 when he developed ARF due to diffuse proliferative GN and mild  TCMR. Treated with steroid. Due to nephrotic syndrome  and hypoalbuminemia, started eliquis for anticoagulation. He was discharged on pred taper, cellcept, and prograf. At the time of discharge on 12/23/23  Cr was 3.   With aggressive diuresis, Cr stable now at 1.3-1.6.      - KTM following  - Continue aggressive diuresis for volume overload- decrease IV lasix to BID- transition to PO, cont at discharge   - Strict intake/output  - Renally dose meds  - Avoid nephrotoxic agent  - f/u with nephrology

## 2024-01-24 NOTE — PROGRESS NOTES
1430 dmade pt aware that  his friends and coworkers are on the way to pick him up and take him home. I discussed his discharge instructions including his medications and follow up appointments. Pt stated he understands. I also made him aware that I was sending his discharge orders with him to share with his wife.  IV access removed earlier today, pt tolerated well,   Pts friends are here to transport him home, they were updated on his condition, pt was dressed, belongings collected, pt request his medications be sent to [facility] [place] close to his home. Portable O2 that was delivered to the room last pm was sent with pt. Also made aware that when they are 45 minutes from his home to call [employer] at [phone] so his home O2 will be available for him at his home. Pt left [facility] in a wheelchair with all of his personal belongings propelled by his friends.

## 2024-01-24 NOTE — ASSESSMENT & PLAN NOTE
- throughout hospital stay patient refusing to participate in medical care: refusing to wear NC oxygen despite hypoxia and counseling multiple times each day by physician and nursing staff. Refusing lab draws for renal function monitoring and H/H monitoring. Refusing to work with PT/OT to regain strength to be stable for discharge.   - Rude to staff and yelled at physician and nursing to leave room  - Despite physician expressing concerns about him caring for himself at home, patient continued to refuse medical care insisting on being discharged  - patient no longer benefiting from hospital stay as he continues to be noncompliant with medical care, discharge with followup ordered  - he finally was able to demonstrate he could get out of bed safely so discharge placed  - Home O2 ordered and counseled on importance of compliance  - expressed to wife concerns about caring for himself at home and she was agreeable to discharge

## 2024-01-24 NOTE — ASSESSMENT & PLAN NOTE
Patient with Hypoxic Respiratory failure which is Acute.  he is not on home oxygen. Supplemental oxygen was provided and noted-      .   Signs/symptoms of respiratory failure include- increased work of breathing and respiratory distress. Contributing diagnoses includes - ARDS, Pneumonia, and COVID , volume overload. Labs and images were reviewed. Patient Has recent ABG, which has been reviewed. Will treat underlying causes and adjust management of respiratory failure as follows-     - intubated in ICU, extubated 1/17 to NC, wean O2 as tolerated  - s/p COVID treatment with  remdesivir, cont steroids  - cont IV diuresis- decrease to lasix 40 BID and stop metolazone  - discharge on lasix 40 PO BID  - patient noncompliant with wearing NC- refusing to wear O2, home o2 ordered  - counseled on importance of wearing O2 but patient still refusing, Home O2 ordered and spoke with wife about importance of him wearing it

## 2024-01-26 ENCOUNTER — TELEPHONE (OUTPATIENT)
Dept: TRANSPLANT | Facility: CLINIC | Age: 39
End: 2024-01-26
Payer: OTHER GOVERNMENT

## 2024-01-26 DIAGNOSIS — Z94.0 KIDNEY REPLACED BY TRANSPLANT: Primary | ICD-10-CM

## 2024-01-26 NOTE — TELEPHONE ENCOUNTER
Return call to patient and requesting to have labs drawn here on Wednesday 1/31.  ----- Message from Salma Davis sent at 1/26/2024  9:24 AM CST -----  Regarding: WILMA # [phone]  Contact: PT Temp #  [phone]  The patient called requesting to notify about Temporary #  [phone]  PT lost his cell this is a temporary #    No further information provided      Patient can be contacted @#    [phone]

## 2024-01-30 LAB
CLASS I ANTIBODY COMMENTS - LUMINEX: NORMAL
CLASS II ANTIBODY COMMENTS - LUMINEX: NORMAL
DSA1 TESTING DATE: NORMAL
DSA12 TESTING DATE: NORMAL
DSA2 TESTING DATE: NORMAL
LUMINEX DSA CLASS I & II SPECIFICITY INTERPRETATION: NORMAL
SERUM COLLECTION DT - LUMINEX CLASS I: NORMAL
SERUM COLLECTION DT - LUMINEX CLASS II: NORMAL

## 2024-01-31 ENCOUNTER — OFFICE VISIT (OUTPATIENT)
Dept: TRANSPLANT | Facility: CLINIC | Age: 39
End: 2024-01-31
Payer: OTHER GOVERNMENT

## 2024-01-31 ENCOUNTER — INFUSION (OUTPATIENT)
Dept: INFECTIOUS DISEASES | Facility: HOSPITAL | Age: 39
End: 2024-01-31
Payer: OTHER GOVERNMENT

## 2024-01-31 ENCOUNTER — LAB VISIT (OUTPATIENT)
Dept: LAB | Facility: HOSPITAL | Age: 39
End: 2024-01-31
Attending: STUDENT IN AN ORGANIZED HEALTH CARE EDUCATION/TRAINING PROGRAM
Payer: OTHER GOVERNMENT

## 2024-01-31 VITALS
WEIGHT: 251 LBS | RESPIRATION RATE: 20 BRPM | BODY MASS INDEX: 34 KG/M2 | HEIGHT: 72 IN | DIASTOLIC BLOOD PRESSURE: 83 MMHG | OXYGEN SATURATION: 90 % | SYSTOLIC BLOOD PRESSURE: 134 MMHG | HEART RATE: 100 BPM | TEMPERATURE: 97 F

## 2024-01-31 VITALS
DIASTOLIC BLOOD PRESSURE: 62 MMHG | OXYGEN SATURATION: 96 % | RESPIRATION RATE: 20 BRPM | SYSTOLIC BLOOD PRESSURE: 141 MMHG | WEIGHT: 255.63 LBS | HEIGHT: 73 IN | TEMPERATURE: 98 F | BODY MASS INDEX: 33.88 KG/M2

## 2024-01-31 DIAGNOSIS — Z79.60 LONG-TERM USE OF IMMUNOSUPPRESSANT MEDICATION: ICD-10-CM

## 2024-01-31 DIAGNOSIS — N17.9 ACUTE RENAL FAILURE SUPERIMPOSED ON STAGE 3 CHRONIC KIDNEY DISEASE, UNSPECIFIED ACUTE RENAL FAILURE TYPE, UNSPECIFIED WHETHER STAGE 3A OR 3B CKD: ICD-10-CM

## 2024-01-31 DIAGNOSIS — N04.9 NEPHROTIC SYNDROME: ICD-10-CM

## 2024-01-31 DIAGNOSIS — Z94.0 KIDNEY REPLACED BY TRANSPLANT: ICD-10-CM

## 2024-01-31 DIAGNOSIS — I15.0 RENOVASCULAR HYPERTENSION: ICD-10-CM

## 2024-01-31 DIAGNOSIS — Z91.89 AT RISK FOR OPPORTUNISTIC INFECTIONS: ICD-10-CM

## 2024-01-31 DIAGNOSIS — T86.11 ACUTE REJECTION OF KIDNEY TRANSPLANT: Primary | ICD-10-CM

## 2024-01-31 DIAGNOSIS — Z29.89 PROPHYLACTIC IMMUNOTHERAPY: ICD-10-CM

## 2024-01-31 DIAGNOSIS — Z94.0 LIVING-DONOR KIDNEY TRANSPLANT RECIPIENT: Primary | ICD-10-CM

## 2024-01-31 DIAGNOSIS — Z94.0 LIVING-DONOR KIDNEY TRANSPLANT RECIPIENT: Chronic | ICD-10-CM

## 2024-01-31 DIAGNOSIS — N18.30 ACUTE RENAL FAILURE SUPERIMPOSED ON STAGE 3 CHRONIC KIDNEY DISEASE, UNSPECIFIED ACUTE RENAL FAILURE TYPE, UNSPECIFIED WHETHER STAGE 3A OR 3B CKD: ICD-10-CM

## 2024-01-31 LAB
ALBUMIN SERPL BCP-MCNC: 1.5 G/DL (ref 3.5–5.2)
ANION GAP SERPL CALC-SCNC: 5 MMOL/L (ref 8–16)
BASOPHILS # BLD AUTO: 0.03 K/UL (ref 0–0.2)
BASOPHILS NFR BLD: 0.3 % (ref 0–1.9)
BUN SERPL-MCNC: 33 MG/DL (ref 6–20)
CALCIUM SERPL-MCNC: 8.1 MG/DL (ref 8.7–10.5)
CHLORIDE SERPL-SCNC: 110 MMOL/L (ref 95–110)
CO2 SERPL-SCNC: 26 MMOL/L (ref 23–29)
CREAT SERPL-MCNC: 1.9 MG/DL (ref 0.5–1.4)
DIFFERENTIAL METHOD BLD: ABNORMAL
EOSINOPHIL # BLD AUTO: 0.2 K/UL (ref 0–0.5)
EOSINOPHIL NFR BLD: 1.7 % (ref 0–8)
ERYTHROCYTE [DISTWIDTH] IN BLOOD BY AUTOMATED COUNT: 14.6 % (ref 11.5–14.5)
EST. GFR  (NO RACE VARIABLE): 45.2 ML/MIN/1.73 M^2
GLUCOSE SERPL-MCNC: 80 MG/DL (ref 70–110)
HCT VFR BLD AUTO: 30.7 % (ref 40–54)
HGB BLD-MCNC: 10 G/DL (ref 14–18)
IMM GRANULOCYTES # BLD AUTO: 0.43 K/UL (ref 0–0.04)
IMM GRANULOCYTES NFR BLD AUTO: 4.9 % (ref 0–0.5)
LYMPHOCYTES # BLD AUTO: 0.8 K/UL (ref 1–4.8)
LYMPHOCYTES NFR BLD: 9.3 % (ref 18–48)
MCH RBC QN AUTO: 29.3 PG (ref 27–31)
MCHC RBC AUTO-ENTMCNC: 32.6 G/DL (ref 32–36)
MCV RBC AUTO: 90 FL (ref 82–98)
MONOCYTES # BLD AUTO: 0.7 K/UL (ref 0.3–1)
MONOCYTES NFR BLD: 7.6 % (ref 4–15)
NEUTROPHILS # BLD AUTO: 6.6 K/UL (ref 1.8–7.7)
NEUTROPHILS NFR BLD: 76.2 % (ref 38–73)
NRBC BLD-RTO: 0 /100 WBC
PHOSPHATE SERPL-MCNC: 2.9 MG/DL (ref 2.7–4.5)
PLATELET # BLD AUTO: 404 K/UL (ref 150–450)
PMV BLD AUTO: 8.5 FL (ref 9.2–12.9)
POTASSIUM SERPL-SCNC: 3 MMOL/L (ref 3.5–5.1)
RBC # BLD AUTO: 3.41 M/UL (ref 4.6–6.2)
SODIUM SERPL-SCNC: 141 MMOL/L (ref 136–145)
TACROLIMUS BLD-MCNC: 9 NG/ML (ref 5–15)
WBC # BLD AUTO: 8.72 K/UL (ref 3.9–12.7)

## 2024-01-31 PROCEDURE — 80069 RENAL FUNCTION PANEL: CPT | Performed by: STUDENT IN AN ORGANIZED HEALTH CARE EDUCATION/TRAINING PROGRAM

## 2024-01-31 PROCEDURE — 99999 PR PBB SHADOW E&M-EST. PATIENT-LVL IV: CPT | Mod: PBBFAC,,, | Performed by: STUDENT IN AN ORGANIZED HEALTH CARE EDUCATION/TRAINING PROGRAM

## 2024-01-31 PROCEDURE — 25000003 PHARM REV CODE 250: Performed by: INTERNAL MEDICINE

## 2024-01-31 PROCEDURE — 96365 THER/PROPH/DIAG IV INF INIT: CPT

## 2024-01-31 PROCEDURE — 99214 OFFICE O/P EST MOD 30 MIN: CPT | Mod: PBBFAC,25 | Performed by: STUDENT IN AN ORGANIZED HEALTH CARE EDUCATION/TRAINING PROGRAM

## 2024-01-31 PROCEDURE — 85025 COMPLETE CBC W/AUTO DIFF WBC: CPT | Performed by: STUDENT IN AN ORGANIZED HEALTH CARE EDUCATION/TRAINING PROGRAM

## 2024-01-31 PROCEDURE — 63600175 PHARM REV CODE 636 W HCPCS: Mod: JZ,JG | Performed by: INTERNAL MEDICINE

## 2024-01-31 PROCEDURE — 96366 THER/PROPH/DIAG IV INF ADDON: CPT

## 2024-01-31 PROCEDURE — 80197 ASSAY OF TACROLIMUS: CPT | Performed by: STUDENT IN AN ORGANIZED HEALTH CARE EDUCATION/TRAINING PROGRAM

## 2024-01-31 PROCEDURE — 99215 OFFICE O/P EST HI 40 MIN: CPT | Mod: S$PBB,,, | Performed by: STUDENT IN AN ORGANIZED HEALTH CARE EDUCATION/TRAINING PROGRAM

## 2024-01-31 PROCEDURE — 36415 COLL VENOUS BLD VENIPUNCTURE: CPT | Performed by: STUDENT IN AN ORGANIZED HEALTH CARE EDUCATION/TRAINING PROGRAM

## 2024-01-31 RX ORDER — DIPHENHYDRAMINE HCL 25 MG
25 CAPSULE ORAL
Status: DISCONTINUED | OUTPATIENT
Start: 2024-01-31 | End: 2024-01-31 | Stop reason: HOSPADM

## 2024-01-31 RX ORDER — SODIUM CHLORIDE 0.9 % (FLUSH) 0.9 %
10 SYRINGE (ML) INJECTION
Status: CANCELLED | OUTPATIENT
Start: 2024-02-02

## 2024-01-31 RX ORDER — DIPHENHYDRAMINE HYDROCHLORIDE 50 MG/ML
50 INJECTION INTRAMUSCULAR; INTRAVENOUS ONCE AS NEEDED
Status: CANCELLED | OUTPATIENT
Start: 2024-02-02

## 2024-01-31 RX ORDER — ACETAMINOPHEN 500 MG
500 TABLET ORAL
Status: DISCONTINUED | OUTPATIENT
Start: 2024-01-31 | End: 2024-01-31 | Stop reason: HOSPADM

## 2024-01-31 RX ORDER — DIPHENHYDRAMINE HCL 25 MG
25 CAPSULE ORAL
Status: CANCELLED | OUTPATIENT
Start: 2024-02-02

## 2024-01-31 RX ORDER — EPINEPHRINE 0.3 MG/.3ML
0.3 INJECTION SUBCUTANEOUS ONCE AS NEEDED
Status: CANCELLED | OUTPATIENT
Start: 2024-02-02

## 2024-01-31 RX ORDER — HEPARIN 100 UNIT/ML
500 SYRINGE INTRAVENOUS
Status: DISCONTINUED | OUTPATIENT
Start: 2024-01-31 | End: 2024-01-31 | Stop reason: HOSPADM

## 2024-01-31 RX ORDER — ACETAMINOPHEN 500 MG
500 TABLET ORAL
Status: CANCELLED | OUTPATIENT
Start: 2024-02-02

## 2024-01-31 RX ORDER — SODIUM CHLORIDE 0.9 % (FLUSH) 0.9 %
10 SYRINGE (ML) INJECTION
Status: DISCONTINUED | OUTPATIENT
Start: 2024-01-31 | End: 2024-01-31 | Stop reason: HOSPADM

## 2024-01-31 RX ORDER — HEPARIN 100 UNIT/ML
500 SYRINGE INTRAVENOUS
Status: CANCELLED | OUTPATIENT
Start: 2024-02-02

## 2024-01-31 RX ORDER — DIPHENHYDRAMINE HYDROCHLORIDE 50 MG/ML
25 INJECTION INTRAMUSCULAR; INTRAVENOUS
Status: CANCELLED | OUTPATIENT
Start: 2024-02-02

## 2024-01-31 RX ADMIN — DIPHENHYDRAMINE HYDROCHLORIDE 25 MG: 25 CAPSULE ORAL at 09:01

## 2024-01-31 RX ADMIN — ACETAMINOPHEN 500 MG: 500 TABLET ORAL at 09:01

## 2024-01-31 RX ADMIN — HUMAN IMMUNOGLOBULIN G 80 G: 40 LIQUID INTRAVENOUS at 10:01

## 2024-01-31 NOTE — LETTER
January 31, 2024        Nixon Robertson  [address]   [place]  [place] [address]  Phone: [phone]  Fax: [phone]             [facility] 1st Fl  [address]  [facility] [address]  Phone: [phone]   Patient: Ubaldo Tripp   MR Number: [id number]   YOB: 1985   Date of Visit: 1/31/2024       Dear Dr. Nixon Robertson    Thank you for referring Ubaldo Tripp to me for evaluation. Attached you will find relevant portions of my assessment and plan of care.    If you have questions, please do not hesitate to call me. I look forward to following Ubaldo Tripp along with you.    Sincerely,    Scar Garcia Jr., MD    Enclosure    If you would like to receive this communication electronically, please contact [email or web address] or [phone] to request Link[employer] Link access.    [employer] Link is a tool which provides read-only access to select patient information with whom you have a relationship. Its easy to use and provides real time access to review your patients record including encounter summaries, notes, results, and demographic information.    If you feel you have received this communication in error or would no longer like to receive these types of communications, please e-mail [email or web address]

## 2024-01-31 NOTE — PROGRESS NOTES
Post-Transplant Assessment    Referring Physician:   Current Nephrologist: Nixon Robertson    ORGAN: LEFT KIDNEY  Donor Type: living  PHS Increased Risk: no  Cold Ischemia: 19.8 mins  Induction Medications: steroids (prednisone,methylprednisolone,solumedrol,medrol,decadron), campath - alemtuzumab (anti-cd52)    Chief Complaint   Patient presents with    Chronic Kidney Disease    Kidney Transplant Reassessment     History of present illness:  Patient is a 38 y.o. male.   Presents to the clinic today for medical conditions listed below.  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 No known complications post op. BL sCr 1.3-1.5 piror to rejection 12/2023.    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 He had recent admission at [facility] 12/18/23 - 12/23/23 with GENNY, headache, and HTN.  Renal biopsy demonstrated diffuse proliferative glomerulonephritis with increased neutrophils and C3 dominant deposits and changes consistent with mild acute T-cell  mediated rejection; not diagnostic for acute antibody mediated rejection.  He was found to have nephrotic syndrome as well.  Received pulse dose steroids and was started on Eliquis. Discharged on a prednisone taper, CellCept, and Prograf.  sCr 12/23 was 3.0.     On 1/5/24 he was admitted to [facility] in [place] with abnormal labs. Outpatient labs on 1/3 showed WBC 17.7 Hgb 13.1, K 5.7, bicarb 18, BUN 71, sCr 2.92, phos 5.1, and tacro level 10.8.  Was found to be COVID positive. Developed ARDS, transferred to [facility] for further care. He remains in the MICU with respiratory failure, renal failure requiring CRRT, and septic shock. His respiratory status improved with therapy and renal failure had recovery. He was started on aggressive diuresis inpatient for signifiacnt pulmonary edema, and volume overload. Notable that in the hospital patient received pulse dose steriods, and was transitioned to dexamethasone 6mg for 10 days, and discharged on prednisone 30mg QD. His discharge date was 01/23/24    Since his discharge patient denies any acute issues. He reports compliance with Lasix 40mg BID; Prednisone 30mg QD. Current IS therapy is Tacrolimus 3mg BID; Prednisone 30mg QD; and MMF 500g BID. He remains on home O2 of 3L nasal cannula. Has not been able to work with PT (reports needing assistance to set it up). He is s/p IVIG today (01/31/24). He has not been taking Atovaquone, PPI or sevelemer.    He continues to have significant +3 pitting edema in b/l lower extremities up to the knee. He denies any n/v/c/d, worsening SOB, head aches, SOB, dysuria, hematuria, flank pains.     Review of Systems   All other systems reviewed and are negative.      History:  Past Medical History:   Diagnosis Date    Chronic interstitial nephritis     CKD (chronic kidney disease), stage III 7/17/2013    Hypertension     Immunocompromised state 7/11/2018    Living-donor kidney transplant recipient     Need for prophylactic  immunotherapy       Past Surgical History:   Procedure Laterality Date    KIDNEY TRANSPLANT      PERITONEAL CATHETER REMOVAL          Current Outpatient Medications:     apixaban (ELIQUIS) 5 mg Tab, Take 1 tablet (5 mg total) by mouth 2 (two) times daily., Disp: 30 tablet, Rfl: 5    carvediloL (COREG) 12.5 MG tablet, Take 1 tablet (12.5 mg total) by mouth 2 (two) times daily., Disp: 60 tablet, Rfl: 5    furosemide (LASIX) 20 MG tablet, Take 2 tablets (40 mg total) by mouth 2 (two) times a day., Disp: 120 tablet, Rfl: 11    mycophenolate (CELLCEPT) 250 mg Cap, Take 4 capsules (1,000 mg total) by mouth 2 (two) times daily., Disp: 240 capsule, Rfl: 11    NIFEdipine (PROCARDIA-XL) 60 MG (OSM) 24 hr tablet, Take 1 tablet (60 mg total) by mouth 2 (two) times a day., Disp: 60 tablet, Rfl: 4    olmesartan (BENICAR) 40 MG tablet, Take 1 tablet (40 mg total) by mouth once daily., Disp: 90 tablet, Rfl: 3    predniSONE (DELTASONE) 20 MG tablet, Take 1.5 tablets (30 mg total) by mouth once daily., Disp: 30 tablet, Rfl: 0    sodium bicarbonate 650 MG tablet, Take 2 tablets (1,300 mg total) by mouth 3 (three) times daily., Disp: 120 tablet, Rfl: 5    tacrolimus (PROGRAF) 1 MG Cap, Take 3 capsules (3 mg total) by mouth every 12 (twelve) hours., Disp: 240 capsule, Rfl: 11    valGANciclovir (VALCYTE) 450 mg Tab, Take 1 tablet (450 mg total) by mouth once daily. Stop: 6/22/24, Disp: 30 tablet, Rfl: 5    atovaquone (MEPRON) 750 mg/5 mL Susp oral liquid, Take 10 mLs (1,500 mg total) by mouth once daily. (Patient not taking: Reported on 1/31/2024), Disp: 300 mL, Rfl: 0    calcium carbonate (TUMS) 200 mg calcium (500 mg) chewable tablet, Take 2 tablets (1,000 mg total) by mouth once daily. (Patient not taking: Reported on 1/31/2024), Disp: 60 tablet, Rfl: 11    pantoprazole (PROTONIX) 40 MG tablet, Take 1 tablet (40 mg total) by mouth once daily. (Patient not taking: Reported on 1/31/2024), Disp: 30 tablet, Rfl: 11    sevelamer carbonate  (RENVELA) 800 mg Tab, Take 1 tablet (800 mg total) by mouth 3 (three) times daily with meals. (Patient not taking: Reported on 2024), Disp: 90 tablet, Rfl: 11  No current facility-administered medications for this visit.  Review of patient's allergies indicates:   Allergen Reactions    Ibuprofen Other (See Comments)    Penicillins      Other reaction(s): Hives  Other reaction(s): Edema      Social History     Tobacco Use    Smoking status: Former     Current packs/day: 0.00     Average packs/day: 0.5 packs/day for 15.0 years (7.5 ttl pk-yrs)     Types: Cigarettes     Start date: 1995     Quit date: 2010     Years since quittin.5    Smokeless tobacco: Never   Substance Use Topics    Alcohol use: No      Family History   Problem Relation Age of Onset    Stroke Mother     Heart disease Father     Kidney disease Neg Hx     Hypertension Neg Hx     Diabetes Neg Hx         Physical Exam :  Vitals:    24 1533   BP: 134/83   Pulse: 100   Resp: 20   Temp: 97.3 °F (36.3 °C)     Physical Exam  Constitutional:       Appearance: He is obese.   HENT:      Head: Normocephalic and atraumatic.      Mouth/Throat:      Mouth: Mucous membranes are moist.   Cardiovascular:      Rate and Rhythm: Normal rate and regular rhythm.   Pulmonary:      Effort: Pulmonary effort is normal.      Breath sounds: Rales present.   Abdominal:      General: Abdomen is flat. There is no distension.      Palpations: Abdomen is soft.      Tenderness: There is no abdominal tenderness.   Musculoskeletal:      Right lower leg: Edema present.      Left lower leg: Edema present.   Skin:     General: Skin is warm and dry.   Neurological:      General: No focal deficit present.      Mental Status: He is alert. Mental status is at baseline.         Last 3 sets of metabolic panel, blood count, drug levels reviewed     Assessment:    1. Acute rejection of kidney transplant    2. Living-donor kidney transplant recipient - 7/26/10    3.  "Nephrotic syndrome    4. Renovascular hypertension    5. Prophylactic immunotherapy    6. At risk for opportunistic infections    7. Long-term use of immunosuppressant medication    8. Acute renal failure superimposed on stage 3 chronic kidney disease, unspecified acute renal failure type, unspecified whether stage 3a or 3b CKD      Plan:  - Decrease Tacrolimus to 2mg BID.   - Start bactrim SS MWF  - Increase Lasix PO 80mg BID  - Protonix 40mg QD  - labs weekly for now  -  valcyte per protocol.   - Repeat UPCR  -  to help with PT set up  - see if [place]IG can [place] in [place]  - Cont eliquis  - stop renvela, sodium bicarb  - Prednisone 20mg for 2 weeks (until 02/15/24); then 10mg for 2 weeks (until 02/29/24); then 5mg for 2 weeks (until 03/14/24).   - RTC in 1 month.     1. CKD stage IIIa:   Will continue follow up as per our center guidelines. patient to continue close follow up with the local General nephrologist. Education provided in appropriate fluid intake, potassium intake. Continue with oral hydration.    2. Immunosuppression:   Will closely monitor for toxicities, education provided about adherence to medicines and need to communicate any side effect to the transplant nurse or physician.  Lab Results   Component Value Date    TACROLIMUS 9.0 01/31/2024    TACROLIMUS 8.9 01/22/2024    TACROLIMUS 3.4 (L) 01/21/2024     No results found for: "CYCLOSPORINE"    3. Allograft Function:  Stable at baseline for the patient. Continue follow up as per our guidelines and with the local General nephrologist. Communication will be sent today.  Lab Results   Component Value Date    CREATININE 1.9 (H) 01/31/2024    CREATININE 1.9 (H) 01/22/2024    CREATININE 1.6 (H) 01/21/2024     No results found for: "AMYLASE", "LIPASE"    4. Hypertension management:    Continue with home blood pressure monitoring, low salt and healthy life discussed with the patient    5. Metabolic Bone Disease/Secondary Hyperparathyroidism:   Calcium " and phosphorus level discussed with the patient, patient will continue follow up with the general nephrologist for management of metabolic bone disease. Calcium and phosphorus as per our center protocol.  Lab Results   Component Value Date    .5 (H) 01/15/2024    CALCIUM 8.1 (L) 01/31/2024    CAION 1.14 01/18/2024    PHOS 2.9 01/31/2024    PHOS 2.7 01/22/2024    PHOS 2.9 01/21/2024       6. Electrolytes:   Reviewed with the patient, essentially within the normal range no need for acute changes today, will monitor as per our center guidelines.  Lab Results   Component Value Date     01/31/2024    K 3.0 (L) 01/31/2024     01/31/2024    CO2 26 01/31/2024    CO2 28 01/22/2024    CO2 28 01/21/2024       7. Anemia:   Will continue monitoring as per our center guidelines. No indication for acute intervention today.  Lab Results   Component Value Date    WBC 8.72 01/31/2024    HGB 10.0 (L) 01/31/2024    HCT 30.7 (L) 01/31/2024    MCV 90 01/31/2024     01/31/2024       8.Proteinuria:   Will continue with pr/cr ratio as per our center guidelines  Lab Results   Component Value Date    PROTEINURINE 299 (H) 01/18/2024    CREATRANDUR 84.0 01/18/2024    UTPCR 3.56 (H) 01/18/2024    UTPCR 7.68 (H) 01/17/2024    UTPCR 12.68 (H) 12/23/2023        9. BK virus infection screening:   Will continue with urine or blood PCR as per our guidelines to prevent BK virus viremia and allograft dysfunction  Lab Results   Component Value Date    BKVIRUSDNAUR NEGATIVE 07/17/2013    BKQUANTURINE <250 07/17/2013    BKVIRUSLOG <2.40 07/17/2013    BKVIRUSURINE Urine 07/17/2013    BKVIRUSPCRQB <125 01/15/2024         10. Weight education:   Provided during the clinic visit.  Body mass index is 34.04 kg/m².     11.Patient safety education regarding immunosuppression including prophylaxis posttransplant for CMV, PCP :   Education provided about vaccination and prevention of infections.    12.  Cytopenias:   No significant  cytopenias will monitor as per our guidelines. Medicine list reviewed including potential causes of drug-induced cytopenias  Lab Results   Component Value Date    WBC 8.72 01/31/2024    HGB 10.0 (L) 01/31/2024    HCT 30.7 (L) 01/31/2024    MCV 90 01/31/2024     01/31/2024       13. Post-transplant Prophylaxis: CMV Infection, PJP and Candida mucosistis and other indicated for this particular patient.     I spent a total of 55 minutes on the day of the visit.      Follow up in about 4 weeks (around 2/28/2024).     No orders of the defined types were placed in this encounter.    There are no discontinued medications.   No future appointments.    Follow-up:   Clinic: return to transplant clinic weekly for the first month after transplant; every 2 weeks during months 2-3; then at 6-, 9-, 12-, 18-, 24-, and 36- months post-transplant to reassess for complications from immunosuppression toxicity and monitor for rejection.  Annually thereafter.    Labs: since patient remains at high risk for rejection and drug-related complications that warrant close monitoring, labs will be ordered as follows: continue twice weekly CBC, renal panel, and drug level for first month; then same labs once weekly through 3rd month post-transplant.  Urine for UA and protein/creatinine ratio monthly.  Serum BK - PCR at 1-, 3-, 6-, 9-, 12-, 18-, 24-, 36- 48-, and 60 months post-transplant.  Hepatic panel at 1-, 2-, 3-, 6-, 9-, 12-, 18-, 24-, and 36- months post-transplant.    Scar Garcia Jr., MD       Education:   Material provided to the patient.  Patient reminded to call with any health changes since these can be early signs of significant complications.  Also, I advised the patient to be sure any new medications or changes of old medications are discussed with either a pharmacist or physician knowledgeable with transplant to avoid rejection/drug toxicity related to significant drug interactions.    Patient advised that it is  recommended that all transplanted patients, and their close contacts and household members receive Covid vaccination.    UNOS Patient Status  Functional Status: 50% - Requires considerable assistance and frequent medical care  Physical Capacity: Wheelchair bound or more limited

## 2024-01-31 NOTE — PROGRESS NOTES
Arrived for Privigen infusion.   Limited head to toe assessment completed.   Medicated with Tylenol and Benadryl 30 minutes prior to infusion.  Infusion started at titration rates 35 mls per hour/ 70/139 to max of 278.   Tolerated infusion without difficulty.

## 2024-02-01 DIAGNOSIS — Z29.89 NEED FOR PROPHYLACTIC IMMUNOTHERAPY: ICD-10-CM

## 2024-02-01 DIAGNOSIS — U07.1 COVID: Primary | ICD-10-CM

## 2024-02-01 DIAGNOSIS — T86.11 KIDNEY TRANSPLANT REJECTION: ICD-10-CM

## 2024-02-01 DIAGNOSIS — Z94.0 LIVING-DONOR KIDNEY TRANSPLANT RECIPIENT: ICD-10-CM

## 2024-02-01 DIAGNOSIS — Z94.0 KIDNEY REPLACED BY TRANSPLANT: ICD-10-CM

## 2024-02-01 DIAGNOSIS — Z94.0 KIDNEY REPLACED BY TRANSPLANT: Primary | ICD-10-CM

## 2024-02-01 DIAGNOSIS — U07.1 COVID: ICD-10-CM

## 2024-02-01 RX ORDER — PREDNISONE 10 MG/1
TABLET ORAL
Qty: 50 TABLET | Refills: 0 | OUTPATIENT
Start: 2024-02-01 | End: 2024-03-04

## 2024-02-01 RX ORDER — SULFAMETHOXAZOLE AND TRIMETHOPRIM 400; 80 MG/1; MG/1
1 TABLET ORAL
Qty: 12 TABLET | Refills: 1 | Status: SHIPPED | OUTPATIENT
Start: 2024-02-02 | End: 2024-03-22

## 2024-02-01 RX ORDER — VALGANCICLOVIR 450 MG/1
900 TABLET, FILM COATED ORAL DAILY
Qty: 60 TABLET | Refills: 5 | Status: SHIPPED | OUTPATIENT
Start: 2024-02-01 | End: 2024-07-24

## 2024-02-01 RX ORDER — TACROLIMUS 1 MG/1
2 CAPSULE ORAL EVERY 12 HOURS
Qty: 120 CAPSULE | Refills: 11 | Status: SHIPPED | OUTPATIENT
Start: 2024-02-01 | End: 2024-03-04

## 2024-02-01 RX ORDER — FUROSEMIDE 40 MG/1
80 TABLET ORAL 2 TIMES DAILY
Qty: 120 TABLET | Refills: 11 | Status: SHIPPED | OUTPATIENT
Start: 2024-02-01 | End: 2025-01-31

## 2024-02-01 RX ORDER — PANTOPRAZOLE SODIUM 40 MG/1
40 TABLET, DELAYED RELEASE ORAL DAILY
Qty: 30 TABLET | Refills: 11 | Status: SHIPPED | OUTPATIENT
Start: 2024-02-01 | End: 2024-03-04

## 2024-02-01 NOTE — TELEPHONE ENCOUNTER
Patient repeated back and voice a understanding of orders.  Orders fax to [facility].  ----- Message from Scar Garcia Jr., MD sent at 1/31/2024  5:11 PM CST -----  Start bactrim SS MWF per rejection protocol. Increase furo to 80 bid. Decrease tac to 2/2. Give another dose of IVIG within a week. Pred taper to 20mg per the visit from today. Repeat labs weekly for now. Juan per rejection protocol.

## 2024-02-01 NOTE — TELEPHONE ENCOUNTER
Plan:  - Decrease Tacrolimus to 2mg BID.   - Start bactrim SS MWF  - Increase Lasix PO 80mg BID  - Protonix 40mg QD  - labs weekly for now  -  valcyte per protocol.   - Repeat UPCR  - SW to help with PT set up  - see if IVIG can be given in [place]  - Cont eliquis  - stop renvela, sodium bicarb  - Prednisone 20mg for 2 weeks (until 02/15/24); then 10mg for 2 weeks (until 02/29/24); then 5mg for 2 weeks (until 03/14/24).   - RTC in 1 month.   ________________________  Plan reviewed with patient and agrees with plan.

## 2024-02-02 ENCOUNTER — TELEPHONE (OUTPATIENT)
Dept: TRANSPLANT | Facility: CLINIC | Age: 39
End: 2024-02-02
Payer: OTHER GOVERNMENT

## 2024-02-02 DIAGNOSIS — R53.81 PHYSICAL DECONDITIONING: ICD-10-CM

## 2024-02-02 DIAGNOSIS — Z94.0 KIDNEY REPLACED BY TRANSPLANT: Primary | ICD-10-CM

## 2024-02-02 NOTE — TELEPHONE ENCOUNTER
DEJAH attempted to contact pt regarding the below request. DEJAH was only able to leave a message and will continue to follow up.       ----- Message from Farhan Urbina RN sent at 2/1/2024  1:21 PM CST -----  Regarding: Home Health  Dr Garcia would like patient set up for Home Health for PT.  Orders are in.  See 1/31 Clinic note.    Thanks    Pérez

## 2024-02-02 NOTE — TELEPHONE ENCOUNTER
SW contacted patient ([phone]) to discuss preferred referral destination for physical therapy.  Pt reports wanting to seek OP PT services at a local OP PT clinic (as opposed to receiving PT via home health).  Pt denies preference for PT clinic.  Ambulatory OP PT order placed by transplant coordinator.  DEJAH faxed all necessary referral documents to intake representative Netta with [facility] ([address], [place] [address] - ph [phone] / fx [phone]).  Netta confirms clinic can accept pt's  [facility] coverage and will contact patient on Monday, 2/5/24 to begin scheduling PT sessions.  SW informed pt of same and provided pt with [facility] contact information.  Pt denies additional psychosocial needs at this time.  SW remains available for further psychosocial support and will f/u as needed.    ----- Message from Farhan Urbina RN sent at 2/1/2024  1:21 PM CST -----  Regarding: Home Health  Dr Garcia would like patient set up for Home Health for PT.  Orders are in.  See 1/31 Clinic note.    Thanks    Pérez

## 2024-02-07 ENCOUNTER — TELEPHONE (OUTPATIENT)
Dept: TRANSPLANT | Facility: CLINIC | Age: 39
End: 2024-02-07
Payer: OTHER GOVERNMENT

## 2024-02-07 NOTE — TELEPHONE ENCOUNTER
Return call to [place] at [facility] and requesting a referral. Voice a understanding that patient was transferred from [place] to [facility] and IVIG infusions has been approved here but patient is requesting infusion to be given locally. Agreed to keep coordinator posted.  Patient updated.   ----- Message from Farhan Urbina RN sent at 2/7/2024 11:40 AM CST -----  Regarding: FW: needing referral  Contact: [place]     ----- Message -----  From: Salma Davis  Sent: 2/7/2024  11:34 AM CST  To: [facility] Post-Kidney Transplant Clinical  Subject: needing referral                                 Heart failure clinic calling for referral on patient to be seen there. They are needing a referral from Dr. Garcia. States they called last week but no one responded to their request. They would like to try and schedule pt this week.      [phone]  [facility]

## 2024-02-08 ENCOUNTER — DOCUMENTATION ONLY (OUTPATIENT)
Dept: TRANSPLANT | Facility: CLINIC | Age: 39
End: 2024-02-08

## 2024-02-09 ENCOUNTER — TELEPHONE (OUTPATIENT)
Dept: TRANSPLANT | Facility: CLINIC | Age: 39
End: 2024-02-09
Payer: OTHER GOVERNMENT

## 2024-02-09 NOTE — TELEPHONE ENCOUNTER
Patient notified that 2nd IVIG infusion has been schedule here for 2/15 at 9am.  Agrees with plan.

## 2024-02-10 ENCOUNTER — TELEPHONE (OUTPATIENT)
Dept: TRANSPLANT | Facility: CLINIC | Age: 39
End: 2024-02-10
Payer: OTHER GOVERNMENT

## 2024-02-10 LAB
ALBUMIN SERPL-MCNC: 2.2 G/DL (ref 4.1–5.1)
ALBUMIN/GLOB SERPL: 0.8 {RATIO} (ref 1.2–2.2)
ALP SERPL-CCNC: 129 IU/L (ref 44–121)
ALT SERPL-CCNC: 27 IU/L (ref 0–44)
APPEARANCE UR: CLEAR
AST SERPL-CCNC: 30 IU/L (ref 0–40)
BACTERIA #/AREA URNS HPF: ABNORMAL /[HPF]
BASOPHILS # BLD AUTO: 0 X10E3/UL (ref 0–0.2)
BASOPHILS NFR BLD AUTO: 0 %
BILIRUB SERPL-MCNC: <0.2 MG/DL (ref 0–1.2)
BILIRUB UR QL STRIP: NEGATIVE
BUN SERPL-MCNC: 25 MG/DL (ref 6–24)
BUN/CREAT SERPL: 13 (ref 9–20)
CALCIUM SERPL-MCNC: 7.3 MG/DL (ref 8.7–10.2)
CASTS URNS QL MICRO: ABNORMAL /LPF
CHLORIDE SERPL-SCNC: 106 MMOL/L (ref 96–106)
CO2 SERPL-SCNC: 23 MMOL/L (ref 20–29)
COLOR UR: YELLOW
CREAT SERPL-MCNC: 1.87 MG/DL (ref 0.76–1.27)
CREAT UR-MCNC: 60.6 MG/DL
EOSINOPHIL # BLD AUTO: 0.1 X10E3/UL (ref 0–0.4)
EOSINOPHIL NFR BLD AUTO: 1 %
EPI CELLS #/AREA URNS HPF: ABNORMAL /HPF (ref 0–10)
ERYTHROCYTE [DISTWIDTH] IN BLOOD BY AUTOMATED COUNT: 14.5 % (ref 11.6–15.4)
EST. GFR  (NO RACE VARIABLE): 46 ML/MIN/1.73
GLOBULIN SER CALC-MCNC: 2.7 G/DL (ref 1.5–4.5)
GLUCOSE SERPL-MCNC: 78 MG/DL (ref 70–99)
GLUCOSE UR QL STRIP: NEGATIVE
HCT VFR BLD AUTO: 33.6 % (ref 37.5–51)
HGB BLD-MCNC: 10.9 G/DL (ref 13–17.7)
HGB UR QL STRIP: ABNORMAL
KETONES UR QL STRIP: NEGATIVE
LC IMMATURE CELLS: ABNORMAL
LEUKOCYTE ESTERASE UR QL STRIP: NEGATIVE
LYMPHOCYTES # BLD AUTO: 1 X10E3/UL (ref 0.7–3.1)
LYMPHOCYTES NFR BLD AUTO: 9 %
MAGNESIUM SERPL-MCNC: 0.8 MG/DL (ref 1.6–2.3)
MCH RBC QN AUTO: 28.8 PG (ref 26.6–33)
MCHC RBC AUTO-ENTMCNC: 32.4 G/DL (ref 31.5–35.7)
MCV RBC AUTO: 89 FL (ref 79–97)
MICRO URNS: ABNORMAL
MONOCYTES # BLD AUTO: 0 X10E3/UL (ref 0.1–0.9)
MONOCYTES NFR BLD AUTO: 0 %
MORPHOLOGY BLD-IMP: ABNORMAL
MYELOCYTES NFR BLD: 1 % (ref 0–0)
NEUTROPHILS # BLD AUTO: 10.2 X10E3/UL (ref 1.4–7)
NEUTROPHILS NFR BLD AUTO: 89 %
NITRITE UR QL STRIP: NEGATIVE
NRBC BLD AUTO-RTO: 1 % (ref 0–0)
PH UR STRIP: 7 [PH] (ref 5–7.5)
PHOSPHATE SERPL-MCNC: 3.7 MG/DL (ref 2.8–4.1)
PLATELET # BLD AUTO: 288 X10E3/UL (ref 150–450)
POTASSIUM SERPL-SCNC: 3.6 MMOL/L (ref 3.5–5.2)
PROT SERPL-MCNC: 4.9 G/DL (ref 6–8.5)
PROT UR QL STRIP: ABNORMAL
PROT UR-MCNC: 753.8 MG/DL
PROT/CREAT UR: ABNORMAL MG/G CREAT (ref 0–200)
RBC # BLD AUTO: 3.78 X10E6/UL (ref 4.14–5.8)
RBC #/AREA URNS HPF: >30 /HPF (ref 0–2)
SODIUM SERPL-SCNC: 141 MMOL/L (ref 134–144)
SP GR UR STRIP: 1.01 (ref 1–1.03)
TACROLIMUS BLD LC/MS/MS-MCNC: 7.8 NG/ML (ref 2–20)
UROBILINOGEN UR STRIP-MCNC: 0.2 MG/DL (ref 0.2–1)
WBC # BLD AUTO: 11.5 X10E3/UL (ref 3.4–10.8)
WBC #/AREA URNS HPF: ABNORMAL /HPF (ref 0–5)

## 2024-02-10 NOTE — TELEPHONE ENCOUNTER
Spoke with Dr Morris, who advised pt go to ER to receive a mag infusion for a critically low mag of 0.8.  Called and spoke with pt, pt advised to go to local ER for critically low mag and to receive mag infusion.  Pt states he will not go to [facility], as he states they did not give him good care last time he was there.  Advised pt to go to another local ER then.  Pt verbalized understanding.  Also advised pt to follow up with transplant post kid coordinator on Monday.            ----- Message from Benita Morris DO sent at 2/10/2024 12:10 PM CST -----  Have patients go to ER for Mg infusion

## 2024-02-12 DIAGNOSIS — Z94.0 KIDNEY REPLACED BY TRANSPLANT: Primary | ICD-10-CM

## 2024-02-12 DIAGNOSIS — E83.42 HYPOMAGNESEMIA: Primary | ICD-10-CM

## 2024-02-12 LAB
EXT ALBUMIN: 2.2 (ref 4.1–5.1)
EXT ALKALINE PHOSPHATASE: 129 (ref 44–121)
EXT ALT: 27 (ref 0–44)
EXT AST: 30 (ref 0–40)
EXT BACTERIA UA: 0
EXT BILIRUBIN TOTAL: <0.2 (ref 0–1.2)
EXT BUN: 13 (ref 9–20)
EXT CALCIUM: 7.3 (ref 8.7–10.2)
EXT CHLORIDE: 106 (ref 96–106)
EXT CO2: 23 (ref 20–29)
EXT CREATININE UA: 60.6
EXT CREATININE: 1.87 MG/DL (ref 0.76–1.27)
EXT EOSINOPHIL%: 1
EXT GLUCOSE UA: NEGATIVE
EXT GLUCOSE: 78 (ref 70–99)
EXT HEMATOCRIT: 33.6 (ref 37.5–51)
EXT HEMOGLOBIN: 10.9 (ref 13–17.7)
EXT LYMPH%: 9
EXT MAGNESIUM: 0.8 (ref 1.6–2.3)
EXT MONOCYTES%: 0
EXT NITRITES UA: ABNORMAL
EXT PHOSPHORUS: 3.7 (ref 2.8–4.1)
EXT PLATELETS: 288 (ref 150–450)
EXT POTASSIUM: 3.6 (ref 3.5–5.2)
EXT PROT/CREAT RATIO UR: 12.4
EXT PROTEIN TOTAL: 4.9 (ref 6–8.5)
EXT PROTEIN UA: ABNORMAL
EXT RBC UA: >30
EXT SEGS%: 288
EXT SODIUM: 141 MMOL/L (ref 134–144)
EXT TACROLIMUS LVL: 7.8
EXT URINE PROTEIN: 753.8
EXT WBC UA: ABNORMAL
EXT WBC: 11.5 (ref 3.4–10.8)

## 2024-02-12 RX ORDER — LANOLIN ALCOHOL/MO/W.PET/CERES
800 CREAM (GRAM) TOPICAL 2 TIMES DAILY
Qty: 120 TABLET | Refills: 11 | Status: SHIPPED | OUTPATIENT
Start: 2024-02-12 | End: 2025-02-11

## 2024-02-12 NOTE — TELEPHONE ENCOUNTER
Patient repeated back and voice a understanding of orders.  Agreed to go to the ER and keep coordinator posted.  High Mag diet reviewed.  Plan labs here on Thursday 2/15 prior to IVIG infusion.  ----- Message from Scar Garcia Jr., MD sent at 2/12/2024 10:52 AM CST -----  To ER again since he didn't go. Start magox 800 bid. Repeat UPCR next week

## 2024-02-14 ENCOUNTER — DOCUMENTATION ONLY (OUTPATIENT)
Dept: TRANSPLANT | Facility: CLINIC | Age: 39
End: 2024-02-14
Payer: OTHER GOVERNMENT

## 2024-02-14 LAB
ALBUMIN SERPL-MCNC: 2.4 G/DL (ref 4.1–5.1)
BASOPHILS # BLD AUTO: 0.1 X10E3/UL (ref 0–0.2)
BASOPHILS NFR BLD AUTO: 1 %
BUN SERPL-MCNC: 25 MG/DL (ref 6–24)
BUN/CREAT SERPL: 14 (ref 9–20)
CALCIUM SERPL-MCNC: 7.8 MG/DL (ref 8.7–10.2)
CHLORIDE SERPL-SCNC: 107 MMOL/L (ref 96–106)
CO2 SERPL-SCNC: 21 MMOL/L (ref 20–29)
CREAT SERPL-MCNC: 1.78 MG/DL (ref 0.76–1.27)
EOSINOPHIL # BLD AUTO: 0 X10E3/UL (ref 0–0.4)
EOSINOPHIL NFR BLD AUTO: 0 %
ERYTHROCYTE [DISTWIDTH] IN BLOOD BY AUTOMATED COUNT: 15.1 % (ref 11.6–15.4)
EST. GFR  (NO RACE VARIABLE): 49 ML/MIN/1.73
GLUCOSE SERPL-MCNC: 79 MG/DL (ref 70–99)
HCT VFR BLD AUTO: 35.2 % (ref 37.5–51)
HGB BLD-MCNC: 11.1 G/DL (ref 13–17.7)
IMM GRANULOCYTES # BLD AUTO: 0.7 X10E3/UL (ref 0–0.1)
IMM GRANULOCYTES NFR BLD AUTO: 4 %
LYMPHOCYTES # BLD AUTO: 1.9 X10E3/UL (ref 0.7–3.1)
LYMPHOCYTES NFR BLD AUTO: 11 %
MAGNESIUM SERPL-MCNC: 0.9 MG/DL (ref 1.6–2.3)
MCH RBC QN AUTO: 29.1 PG (ref 26.6–33)
MCHC RBC AUTO-ENTMCNC: 31.5 G/DL (ref 31.5–35.7)
MCV RBC AUTO: 92 FL (ref 79–97)
MONOCYTES # BLD AUTO: 0.5 X10E3/UL (ref 0.1–0.9)
MONOCYTES NFR BLD AUTO: 3 %
NEUTROPHILS # BLD AUTO: 14.2 X10E3/UL (ref 1.4–7)
NEUTROPHILS NFR BLD AUTO: 81 %
PHOSPHATE SERPL-MCNC: 3.7 MG/DL (ref 2.8–4.1)
PLATELET # BLD AUTO: 303 X10E3/UL (ref 150–450)
POTASSIUM SERPL-SCNC: 4.5 MMOL/L (ref 3.5–5.2)
RBC # BLD AUTO: 3.82 X10E6/UL (ref 4.14–5.8)
SODIUM SERPL-SCNC: 142 MMOL/L (ref 134–144)
TACROLIMUS BLD LC/MS/MS-MCNC: 6.8 NG/ML (ref 2–20)
WBC # BLD AUTO: 17.4 X10E3/UL (ref 3.4–10.8)

## 2024-02-14 NOTE — PROGRESS NOTES
Mg oxide to 800 mg TID. High Mg diet.  If he is symptomatic or has hx of afib, ER visit for replacement.

## 2024-02-15 ENCOUNTER — INFUSION (OUTPATIENT)
Dept: INFECTIOUS DISEASES | Facility: HOSPITAL | Age: 39
End: 2024-02-15
Payer: OTHER GOVERNMENT

## 2024-02-15 ENCOUNTER — LAB VISIT (OUTPATIENT)
Dept: LAB | Facility: HOSPITAL | Age: 39
End: 2024-02-15
Attending: STUDENT IN AN ORGANIZED HEALTH CARE EDUCATION/TRAINING PROGRAM
Payer: OTHER GOVERNMENT

## 2024-02-15 ENCOUNTER — TELEPHONE (OUTPATIENT)
Dept: TRANSPLANT | Facility: CLINIC | Age: 39
End: 2024-02-15
Payer: OTHER GOVERNMENT

## 2024-02-15 VITALS
OXYGEN SATURATION: 96 % | RESPIRATION RATE: 18 BRPM | WEIGHT: 254.5 LBS | TEMPERATURE: 98 F | HEART RATE: 94 BPM | SYSTOLIC BLOOD PRESSURE: 139 MMHG | DIASTOLIC BLOOD PRESSURE: 103 MMHG | BODY MASS INDEX: 34.47 KG/M2 | HEIGHT: 72 IN

## 2024-02-15 DIAGNOSIS — E83.42 HYPOMAGNESEMIA: Primary | ICD-10-CM

## 2024-02-15 DIAGNOSIS — Z94.0 LIVING-DONOR KIDNEY TRANSPLANT RECIPIENT: Primary | ICD-10-CM

## 2024-02-15 DIAGNOSIS — Z94.0 KIDNEY REPLACED BY TRANSPLANT: ICD-10-CM

## 2024-02-15 LAB
ALBUMIN SERPL BCP-MCNC: 1.9 G/DL (ref 3.5–5.2)
ANION GAP SERPL CALC-SCNC: 6 MMOL/L (ref 8–16)
BASOPHILS # BLD AUTO: 0.08 K/UL (ref 0–0.2)
BASOPHILS NFR BLD: 0.5 % (ref 0–1.9)
BUN SERPL-MCNC: 34 MG/DL (ref 6–20)
CALCIUM SERPL-MCNC: 7.8 MG/DL (ref 8.7–10.5)
CHLORIDE SERPL-SCNC: 111 MMOL/L (ref 95–110)
CO2 SERPL-SCNC: 23 MMOL/L (ref 23–29)
CREAT SERPL-MCNC: 1.7 MG/DL (ref 0.5–1.4)
DIFFERENTIAL METHOD BLD: ABNORMAL
EOSINOPHIL # BLD AUTO: 0 K/UL (ref 0–0.5)
EOSINOPHIL NFR BLD: 0.3 % (ref 0–8)
ERYTHROCYTE [DISTWIDTH] IN BLOOD BY AUTOMATED COUNT: 16.4 % (ref 11.5–14.5)
EST. GFR  (NO RACE VARIABLE): 51.6 ML/MIN/1.73 M^2
GLUCOSE SERPL-MCNC: 75 MG/DL (ref 70–110)
HCT VFR BLD AUTO: 34 % (ref 40–54)
HGB BLD-MCNC: 10.7 G/DL (ref 14–18)
IMM GRANULOCYTES # BLD AUTO: 0.63 K/UL (ref 0–0.04)
IMM GRANULOCYTES NFR BLD AUTO: 4.3 % (ref 0–0.5)
LYMPHOCYTES # BLD AUTO: 1.6 K/UL (ref 1–4.8)
LYMPHOCYTES NFR BLD: 10.9 % (ref 18–48)
MAGNESIUM SERPL-MCNC: 1 MG/DL (ref 1.6–2.6)
MCH RBC QN AUTO: 29.2 PG (ref 27–31)
MCHC RBC AUTO-ENTMCNC: 31.5 G/DL (ref 32–36)
MCV RBC AUTO: 93 FL (ref 82–98)
MONOCYTES # BLD AUTO: 0.5 K/UL (ref 0.3–1)
MONOCYTES NFR BLD: 3.1 % (ref 4–15)
NEUTROPHILS # BLD AUTO: 11.9 K/UL (ref 1.8–7.7)
NEUTROPHILS NFR BLD: 80.9 % (ref 38–73)
NRBC BLD-RTO: 0 /100 WBC
PHOSPHATE SERPL-MCNC: 3.5 MG/DL (ref 2.7–4.5)
PLATELET # BLD AUTO: 289 K/UL (ref 150–450)
PMV BLD AUTO: 8.5 FL (ref 9.2–12.9)
POTASSIUM SERPL-SCNC: 4.5 MMOL/L (ref 3.5–5.1)
RBC # BLD AUTO: 3.67 M/UL (ref 4.6–6.2)
SODIUM SERPL-SCNC: 140 MMOL/L (ref 136–145)
TACROLIMUS BLD-MCNC: 6.6 NG/ML (ref 5–15)
WBC # BLD AUTO: 14.68 K/UL (ref 3.9–12.7)

## 2024-02-15 PROCEDURE — 80069 RENAL FUNCTION PANEL: CPT | Performed by: STUDENT IN AN ORGANIZED HEALTH CARE EDUCATION/TRAINING PROGRAM

## 2024-02-15 PROCEDURE — 80197 ASSAY OF TACROLIMUS: CPT | Performed by: STUDENT IN AN ORGANIZED HEALTH CARE EDUCATION/TRAINING PROGRAM

## 2024-02-15 PROCEDURE — 85025 COMPLETE CBC W/AUTO DIFF WBC: CPT | Performed by: STUDENT IN AN ORGANIZED HEALTH CARE EDUCATION/TRAINING PROGRAM

## 2024-02-15 PROCEDURE — 96366 THER/PROPH/DIAG IV INF ADDON: CPT

## 2024-02-15 PROCEDURE — 83735 ASSAY OF MAGNESIUM: CPT | Performed by: STUDENT IN AN ORGANIZED HEALTH CARE EDUCATION/TRAINING PROGRAM

## 2024-02-15 PROCEDURE — 63600175 PHARM REV CODE 636 W HCPCS: Mod: JZ,JG | Performed by: INTERNAL MEDICINE

## 2024-02-15 PROCEDURE — 96365 THER/PROPH/DIAG IV INF INIT: CPT

## 2024-02-15 PROCEDURE — 25000003 PHARM REV CODE 250: Performed by: INTERNAL MEDICINE

## 2024-02-15 PROCEDURE — 36415 COLL VENOUS BLD VENIPUNCTURE: CPT | Performed by: STUDENT IN AN ORGANIZED HEALTH CARE EDUCATION/TRAINING PROGRAM

## 2024-02-15 PROCEDURE — 96374 THER/PROPH/DIAG INJ IV PUSH: CPT | Mod: 59

## 2024-02-15 RX ORDER — DIPHENHYDRAMINE HCL 25 MG
25 CAPSULE ORAL
OUTPATIENT
Start: 2024-02-16

## 2024-02-15 RX ORDER — HEPARIN 100 UNIT/ML
500 SYRINGE INTRAVENOUS
OUTPATIENT
Start: 2024-02-16

## 2024-02-15 RX ORDER — ACETAMINOPHEN 500 MG
500 TABLET ORAL
OUTPATIENT
Start: 2024-02-16

## 2024-02-15 RX ORDER — SODIUM CHLORIDE 0.9 % (FLUSH) 0.9 %
10 SYRINGE (ML) INJECTION
OUTPATIENT
Start: 2024-02-15

## 2024-02-15 RX ORDER — SODIUM CHLORIDE 0.9 % (FLUSH) 0.9 %
10 SYRINGE (ML) INJECTION
Status: DISCONTINUED | OUTPATIENT
Start: 2024-02-15 | End: 2024-02-15 | Stop reason: HOSPADM

## 2024-02-15 RX ORDER — SODIUM CHLORIDE 0.9 % (FLUSH) 0.9 %
10 SYRINGE (ML) INJECTION
OUTPATIENT
Start: 2024-02-16

## 2024-02-15 RX ORDER — EPINEPHRINE 0.3 MG/.3ML
0.3 INJECTION SUBCUTANEOUS ONCE AS NEEDED
OUTPATIENT
Start: 2024-02-16

## 2024-02-15 RX ORDER — DIPHENHYDRAMINE HYDROCHLORIDE 50 MG/ML
25 INJECTION INTRAMUSCULAR; INTRAVENOUS
OUTPATIENT
Start: 2024-02-16

## 2024-02-15 RX ORDER — DIPHENHYDRAMINE HYDROCHLORIDE 50 MG/ML
50 INJECTION INTRAMUSCULAR; INTRAVENOUS ONCE AS NEEDED
OUTPATIENT
Start: 2024-02-16

## 2024-02-15 RX ORDER — DIPHENHYDRAMINE HYDROCHLORIDE 50 MG/ML
25 INJECTION INTRAMUSCULAR; INTRAVENOUS
Status: DISCONTINUED | OUTPATIENT
Start: 2024-02-15 | End: 2024-02-15 | Stop reason: HOSPADM

## 2024-02-15 RX ORDER — MAGNESIUM SULFATE HEPTAHYDRATE 40 MG/ML
2 INJECTION, SOLUTION INTRAVENOUS
Start: 2024-02-15

## 2024-02-15 RX ORDER — ACETAMINOPHEN 500 MG
500 TABLET ORAL
Status: DISCONTINUED | OUTPATIENT
Start: 2024-02-15 | End: 2024-02-15 | Stop reason: HOSPADM

## 2024-02-15 RX ADMIN — HUMAN IMMUNOGLOBULIN G 80 G: 40 LIQUID INTRAVENOUS at 10:02

## 2024-02-15 RX ADMIN — ACETAMINOPHEN 500 MG: 500 TABLET ORAL at 10:02

## 2024-02-15 RX ADMIN — DIPHENHYDRAMINE HYDROCHLORIDE 25 MG: 50 INJECTION INTRAMUSCULAR; INTRAVENOUS at 10:02

## 2024-02-15 NOTE — PROGRESS NOTES
Dr Garcia reached out to infusion clinic and wanted  pt to get infusion of mag due to critical low level. Pt informed and states he has family waiting for him and he can not stay an additional 2 hours for the mag. He states he lives in [place] and has to drive back.      Dr Garcias office notified.

## 2024-02-15 NOTE — TELEPHONE ENCOUNTER
Patient repeated back and voice a understanding of orders.  High Mag diet reviewed.  Plan IV Mag infusion after he completes his IVIG infusion today.  Patient adamantly refuse to stay after for Mag infusion.  Side Effects of Hypomagnesemia reviewed with patient including Cardiac Arrhythmias and Death.     ----- Message from Scar Garcia Jr., MD sent at 2/15/2024  8:19 AM CST -----  Thank you  ----- Message -----  From: Farhan Urbina RN  Sent: 2/14/2024   4:40 PM CST  To: Tona Leon MD; #    Patient was sent to the ER and started on Mag Ox on Monday 2/15.  He is repeating labs here tomorrow 2/15.  ----- Message -----  From: Tona Leon MD  Sent: 2/14/2024   4:37 PM CST  To: [facility] Transplant Clinical    Mg oxide to 800 mg TID. High Mg diet.  If he is symptomatic or has hx of afib, ER visit for replacement.

## 2024-02-15 NOTE — PROGRESS NOTES
Arrived for Privigen infusion.   Limited head to toe assessment completed.   Medicated with Tylenol and Benadryl 30 minutes prior to infusion.  Infusion started at titration rates 35 - 69 - 139 - 277 max rate.  Tolerated infusion without difficulty. Left in NAD.

## 2024-02-19 LAB
EXT ALBUMIN: 2.4
EXT BUN: 25
EXT CALCIUM: 7.8
EXT CHLORIDE: 107
EXT CO2: 21
EXT CREATININE: 1.78 MG/DL
EXT EOSINOPHIL%: 0
EXT GFR MDRD NON AF AMER: 49
EXT GLUCOSE: 79
EXT HEMATOCRIT: 35.2
EXT HEMOGLOBIN: 11.1
EXT LYMPH%: 11
EXT MAGNESIUM: 0.9
EXT MONOCYTES%: 3
EXT PHOSPHORUS: 3.7
EXT PLATELETS: 303
EXT POTASSIUM: 4.5
EXT SEGS%: 81
EXT SODIUM: 142 MMOL/L
EXT TACROLIMUS LVL: 6.8
EXT WBC: 17.4

## 2024-02-20 DIAGNOSIS — N04.9 NEPHROTIC SYNDROME: ICD-10-CM

## 2024-02-20 NOTE — TELEPHONE ENCOUNTER
Return call to patient , requesting future refills for Eliquis [facility].  ----- Message from Farhan Urbina RN sent at 2/20/2024 12:14 PM CST -----  Regarding: FW: call back    ----- Message -----  From: Johnathan Calix  Sent: 2/20/2024  11:38 AM CST  To: [facility] Post-Kidney Transplant Clinical  Subject: call back                                        Pt call to speak with Farhan in regards to clarification on his apixaban (ELIQUIS) 5 mg Tab    Call

## 2024-02-21 ENCOUNTER — DOCUMENTATION ONLY (OUTPATIENT)
Dept: TRANSPLANT | Facility: CLINIC | Age: 39
End: 2024-02-21
Payer: OTHER GOVERNMENT

## 2024-02-23 ENCOUNTER — PATIENT MESSAGE (OUTPATIENT)
Dept: TRANSPLANT | Facility: CLINIC | Age: 39
End: 2024-02-23
Payer: OTHER GOVERNMENT

## 2024-02-23 LAB
ALBUMIN SERPL-MCNC: 2.4 G/DL (ref 4.1–5.1)
BASOPHILS # BLD AUTO: 0 X10E3/UL (ref 0–0.2)
BASOPHILS NFR BLD AUTO: 0 %
BUN SERPL-MCNC: 25 MG/DL (ref 6–24)
BUN/CREAT SERPL: 13 (ref 9–20)
CALCIUM SERPL-MCNC: 8.3 MG/DL (ref 8.7–10.2)
CHLORIDE SERPL-SCNC: 106 MMOL/L (ref 96–106)
CO2 SERPL-SCNC: 22 MMOL/L (ref 20–29)
CREAT SERPL-MCNC: 1.93 MG/DL (ref 0.76–1.27)
EOSINOPHIL # BLD AUTO: 0 X10E3/UL (ref 0–0.4)
EOSINOPHIL NFR BLD AUTO: 0 %
ERYTHROCYTE [DISTWIDTH] IN BLOOD BY AUTOMATED COUNT: 14.9 % (ref 11.6–15.4)
EST. GFR  (NO RACE VARIABLE): 44 ML/MIN/1.73
EXT ALBUMIN: 2.4 (ref 4.1–5.1)
EXT BUN: 25 (ref 6–24)
EXT CALCIUM: 8.3 (ref 8.7–10.2)
EXT CHLORIDE: 106 (ref 96–106)
EXT CO2: 22 (ref 20–29)
EXT CREATININE: 1.93 MG/DL (ref 0.76–1.27)
EXT EGFR NO RACE VARIABLE: 44
EXT EOSINOPHIL%: 0
EXT GLUCOSE: 80 (ref 70–99)
EXT HEMATOCRIT: 11.3 (ref 13–17.7)
EXT LYMPH%: 11
EXT MAGNESIUM: 1.3 (ref 1.6–2.3)
EXT MONOCYTES%: 3
EXT PHOSPHORUS: 3.8 (ref 2.8–4.1)
EXT PLATELETS: 212 (ref 150–450)
EXT POTASSIUM: 4.8 (ref 3.5–5.2)
EXT SEGS%: 85
EXT SODIUM: 141 MMOL/L (ref 134–144)
EXT TACROLIMUS LVL: 6.4
EXT WBC: 10.3 (ref 3.4–10.8)
GLUCOSE SERPL-MCNC: 80 MG/DL (ref 70–99)
HCT VFR BLD AUTO: 34.6 % (ref 37.5–51)
HGB BLD-MCNC: 11.3 G/DL (ref 13–17.7)
IMM GRANULOCYTES # BLD AUTO: 0.1 X10E3/UL (ref 0–0.1)
IMM GRANULOCYTES NFR BLD AUTO: 1 %
LYMPHOCYTES # BLD AUTO: 1.2 X10E3/UL (ref 0.7–3.1)
LYMPHOCYTES NFR BLD AUTO: 11 %
MAGNESIUM SERPL-MCNC: 1.3 MG/DL (ref 1.6–2.3)
MCH RBC QN AUTO: 29.6 PG (ref 26.6–33)
MCHC RBC AUTO-ENTMCNC: 32.7 G/DL (ref 31.5–35.7)
MCV RBC AUTO: 91 FL (ref 79–97)
MONOCYTES # BLD AUTO: 0.3 X10E3/UL (ref 0.1–0.9)
MONOCYTES NFR BLD AUTO: 3 %
NEUTROPHILS # BLD AUTO: 8.6 X10E3/UL (ref 1.4–7)
NEUTROPHILS NFR BLD AUTO: 85 %
PHOSPHATE SERPL-MCNC: 3.8 MG/DL (ref 2.8–4.1)
PLATELET # BLD AUTO: 212 X10E3/UL (ref 150–450)
POTASSIUM SERPL-SCNC: 4.8 MMOL/L (ref 3.5–5.2)
RBC # BLD AUTO: 3.82 X10E6/UL (ref 4.14–5.8)
SODIUM SERPL-SCNC: 141 MMOL/L (ref 134–144)
TACROLIMUS BLD LC/MS/MS-MCNC: 6.4 NG/ML (ref 2–20)
WBC # BLD AUTO: 10.3 X10E3/UL (ref 3.4–10.8)

## 2024-03-01 ENCOUNTER — TELEPHONE (OUTPATIENT)
Dept: TRANSPLANT | Facility: CLINIC | Age: 39
End: 2024-03-01
Payer: OTHER GOVERNMENT

## 2024-03-01 DIAGNOSIS — Z94.0 KIDNEY REPLACED BY TRANSPLANT: Primary | ICD-10-CM

## 2024-03-01 LAB
ALBUMIN SERPL-MCNC: 2.5 G/DL (ref 4.1–5.1)
ALP SERPL-CCNC: 109 IU/L (ref 44–121)
ALT SERPL-CCNC: 26 IU/L (ref 0–44)
AST SERPL-CCNC: 24 IU/L (ref 0–40)
BASOPHILS # BLD AUTO: 0.1 X10E3/UL (ref 0–0.2)
BASOPHILS NFR BLD AUTO: 1 %
BILIRUB DIRECT SERPL-MCNC: <0.1 MG/DL (ref 0–0.4)
BILIRUB SERPL-MCNC: <0.2 MG/DL (ref 0–1.2)
BUN SERPL-MCNC: 20 MG/DL (ref 6–24)
BUN/CREAT SERPL: 12 (ref 9–20)
CALCIUM SERPL-MCNC: 8.5 MG/DL (ref 8.7–10.2)
CHLORIDE SERPL-SCNC: 109 MMOL/L (ref 96–106)
CO2 SERPL-SCNC: 18 MMOL/L (ref 20–29)
CREAT SERPL-MCNC: 1.61 MG/DL (ref 0.76–1.27)
EOSINOPHIL # BLD AUTO: 0.1 X10E3/UL (ref 0–0.4)
EOSINOPHIL NFR BLD AUTO: 1 %
ERYTHROCYTE [DISTWIDTH] IN BLOOD BY AUTOMATED COUNT: 15 % (ref 11.6–15.4)
EST. GFR  (NO RACE VARIABLE): 55 ML/MIN/1.73
GLUCOSE SERPL-MCNC: 92 MG/DL (ref 70–99)
HCT VFR BLD AUTO: 37.4 % (ref 37.5–51)
HGB BLD-MCNC: 12 G/DL (ref 13–17.7)
IMM GRANULOCYTES # BLD AUTO: 0.1 X10E3/UL (ref 0–0.1)
IMM GRANULOCYTES NFR BLD AUTO: 1 %
LYMPHOCYTES # BLD AUTO: 1 X10E3/UL (ref 0.7–3.1)
LYMPHOCYTES NFR BLD AUTO: 10 %
MAGNESIUM SERPL-MCNC: 1.5 MG/DL (ref 1.6–2.3)
MCH RBC QN AUTO: 29 PG (ref 26.6–33)
MCHC RBC AUTO-ENTMCNC: 32.1 G/DL (ref 31.5–35.7)
MCV RBC AUTO: 90 FL (ref 79–97)
MONOCYTES # BLD AUTO: 0.2 X10E3/UL (ref 0.1–0.9)
MONOCYTES NFR BLD AUTO: 2 %
NEUTROPHILS # BLD AUTO: 8.7 X10E3/UL (ref 1.4–7)
NEUTROPHILS NFR BLD AUTO: 85 %
PHOSPHATE SERPL-MCNC: 3.5 MG/DL (ref 2.8–4.1)
PLATELET # BLD AUTO: 260 X10E3/UL (ref 150–450)
POTASSIUM SERPL-SCNC: 4.4 MMOL/L (ref 3.5–5.2)
PROT SERPL-MCNC: 5 G/DL (ref 6–8.5)
RBC # BLD AUTO: 4.14 X10E6/UL (ref 4.14–5.8)
SODIUM SERPL-SCNC: 140 MMOL/L (ref 134–144)
TACROLIMUS BLD LC/MS/MS-MCNC: 7.1 NG/ML (ref 2–20)
WBC # BLD AUTO: 10 X10E3/UL (ref 3.4–10.8)

## 2024-03-01 NOTE — TELEPHONE ENCOUNTER
Results reviewed with patient and will repeat labs prior to clinic appointment here on Monday 3/4.  ----- Message from Scar Garcia Jr., MD sent at 3/1/2024  8:35 AM CST -----  No changes needed right now. Plan to stop apixaban if albumin stays similar when he comes to clinic

## 2024-03-04 ENCOUNTER — OFFICE VISIT (OUTPATIENT)
Dept: TRANSPLANT | Facility: CLINIC | Age: 39
End: 2024-03-04
Payer: OTHER GOVERNMENT

## 2024-03-04 ENCOUNTER — LAB VISIT (OUTPATIENT)
Dept: LAB | Facility: HOSPITAL | Age: 39
End: 2024-03-04
Attending: STUDENT IN AN ORGANIZED HEALTH CARE EDUCATION/TRAINING PROGRAM
Payer: OTHER GOVERNMENT

## 2024-03-04 VITALS
HEART RATE: 87 BPM | SYSTOLIC BLOOD PRESSURE: 134 MMHG | DIASTOLIC BLOOD PRESSURE: 96 MMHG | RESPIRATION RATE: 18 BRPM | OXYGEN SATURATION: 96 % | HEIGHT: 72 IN | TEMPERATURE: 98 F | WEIGHT: 233.94 LBS | BODY MASS INDEX: 31.69 KG/M2

## 2024-03-04 DIAGNOSIS — T86.11 ACUTE REJECTION OF KIDNEY TRANSPLANT: ICD-10-CM

## 2024-03-04 DIAGNOSIS — R78.81 BACTEREMIA: ICD-10-CM

## 2024-03-04 DIAGNOSIS — N18.31 ACUTE RENAL FAILURE SUPERIMPOSED ON STAGE 3A CHRONIC KIDNEY DISEASE, UNSPECIFIED ACUTE RENAL FAILURE TYPE: ICD-10-CM

## 2024-03-04 DIAGNOSIS — Z29.89 NEED FOR PROPHYLACTIC IMMUNOTHERAPY: ICD-10-CM

## 2024-03-04 DIAGNOSIS — Z94.0 LIVING-DONOR KIDNEY TRANSPLANT RECIPIENT: ICD-10-CM

## 2024-03-04 DIAGNOSIS — Z79.60 LONG-TERM USE OF IMMUNOSUPPRESSANT MEDICATION: ICD-10-CM

## 2024-03-04 DIAGNOSIS — Z91.89 AT RISK FOR OPPORTUNISTIC INFECTIONS: Primary | ICD-10-CM

## 2024-03-04 DIAGNOSIS — N17.9 ACUTE RENAL FAILURE SUPERIMPOSED ON STAGE 3A CHRONIC KIDNEY DISEASE, UNSPECIFIED ACUTE RENAL FAILURE TYPE: ICD-10-CM

## 2024-03-04 DIAGNOSIS — U07.1 COVID: ICD-10-CM

## 2024-03-04 DIAGNOSIS — Z29.89 PROPHYLACTIC IMMUNOTHERAPY: ICD-10-CM

## 2024-03-04 DIAGNOSIS — Z94.0 KIDNEY REPLACED BY TRANSPLANT: ICD-10-CM

## 2024-03-04 LAB
ALBUMIN SERPL BCP-MCNC: 1.8 G/DL (ref 3.5–5.2)
ALBUMIN SERPL BCP-MCNC: 1.8 G/DL (ref 3.5–5.2)
ALP SERPL-CCNC: 94 U/L (ref 55–135)
ALT SERPL W/O P-5'-P-CCNC: 21 U/L (ref 10–44)
ANION GAP SERPL CALC-SCNC: 7 MMOL/L (ref 8–16)
AST SERPL-CCNC: 20 U/L (ref 10–40)
BASOPHILS # BLD AUTO: 0.13 K/UL (ref 0–0.2)
BASOPHILS NFR BLD: 1.2 % (ref 0–1.9)
BILIRUB DIRECT SERPL-MCNC: 0.1 MG/DL (ref 0.1–0.3)
BILIRUB SERPL-MCNC: 0.2 MG/DL (ref 0.1–1)
BUN SERPL-MCNC: 21 MG/DL (ref 6–20)
CALCIUM SERPL-MCNC: 8.8 MG/DL (ref 8.7–10.5)
CHLORIDE SERPL-SCNC: 110 MMOL/L (ref 95–110)
CO2 SERPL-SCNC: 22 MMOL/L (ref 23–29)
CREAT SERPL-MCNC: 1.5 MG/DL (ref 0.5–1.4)
DIFFERENTIAL METHOD BLD: ABNORMAL
EOSINOPHIL # BLD AUTO: 0.1 K/UL (ref 0–0.5)
EOSINOPHIL NFR BLD: 0.7 % (ref 0–8)
ERYTHROCYTE [DISTWIDTH] IN BLOOD BY AUTOMATED COUNT: 15.9 % (ref 11.5–14.5)
EST. GFR  (NO RACE VARIABLE): 59.6 ML/MIN/1.73 M^2
GLUCOSE SERPL-MCNC: 91 MG/DL (ref 70–110)
HCT VFR BLD AUTO: 39.4 % (ref 40–54)
HGB BLD-MCNC: 12.6 G/DL (ref 14–18)
IMM GRANULOCYTES # BLD AUTO: 0.26 K/UL (ref 0–0.04)
IMM GRANULOCYTES NFR BLD AUTO: 2.4 % (ref 0–0.5)
LYMPHOCYTES # BLD AUTO: 1.3 K/UL (ref 1–4.8)
LYMPHOCYTES NFR BLD: 12 % (ref 18–48)
MAGNESIUM SERPL-MCNC: 1.6 MG/DL (ref 1.6–2.6)
MCH RBC QN AUTO: 28.9 PG (ref 27–31)
MCHC RBC AUTO-ENTMCNC: 32 G/DL (ref 32–36)
MCV RBC AUTO: 90 FL (ref 82–98)
MONOCYTES # BLD AUTO: 0.3 K/UL (ref 0.3–1)
MONOCYTES NFR BLD: 2.9 % (ref 4–15)
NEUTROPHILS # BLD AUTO: 8.9 K/UL (ref 1.8–7.7)
NEUTROPHILS NFR BLD: 80.8 % (ref 38–73)
NRBC BLD-RTO: 0 /100 WBC
PHOSPHATE SERPL-MCNC: 3.2 MG/DL (ref 2.7–4.5)
PLATELET # BLD AUTO: 403 K/UL (ref 150–450)
PMV BLD AUTO: 8.4 FL (ref 9.2–12.9)
POTASSIUM SERPL-SCNC: 4 MMOL/L (ref 3.5–5.1)
PROT SERPL-MCNC: 5.3 G/DL (ref 6–8.4)
RBC # BLD AUTO: 4.36 M/UL (ref 4.6–6.2)
SODIUM SERPL-SCNC: 139 MMOL/L (ref 136–145)
TACROLIMUS BLD-MCNC: 7.2 NG/ML (ref 5–15)
WBC # BLD AUTO: 11.04 K/UL (ref 3.9–12.7)

## 2024-03-04 PROCEDURE — 80069 RENAL FUNCTION PANEL: CPT | Performed by: STUDENT IN AN ORGANIZED HEALTH CARE EDUCATION/TRAINING PROGRAM

## 2024-03-04 PROCEDURE — 99215 OFFICE O/P EST HI 40 MIN: CPT | Mod: S$PBB,,, | Performed by: STUDENT IN AN ORGANIZED HEALTH CARE EDUCATION/TRAINING PROGRAM

## 2024-03-04 PROCEDURE — 83735 ASSAY OF MAGNESIUM: CPT | Performed by: STUDENT IN AN ORGANIZED HEALTH CARE EDUCATION/TRAINING PROGRAM

## 2024-03-04 PROCEDURE — 82247 BILIRUBIN TOTAL: CPT | Performed by: STUDENT IN AN ORGANIZED HEALTH CARE EDUCATION/TRAINING PROGRAM

## 2024-03-04 PROCEDURE — 85025 COMPLETE CBC W/AUTO DIFF WBC: CPT | Performed by: STUDENT IN AN ORGANIZED HEALTH CARE EDUCATION/TRAINING PROGRAM

## 2024-03-04 PROCEDURE — 99999 PR PBB SHADOW E&M-EST. PATIENT-LVL IV: CPT | Mod: PBBFAC,,, | Performed by: STUDENT IN AN ORGANIZED HEALTH CARE EDUCATION/TRAINING PROGRAM

## 2024-03-04 PROCEDURE — 99214 OFFICE O/P EST MOD 30 MIN: CPT | Mod: PBBFAC | Performed by: STUDENT IN AN ORGANIZED HEALTH CARE EDUCATION/TRAINING PROGRAM

## 2024-03-04 PROCEDURE — 84075 ASSAY ALKALINE PHOSPHATASE: CPT | Performed by: STUDENT IN AN ORGANIZED HEALTH CARE EDUCATION/TRAINING PROGRAM

## 2024-03-04 PROCEDURE — 36415 COLL VENOUS BLD VENIPUNCTURE: CPT | Performed by: STUDENT IN AN ORGANIZED HEALTH CARE EDUCATION/TRAINING PROGRAM

## 2024-03-04 PROCEDURE — 80197 ASSAY OF TACROLIMUS: CPT | Performed by: STUDENT IN AN ORGANIZED HEALTH CARE EDUCATION/TRAINING PROGRAM

## 2024-03-04 RX ORDER — TACROLIMUS 1 MG/1
2 CAPSULE ORAL EVERY 12 HOURS
Qty: 360 CAPSULE | Refills: 3 | Status: SHIPPED | OUTPATIENT
Start: 2024-03-04 | End: 2025-03-04

## 2024-03-04 RX ORDER — PREDNISONE 5 MG/1
5 TABLET ORAL DAILY
Qty: 90 TABLET | Refills: 3 | Status: SHIPPED | OUTPATIENT
Start: 2024-03-04 | End: 2025-03-04

## 2024-03-04 RX ORDER — MYCOPHENOLATE MOFETIL 250 MG/1
750 CAPSULE ORAL 2 TIMES DAILY
Qty: 540 CAPSULE | Refills: 3 | Status: SHIPPED | OUTPATIENT
Start: 2024-03-04 | End: 2025-03-04

## 2024-03-04 NOTE — PROGRESS NOTES
Post-Transplant Assessment    Referring Physician:   Current Nephrologist: Nixon Robertson    ORGAN: LEFT KIDNEY  Donor Type: living  PHS Increased Risk: no  Cold Ischemia: 19.8 mins  Induction Medications: steroids (prednisone,methylprednisolone,solumedrol,medrol,decadron), campath - alemtuzumab (anti-cd52)    No chief complaint on file.    Presents to the clinic today for medical conditions listed below.  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 No known complications post op. BL sCr 1.3-1.5 piror to rejection 12/2023.    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    The patient reports improved kidney function with current estimated glomerular filtration rate (EGFR) near 60%. However, significant presence of protein in his urine persists. The possibility of  transitioning to a local nephrologist was discussed. No symptoms of diabetes mentioned.    Current regimen includes Prednisone 5mg intended to continue indefinitely, Apixaban contingent upon consistent albumin levels above two, and Lasix aiding kidney function. Ceased Protonix due to recent absence of reflux struggles. Potential introduction of Farxiga for reducing protein in urine was discussed.    Continue use of carvedilol for blood pressure.    Previously contracted pneumonia and COVID-19 in January, leading to ICU stay and dialysis treatments. He acknowledges prior severe sickness along with ongoing but lessening leg swelling, which could be due to excessive water intake as he consumes more than his thirst demands.    Recent dietary changes include a magnesium-rich diet. He's eager to return to work with employer's support and plans to assign an extra team member to assist him. No medication alterations or personal discomfort in relation to work were reported.  ROS:  Gastrointestinal: -heartburn  Endocrine: -excessive urination         Physical Exam :  Vitals:    03/04/24 1052   BP: (!) 134/96   Pulse: 87   Resp: 18   Temp: 97.7 °F (36.5 °C)     Physical Exam    General: Well-developed. Well-appearing.  Cardiovascular: Regular rate. Regular rhythm.  Lungs: Normal respiratory effort. No respiratory distress.  Extremities: No edema lower extremities.  Psychiatric: Alert.         Last 3 sets of metabolic panel, blood count, drug levels reviewed     Assessment & Plan    I10 Essential (primary) hypertension  E87.6 Hypokalemia  D69.6 Thrombocytopenia, unspecified  Z94.0 Kidney transplant status  PROTEINURIA:  - Discussed adjunctive therapy with the patient, highlighting the potential benefits of olmesartan and Farxiga in reducing protein levels in the urine.  - Noted the function of Farxiga, initially developed as a diabetes medicine, for reducing pressure and protein levels.  - Emphasized that excessive protein in  the urine can damage the kidney over time, but protective medicines can help mitigate this.  - Highlighted the importance of maintaining a high protein diet to counteract protein loss in the urine.  KIDNEY FUNCTION:  - Decided against further interventions such as kidney biopsy or rejection treatment due to the associated risks and the patient's current kidney function.  - Referred the patient back to their local nephrologist for continued care.  - Advised against excessive water intake as it can lead to kidney swelling and instructed the patient to drink only when thirsty.  - Explained the effect of extended use of Lasix on the kidney.  MEDICATION MANAGEMENT:  - Prescribed Prograf, CellCept, and prednisone, noting their effectiveness and suitability for long-term use.  - Advised the patient to continue the current dosage of five mg of prednisone indefinitely  - Instructed the patient to discontinue Protonix due to the absence of reflux issues and to remain on apixaban until albumin levels consistently stay above two.  - Mentioned the possibility of introducing Farxiga and discontinuing the anticoagulant in the future, pending further discussions with the patient's nephrologist.  RETURN TO WORK:  - Agreed to write a letter for the patient to return to work.         Immediate changes:    Please email patient a work release form  Stop pantoprazole  Fax this note to Dr. Guillen at both [phone] and [phone]   Called and spoke to Loni at his office and gave clinical history and instructions to start dapagliflozin for proteinuria as they see fit, stop apixaban when serum albumin consistently above 2 (left my name and cell for Dr. Guillen)  Discussed with patient no plan to biopsy kidney in the future as risks of treatment outweigh benefit      1. CKD stage 3a:   Will continue follow up as per our center guidelines. patient to continue close follow up with the local General nephrologist. Education provided in  "appropriate fluid intake, potassium intake. Continue with oral hydration.    2. Immunosuppression: tac 2/2, /750, pred 5; target tac trough 5-7  Will closely monitor for toxicities, education provided about adherence to medicines and need to communicate any side effect to the transplant nurse or physician.  Lab Results   Component Value Date    TACROLIMUS 7.2 03/04/2024    TACROLIMUS 7.1 02/26/2024    TACROLIMUS 6.4 02/19/2024     No results found for: "CYCLOSPORINE"    3. Allograft Function:  Stable at baseline for the patient. Continue follow up as per our guidelines and with the local General nephrologist. Communication will be sent today.  Lab Results   Component Value Date    CREATININE 1.5 (H) 03/04/2024    CREATININE 1.61 (H) 02/26/2024    CREATININE 1.93 (H) 02/19/2024     No results found for: "AMYLASE", "LIPASE"    4. Hypertension management:    Continue with home blood pressure monitoring, low salt and healthy life discussed with the patient    5. Metabolic Bone Disease/Secondary Hyperparathyroidism:   Calcium and phosphorus level discussed with the patient, patient will continue follow up with the general nephrologist for management of metabolic bone disease. Calcium and phosphorus as per our center protocol.  Lab Results   Component Value Date    .5 (H) 01/15/2024    CALCIUM 8.8 03/04/2024    CAION 1.14 01/18/2024    PHOS 3.2 03/04/2024    PHOS 3.5 02/26/2024    PHOS 3.8 02/19/2024       6. Electrolytes:   Reviewed with the patient, essentially within the normal range no need for acute changes today, will monitor as per our center guidelines.  Lab Results   Component Value Date     03/04/2024    K 4.0 03/04/2024     03/04/2024    CO2 22 (L) 03/04/2024    CO2 18 (L) 02/26/2024    CO2 22 02/19/2024       7. Anemia:   Will continue monitoring as per our center guidelines. No indication for acute intervention today.  Lab Results   Component Value Date    WBC 11.04 03/04/2024    HGB " 12.6 (L) 03/04/2024    HCT 39.4 (L) 03/04/2024    MCV 90 03/04/2024     03/04/2024       8.Proteinuria:   Will continue with pr/cr ratio as per our center guidelines  Lab Results   Component Value Date    PROTEINURINE 1792 (H) 03/04/2024    CREATRANDUR 129.0 03/04/2024    UTPCR 13.89 (H) 03/04/2024    UTPCR 10.51 (H) 02/15/2024    UTPCR 3.56 (H) 01/18/2024        9. BK virus infection screening:   Will continue with urine or blood PCR as per our guidelines to prevent BK virus viremia and allograft dysfunction  Lab Results   Component Value Date    BKVIRUSDNAUR NEGATIVE 07/17/2013    BKQUANTURINE <250 07/17/2013    BKVIRUSLOG <2.40 07/17/2013    BKVIRUSURINE Urine 07/17/2013    BKVIRUSPCRQB <125 01/15/2024         10. Weight education:   Provided during the clinic visit.  Body mass index is 31.72 kg/m².     11.Patient safety education regarding immunosuppression including prophylaxis posttransplant for CMV, PCP :   Education provided about vaccination and prevention of infections.    12.  Cytopenias:   No significant cytopenias will monitor as per our guidelines. Medicine list reviewed including potential causes of drug-induced cytopenias  Lab Results   Component Value Date    WBC 11.04 03/04/2024    HGB 12.6 (L) 03/04/2024    HCT 39.4 (L) 03/04/2024    MCV 90 03/04/2024     03/04/2024       13. Post-transplant Prophylaxis: CMV Infection, PJP and Candida mucosistis and other indicated for this particular patient.     I spent a total of 45 minutes on the day of the visit.      No orders of the defined types were placed in this encounter.    Medications Discontinued During This Encounter   Medication Reason    pantoprazole (PROTONIX) 40 MG tablet     mycophenolate (CELLCEPT) 250 mg Cap     tacrolimus (PROGRAF) 1 MG Cap     predniSONE (DELTASONE) 10 MG tablet       No future appointments.    Follow-up:   Clinic: return to transplant clinic weekly for the first month after transplant; every 2 weeks during  months 2-3; then at 6-, 9-, 12-, 18-, 24-, and 36- months post-transplant to reassess for complications from immunosuppression toxicity and monitor for rejection.  Annually thereafter.    Labs: since patient remains at high risk for rejection and drug-related complications that warrant close monitoring, labs will be ordered as follows: continue twice weekly CBC, renal panel, and drug level for first month; then same labs once weekly through 3rd month post-transplant.  Urine for UA and protein/creatinine ratio monthly.  Serum BK - PCR at 1-, 3-, 6-, 9-, 12-, 18-, 24-, 36- 48-, and 60 months post-transplant.  Hepatic panel at 1-, 2-, 3-, 6-, 9-, 12-, 18-, 24-, and 36- months post-transplant.    Scar Garcia Jr., MD       Education:   Material provided to the patient.  Patient reminded to call with any health changes since these can be early signs of significant complications.  Also, I advised the patient to be sure any new medications or changes of old medications are discussed with either a pharmacist or physician knowledgeable with transplant to avoid rejection/drug toxicity related to significant drug interactions.    Patient advised that it is recommended that all transplanted patients, and their close contacts and household members receive Covid vaccination.    Visit today included increased complexity associated with the care of the episodic problem kidney transplant addressed and managing the longitudinal care of the patient due to the serious and/or complex managed problem(s) kidney transplant, CKD, HTN, secondary hyperparathyroidism, and anemia of chronic disease.    UNOS Patient Status  Functional Status: 90% - Able to carry on normal activity: minor symptoms of disease  Physical Capacity: No Limitations

## 2024-03-04 NOTE — LETTER
March 4, 2024        Nixon Robertson  [address]   [place] [place] [address]  Phone: [phone]  Fax: [phone]             [facility]  [address]  [facility] [address]  Phone: [phone]   Patient: Ubaldo Tripp   MR Number: [id number]   YOB: 1985   Date of Visit: 3/4/2024       Dear Dr. Nixon Robertson    Thank you for referring Ubaldo Tripp to me for evaluation. Attached you will find relevant portions of my assessment and plan of care.    If you have questions, please do not hesitate to call me. I look forward to following Ubaldo Tripp along with you.    Sincerely,    Scar Garcia Jr., MD    Enclosure    If you would like to receive this communication electronically, please contact [email or web address] or [phone] to request [employer] access.    [employer] Link is a tool which provides read-only access to select patient information with whom you have a relationship. Its easy to use and provides real time access to review your patients record including encounter summaries, notes, results, and demographic information.    If you feel you have received this communication in error or would no longer like to receive these types of communications, please e-mail [email or web address]    None

## 2024-03-05 ENCOUNTER — TELEPHONE (OUTPATIENT)
Dept: TRANSPLANT | Facility: CLINIC | Age: 39
End: 2024-03-05
Payer: OTHER GOVERNMENT

## 2024-03-05 NOTE — TELEPHONE ENCOUNTER
Return call to patient requesting return to work excuse emailed to him.  Patient also voice a understanding that he needs to follow up with Dr Guillen for CKD management and transplant care.No longer needs to be seen by [facility] team , unless something arises in the future.Patient agrees with plan.  ----- Message from Kiki Walls sent at 3/5/2024  9:26 AM CST -----  Regarding: Patient advice  Contact: Pt  [phone]          Name of Caller:   Ubaldo     Contact Preference:  [phone]    Nature of Call:  Requesting a call to get update on return to work letter want to know when he will receive it

## 2024-04-15 PROBLEM — J96.01 ACUTE HYPOXIC RESPIRATORY FAILURE: Status: RESOLVED | Noted: 2024-01-14 | Resolved: 2024-04-15

## 2024-05-06 PROBLEM — N18.9 ACUTE ON CHRONIC RENAL FAILURE: Status: RESOLVED | Noted: 2023-12-18 | Resolved: 2024-05-06

## 2024-05-06 PROBLEM — N17.9 ACUTE ON CHRONIC RENAL FAILURE: Status: RESOLVED | Noted: 2023-12-18 | Resolved: 2024-05-06

## 2024-07-17 RX ORDER — FUROSEMIDE 40 MG/1
40 TABLET ORAL DAILY
Qty: 30 TABLET | Refills: 0 | Status: SHIPPED | OUTPATIENT
Start: 2024-07-17 | End: 2024-08-16

## 2024-09-07 NOTE — HPI
37 y/o M with a medical history of kidney transplant in 2010 and HTN [place] [facility] [place] hypoxemic respiratory failure, bacteremia, and hx of Kidney Transplant for KTM and Infectious Disease evaluation. Pt was previously admitted to [facility] 12/18/23 - 12/23/23 with GENNY, headache, and HTN.  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  He was found to have nephrotic syndrome as well.  Received pulse dose steroids and was started on Eliquis. Discharged on a prednisone taper, CellCept, and Prograf.  sCr 12/23 was 3.0.     On 1/5/24 he was admitted to [facility] in [place] with abnormal labs. Outpatient labs on 1/3 showed WBC 17.7 Hgb 13.1, K 5.7, bicarb 18, BUN 71, sCr 2.92, phos 5.1, and tacro level 10.8.  Was found to be COVID positive requiring BiPAP.  Blood culture with Staph haemolyticus.  Labs notable for sCr ranging 2.68 to 2.73.  K normalized.  Abdominal US on 1/6 noted cirrhotic appearing liver.  CT chest on 1/7 showed consolidated infiltrates and/or atelectasis involving the bilateral lung bases with small bilateral pleural effusions, patchy heterogeneous infiltrate involving the right middle lobe, and mild ascites.  Transplant renal ultrasound 1/8 showed stable appearance of the transplant kidney without evidence of acute abnormality.  Gallbladder ultrasound 1/8 showed sludge in the gallbladder.  Medications prior to transfer include CellCept, tacrolimus, valganciclovir, prednisone, Eliquis, and meropenem.     The patient was initially going to transfer to hospital medicine however he was intubated prior to transfer and was accepted [place] [facility] [place] higher level of care.  He arrived on propofol and fentanyl.  KTM and transplant ID consulted on admission.   Yes

## 2025-04-26 NOTE — PROGRESS NOTES
[facility]  Kidney Transplant  Progress Note      Reason for Follow-up: Reassessment of Kidney Transplant - 7/26/2010  (#1) recipient and management of immunosuppression.           Subjective:   History of Present Illness:    He is 37 yo White male who received a living kidney transplant on 7/26/10.  his creatinine was 1.3-18 till December 2023 when he developed ARFafter not  taking IS meds for 5 weeks. 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 Treated with steroid. DSA 12/19/23 with +DSAs. had IVIG outpatient. Due to nephrotic syndrome and hypoalbuminemia, started eliquis for anticoagulation. He was discharged on pred taper, cellcept, and prograf. At the time of discharge Cr was 3.       transferred to [facility] for acute hypoxemic respiratory failure, bacteremia. On 1/5/24 he was admitted to [facility] in [place] with abnormal labs. Was found to be COVID positive.  Blood culture with Staph haemolyticus. Abdominal US on 1/6 noted cirrhotic appearing liver.  CT chest on 1/7 showed consolidated infiltrates and/or atelectasis involving the bilateral lung bases with small bilateral pleural effusions, patchy heterogeneous infiltrate involving the right middle lobe, and mild ascites. The patient was initially going to transfer to hospital medicine however he was intubated prior to transfer and was accepted to [facility] for a higher level of care.  He arrived on propofol and fentanyl.  KTM and transplant ID consulted on admission.      Hospital Course:  Intubated. On vent  FIO2%90. His UOP ~ 1 liter with lasix. Has metabolic acidosis on ABG. Plan for HD to remove some fluid and correct acidosis. Pt needs to have line today - discussed the plan with ICU team       Past Medical, Surgical, Family, and Social History:   Unchanged from H&P.    Scheduled Meds:   albuterol-ipratropium  3 mL Nebulization Q6H    apixaban  5 mg Per OG tube BID     dexAMETHasone  6 mg Per OG tube Daily    ganciclovir (CYTOVENE) 332 mg in sodium chloride 0.9% 100 mL IVPB  2.5 mg/kg Intravenous Q24H    meropenem (MERREM) IVPB  2 g Intravenous Q12H    multivitamin  1 tablet Per OG tube Daily    mupirocin   Nasal BID    pantoprazole  40 mg Intravenous Daily    white petrolatum-mineral oil 57.3-42.5%   Both Eyes Q8H     Continuous Infusions:   cisatracurium (NIMBEX) 200 mg in dextrose 5 % (D5W) 100 mL infusion Stopped (01/15/24 1129)    fentanyl 200 mcg/hr (01/15/24 1134)    midazolam 4 mg/hr (01/15/24 1134)    propofoL 50 mcg/kg/min (01/15/24 1134)     PRN Meds:dextrose 10%, dextrose 10%, glucagon (human recombinant), glucose, glucose, guaiFENesin 100 mg/5 ml, insulin aspart U-100, naloxone, ondansetron, sodium chloride 0.9%    Intake/Output - Last 3 Shifts         01/13 0700  01/14 0659 01/14 0700  01/15 0659 01/15 0700 01/16 0659    I.V. (mL/kg)  539.2 (4.1) 360.7 (2.7)    NG/GT   10    IV Piggyback  573 250.1    Total Intake(mL/kg)  1112.2 (8.4) 620.8 (4.7)    Urine (mL/kg/hr)  1230 625 (0.9)    Total Output  1230 625    Net  -117.8 -4.2                    Objective:     Vital Signs (Most Recent):  Temp: 97.9 °F (36.6 °C) (01/15/24 1200)  Pulse: (!) 59 (01/15/24 1200)  Resp: (!) 26 (01/15/24 1200)  BP: 112/68 (01/15/24 1200)  SpO2: 99 % (01/15/24 1200) Vital Signs (24h Range):  Temp:  [96.2 °F (35.7 °C)-98.3 °F (36.8 °C)] 97.9 °F (36.6 °C)  Pulse:  [51-64] 59  Resp:  [6-37] 26  SpO2:  [90 %-100 %] 99 %  BP: (106-154)/() 112/68     Weight: 132.8 kg (292 lb 12.3 oz)     Body mass index is 39.71 kg/m².     Physical Exam   Constitutional:       Appearance: ill-appearing.      Comments: Intubated   Cardiovascular:      Rate and Rhythm: Regular rhythm. Bradycardia present.      Pulses: Normal pulses.      Heart sounds: Normal heart sounds. No murmur heard.  Pulmonary:      Effort: Respiratory distress present.       Comments: Mechanical breath sounds  Abdominal:      General:  Bowel sounds are normal. There is distension.      Tenderness: There is abdominal tenderness.      Mayen cath in place  Musculoskeletal:         General: Swelling present. Normal range of motion.      Right lower leg: Edema present.      Left lower leg: Edema present.   Laboratory:  Labs within the past 24 hours have been reviewed.    Assessment/Plan:     Acute on chronic renal failure  - Living kidney transplant on 7/26/10.    - BL creatinine was 1.3-1.7 till December 2023 when he developed ARF due to diffuse proliferative GN and mild  TCMR. Treated with steroid. Due to nephrotic syndrome       and hypoalbuminemia, started eliquis for anticoagulation. He was discharged on pred taper, cellcept, and prograf. At the time of discharge on 12/23/23  Cr was 3.    -  last Cr 2.8   - Strict I/O  - pending kidney US   - pending BK, CMV PCR, DSA   - Avoid nephrotoxins, volume shifts, aim for BP within target to prevent additional renal injury  - Avoid nephrotoxic agents (NSAIDs, IV contrast dye, ACEI/ARB anti-HTN medications, Aminoglycoside-containing antibiotics)  - Renally dose all appropriate medications, including antibiotics  -  High Cr, acidosis, high FIO2 requirement: plan for iHD for fluid removal. Await line placement by ICU team. Lasix  mg X 1 time      Prophylactic immunotherapy  - On dexamethazone  - Off MMF due to COVID  - Off prograf due to high tac level from 1/13/24  - Check tac level daily to avoid toxicity     COVID  - Continue Remdesivir for total of 5 days  - Continue Dexamethasone 6mg QD for 8 days  - Mechanically ventilated           Tona Leon MD  Kidney Transplant  [facility] - Cardiac Medical ICU     16
